# Patient Record
Sex: MALE | Race: BLACK OR AFRICAN AMERICAN | NOT HISPANIC OR LATINO | URBAN - METROPOLITAN AREA
[De-identification: names, ages, dates, MRNs, and addresses within clinical notes are randomized per-mention and may not be internally consistent; named-entity substitution may affect disease eponyms.]

---

## 2017-01-08 ENCOUNTER — EMERGENCY (EMERGENCY)
Facility: HOSPITAL | Age: 77
LOS: 0 days | Discharge: HOME | End: 2017-01-08
Attending: EMERGENCY MEDICINE
Payer: MEDICARE

## 2017-01-08 VITALS
RESPIRATION RATE: 18 BRPM | WEIGHT: 149.91 LBS | HEART RATE: 101 BPM | SYSTOLIC BLOOD PRESSURE: 138 MMHG | OXYGEN SATURATION: 98 % | TEMPERATURE: 98 F | DIASTOLIC BLOOD PRESSURE: 65 MMHG | HEIGHT: 73 IN

## 2017-01-08 VITALS
SYSTOLIC BLOOD PRESSURE: 133 MMHG | DIASTOLIC BLOOD PRESSURE: 61 MMHG | OXYGEN SATURATION: 98 % | RESPIRATION RATE: 18 BRPM | HEART RATE: 84 BPM

## 2017-01-08 DIAGNOSIS — Z21 ASYMPTOMATIC HUMAN IMMUNODEFICIENCY VIRUS [HIV] INFECTION STATUS: ICD-10-CM

## 2017-01-08 DIAGNOSIS — R41.82 ALTERED MENTAL STATUS, UNSPECIFIED: ICD-10-CM

## 2017-01-08 DIAGNOSIS — F03.90 UNSPECIFIED DEMENTIA, UNSPECIFIED SEVERITY, WITHOUT BEHAVIORAL DISTURBANCE, PSYCHOTIC DISTURBANCE, MOOD DISTURBANCE, AND ANXIETY: ICD-10-CM

## 2017-01-08 DIAGNOSIS — I10 ESSENTIAL (PRIMARY) HYPERTENSION: ICD-10-CM

## 2017-01-08 LAB
ALBUMIN SERPL ELPH-MCNC: 3.9 G/DL — SIGNIFICANT CHANGE UP (ref 3.3–5)
ALP SERPL-CCNC: 246 U/L — HIGH (ref 40–120)
ALT FLD-CCNC: 16 U/L — SIGNIFICANT CHANGE UP (ref 12–78)
ANION GAP SERPL CALC-SCNC: 7 MMOL/L — SIGNIFICANT CHANGE UP (ref 5–17)
APTT BLD: 33.4 SEC — SIGNIFICANT CHANGE UP (ref 27.5–37.4)
AST SERPL-CCNC: 24 U/L — SIGNIFICANT CHANGE UP (ref 15–37)
BILIRUB SERPL-MCNC: 0.5 MG/DL — SIGNIFICANT CHANGE UP (ref 0.2–1.2)
BUN SERPL-MCNC: 26 MG/DL — HIGH (ref 7–23)
CALCIUM SERPL-MCNC: 9.1 MG/DL — SIGNIFICANT CHANGE UP (ref 8.5–10.1)
CHLORIDE SERPL-SCNC: 105 MMOL/L — SIGNIFICANT CHANGE UP (ref 96–108)
CK SERPL-CCNC: 86 U/L — SIGNIFICANT CHANGE UP (ref 26–308)
CO2 SERPL-SCNC: 28 MMOL/L — SIGNIFICANT CHANGE UP (ref 22–31)
CREAT SERPL-MCNC: 1.38 MG/DL — HIGH (ref 0.5–1.3)
GLUCOSE SERPL-MCNC: 124 MG/DL — HIGH (ref 70–99)
HCT VFR BLD CALC: 36.6 % — LOW (ref 39–50)
HGB BLD-MCNC: 13.2 G/DL — SIGNIFICANT CHANGE UP (ref 13–17)
INR BLD: 1.04 RATIO — SIGNIFICANT CHANGE UP (ref 0.88–1.16)
MCHC RBC-ENTMCNC: 33.2 PG — SIGNIFICANT CHANGE UP (ref 27–34)
MCHC RBC-ENTMCNC: 36.2 GM/DL — HIGH (ref 32–36)
MCV RBC AUTO: 91.6 FL — SIGNIFICANT CHANGE UP (ref 80–100)
PLATELET # BLD AUTO: 192 K/UL — SIGNIFICANT CHANGE UP (ref 150–400)
POTASSIUM SERPL-MCNC: 4.6 MMOL/L — SIGNIFICANT CHANGE UP (ref 3.5–5.3)
POTASSIUM SERPL-SCNC: 4.6 MMOL/L — SIGNIFICANT CHANGE UP (ref 3.5–5.3)
PROT SERPL-MCNC: 9.3 GM/DL — HIGH (ref 6–8.3)
PROTHROM AB SERPL-ACNC: 11.7 SEC — SIGNIFICANT CHANGE UP (ref 10–13.1)
RBC # BLD: 3.99 M/UL — LOW (ref 4.2–5.8)
RBC # FLD: 12.1 % — SIGNIFICANT CHANGE UP (ref 11–15)
SODIUM SERPL-SCNC: 140 MMOL/L — SIGNIFICANT CHANGE UP (ref 135–145)
WBC # BLD: 9.7 K/UL — SIGNIFICANT CHANGE UP (ref 3.8–10.5)
WBC # FLD AUTO: 9.7 K/UL — SIGNIFICANT CHANGE UP (ref 3.8–10.5)

## 2017-01-08 PROCEDURE — 99284 EMERGENCY DEPT VISIT MOD MDM: CPT

## 2017-01-08 PROCEDURE — 71010: CPT | Mod: 26

## 2017-01-08 PROCEDURE — 70450 CT HEAD/BRAIN W/O DYE: CPT | Mod: 26

## 2017-01-08 NOTE — ED ADULT NURSE NOTE - PMH
Alzheimer's disease of other onset    HIV (human immunodeficiency virus infection)    HTN (hypertension)

## 2017-01-08 NOTE — ED PROVIDER NOTE - MEDICAL DECISION MAKING DETAILS
Patient with history of dementia presents after possible seizure; patient seen by neurology; stable; no seizure in ED, walked to bathroom with family; will follow up as outpatient

## 2017-01-08 NOTE — ED PROVIDER NOTE - OBJECTIVE STATEMENT
Patient with history of demetia, hiv presents with family presents after fall with possible seizure-like activity. Daughter denies history of seizure, fever, chills, neck pain.

## 2017-01-08 NOTE — ED ADULT NURSE NOTE - CHIEF COMPLAINT QUOTE
pt BIB paramedics for seizure like activity at home, was in kitchen stood up and fell back unknown if he struck head and started to shake once he hit the floor, denies seizure hx, HIV + compliant with meds, no neuro deficits noted and pt currently at baseline mental status, denies LOC,  in triage

## 2017-01-08 NOTE — ED ADULT TRIAGE NOTE - CHIEF COMPLAINT QUOTE
pt BIB paramedics for seizure like activity at home, was in kitchen stood up and fell back unknown if he struck head and started to shake once he hit the floor, denies seizure hx, HIV + compliant with meds, no neuro deficits noted and pt currently at baseline mental status, denies LOC pt BIB paramedics for seizure like activity at home, was in kitchen stood up and fell back unknown if he struck head and started to shake once he hit the floor, denies seizure hx, HIV + compliant with meds, no neuro deficits noted and pt currently at baseline mental status, denies LOC,  in triage

## 2017-01-08 NOTE — ED ADULT NURSE NOTE - OBJECTIVE STATEMENT
pt family at beside pt was at home washing dishes he vomited in the zinc, then fell back back and had seizure like activity, pt c/o chronic pain to the Rt arm, no visible bruise noted

## 2017-01-08 NOTE — ED ADULT NURSE REASSESSMENT NOTE - NS ED NURSE REASSESS COMMENT FT1
pt seen and re-evalautated  by Ed attending discharge ready in stable condition , left Ed ambulatory accompanied by wife and daughter in no acute distress

## 2017-01-09 DIAGNOSIS — R40.20 UNSPECIFIED COMA: ICD-10-CM

## 2017-01-11 ENCOUNTER — INPATIENT (INPATIENT)
Facility: HOSPITAL | Age: 77
LOS: 3 days | Discharge: HOME | End: 2017-01-15
Attending: INTERNAL MEDICINE | Admitting: INTERNAL MEDICINE
Payer: MEDICARE

## 2017-01-11 VITALS
DIASTOLIC BLOOD PRESSURE: 67 MMHG | OXYGEN SATURATION: 100 % | HEART RATE: 85 BPM | TEMPERATURE: 99 F | RESPIRATION RATE: 18 BRPM | SYSTOLIC BLOOD PRESSURE: 121 MMHG

## 2017-01-11 LAB
APTT BLD: 22.1 SEC — LOW (ref 27.5–37.4)
INR BLD: 1.01 RATIO — SIGNIFICANT CHANGE UP (ref 0.88–1.16)
LACTATE SERPL-SCNC: 1.1 MMOL/L — SIGNIFICANT CHANGE UP (ref 0.7–2)
PROTHROM AB SERPL-ACNC: 11.3 SEC — SIGNIFICANT CHANGE UP (ref 10–13.1)

## 2017-01-11 PROCEDURE — 99285 EMERGENCY DEPT VISIT HI MDM: CPT

## 2017-01-11 RX ORDER — SODIUM CHLORIDE 9 MG/ML
1000 INJECTION INTRAMUSCULAR; INTRAVENOUS; SUBCUTANEOUS ONCE
Qty: 0 | Refills: 0 | Status: COMPLETED | OUTPATIENT
Start: 2017-01-11 | End: 2017-01-11

## 2017-01-11 RX ORDER — IOHEXOL 300 MG/ML
30 INJECTION, SOLUTION INTRAVENOUS ONCE
Qty: 0 | Refills: 0 | Status: COMPLETED | OUTPATIENT
Start: 2017-01-11 | End: 2017-01-12

## 2017-01-11 RX ORDER — SODIUM CHLORIDE 9 MG/ML
3 INJECTION INTRAMUSCULAR; INTRAVENOUS; SUBCUTANEOUS ONCE
Qty: 0 | Refills: 0 | Status: COMPLETED | OUTPATIENT
Start: 2017-01-11 | End: 2017-01-11

## 2017-01-11 NOTE — ED ADULT TRIAGE NOTE - CHIEF COMPLAINT QUOTE
as per spouse pt appears to be off balance, diarhhea today, groin pain and lower back pain. Hx dementia

## 2017-01-11 NOTE — ED ADULT NURSE NOTE - OBJECTIVE STATEMENT
As per daughter, recently d/c do to syncopal episode on Sunday. Pt c/o of abdominal pain w/ Diarrhea x 6 episode As per family member, recently d/c do to syncopal episode on Sunday. Pt c/o of abdominal pain w/ Diarrhea x 6 episode

## 2017-01-12 DIAGNOSIS — M48.06 SPINAL STENOSIS, LUMBAR REGION: ICD-10-CM

## 2017-01-12 DIAGNOSIS — Z21 ASYMPTOMATIC HUMAN IMMUNODEFICIENCY VIRUS [HIV] INFECTION STATUS: ICD-10-CM

## 2017-01-12 DIAGNOSIS — K57.30 DIVERTICULOSIS OF LARGE INTESTINE WITHOUT PERFORATION OR ABSCESS WITHOUT BLEEDING: ICD-10-CM

## 2017-01-12 DIAGNOSIS — G20 PARKINSON'S DISEASE: ICD-10-CM

## 2017-01-12 DIAGNOSIS — B20 HUMAN IMMUNODEFICIENCY VIRUS [HIV] DISEASE: ICD-10-CM

## 2017-01-12 DIAGNOSIS — K52.9 NONINFECTIVE GASTROENTERITIS AND COLITIS, UNSPECIFIED: ICD-10-CM

## 2017-01-12 DIAGNOSIS — R19.7 DIARRHEA, UNSPECIFIED: ICD-10-CM

## 2017-01-12 DIAGNOSIS — G30.8 OTHER ALZHEIMER'S DISEASE: ICD-10-CM

## 2017-01-12 DIAGNOSIS — I10 ESSENTIAL (PRIMARY) HYPERTENSION: ICD-10-CM

## 2017-01-12 DIAGNOSIS — N18.9 CHRONIC KIDNEY DISEASE, UNSPECIFIED: ICD-10-CM

## 2017-01-12 LAB
ALBUMIN SERPL ELPH-MCNC: 3.3 G/DL — SIGNIFICANT CHANGE UP (ref 3.3–5)
ALP SERPL-CCNC: 167 U/L — HIGH (ref 40–120)
ALT FLD-CCNC: 39 U/L — SIGNIFICANT CHANGE UP (ref 12–78)
ANION GAP SERPL CALC-SCNC: 9 MMOL/L — SIGNIFICANT CHANGE UP (ref 5–17)
APPEARANCE UR: CLEAR — SIGNIFICANT CHANGE UP
AST SERPL-CCNC: 118 U/L — HIGH (ref 15–37)
BACTERIA # UR AUTO: ABNORMAL
BASOPHILS # BLD AUTO: 0.2 K/UL — SIGNIFICANT CHANGE UP (ref 0–0.2)
BASOPHILS NFR BLD AUTO: 1.6 % — SIGNIFICANT CHANGE UP (ref 0–2)
BILIRUB SERPL-MCNC: 0.3 MG/DL — SIGNIFICANT CHANGE UP (ref 0.2–1.2)
BILIRUB UR-MCNC: NEGATIVE — SIGNIFICANT CHANGE UP
BUN SERPL-MCNC: 26 MG/DL — HIGH (ref 7–23)
C DIFF BY PCR RESULT: SIGNIFICANT CHANGE UP
C DIFF TOX GENS STL QL NAA+PROBE: SIGNIFICANT CHANGE UP
CALCIUM SERPL-MCNC: 8.1 MG/DL — LOW (ref 8.5–10.1)
CHLORIDE SERPL-SCNC: 104 MMOL/L — SIGNIFICANT CHANGE UP (ref 96–108)
CO2 SERPL-SCNC: 24 MMOL/L — SIGNIFICANT CHANGE UP (ref 22–31)
COLOR SPEC: YELLOW — SIGNIFICANT CHANGE UP
COMMENT - URINE: SIGNIFICANT CHANGE UP
CREAT SERPL-MCNC: 1.31 MG/DL — HIGH (ref 0.5–1.3)
DIFF PNL FLD: ABNORMAL
EOSINOPHIL # BLD AUTO: 0 K/UL — SIGNIFICANT CHANGE UP (ref 0–0.5)
EOSINOPHIL NFR BLD AUTO: 0.1 % — SIGNIFICANT CHANGE UP (ref 0–6)
EPI CELLS # UR: SIGNIFICANT CHANGE UP
GLUCOSE SERPL-MCNC: 130 MG/DL — HIGH (ref 70–99)
GLUCOSE UR QL: NEGATIVE MG/DL — SIGNIFICANT CHANGE UP
GRAN CASTS # UR COMP ASSIST: ABNORMAL /LPF
HCT VFR BLD CALC: 32.6 % — LOW (ref 39–50)
HGB BLD-MCNC: 11.9 G/DL — LOW (ref 13–17)
HYALINE CASTS # UR AUTO: ABNORMAL /LPF
KETONES UR-MCNC: NEGATIVE — SIGNIFICANT CHANGE UP
LEUKOCYTE ESTERASE UR-ACNC: NEGATIVE — SIGNIFICANT CHANGE UP
LIDOCAIN IGE QN: 166 U/L — SIGNIFICANT CHANGE UP (ref 73–393)
LYMPHOCYTES # BLD AUTO: 26.9 % — SIGNIFICANT CHANGE UP (ref 13–44)
LYMPHOCYTES # BLD AUTO: 3.1 K/UL — SIGNIFICANT CHANGE UP (ref 1–3.3)
MCHC RBC-ENTMCNC: 33.2 PG — SIGNIFICANT CHANGE UP (ref 27–34)
MCHC RBC-ENTMCNC: 36.6 GM/DL — HIGH (ref 32–36)
MCV RBC AUTO: 90.8 FL — SIGNIFICANT CHANGE UP (ref 80–100)
MONOCYTES # BLD AUTO: 1 K/UL — HIGH (ref 0–0.9)
MONOCYTES NFR BLD AUTO: 8.5 % — SIGNIFICANT CHANGE UP (ref 2–14)
NEUTROPHILS # BLD AUTO: 7.2 K/UL — SIGNIFICANT CHANGE UP (ref 1.8–7.4)
NEUTROPHILS NFR BLD AUTO: 62.9 % — SIGNIFICANT CHANGE UP (ref 43–77)
NITRITE UR-MCNC: NEGATIVE — SIGNIFICANT CHANGE UP
PH UR: 5 — SIGNIFICANT CHANGE UP (ref 4.8–8)
PLATELET # BLD AUTO: 165 K/UL — SIGNIFICANT CHANGE UP (ref 150–400)
POTASSIUM SERPL-MCNC: 4.2 MMOL/L — SIGNIFICANT CHANGE UP (ref 3.5–5.3)
POTASSIUM SERPL-SCNC: 4.2 MMOL/L — SIGNIFICANT CHANGE UP (ref 3.5–5.3)
PROT SERPL-MCNC: 8.7 GM/DL — HIGH (ref 6–8.3)
PROT UR-MCNC: 30 MG/DL
RBC # BLD: 3.6 M/UL — LOW (ref 4.2–5.8)
RBC # FLD: 11.8 % — SIGNIFICANT CHANGE UP (ref 11–15)
RBC CASTS # UR COMP ASSIST: SIGNIFICANT CHANGE UP /HPF (ref 0–4)
SODIUM SERPL-SCNC: 137 MMOL/L — SIGNIFICANT CHANGE UP (ref 135–145)
SP GR SPEC: 1.01 — SIGNIFICANT CHANGE UP (ref 1.01–1.02)
UROBILINOGEN FLD QL: NEGATIVE MG/DL — SIGNIFICANT CHANGE UP
WBC # BLD: 11.4 K/UL — HIGH (ref 3.8–10.5)
WBC # FLD AUTO: 11.4 K/UL — HIGH (ref 3.8–10.5)

## 2017-01-12 PROCEDURE — 74177 CT ABD & PELVIS W/CONTRAST: CPT | Mod: 26

## 2017-01-12 PROCEDURE — 99223 1ST HOSP IP/OBS HIGH 75: CPT

## 2017-01-12 PROCEDURE — 99223 1ST HOSP IP/OBS HIGH 75: CPT | Mod: AI

## 2017-01-12 PROCEDURE — 99233 SBSQ HOSP IP/OBS HIGH 50: CPT | Mod: 25

## 2017-01-12 RX ORDER — AMLODIPINE BESYLATE 2.5 MG/1
5 TABLET ORAL DAILY
Qty: 0 | Refills: 0 | Status: DISCONTINUED | OUTPATIENT
Start: 2017-01-12 | End: 2017-01-15

## 2017-01-12 RX ORDER — DIPHENHYDRAMINE HCL 50 MG
50 CAPSULE ORAL ONCE
Qty: 0 | Refills: 0 | Status: DISCONTINUED | OUTPATIENT
Start: 2017-01-12 | End: 2017-01-12

## 2017-01-12 RX ORDER — HEPARIN SODIUM 5000 [USP'U]/ML
5000 INJECTION INTRAVENOUS; SUBCUTANEOUS EVERY 12 HOURS
Qty: 0 | Refills: 0 | Status: DISCONTINUED | OUTPATIENT
Start: 2017-01-12 | End: 2017-01-15

## 2017-01-12 RX ORDER — ACETAMINOPHEN 500 MG
650 TABLET ORAL EVERY 6 HOURS
Qty: 0 | Refills: 0 | Status: DISCONTINUED | OUTPATIENT
Start: 2017-01-12 | End: 2017-01-15

## 2017-01-12 RX ORDER — SODIUM CHLORIDE 9 MG/ML
1000 INJECTION INTRAMUSCULAR; INTRAVENOUS; SUBCUTANEOUS
Qty: 0 | Refills: 0 | Status: DISCONTINUED | OUTPATIENT
Start: 2017-01-12 | End: 2017-01-15

## 2017-01-12 RX ORDER — ACETAMINOPHEN 500 MG
650 TABLET ORAL EVERY 6 HOURS
Qty: 0 | Refills: 0 | Status: DISCONTINUED | OUTPATIENT
Start: 2017-01-12 | End: 2017-01-12

## 2017-01-12 RX ORDER — EMTRICITABINE AND TENOFOVIR DISOPROXIL FUMARATE 200; 300 MG/1; MG/1
1 TABLET, FILM COATED ORAL DAILY
Qty: 0 | Refills: 0 | Status: DISCONTINUED | OUTPATIENT
Start: 2017-01-12 | End: 2017-01-15

## 2017-01-12 RX ORDER — RITONAVIR 100 MG/1
100 TABLET, FILM COATED ORAL DAILY
Qty: 0 | Refills: 0 | Status: DISCONTINUED | OUTPATIENT
Start: 2017-01-12 | End: 2017-01-15

## 2017-01-12 RX ORDER — CIPROFLOXACIN LACTATE 400MG/40ML
200 VIAL (ML) INTRAVENOUS EVERY 12 HOURS
Qty: 0 | Refills: 0 | Status: DISCONTINUED | OUTPATIENT
Start: 2017-01-12 | End: 2017-01-13

## 2017-01-12 RX ORDER — METRONIDAZOLE 500 MG
TABLET ORAL
Qty: 0 | Refills: 0 | Status: DISCONTINUED | OUTPATIENT
Start: 2017-01-12 | End: 2017-01-13

## 2017-01-12 RX ORDER — SODIUM CHLORIDE 9 MG/ML
1000 INJECTION INTRAMUSCULAR; INTRAVENOUS; SUBCUTANEOUS ONCE
Qty: 0 | Refills: 0 | Status: COMPLETED | OUTPATIENT
Start: 2017-01-12 | End: 2017-01-12

## 2017-01-12 RX ORDER — DARUNAVIR 75 MG/1
800 TABLET, FILM COATED ORAL DAILY
Qty: 0 | Refills: 0 | Status: DISCONTINUED | OUTPATIENT
Start: 2017-01-12 | End: 2017-01-15

## 2017-01-12 RX ORDER — METRONIDAZOLE 500 MG
500 TABLET ORAL EVERY 8 HOURS
Qty: 0 | Refills: 0 | Status: DISCONTINUED | OUTPATIENT
Start: 2017-01-12 | End: 2017-01-13

## 2017-01-12 RX ORDER — METRONIDAZOLE 500 MG
500 TABLET ORAL ONCE
Qty: 0 | Refills: 0 | Status: COMPLETED | OUTPATIENT
Start: 2017-01-12 | End: 2017-01-12

## 2017-01-12 RX ORDER — DIPHENHYDRAMINE HCL 50 MG
25 CAPSULE ORAL ONCE
Qty: 0 | Refills: 0 | Status: COMPLETED | OUTPATIENT
Start: 2017-01-12 | End: 2017-01-12

## 2017-01-12 RX ORDER — DONEPEZIL HYDROCHLORIDE 10 MG/1
10 TABLET, FILM COATED ORAL AT BEDTIME
Qty: 0 | Refills: 0 | Status: DISCONTINUED | OUTPATIENT
Start: 2017-01-12 | End: 2017-01-15

## 2017-01-12 RX ORDER — OLANZAPINE 15 MG/1
2.5 TABLET, FILM COATED ORAL DAILY
Qty: 0 | Refills: 0 | Status: DISCONTINUED | OUTPATIENT
Start: 2017-01-12 | End: 2017-01-15

## 2017-01-12 RX ADMIN — DARUNAVIR 800 MILLIGRAM(S): 75 TABLET, FILM COATED ORAL at 14:05

## 2017-01-12 RX ADMIN — DONEPEZIL HYDROCHLORIDE 10 MILLIGRAM(S): 10 TABLET, FILM COATED ORAL at 22:39

## 2017-01-12 RX ADMIN — Medication 100 MILLIGRAM(S): at 18:05

## 2017-01-12 RX ADMIN — SODIUM CHLORIDE 75 MILLILITER(S): 9 INJECTION INTRAMUSCULAR; INTRAVENOUS; SUBCUTANEOUS at 18:06

## 2017-01-12 RX ADMIN — EMTRICITABINE AND TENOFOVIR DISOPROXIL FUMARATE 1 TABLET(S): 200; 300 TABLET, FILM COATED ORAL at 14:47

## 2017-01-12 RX ADMIN — SODIUM CHLORIDE 2000 MILLILITER(S): 9 INJECTION INTRAMUSCULAR; INTRAVENOUS; SUBCUTANEOUS at 05:37

## 2017-01-12 RX ADMIN — Medication 650 MILLIGRAM(S): at 14:42

## 2017-01-12 RX ADMIN — Medication 650 MILLIGRAM(S): at 14:12

## 2017-01-12 RX ADMIN — Medication 650 MILLIGRAM(S): at 08:37

## 2017-01-12 RX ADMIN — Medication 100 MILLIGRAM(S): at 07:24

## 2017-01-12 RX ADMIN — SODIUM CHLORIDE 3 MILLILITER(S): 9 INJECTION INTRAMUSCULAR; INTRAVENOUS; SUBCUTANEOUS at 00:26

## 2017-01-12 RX ADMIN — Medication 100 MILLIGRAM(S): at 14:07

## 2017-01-12 RX ADMIN — SODIUM CHLORIDE 75 MILLILITER(S): 9 INJECTION INTRAMUSCULAR; INTRAVENOUS; SUBCUTANEOUS at 07:24

## 2017-01-12 RX ADMIN — HEPARIN SODIUM 5000 UNIT(S): 5000 INJECTION INTRAVENOUS; SUBCUTANEOUS at 18:06

## 2017-01-12 RX ADMIN — SODIUM CHLORIDE 2000 MILLILITER(S): 9 INJECTION INTRAMUSCULAR; INTRAVENOUS; SUBCUTANEOUS at 00:26

## 2017-01-12 RX ADMIN — IOHEXOL 30 MILLILITER(S): 300 INJECTION, SOLUTION INTRAVENOUS at 00:26

## 2017-01-12 RX ADMIN — AMLODIPINE BESYLATE 5 MILLIGRAM(S): 2.5 TABLET ORAL at 14:06

## 2017-01-12 RX ADMIN — OLANZAPINE 2.5 MILLIGRAM(S): 15 TABLET, FILM COATED ORAL at 14:04

## 2017-01-12 RX ADMIN — Medication 100 MILLIGRAM(S): at 22:39

## 2017-01-12 RX ADMIN — RITONAVIR 100 MILLIGRAM(S): 100 TABLET, FILM COATED ORAL at 14:46

## 2017-01-12 RX ADMIN — Medication 650 MILLIGRAM(S): at 23:47

## 2017-01-12 RX ADMIN — Medication 25 MILLIGRAM(S): at 23:47

## 2017-01-12 RX ADMIN — Medication 1 TABLET(S): at 14:05

## 2017-01-12 NOTE — H&P ADULT. - ASSESSMENT
75 y/o M with HTN, HIV, dementia with behavior disturbance (agitation sometimes) p/w diarrhea for 2 days, CT abd consistent with possible sigmoid colitis, dehydration

## 2017-01-12 NOTE — H&P ADULT. - NEUROLOGICAL DETAILS
cranial nerves intact/AAOX1, confused- baseline mental status as per wife/responds to verbal commands

## 2017-01-12 NOTE — PROGRESS NOTE ADULT - SUBJECTIVE AND OBJECTIVE BOX
Patient is a 76y old  Male who presents with a chief complaint of Diarrhea (2017 06:00)    HPI:  75 y/o M with PMH of HIV, HTN, dementia brought in with c/o LBM for 1-2 days resulting in generalized weakness, and off balance while walking, patient denies nausea, vomiting, abdominal pain, fever, chills, As per wife his baseline mental status is confused, previously patient was non compliant to medications but for last few months he is compliant with HAART and wife makes sure he takes it regularly.  Patient denies cp, sob, dizziness at this time.  Pt. never had colonoscopy as per family.  Patient was in ED on 17 for an episode of vomiting followed by fall and possible seizure. (2017 06:00)       INTERVAL HPI/OVERNIGHT EVENTS:        Allergies    No Known Allergies    Intolerances        Vital Signs Last 24 Hrs  T(C): 36.7, Max: 38.1 ( @ 08:03)  T(F): 98.1, Max: 100.6 ( @ 08:03)  HR: 74 (74 - 94)  BP: 107/63 (107/63 - 130/75)  BP(mean): --  RR: 18 (18 - 18)  SpO2: 100% (96% - 100%)  CAPILLARY BLOOD GLUCOSE      I & Os for current day (as of  @ 17:33)  =============================================  IN: 1075 ml / OUT: 350 ml / NET: 725 ml          PHYSICAL EXAMINATION:  PHYSICAL EXAM:  GENERAL: NAD,   HEAD:  Atraumatic, Normocephalic  EYES: conjunctiva and sclera clear  ENMT: No tonsillar erythema, exudates, or enlargement; Moist mucous membranes, Good dentition, No lesions  NECK:  No JVD,no bruit  NERVOUS SYSTEM: cogwheeling rigidity both upper ext, confused  CHEST/LUNG: Clear anteriorly bilaterally; No rales, rhonchi, wheezing, or rubs  HEART: Regular rate and rhythm; No murmurs, rubs, or gallops  ABDOMEN: Soft, Nontender, Nondistended; Bowel sounds present  EXTREMITIES:  2 DP Pulses, No clubbing, cyanosis, or edema          LABS:                        11.9   11.4  )-----------( 165      ( 2017 23:32 )             32.6     2017 23:32    137    |  104    |  26     ----------------------------<  130    4.2     |  24     |  1.31     Ca    8.1        2017 23:32    TPro  8.7    /  Alb  3.3    /  TBili  0.3    /  DBili  x      /  AST  118    /  ALT  39     /  AlkPhos  167    2017 23:32    PT/INR - ( 2017 23:32 )   PT: 11.3 sec;   INR: 1.01 ratio         PTT - ( 2017 23:32 )  PTT:22.1 sec  Urinalysis Basic - ( 2017 00:57 )    Color: Yellow / Appearance: Clear / S.015 / pH: x  Gluc: x / Ketone: Negative  / Bili: Negative / Urobili: Negative mg/dL   Blood: x / Protein: 30 mg/dL / Nitrite: Negative   Leuk Esterase: Negative / RBC: 0-2 /HPF / WBC x   Sq Epi: x / Non Sq Epi: Occasional / Bacteria: Few                    RADIOLOGY & ADDITIONAL TESTS:        MEDICATIONS  (STANDING):  OLANZapine 2.5milliGRAM(s) Oral daily  amLODIPine   Tablet 5milliGRAM(s) Oral daily  donepezil 10milliGRAM(s) Oral at bedtime  emtricitabine 200 mG/tenofovir 300 mG 1Tablet(s) Oral daily  darunavir 800milliGRAM(s) Oral daily  ritonavir Tablet 100milliGRAM(s) Oral daily  trimethoprim  160 mG/sulfamethoxazole 800 mG 1Tablet(s) Oral every 48 hours  ciprofloxacin   IVPB 200milliGRAM(s) IV Intermittent every 12 hours  metroNIDAZOLE  IVPB  IV Intermittent   sodium chloride 0.9%. 1000milliLiter(s) IV Continuous <Continuous>  heparin  Injectable 5000Unit(s) SubCutaneous every 12 hours  metroNIDAZOLE  IVPB 500milliGRAM(s) IV Intermittent every 8 hours    MEDICATIONS  (PRN):  acetaminophen   Tablet 650milliGRAM(s) Oral every 6 hours PRN For Temp greater than 38 C (100.4 F)  acetaminophen   Tablet. 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  Chronic kidney disease, unspecified stage: Chronic kidney disease, unspecified stage  CKD (chronic kidney disease): CKD (chronic kidney disease)  Alzheimer&#x27;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 disturbance: Alzheimer&#x27;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 disturbance  Essential hypertension: Essential hypertension  HIV (human immunodeficiency virus infection): HIV (human immunodeficiency virus infection)  Diarrhea with dehydration: Diarrhea with dehydration  HIV (human immunodeficiency virus infection)        Comprehensive Metabolic Panel  Complete Blood Count + Automated Diff  Lactate, Blood  Lipase, Serum  Activated Partial Thromboplastin Time  Prothrombin Time and INR, Plasma  Urinalysis  sodium chloride 0.9% lock flush [completed]  sodium chloride 0.9% Bolus [completed]  iohexol 300 mG (iodine)/mL Oral Solution [completed]  Clostridium difficile Toxin by PCR  CT Abdomen and Pelvis w/ Oral Cont and w/ IV Cont  Urine Microscopic-Add On (NC)  sodium chloride 0.9% Bolus [completed]  HIV-1 RNA Quantitative, Viral Load  Ova and Parasites  Full T Cell Subset  12 Lead ECG  OLANZapine  amLODIPine   Tablet  donepezil  emtricitabine 200 mG/tenofovir 300 mG  darunavir  ritonavir Tablet  trimethoprim  160 mG/sulfamethoxazole 800 mG  ciprofloxacin   IVPB  metroNIDAZOLE  IVPB  sodium chloride 0.9%.  Diet, Full Liquid  metroNIDAZOLE  IVPB [completed]  metroNIDAZOLE  IVPB  heparin  Injectable  acetaminophen   Tablet  acetaminophen   Tablet.  acetaminophen   Tablet [discontinued]  Culture - Acid Fast - Stool w/Smear  Cryptosporidium Smear  Giardia lamblia Antigen, Stool

## 2017-01-12 NOTE — CONSULT NOTE ADULT - SUBJECTIVE AND OBJECTIVE BOX
Infectious Diseases - Attending at Dr. Thacker    HPI:  75 y/o M with PMH of HIV, HTN, dementia brought in with c/o LEAH eric 17.Pt is incontinenet right now. He comes in with  generalized weakness, and off balance while walking, patient denies nausea, vomiting, abdominal pain, fever, chills, As per wife his baseline mental status is confused, previously patient was non compliant to medications but for last few months he is compliant with HAART and wife makes sure he takes it regularly.  Patient denies cp, sob, dizziness at this time.  Pt. never had colonoscopy as per family.  Patient was in ED on 17 for an episode of vomiting followed by fall and possible seizure. (2017 06:00)      PAST MEDICAL & SURGICAL HISTORY:  Alzheimer&#x27;s disease of other onset  HTN (hypertension)  HIV (human immunodeficiency virus infection)  No significant past surgical history      Allergies    No Known Allergies    Intolerances        FAMILY HISTORY:  No pertinent family history in first degree relatives      SOCIAL HISTORY:No smoking    REVIEW OF SYSTEMS:    Constitutional: No fever,+ weight loss or fatigue  Eyes: No eye pain, visual disturbances, or discharge  ENT:  No difficulty hearing, tinnitus, vertigo; No sinus or throat pain  Neck: No pain or stiffness  Breasts: No pain, masses or nipple discharge  Respiratory: No cough, wheezing, chills or hemoptysis  Cardiovascular: No chest pain, palpitations, shortness of breath, dizziness or leg swelling  Gastrointestinal: + abdominal or epigastric pain. No nausea, vomiting or hematemesis, +diarrhea No melena or hematochezia.  Genitourinary: No dysuria, frequency, hematuria or incontinence  Rectal: No pain, hemorrhoids or incontinence  Neurological: Confused,likely memory loss  Skin: No itching, burning, rashes or lesions       MEDICATIONS  (STANDING):  OLANZapine 2.5milliGRAM(s) Oral daily  amLODIPine   Tablet 5milliGRAM(s) Oral daily  donepezil 10milliGRAM(s) Oral at bedtime  emtricitabine 200 mG/tenofovir 300 mG 1Tablet(s) Oral daily  darunavir 800milliGRAM(s) Oral daily  ritonavir Tablet 100milliGRAM(s) Oral daily  trimethoprim  160 mG/sulfamethoxazole 800 mG 1Tablet(s) Oral every 48 hours  ciprofloxacin   IVPB 200milliGRAM(s) IV Intermittent every 12 hours  metroNIDAZOLE  IVPB  IV Intermittent   sodium chloride 0.9%. 1000milliLiter(s) IV Continuous <Continuous>  heparin  Injectable 5000Unit(s) SubCutaneous every 12 hours  metroNIDAZOLE  IVPB 500milliGRAM(s) IV Intermittent every 8 hours    MEDICATIONS  (PRN):  acetaminophen   Tablet 650milliGRAM(s) Oral every 6 hours PRN For Temp greater than 38 C (100.4 F)  acetaminophen   Tablet. 650milliGRAM(s) Oral every 6 hours PRN Mild Pain (1 - 3)      Vital Signs Last 24 Hrs  T(C): 36.7, Max: 38.1 ( @ 08:03)  T(F): 98.1, Max: 100.6 ( @ 08:03)  HR: 74 (74 - 94)  BP: 107/63 (107/63 - 130/75)  BP(mean): --  RR: 18 (18 - 18)  SpO2: 100% (96% - 100%)    PHYSICAL EXAM:    Constitutional: NAD, well-groomed, well-developed  HEENT: PERRLA, EOMI, Normal Hearing, MMM  Neck: No LAD, No JVD  Back: Normal spine flexure, No CVA tenderness  Respiratory: CTAB/L   Cardiovascular: S1 and S2, RRR, no M/G/R  Gastrointestinal: BS+, mild distension ,tendereness +  Extremities: No peripheral edema  Vascular: 2+ peripheral pulses  Neurological: confused,no focal deficit      LABS:                        11.9   11.4  )-----------( 165      ( 2017 23:32 )             32.6     2017 23:32    137    |  104    |  26     ----------------------------<  130    4.2     |  24     |  1.31     Ca    8.1        2017 23:32    TPro  8.7    /  Alb  3.3    /  TBili  0.3    /  DBili  x      /  AST  118    /  ALT  39     /  AlkPhos  167    2017 23:32    PT/INR - ( 2017 23:32 )   PT: 11.3 sec;   INR: 1.01 ratio         PTT - ( 2017 23:32 )  PTT:22.1 sec  Urinalysis Basic - ( 2017 00:57 )    Color: Yellow / Appearance: Clear / S.015 / pH: x  Gluc: x / Ketone: Negative  / Bili: Negative / Urobili: Negative mg/dL   Blood: x / Protein: 30 mg/dL / Nitrite: Negative   Leuk Esterase: Negative / RBC: 0-2 /HPF / WBC x   Sq Epi: x / Non Sq Epi: Occasional / Bacteria: Few        MICROBIOLOGY:      RADIOLOGY & ADDITIONAL STUDIES:

## 2017-01-12 NOTE — ED PROVIDER NOTE - PHYSICAL EXAMINATION
General:     NAD, well-nourished, well-appearing  Head:     NC/AT, EOMI, oral mucosa moist  Neck:     trachea midline  Lungs:     CTA b/l, no w/r/r  CVS:     S1S2, RRR, no m/g/r  Abd:     +BS, ttp @ bilateral lower abd, s/nd, no organomegaly  Ext:    2+ radial and pedal pulses, no c/c/e  Neuro: AAOx3, no sensory/motor deficits

## 2017-01-12 NOTE — PROGRESS NOTE ADULT - ASSESSMENT
77 y/o M with HTN, HIV, dementia with behavior disturbance (agitation sometimes) p/w diarrhea for 2 days, CT abd consistent with possible sigmoid colitis, dehydration

## 2017-01-12 NOTE — H&P ADULT. - HISTORY OF PRESENT ILLNESS
77 y/o M with PMH of HIV, HTN, dementia brought in with c/o LBM for 1-2 days resulting in generalized weakness, and off balance while walking, patient denies nausea, vomiting, abdominal pain, fever, chills, As per wife his baseline mental status is confused, previously patient was non compliant to medications but for last few months he is compliant with HAART and wife makes sure he takes it regularly.  Patient denies cp, sob, dizziness at this time.  Pt. never had colonoscopy as per family.  Patient was in ED on 1/8/17 for an episode of vomiting followed by fall and possible seizure.

## 2017-01-12 NOTE — CONSULT NOTE ADULT - PROBLEM SELECTOR RECOMMENDATION 9
f/u on stool c/s,ova and parasite  Sp [employer] stating for oppurtunisitic organsim likely cryptosporidium ,isospora and cyclospora etc.  F/U c diff though less likely as not on antibiotics recently. On cipro /flagyl? Colitis on CT

## 2017-01-12 NOTE — ED ADULT NURSE REASSESSMENT NOTE - NS ED NURSE REASSESS COMMENT FT1
pt awake, alert, pt being cleaned at this time. family at bedside. no acute distress noted. awaiting admit

## 2017-01-12 NOTE — CONSULT NOTE ADULT - SUBJECTIVE AND OBJECTIVE BOX
Chief Complaint:  Patient is a 76y old  Male who presents with a chief complaint of Diarrhea (2017 06:00)      HPI: Abdominal pain and diarrhea for 1 week. No vomiting, fever ,blood in stool. No recent use of antibiotics.    No Known Allergies      OLANZapine 2.5milliGRAM(s) Oral daily  amLODIPine   Tablet 5milliGRAM(s) Oral daily  donepezil 10milliGRAM(s) Oral at bedtime  emtricitabine 200 mG/tenofovir 300 mG 1Tablet(s) Oral daily  darunavir 800milliGRAM(s) Oral daily  ritonavir Tablet 100milliGRAM(s) Oral daily  trimethoprim  160 mG/sulfamethoxazole 800 mG 1Tablet(s) Oral every 48 hours  ciprofloxacin   IVPB 200milliGRAM(s) IV Intermittent every 12 hours  metroNIDAZOLE  IVPB  IV Intermittent   sodium chloride 0.9%. 1000milliLiter(s) IV Continuous <Continuous>  heparin  Injectable 5000Unit(s) SubCutaneous every 12 hours  metroNIDAZOLE  IVPB 500milliGRAM(s) IV Intermittent every 8 hours  acetaminophen   Tablet 650milliGRAM(s) Oral every 6 hours PRN  acetaminophen   Tablet. 650milliGRAM(s) Oral every 6 hours PRN      PMH/PSH: No pertinent family history in first degree relatives  Alzheimer;s disease of other onset  HTN (hypertension)  HIV (human immunodeficiency virus infection)  No significant past surgical history        Review of Systems:    General:  No wt loss, fevers, chills, night sweats,fatigue,   Eyes:  Good vision,   ENT:  No sore throat,    CV:  No pain, palpitatioins, hypo/hypertension  Resp:  No dyspnea, cough, tachypnea, wheezing  GI:  No pain, No nausea, No vomiting, + diarrhea, No constipatiion, No weight loss, No fever, No pruritis, No rectal bleeding, No tarry stools, No dysphagia,  :  No pain, bleeding, incontinence, nocturia  Muscle:  No pain, weakness  Breast:  No pain, abscess, mass, discharge  Neuro:  No weakness, tingling, memory problems  Psych:  No fatigue, insomnia, mood problems, depression  Endocrine:  No polyuria, polydypsia, cold/heat intolerance  Heme:  No petechiae, ecchymosis, easy bruisability  Skin:  No rash, tattoos, scars, edema      Physical Exam:    Vital Signs:  Vital Signs Last 24 Hrs  T(C): 36.7, Max: 38.1 ( @ 08:03)  T(F): 98.1, Max: 100.6 ( @ 08:03)  HR: 74 (74 - 94)  BP: 107/63 (107/63 - 130/75)  BP(mean): --  RR: 18 (18 - 18)  SpO2: 100% (96% - 100%)  Daily Height in cm: 182.88 (2017 11:15)    Daily Weight in k (2017 11:15)    General:  Appears stated age, well-groomed, well-nourished, no distress  HEENT:  NC/AT,  conjunctivae clear and pink, no thyromegaly, nodules, adenopathy, no JVD  Chest:  Full & symmetric excursion, no increased effort, breath sounds clear  Cardiovascular:  Regular rhythm, S1, S2, no murmur/rub/S3/S4, no abdominal bruit, no edema  Abdomen:  Soft, non-tender, +distended, normoactive bowel sounds,  no masses ,no hepatosplenomegaly, no signs of chronic liver disease  Extremities:  no cyanosis, clubbing or edema  Skin:  No rash/erythema/ecchymoses/petechiae/wounds/abscess/warm/dry  Neuro/Psych:  Alert, oriented, no asterixis, no tremor, no encephalopathy    Laboratory:                            11.9   11.4  )-----------( 165      ( 2017 23:32 )             32.6     2017 23:32    137    |  104    |  26     ----------------------------<  130    4.2     |  24     |  1.31     Ca    8.1        2017 23:32    TPro  8.7    /  Alb  3.3    /  TBili  0.3    /  DBili  x      /  AST  118    /  ALT  39     /  AlkPhos  167    2017 23:32    LIVER FUNCTIONS - ( 2017 23:32 )  Alb: 3.3 g/dL / Pro: 8.7 gm/dL / ALK PHOS: 167 U/L / ALT: 39 U/L / AST: 118 U/L / GGT: x           PT/INR - ( 2017 23:32 )   PT: 11.3 sec;   INR: 1.01 ratio         PTT - ( 2017 23:32 )  PTT:22.1 sec  Urinalysis Basic - ( 2017 00:57 )    Color: Yellow / Appearance: Clear / S.015 / pH: x  Gluc: x / Ketone: Negative  / Bili: Negative / Urobili: Negative mg/dL   Blood: x / Protein: 30 mg/dL / Nitrite: Negative   Leuk Esterase: Negative / RBC: 0-2 /HPF / WBC x   Sq Epi: x / Non Sq Epi: Occasional / Bacteria: Few      Lipase, Serum: 166 U/L ( @ 23:32)  amyl    Imaging:  EXAM:  CT ABDOMEN AND PELVIS OC IC                            PROCEDURE DATE:  2017        INTERPRETATION:  CLINICAL INFORMATION: Abdominal pain. Diarrhea.    TECHNIQUE: CT imaging of the abdomen and pelvis was performed with IV and   oral contrast. 90 mL of Omnipaque 350 was injected intravenously. 10 mL   was discarded.    COMPARISON: There are no prior studies available for comparison.      FINDINGS:    The heart and lung bases are unremarkable.    The liver, spleen, pancreas, gallbladder and biliary tree are   unremarkable.    The kidneys enhance symmetrically without hydronephrosis. Subcentimeter   hypodense lesions in the right kidney that are too small to characterize.   The adrenal glands are unremarkable.    Question wall thickening versus under distention of the mid sigmoid colon   (sequence 2, image 74). There is no bowel obstruction or ascites. There   are scattered colonic diverticula. The appendix is normal.    The bladder, prostate and seminal vesicles are unremarkable.    There is no abdominal or pelvic lymphadenopathy.    The aorta and IVC are unremarkable.    Moderate to severe degenerative changes of the lumbar spine, worst at   L4-5 where there is moderate to severe central canal stenosis. Cortical   thickening and lucency involving the L2 and L3 vertebral bodies along   with their posterior elements suggestive of Pagets disease.      IMPRESSION:    No evidence of small bowel obstruction or active bowel inflammation.    Question wall thickening versus under distention of the mid sigmoid   colon. Correlation with colonoscopy suggested to exclude underlying   lesion.    Moderate to severe degenerative changes of the lumbar spine, worst at   L4-5 where there is moderate to severe central canal stenosis. MRI   suggested for further characterization.    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              OSIRIS MUSA M.D., ATTENDING RADIOLOGIST  This document has been electronically signed. 2017  3:30AM

## 2017-01-12 NOTE — ED PROVIDER NOTE - OBJECTIVE STATEMENT
76yoM; with pmh signif for HIV (non-compliant with medications), Dementia, HTN, HLD; now p/w diarrhea (x3 days)--watery diarrhea, x10 episodes daily, non-bloody. c/o abd pain--bilateral lower abd pain, cramping, intermittent. denies n/v. denies f/c/s. denies sick contacts. denies travel hx. denies unusual PO intake.

## 2017-01-12 NOTE — H&P ADULT. - PROBLEM SELECTOR PLAN 1
admit to med/surg floor  IV hydration  IV cipro/flagyl  full liquid diet  stool for ova/parasite and C- diff toxin  GI eval

## 2017-01-12 NOTE — ED PROVIDER NOTE - CARE PLAN
Principal Discharge DX:	Diarrhea with dehydration  Secondary Diagnosis:	HIV (human immunodeficiency virus infection)

## 2017-01-12 NOTE — H&P ADULT. - GASTROINTESTINAL DETAILS
no rebound tenderness/no masses palpable/no distention/soft/no guarding/nontender/no rigidity/bowel sounds normal

## 2017-01-13 DIAGNOSIS — A09 INFECTIOUS GASTROENTERITIS AND COLITIS, UNSPECIFIED: ICD-10-CM

## 2017-01-13 DIAGNOSIS — N17.9 ACUTE KIDNEY FAILURE, UNSPECIFIED: ICD-10-CM

## 2017-01-13 LAB
4/8 RATIO: 0.15 RATIO — LOW (ref 0.9–3.6)
ABS CD8: 1463 /UL — HIGH (ref 136–757)
ANION GAP SERPL CALC-SCNC: 8 MMOL/L — SIGNIFICANT CHANGE UP (ref 5–17)
BUN SERPL-MCNC: 10 MG/DL — SIGNIFICANT CHANGE UP (ref 7–23)
CALCIUM SERPL-MCNC: 8.4 MG/DL — LOW (ref 8.5–10.1)
CD16+CD56+ CELLS NFR BLD: 10 % — SIGNIFICANT CHANGE UP (ref 4–25)
CD16+CD56+ CELLS NFR SPEC: 219 /UL — SIGNIFICANT CHANGE UP (ref 64–494)
CD19 BLASTS SPEC-ACNC: 10 % — SIGNIFICANT CHANGE UP (ref 5–22)
CD19 BLASTS SPEC-ACNC: 212 /UL — SIGNIFICANT CHANGE UP (ref 68–528)
CD3 BLASTS SPEC-ACNC: 1673 /UL — SIGNIFICANT CHANGE UP (ref 799–2171)
CD3 BLASTS SPEC-ACNC: 78 % — SIGNIFICANT CHANGE UP (ref 59–85)
CD4 %: 10 % — LOW (ref 36–65)
CD8 %: 67 % — HIGH (ref 11–36)
CHLORIDE SERPL-SCNC: 107 MMOL/L — SIGNIFICANT CHANGE UP (ref 96–108)
CO2 SERPL-SCNC: 25 MMOL/L — SIGNIFICANT CHANGE UP (ref 22–31)
CREAT SERPL-MCNC: 0.97 MG/DL — SIGNIFICANT CHANGE UP (ref 0.5–1.3)
CULTURE RESULTS: SIGNIFICANT CHANGE UP
GLUCOSE SERPL-MCNC: 109 MG/DL — HIGH (ref 70–99)
HCT VFR BLD CALC: 35.3 % — LOW (ref 39–50)
HGB BLD-MCNC: 12.8 G/DL — LOW (ref 13–17)
HIV1 RNA # SERPL NAA+PROBE: SIGNIFICANT CHANGE UP
HIV1 RNA SERPL NAA+PROBE-LOG#: <1.6 LG10COP/ML — SIGNIFICANT CHANGE UP
MAGNESIUM SERPL-MCNC: 2.3 MG/DL — SIGNIFICANT CHANGE UP (ref 1.8–2.4)
MCHC RBC-ENTMCNC: 32.6 PG — SIGNIFICANT CHANGE UP (ref 27–34)
MCHC RBC-ENTMCNC: 36.2 GM/DL — HIGH (ref 32–36)
MCV RBC AUTO: 90.1 FL — SIGNIFICANT CHANGE UP (ref 80–100)
PHOSPHATE SERPL-MCNC: 2.5 MG/DL — SIGNIFICANT CHANGE UP (ref 2.5–4.5)
PLATELET # BLD AUTO: 160 K/UL — SIGNIFICANT CHANGE UP (ref 150–400)
POTASSIUM SERPL-MCNC: 3.7 MMOL/L — SIGNIFICANT CHANGE UP (ref 3.5–5.3)
POTASSIUM SERPL-SCNC: 3.7 MMOL/L — SIGNIFICANT CHANGE UP (ref 3.5–5.3)
RBC # BLD: 3.92 M/UL — LOW (ref 4.2–5.8)
RBC # FLD: 11.6 % — SIGNIFICANT CHANGE UP (ref 11–15)
SODIUM SERPL-SCNC: 140 MMOL/L — SIGNIFICANT CHANGE UP (ref 135–145)
SPECIMEN SOURCE: SIGNIFICANT CHANGE UP
T-CELL CD4 SUBSET PNL BLD: 219 /UL — LOW (ref 489–1457)
WBC # BLD: 8.2 K/UL — SIGNIFICANT CHANGE UP (ref 3.8–10.5)
WBC # FLD AUTO: 8.2 K/UL — SIGNIFICANT CHANGE UP (ref 3.8–10.5)

## 2017-01-13 PROCEDURE — 99232 SBSQ HOSP IP/OBS MODERATE 35: CPT

## 2017-01-13 PROCEDURE — 99233 SBSQ HOSP IP/OBS HIGH 50: CPT

## 2017-01-13 RX ORDER — CIPROFLOXACIN LACTATE 400MG/40ML
500 VIAL (ML) INTRAVENOUS EVERY 12 HOURS
Qty: 0 | Refills: 0 | Status: DISCONTINUED | OUTPATIENT
Start: 2017-01-13 | End: 2017-01-15

## 2017-01-13 RX ORDER — METRONIDAZOLE 500 MG
500 TABLET ORAL EVERY 8 HOURS
Qty: 0 | Refills: 0 | Status: DISCONTINUED | OUTPATIENT
Start: 2017-01-13 | End: 2017-01-15

## 2017-01-13 RX ADMIN — HEPARIN SODIUM 5000 UNIT(S): 5000 INJECTION INTRAVENOUS; SUBCUTANEOUS at 06:19

## 2017-01-13 RX ADMIN — HEPARIN SODIUM 5000 UNIT(S): 5000 INJECTION INTRAVENOUS; SUBCUTANEOUS at 18:37

## 2017-01-13 RX ADMIN — OLANZAPINE 2.5 MILLIGRAM(S): 15 TABLET, FILM COATED ORAL at 12:14

## 2017-01-13 RX ADMIN — Medication 100 MILLIGRAM(S): at 06:19

## 2017-01-13 RX ADMIN — Medication 100 MILLIGRAM(S): at 22:04

## 2017-01-13 RX ADMIN — DONEPEZIL HYDROCHLORIDE 10 MILLIGRAM(S): 10 TABLET, FILM COATED ORAL at 22:04

## 2017-01-13 RX ADMIN — Medication 500 MILLIGRAM(S): at 22:04

## 2017-01-13 RX ADMIN — EMTRICITABINE AND TENOFOVIR DISOPROXIL FUMARATE 1 TABLET(S): 200; 300 TABLET, FILM COATED ORAL at 12:13

## 2017-01-13 RX ADMIN — DARUNAVIR 800 MILLIGRAM(S): 75 TABLET, FILM COATED ORAL at 12:13

## 2017-01-13 RX ADMIN — Medication 500 MILLIGRAM(S): at 18:37

## 2017-01-13 RX ADMIN — RITONAVIR 100 MILLIGRAM(S): 100 TABLET, FILM COATED ORAL at 12:12

## 2017-01-13 NOTE — PROGRESS NOTE ADULT - SUBJECTIVE AND OBJECTIVE BOX
Patient is a 76y old  Male who presents with a chief complaint of Diarrhea (2017 06:00)      INTERVAL HPI / OVERNIGHT EVENTS: diarrhea improving.    MEDICATIONS  (STANDING):  OLANZapine 2.5milliGRAM(s) Oral daily  amLODIPine   Tablet 5milliGRAM(s) Oral daily  donepezil 10milliGRAM(s) Oral at bedtime  emtricitabine 200 mG/tenofovir 300 mG 1Tablet(s) Oral daily  darunavir 800milliGRAM(s) Oral daily  ritonavir Tablet 100milliGRAM(s) Oral daily  trimethoprim  160 mG/sulfamethoxazole 800 mG 1Tablet(s) Oral every 48 hours  sodium chloride 0.9%. 1000milliLiter(s) IV Continuous <Continuous>  heparin  Injectable 5000Unit(s) SubCutaneous every 12 hours  ciprofloxacin     Tablet 500milliGRAM(s) Oral every 12 hours  metroNIDAZOLE    Tablet 500milliGRAM(s) Oral every 8 hours    MEDICATIONS  (PRN):  acetaminophen   Tablet 650milliGRAM(s) Oral every 6 hours PRN For Temp greater than 38 C (100.4 F)  acetaminophen   Tablet. 650milliGRAM(s) Oral every 6 hours PRN Mild Pain (1 - 3)      Vital Signs Last 24 Hrs  T(C): 37.1, Max: 38.1 ( @ 23:38)  T(F): 98.8, Max: 100.6 ( @ 23:38)  HR: 82 (77 - 104)  BP: 107/63 (107/63 - 148/82)  BP(mean): --  RR: 16 (16 - 18)  SpO2: 96% (96% - 99%)    PHYSICAL EXAM:    Constitutional: Confused (baseline)  Respiratory: CTABCardiovascular: S1 and S2, RRR, no M/G/R  Gastrointestinal: BS+, soft, NT/ND  Extremities: No peripheral edema  Skin: No rashes      LABS:                        12.8   8.2   )-----------( 160      ( 2017 11:34 )             35.3     2017 11:34    140    |  107    |  10     ----------------------------<  109    3.7     |  25     |  0.97     Ca    8.4        2017 11:34  Phos  2.5       2017 11:34  Mg     2.3       2017 11:34    TPro  8.7    /  Alb  3.3    /  TBili  0.3    /  DBili  x      /  AST  118    /  ALT  39     /  AlkPhos  167    2017 23:32    PT/INR - ( 2017 23:32 )   PT: 11.3 sec;   INR: 1.01 ratio         PTT - ( 2017 23:32 )  PTT:22.1 sec  Urinalysis Basic - ( 2017 00:57 )    Color: Yellow / Appearance: Clear / S.015 / pH: x  Gluc: x / Ketone: Negative  / Bili: Negative / Urobili: Negative mg/dL   Blood: x / Protein: 30 mg/dL / Nitrite: Negative   Leuk Esterase: Negative / RBC: 0-2 /HPF / WBC x   Sq Epi: x / Non Sq Epi: Occasional / Bacteria: Few          MICROBIOLOGY:    Testing in progress  Gram Stain - --  Gram Stain Blood - --  Organism - --  Organism Identification - --  Specimen Source - .Stool Feces                    AFB:        RADIOLOGY & ADDITIONAL STUDIES:

## 2017-01-13 NOTE — PROGRESS NOTE ADULT - SUBJECTIVE AND OBJECTIVE BOX
Gastroenterolgy  Patient seen and examined bedside resting comfortably.  No complaints offered.   No abdominal pain  Denies nausea and vomiting. Tolerating diet.    T(F): 98.8, Max: 100.6 (01-12 @ 23:38)  HR: 75 (75 - 104)  BP: 120/70 (107/63 - 148/82)  RR: 16 (16 - 18)  SpO2: 99% (96% - 99%)  Wt(kg): --  CAPILLARY BLOOD GLUCOSE      PHYSICAL EXAM:  General: NAD, WDWN.   Neuro:  Awake confused  HEENT: NCAT, EOMI, conjunctiva clear  CV: +S1+S2 regular rate and rhythm  Lung: clear to ausculation bilaterally, respirations nonlabored, good inspiratory effort  Abdomen: soft, NTND. Normal active BS  Extremities: no cyanosis, clubbing or edema    LABS:                        12.8   8.2   )-----------( 160      ( 13 Jan 2017 11:34 )             35.3     13 Jan 2017 11:34    140    |  107    |  10     ----------------------------<  109    3.7     |  25     |  0.97     Ca    8.4        13 Jan 2017 11:34  Phos  2.5       13 Jan 2017 11:34  Mg     2.3       13 Jan 2017 11:34    TPro  8.7    /  Alb  3.3    /  TBili  0.3    /  DBili  x      /  AST  118    /  ALT  39     /  AlkPhos  167    11 Jan 2017 23:32    LIVER FUNCTIONS - ( 11 Jan 2017 23:32 )  Alb: 3.3 g/dL / Pro: 8.7 gm/dL / ALK PHOS: 167 U/L / ALT: 39 U/L / AST: 118 U/L / GGT: x           PT/INR - ( 11 Jan 2017 23:32 )   PT: 11.3 sec;   INR: 1.01 ratio         PTT - ( 11 Jan 2017 23:32 )  PTT:22.1 sec  I&O's Detail    01-13 @ 11:34    140 | 107 | 10  /8.4 | 2.3 | 2.5  _______________________/  3.7 | 25 | 0.97                           \par   Lipase, Serum: 166 U/L (01-11 @ 23:32)      Clostridium difficile Toxin by PCR (01.12.17 @ 11:13)    Clostridium difficile Toxin by PCR: The results of this test should be interpreted with consideration of all  clinical and laboratory findings. This test determines the presence of  the C. difficile tcdB gene at a given time and is not intended to  identify antibiotic associated disease or C. difficile infection without  clinical context. Successful treatment is based on the resolution of  clinical symptoms. This test should not be used as a "test of cure"  because C. difficile DNA will persist after successful treatment. Repeat  testing will not be permitted.    This test is performed on the BD MAX system using Real-Time PCR and  fluorogenic target-specific hybridization.    C Diff by PCR Result: NotDete    Culture - Stool (05.07.16 @ 14:03)    Culture - Stool:   ****************** PRELIMINARY **************************  NEGATIVE FOR SALMONELLA, SHIGELLA, YERSINIA,  E. COLI O157, AEROMONAS, PLESIOMONAS, AND VIBRIO SP.                      AFTER 24 HOURS  *********************************************************  **********************FINAL REPORT************************  CULTURE NEGATIVE FOR SALMONELLA, SHIGELLA, YERSINIA, E COLI  O157, AEROMONAS, PLESIOMONAS, CAMPYLOBACTER AND VIBRIO SP.  **********************************************************    Specimen Source: FECES

## 2017-01-13 NOTE — PROGRESS NOTE ADULT - SUBJECTIVE AND OBJECTIVE BOX
CHIEF COMPLAINT/INTERVAL HISTORY:    Patient is a 76y old  Male who presents with a chief complaint of Diarrhea (2017 06:00)      HPI:  75 y/o M with PMH of HIV, HTN, dementia brought in with c/o LBM for 1-2 days resulting in generalized weakness, and off balance while walking, patient denies nausea, vomiting, abdominal pain, fever, chills, As per wife his baseline mental status is confused, previously patient was non compliant to medications but for last few months he is compliant with HAART and wife makes sure he takes it regularly.  Patient denies cp, sob, dizziness at this time.  Pt. never had colonoscopy as per family.  Patient was in ED on 17 for an episode of vomiting followed by fall and possible seizure. (2017 06:00)      SUBJECTIVE & OBJECTIVE: Pt seen and examined at bedside. Aide by the bed side, as per Aide he does not like liquid diet and wants to eat solid food, As per RN patient still with frequent LBM all night.  No nausea/vomiting/abd pain  Denies chest pain/SOB, nausea/vomiting, No cough, dizziness, HA or blurry vision, Rest unremarkable.         Vital Signs Last 24 Hrs  T(C): 37.1, Max: 38.1 ( @ 23:38)  T(F): 98.8, Max: 100.6 ( @ 23:38)  HR: 82 (77 - 104)  BP: 107/63 (107/63 - 148/82)  BP(mean): --  ABP: --  ABP(mean): --  RR: 16 (16 - 18)  SpO2: 96% (96% - 99%)        MEDICATIONS  (STANDING):  OLANZapine 2.5milliGRAM(s) Oral daily  amLODIPine   Tablet 5milliGRAM(s) Oral daily  donepezil 10milliGRAM(s) Oral at bedtime  emtricitabine 200 mG/tenofovir 300 mG 1Tablet(s) Oral daily  darunavir 800milliGRAM(s) Oral daily  ritonavir Tablet 100milliGRAM(s) Oral daily  trimethoprim  160 mG/sulfamethoxazole 800 mG 1Tablet(s) Oral every 48 hours  ciprofloxacin   IVPB 200milliGRAM(s) IV Intermittent every 12 hours  metroNIDAZOLE  IVPB  IV Intermittent   sodium chloride 0.9%. 1000milliLiter(s) IV Continuous <Continuous>  heparin  Injectable 5000Unit(s) SubCutaneous every 12 hours  metroNIDAZOLE  IVPB 500milliGRAM(s) IV Intermittent every 8 hours    MEDICATIONS  (PRN):  acetaminophen   Tablet 650milliGRAM(s) Oral every 6 hours PRN For Temp greater than 38 C (100.4 F)  acetaminophen   Tablet. 650milliGRAM(s) Oral every 6 hours PRN Mild Pain (1 - 3)        PHYSICAL EXAM:    GENERAL: NAD, AAOx1, with confusion  HEAD:  Atraumatic, Normocephalic  EYES: EOMI, PERRLA, Mild pallor  ENMT: Moist mucous membranes  NECK: Supple, No JVD  NERVOUS SYSTEM: Non focal, confused, slow speech  CHEST/LUNG: Clear to auscultation bilaterally; No rales, rhonchi, wheezing, or rubs  HEART: Regular rate and rhythm;   ABDOMEN: Soft, Nontender, Nondistended; Bowel sounds present  EXTREMITIES: No cyanosis, or edema    LABS:                        12.8   8.2   )-----------( 160      ( 2017 11:34 )             35.3     2017 11:34    140    |  107    |  10     ----------------------------<  109    3.7     |  25     |  0.97     Ca    8.4        2017 11:34  Phos  2.5       2017 11:34  Mg     2.3       2017 11:34    TPro  8.7    /  Alb  3.3    /  TBili  0.3    /  DBili  x      /  AST  118    /  ALT  39     /  AlkPhos  167    2017 23:32    PT/INR - ( 2017 23:32 )   PT: 11.3 sec;   INR: 1.01 ratio         PTT - ( 2017 23:32 )  PTT:22.1 sec  Urinalysis Basic - ( 2017 00:57 )    Color: Yellow / Appearance: Clear / S.015 / pH: x  Gluc: x / Ketone: Negative  / Bili: Negative / Urobili: Negative mg/dL   Blood: x / Protein: 30 mg/dL / Nitrite: Negative   Leuk Esterase: Negative / RBC: 0-2 /HPF / WBC x   Sq Epi: x / Non Sq Epi: Occasional / Bacteria: Few

## 2017-01-14 LAB
ANION GAP SERPL CALC-SCNC: 9 MMOL/L — SIGNIFICANT CHANGE UP (ref 5–17)
BUN SERPL-MCNC: 12 MG/DL — SIGNIFICANT CHANGE UP (ref 7–23)
CALCIUM SERPL-MCNC: 8.2 MG/DL — LOW (ref 8.5–10.1)
CHLORIDE SERPL-SCNC: 108 MMOL/L — SIGNIFICANT CHANGE UP (ref 96–108)
CO2 SERPL-SCNC: 24 MMOL/L — SIGNIFICANT CHANGE UP (ref 22–31)
CREAT SERPL-MCNC: 0.94 MG/DL — SIGNIFICANT CHANGE UP (ref 0.5–1.3)
GLUCOSE SERPL-MCNC: 99 MG/DL — SIGNIFICANT CHANGE UP (ref 70–99)
MAGNESIUM SERPL-MCNC: 2.1 MG/DL — SIGNIFICANT CHANGE UP (ref 1.8–2.4)
PHOSPHATE SERPL-MCNC: 3 MG/DL — SIGNIFICANT CHANGE UP (ref 2.5–4.5)
POTASSIUM SERPL-MCNC: 3.5 MMOL/L — SIGNIFICANT CHANGE UP (ref 3.5–5.3)
POTASSIUM SERPL-SCNC: 3.5 MMOL/L — SIGNIFICANT CHANGE UP (ref 3.5–5.3)
SODIUM SERPL-SCNC: 141 MMOL/L — SIGNIFICANT CHANGE UP (ref 135–145)

## 2017-01-14 PROCEDURE — 99233 SBSQ HOSP IP/OBS HIGH 50: CPT

## 2017-01-14 RX ORDER — GLYCERIN ADULT
1 SUPPOSITORY, RECTAL RECTAL ONCE
Qty: 0 | Refills: 0 | Status: COMPLETED | OUTPATIENT
Start: 2017-01-14 | End: 2017-01-14

## 2017-01-14 RX ADMIN — RITONAVIR 100 MILLIGRAM(S): 100 TABLET, FILM COATED ORAL at 11:24

## 2017-01-14 RX ADMIN — DARUNAVIR 800 MILLIGRAM(S): 75 TABLET, FILM COATED ORAL at 11:24

## 2017-01-14 RX ADMIN — Medication 500 MILLIGRAM(S): at 17:07

## 2017-01-14 RX ADMIN — Medication 500 MILLIGRAM(S): at 06:28

## 2017-01-14 RX ADMIN — HEPARIN SODIUM 5000 UNIT(S): 5000 INJECTION INTRAVENOUS; SUBCUTANEOUS at 17:07

## 2017-01-14 RX ADMIN — Medication 1 SUPPOSITORY(S): at 17:48

## 2017-01-14 RX ADMIN — DONEPEZIL HYDROCHLORIDE 10 MILLIGRAM(S): 10 TABLET, FILM COATED ORAL at 22:38

## 2017-01-14 RX ADMIN — Medication 500 MILLIGRAM(S): at 22:38

## 2017-01-14 RX ADMIN — Medication 100 MILLIGRAM(S): at 06:28

## 2017-01-14 RX ADMIN — HEPARIN SODIUM 5000 UNIT(S): 5000 INJECTION INTRAVENOUS; SUBCUTANEOUS at 06:28

## 2017-01-14 RX ADMIN — Medication 500 MILLIGRAM(S): at 13:20

## 2017-01-14 RX ADMIN — Medication 1 TABLET(S): at 13:20

## 2017-01-14 RX ADMIN — EMTRICITABINE AND TENOFOVIR DISOPROXIL FUMARATE 1 TABLET(S): 200; 300 TABLET, FILM COATED ORAL at 11:25

## 2017-01-14 RX ADMIN — OLANZAPINE 2.5 MILLIGRAM(S): 15 TABLET, FILM COATED ORAL at 11:24

## 2017-01-14 NOTE — PROGRESS NOTE ADULT - PROBLEM SELECTOR PLAN 3
c/w home meds
c/w home meds
kidney disease is stable
with aids dememtia.  cont HAART cd4 219 -compliant for one year now  F/U with Dr plaza in [facility]

## 2017-01-14 NOTE — PROGRESS NOTE ADULT - SUBJECTIVE AND OBJECTIVE BOX
Gastroenterolgy  Patient seen and examined bedside resting comfortably.  No complaints offered.   No abdominal pain  Denies nausea and vomiting. Tolerating diet.  Normal flatus/BM.     T(F): 98.6, Max: 99.9 (01-13 @ 23:45)  HR: 80 (75 - 84)  BP: 102/67 (102/67 - 120/70)  RR: 16 (16 - 17)  SpO2: 100% (97% - 100%)  Wt(kg): --  CAPILLARY BLOOD GLUCOSE      PHYSICAL EXAM:  General: NAD, WDWN.   Neuro:  Alert & oriented x 3  HEENT: NCAT, EOMI, conjunctiva clear  CV: +S1+S2 regular rate and rhythm  Lung: clear to ausculation bilaterally, respirations nonlabored, good inspiratory effort  Abdomen: soft, NonTender, No distention Normal active BS  Extremities: no cyanosis, clubbing or edema    LABS:                        12.8   8.2   )-----------( 160      ( 13 Jan 2017 11:34 )             35.3     14 Jan 2017 07:21    141    |  108    |  12     ----------------------------<  99     3.5     |  24     |  0.94     Ca    8.2        14 Jan 2017 07:21  Phos  3.0       14 Jan 2017 07:21  Mg     2.1       14 Jan 2017 07:21

## 2017-01-14 NOTE — PHYSICAL THERAPY INITIAL EVALUATION ADULT - FOLLOWS COMMANDS/ANSWERS QUESTIONS, REHAB EVAL
unable to answer questions/unable to follow commands/unable to follow multi-step instructions/50% of the time

## 2017-01-14 NOTE — PHYSICAL THERAPY INITIAL EVALUATION ADULT - RANGE OF MOTION EXAMINATION, REHAB EVAL
bilateral upper extremity ROM was WNL (within normal limits)/bilateral lower extremity ROM was WFL (within functional limits)

## 2017-01-14 NOTE — PROGRESS NOTE ADULT - SUBJECTIVE AND OBJECTIVE BOX
Patient is a 76y old  Male who presents with a chief complaint of Diarrhea (12 Jan 2017 06:00)      INTERVAL HPI/OVERNIGHT EVENTS:  pt feels better pt has history of hiv and dementia , as per request of id  needs stool examination. pending stool  collection    MEDICATIONS  (STANDING):  OLANZapine 2.5milliGRAM(s) Oral daily  amLODIPine   Tablet 5milliGRAM(s) Oral daily  donepezil 10milliGRAM(s) Oral at bedtime  emtricitabine 200 mG/tenofovir 300 mG 1Tablet(s) Oral daily  darunavir 800milliGRAM(s) Oral daily  ritonavir Tablet 100milliGRAM(s) Oral daily  trimethoprim  160 mG/sulfamethoxazole 800 mG 1Tablet(s) Oral every 48 hours  sodium chloride 0.9%. 1000milliLiter(s) IV Continuous <Continuous>  heparin  Injectable 5000Unit(s) SubCutaneous every 12 hours  ciprofloxacin     Tablet 500milliGRAM(s) Oral every 12 hours  metroNIDAZOLE    Tablet 500milliGRAM(s) Oral every 8 hours    MEDICATIONS  (PRN):  acetaminophen   Tablet 650milliGRAM(s) Oral every 6 hours PRN For Temp greater than 38 C (100.4 F)  acetaminophen   Tablet. 650milliGRAM(s) Oral every 6 hours PRN Mild Pain (1 - 3)  guaiFENesin    Syrup 100milliGRAM(s) Oral every 6 hours PRN Cough      Allergies    No Known Allergies    Intolerances        REVIEW OF SYSTEMS:  CONSTITUTIONAL: No fever, weight loss, or fatigue  EYES: No eye pain, visual disturbances, or discharge  ENMT:  No difficulty hearing, tinnitus, vertigo; No sinus or throat pain  NECK: No pain or stiffness  BREASTS: No pain, masses, or nipple discharge  RESPIRATORY: No cough, wheezing, chills or hemoptysis; No shortness of breath  CARDIOVASCULAR: No chest pain, palpitations, dizziness, or leg swelling  GASTROINTESTINAL: No abdominal or epigastric pain. No nausea, vomiting, or hematemesis; No diarrhea or constipation. No melena or hematochezia.  GENITOURINARY: No dysuria, frequency, hematuria, or incontinence  NEUROLOGICAL: No headaches, memory loss, loss of strength, numbness, or tremors  SKIN: No itching, burning, rashes, or lesions   LYMPH NODES: No enlarged glands  ENDOCRINE: No heat or cold intolerance; No hair loss  MUSCULOSKELETAL: No joint pain or swelling; No muscle, back, or extremity pain  PSYCHIATRIC: No depression, anxiety, mood swings, or difficulty sleeping  HEME/LYMPH: No easy bruising, or bleeding gums  ALLERGY AND IMMUNOLOGIC: No hives or eczema    Vital Signs Last 24 Hrs  T(C): 37, Max: 37.7 (01-13 @ 23:45)  T(F): 98.6, Max: 99.9 (01-13 @ 23:45)  HR: 80 (75 - 84)  BP: 102/67 (102/67 - 120/70)  BP(mean): --  RR: 16 (16 - 17)  SpO2: 100% (97% - 100%)    PHYSICAL EXAM:  GENERAL: NAD, well-groomed, well-developed  HEAD:  Atraumatic, Normocephalic  EYES: EOMI, PERRLA, conjunctiva and sclera clear  ENMT: No tonsillar erythema, exudates, or enlargement; Moist mucous membranes, Good dentition, No lesions  NECK: Supple, No JVD, Normal thyroid  NERVOUS SYSTEM:  Alert & Oriented X3, Good concentration; Motor Strength 5/5 B/L upper and lower extremities; DTRs 2+ intact and symmetric  CHEST/LUNG: Clear to percussion bilaterally; No rales, rhonchi, wheezing, or rubs  HEART: Regular rate and rhythm; No murmurs, rubs, or gallops  ABDOMEN: Soft, Nontender, Nondistended; Bowel sounds present  EXTREMITIES:  2+ Peripheral Pulses, No clubbing, cyanosis, or edema  LYMPH: No lymphadenopathy noted  SKIN: No rashes or lesions    LABS:                        12.8   8.2   )-----------( 160      ( 13 Jan 2017 11:34 )             35.3     14 Jan 2017 07:21    141    |  108    |  12     ----------------------------<  99     3.5     |  24     |  0.94     Ca    8.2        14 Jan 2017 07:21  Phos  3.0       14 Jan 2017 07:21  Mg     2.1       14 Jan 2017 07:21          CAPILLARY BLOOD GLUCOSE      RADIOLOGY & ADDITIONAL TESTS:    Imaging Personally Reviewed:  [ ] YES  [ ] NO    Consultant(s) Notes Reviewed:  [ ] YES  [ ] NO    Care Discussed with Consultants/Other Providers [ ] YES  [ ] NO

## 2017-01-14 NOTE — PHYSICAL THERAPY INITIAL EVALUATION ADULT - PASSIVE RANGE OF MOTION EXAMINATION, REHAB EVAL
bilateral lower extremity Passive ROM was WFL (within functional limits)/bilateral upper extremity Passive ROM was WNL (within normal limits)

## 2017-01-14 NOTE — PROGRESS NOTE ADULT - PROBLEM SELECTOR PROBLEM 4
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
Colitis, acute
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

## 2017-01-14 NOTE — PROGRESS NOTE ADULT - ASSESSMENT
Impression: 76y Male admitted with DIARRHEA WITH DEHYDRATION, HIV+, AIDS dementia,   stool cultures neg , C-diff neg

## 2017-01-14 NOTE — PHYSICAL THERAPY INITIAL EVALUATION ADULT - TRANSFER SAFETY CONCERNS NOTED: BED/CHAIR, REHAB EVAL
decreased safety awareness/decreased weight-shifting ability/decreased sequencing ability/decreased balance during turns

## 2017-01-14 NOTE — PROGRESS NOTE ADULT - PROBLEM SELECTOR PROBLEM 5
CKD (chronic kidney disease)
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
Diarrhea with dehydration

## 2017-01-14 NOTE — PROGRESS NOTE ADULT - PROBLEM SELECTOR PROBLEM 3
Essential hypertension
Essential hypertension
Chronic kidney disease, unspecified stage
HIV (human immunodeficiency virus infection)

## 2017-01-14 NOTE — PHYSICAL THERAPY INITIAL EVALUATION ADULT - IMPAIRMENTS FOUND, PT EVAL
muscle strength/arousal, attention, and cognition/ergonomics and body mechanics/gait, locomotion, and balance

## 2017-01-14 NOTE — PHYSICAL THERAPY INITIAL EVALUATION ADULT - ACTIVE RANGE OF MOTION EXAMINATION, REHAB EVAL
bilateral  lower extremity Active ROM was WFL (within functional limits)/andrez. upper extremity Active ROM was WNL (within normal limits)

## 2017-01-15 VITALS
DIASTOLIC BLOOD PRESSURE: 68 MMHG | TEMPERATURE: 99 F | OXYGEN SATURATION: 97 % | SYSTOLIC BLOOD PRESSURE: 102 MMHG | RESPIRATION RATE: 18 BRPM | HEART RATE: 83 BPM

## 2017-01-15 LAB
NIGHT BLUE STAIN TISS: SIGNIFICANT CHANGE UP
SPECIMEN SOURCE: SIGNIFICANT CHANGE UP

## 2017-01-15 RX ORDER — DARUNAVIR 75 MG/1
1 TABLET, FILM COATED ORAL
Qty: 0 | Refills: 0 | COMMUNITY

## 2017-01-15 RX ORDER — METRONIDAZOLE 500 MG
1 TABLET ORAL
Qty: 9 | Refills: 0 | OUTPATIENT
Start: 2017-01-15 | End: 2017-01-18

## 2017-01-15 RX ORDER — CIPROFLOXACIN LACTATE 400MG/40ML
1 VIAL (ML) INTRAVENOUS
Qty: 6 | Refills: 0 | OUTPATIENT
Start: 2017-01-15 | End: 2017-01-18

## 2017-01-15 RX ADMIN — AMLODIPINE BESYLATE 5 MILLIGRAM(S): 2.5 TABLET ORAL at 05:57

## 2017-01-15 RX ADMIN — HEPARIN SODIUM 5000 UNIT(S): 5000 INJECTION INTRAVENOUS; SUBCUTANEOUS at 05:58

## 2017-01-15 RX ADMIN — Medication 500 MILLIGRAM(S): at 05:57

## 2017-01-15 NOTE — DISCHARGE NOTE ADULT - HOSPITAL COURSE
77 y/o M with PMH of HIV, HTN, dementia brought in with c/o diarrhea for 1-2 days resulting in generalized weakness, and off balance while walking, patient denies nausea, vomiting, abdominal pain, fever, chills, As per wife his baseline mental status is confused, previously patient was non compliant with medications but for last few months he is compliant with HAART and wife makes sure he takes it regularly. Patient denies cp, sob, dizziness at this time.  Pt. never had colonoscopy as per family.      CT A/P showed no evidence of small bowel obstruction or active bowel inflammation, question wall thickening versus under distention of the mid sigmoid   colon; correlation with colonoscopy suggested to exclude underlying lesion.  Patient given IV fluids.  Stool studies sent, c diff negative, family to call for rest of stool studies results.  Outpatient colonoscopy should be done.  Patient to complete 5 day course of cipro/flagyl for questionable colitis on CT.

## 2017-01-15 NOTE — PROGRESS NOTE ADULT - PROBLEM SELECTOR PROBLEM 2
HIV (human immunodeficiency virus infection)
HIV (human immunodeficiency virus infection)
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
HIV (human immunodeficiency virus infection)

## 2017-01-15 NOTE — DISCHARGE NOTE ADULT - PLAN OF CARE
Resolution of diarrhea Call [phone] in 1-2 days for stool studies results (ask for Dr Lema)  Follow up with Dr Lange at [facility]

## 2017-01-15 NOTE — PROGRESS NOTE ADULT - SUBJECTIVE AND OBJECTIVE BOX
Gastroenterolgy  Patient seen and examined bedside resting comfortably.  No complaints offered.   No abdominal pain    T(F): 98.8, Max: 98.8 (01-15 @ 05:38)  HR: 83 (66 - 83)  BP: 102/68 (102/62 - 108/63)  RR: 18 (16 - 18)Patient seen and examined bedside resting comfortably.  No complaints offered.   No abdominal pain  SpO2: 97% (97% - 100%)  Wt(kg): --  CAPILLARY BLOOD GLUCOSE      PHYSICAL EXAM:  General: NAD, WDWN.   Neuro:  Alert & oriented x 3  HEENT: NCAT, EOMI, conjunctiva clear  CV: +S1+S2 regular rate and rhythm  Lung: clear to ausculation bilaterally, respirations nonlabored, good inspiratory effort  Abdomen: soft, NonTender, No distention Normal active BS  Extremities: no cyanosis, clubbing or edema    LABS:    14 Jan 2017 07:21    141    |  108    |  12     ----------------------------<  99     3.5     |  24     |  0.94     Ca    8.2        14 Jan 2017 07:21  Phos  3.0       14 Jan 2017 07:21  Mg     2.1       14 Jan 2017 07:21

## 2017-01-15 NOTE — PROGRESS NOTE ADULT - PROBLEM SELECTOR PLAN 1
still with persistent LBM  c/w IV hydration  IV cipro/flagyl  GI and ID following  follow stool studies- c-diff negative  DASH diet as tolerated  monitor alexites
IV hydration  IV cipro/flagyl  full liquid diet  stool for ova/parasite and C- diff toxin  GI eval
advance diet, IV fluids
continue current management, Antibiotics per ID
continue current management, Antibiotics per ID.
long standing hiv infection  and he is taking med . he. he feels better
viral vs acute infectious bacterial diarrhea or diarrhea due to oppurtunistic organisms(like isospora/cyclospora or cryptosporidium)  F/u on acid fast staining of stool and other stool studies ordered  Cont Cipro/flagyl-change to oral (day 2/5)  Possible colitis vs underdistension of colon CT, though not convincing   Out pt colonoscopy sh be performed

## 2017-01-15 NOTE — PROGRESS NOTE ADULT - PROBLEM SELECTOR PROBLEM 1
Diarrhea with dehydration
Diarrhea with dehydration
Diarrhea of infectious origin
Diarrhea with dehydration
HIV (human immunodeficiency virus infection)

## 2017-01-15 NOTE — DISCHARGE NOTE ADULT - CARE PROVIDER_API CALL
Shruti Lema (SANDRA), Internal Medicine  [address] [place] [address]  Phone: [phone]  Fax: [phone]

## 2017-01-15 NOTE — DISCHARGE NOTE ADULT - PATIENT PORTAL LINK FT
“You can access the Follow[facility] Patient Portal, offered by [facility], by registering with the following website: [email or web address]”

## 2017-01-15 NOTE — DISCHARGE NOTE ADULT - CARE PLAN
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 Principal Discharge DX:	Diarrhea with dehydration  Goal:	Resolution of diarrhea  Instructions for follow-up, activity and diet:	Call [phone] in 1-2 days for stool studies results (ask for Dr Lema)  Follow up with Dr Lange at [facility]  Secondary Diagnosis:	Colitis, acute  Secondary Diagnosis:	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

## 2017-01-15 NOTE — DISCHARGE NOTE ADULT - MEDICATION SUMMARY - MEDICATIONS TO TAKE
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    Truvada  --  by mouth   -- unknown dosage  -- Indication: For HIV (human immunodeficiency virus infection)    Norvir 100 mg oral tablet  --  by mouth once a day  -- Indication: For HIV (human immunodeficiency virus infection)    Prezista 800 mg oral tablet  -- 1 tab(s) by mouth once a day  -- Indication: For HIV (human immunodeficiency virus infection)    Norvasc 5 mg oral tablet  -- 1 tab(s) by mouth once a day  -- Indication: For Essential hypertension    donepezil 10 mg oral tablet  -- 1 tab(s) by mouth once a day (at bedtime)  -- Indication: For 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

## 2017-01-15 NOTE — PROGRESS NOTE ADULT - PROBLEM SELECTOR PLAN 2
c/w HAART   ID evaluation appreciated  CD4% 10, follow VL
c/w home meds  ID evaluation  check viral load, T4/T8 cell count
cont antivirals  cont antibiotics
continue antivirals   supportive care
dementia and stable
on aricept

## 2017-01-15 NOTE — DISCHARGE NOTE ADULT - SECONDARY DIAGNOSIS.
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

## 2017-01-17 LAB
CULTURE RESULTS: SIGNIFICANT CHANGE UP
CULTURE RESULTS: SIGNIFICANT CHANGE UP
SPECIMEN SOURCE: SIGNIFICANT CHANGE UP
SPECIMEN SOURCE: SIGNIFICANT CHANGE UP

## 2017-01-18 DIAGNOSIS — R19.7 DIARRHEA, UNSPECIFIED: ICD-10-CM

## 2017-01-18 DIAGNOSIS — E86.0 DEHYDRATION: ICD-10-CM

## 2017-01-18 DIAGNOSIS — K57.30 DIVERTICULOSIS OF LARGE INTESTINE WITHOUT PERFORATION OR ABSCESS WITHOUT BLEEDING: ICD-10-CM

## 2017-01-18 DIAGNOSIS — Z91.14 PATIENT'S OTHER NONCOMPLIANCE WITH MEDICATION REGIMEN: ICD-10-CM

## 2017-01-18 DIAGNOSIS — M48.06 SPINAL STENOSIS, LUMBAR REGION: ICD-10-CM

## 2017-01-18 DIAGNOSIS — N17.9 ACUTE KIDNEY FAILURE, UNSPECIFIED: ICD-10-CM

## 2017-01-18 DIAGNOSIS — A09 INFECTIOUS GASTROENTERITIS AND COLITIS, UNSPECIFIED: ICD-10-CM

## 2017-01-18 DIAGNOSIS — F02.81 DEMENTIA IN OTHER DISEASES CLASSIFIED ELSEWHERE, UNSPECIFIED SEVERITY, WITH BEHAVIORAL DISTURBANCE: ICD-10-CM

## 2017-01-18 DIAGNOSIS — B20 HUMAN IMMUNODEFICIENCY VIRUS [HIV] DISEASE: ICD-10-CM

## 2017-01-18 DIAGNOSIS — I12.9 HYPERTENSIVE CHRONIC KIDNEY DISEASE WITH STAGE 1 THROUGH STAGE 4 CHRONIC KIDNEY DISEASE, OR UNSPECIFIED CHRONIC KIDNEY DISEASE: ICD-10-CM

## 2017-01-18 DIAGNOSIS — G31.83 NEUROCOGNITIVE DISORDER WITH LEWY BODIES: ICD-10-CM

## 2017-01-18 DIAGNOSIS — N18.9 CHRONIC KIDNEY DISEASE, UNSPECIFIED: ICD-10-CM

## 2017-01-18 DIAGNOSIS — R32 UNSPECIFIED URINARY INCONTINENCE: ICD-10-CM

## 2017-01-18 DIAGNOSIS — G30.8 OTHER ALZHEIMER'S DISEASE: ICD-10-CM

## 2017-01-25 ENCOUNTER — RX RENEWAL (OUTPATIENT)
Age: 77
End: 2017-01-25

## 2017-01-28 ENCOUNTER — OUTPATIENT (OUTPATIENT)
Dept: OUTPATIENT SERVICES | Facility: HOSPITAL | Age: 77
LOS: 1 days | End: 2017-01-28
Payer: MEDICARE

## 2017-01-28 ENCOUNTER — APPOINTMENT (OUTPATIENT)
Dept: RADIOLOGY | Facility: IMAGING CENTER | Age: 77
End: 2017-01-28

## 2017-01-28 DIAGNOSIS — Z00.8 ENCOUNTER FOR OTHER GENERAL EXAMINATION: ICD-10-CM

## 2017-01-28 PROBLEM — G30.8 OTHER ALZHEIMER'S DISEASE: Chronic | Status: ACTIVE | Noted: 2017-01-08

## 2017-01-28 PROCEDURE — 73130 X-RAY EXAM OF HAND: CPT

## 2017-01-30 PROBLEM — R40.20 LOC (LOSS OF CONSCIOUSNESS): Status: ACTIVE | Noted: 2017-01-30

## 2017-02-02 ENCOUNTER — APPOINTMENT (OUTPATIENT)
Dept: INFECTIOUS DISEASE | Facility: CLINIC | Age: 77
End: 2017-02-02

## 2017-02-02 VITALS
SYSTOLIC BLOOD PRESSURE: 129 MMHG | HEART RATE: 91 BPM | BODY MASS INDEX: 19.91 KG/M2 | DIASTOLIC BLOOD PRESSURE: 75 MMHG | OXYGEN SATURATION: 99 % | HEIGHT: 72 IN | TEMPERATURE: 98 F | WEIGHT: 147 LBS

## 2017-02-03 LAB
25(OH)D3 SERPL-MCNC: 36.3 NG/ML
ALBUMIN SERPL ELPH-MCNC: 4.2 G/DL
ALP BLD-CCNC: 144 U/L
ALT SERPL-CCNC: 7 U/L
ANION GAP SERPL CALC-SCNC: 14 MMOL/L
APPEARANCE: CLEAR
AST SERPL-CCNC: 26 U/L
BACTERIA: NEGATIVE
BASOPHILS # BLD AUTO: 0.01 K/UL
BASOPHILS NFR BLD AUTO: 0.2 %
BILIRUB SERPL-MCNC: 0.3 MG/DL
BILIRUBIN URINE: NEGATIVE
BLOOD URINE: NEGATIVE
BUN SERPL-MCNC: 19 MG/DL
C TRACH DNA SPEC QL NAA+PROBE: NORMAL
C TRACH RRNA SPEC QL NAA+PROBE: NORMAL
CALCIUM SERPL-MCNC: 9.9 MG/DL
CHLORIDE SERPL-SCNC: 99 MMOL/L
CHOLEST SERPL-MCNC: 247 MG/DL
CHOLEST/HDLC SERPL: 5.3 RATIO
CO2 SERPL-SCNC: 26 MMOL/L
COLOR: YELLOW
CREAT SERPL-MCNC: 0.98 MG/DL
EOSINOPHIL # BLD AUTO: 0.05 K/UL
EOSINOPHIL NFR BLD AUTO: 1.1 %
GLUCOSE QUALITATIVE U: NORMAL MG/DL
GLUCOSE SERPL-MCNC: 90 MG/DL
HAV IGG+IGM SER QL: NONREACTIVE
HBV CORE IGG+IGM SER QL: NONREACTIVE
HBV SURFACE AB SER QL: NONREACTIVE
HBV SURFACE AG SER QL: NONREACTIVE
HCT VFR BLD CALC: 40.6 %
HCV AB SER QL: NONREACTIVE
HCV S/CO RATIO: 0.15 S/CO
HDLC SERPL-MCNC: 47 MG/DL
HGB BLD-MCNC: 13.3 G/DL
IMM GRANULOCYTES NFR BLD AUTO: 0 %
KETONES URINE: NEGATIVE
LDLC SERPL CALC-MCNC: 180 MG/DL
LEUKOCYTE ESTERASE URINE: NEGATIVE
LYMPHOCYTES # BLD AUTO: 2.81 K/UL
LYMPHOCYTES NFR BLD AUTO: 62.6 %
MAN DIFF?: NORMAL
MCHC RBC-ENTMCNC: 31.7 PG
MCHC RBC-ENTMCNC: 32.8 GM/DL
MCV RBC AUTO: 96.7 FL
MICROSCOPIC-UA: NORMAL
MONOCYTES # BLD AUTO: 0.61 K/UL
MONOCYTES NFR BLD AUTO: 13.6 %
N GONORRHOEA DNA SPEC QL NAA+PROBE: NORMAL
N GONORRHOEA RRNA SPEC QL NAA+PROBE: NORMAL
NEUTROPHILS # BLD AUTO: 1.01 K/UL
NEUTROPHILS NFR BLD AUTO: 22.5 %
NITRITE URINE: NEGATIVE
PH URINE: 5.5
PLATELET # BLD AUTO: 264 K/UL
POTASSIUM SERPL-SCNC: 4.1 MMOL/L
PROT SERPL-MCNC: 9.2 G/DL
PROTEIN URINE: NEGATIVE MG/DL
PSA SERPL-MCNC: 1.65 NG/ML
RBC # BLD: 4.2 M/UL
RBC # FLD: 14.4 %
RED BLOOD CELLS URINE: 0 /HPF
SODIUM SERPL-SCNC: 139 MMOL/L
SOURCE AMPLIFICATION: NORMAL
SPECIFIC GRAVITY URINE: 1.02
SQUAMOUS EPITHELIAL CELLS: 0 /HPF
T PALLIDUM AB SER QL IA: NEGATIVE
TRIGL SERPL-MCNC: 102 MG/DL
UROBILINOGEN URINE: NORMAL MG/DL
WBC # FLD AUTO: 4.49 K/UL
WHITE BLOOD CELLS URINE: 2 /HPF

## 2017-02-07 LAB
ADJUSTED MITOGEN: >10 IU/ML
ADJUSTED TB AG: 0.02 IU/ML
CD3 CELLS # BLD: 2191 /UL
CD3 CELLS NFR BLD: 79 %
CD3+CD4+ CELLS # BLD: 317 /UL
CD3+CD4+ CELLS NFR BLD: 11 %
CD3+CD4+ CELLS/CD3+CD8+ CLL SPEC: 0.17 RATIO
CD3+CD8+ CELLS # SPEC: 1895 /UL
CD3+CD8+ CELLS NFR BLD: 68 %
HIV RT GENE MUT DET ISLT: NORMAL
HIV1 RNA # SERPL NAA+PROBE: NOT DETECTED COPIES/ML
M TB IFN-G BLD-IMP: NEGATIVE
QUANTIFERON GOLD NIL: 0.12 IU/ML
VIRAL LOAD LOG: NOT DETECTED LG10COP/ML

## 2017-02-16 ENCOUNTER — APPOINTMENT (OUTPATIENT)
Dept: NEUROLOGY | Facility: CLINIC | Age: 77
End: 2017-02-16

## 2017-02-21 ENCOUNTER — APPOINTMENT (OUTPATIENT)
Dept: NEUROLOGY | Facility: CLINIC | Age: 77
End: 2017-02-21

## 2017-02-21 VITALS
HEIGHT: 72 IN | HEART RATE: 93 BPM | WEIGHT: 147 LBS | DIASTOLIC BLOOD PRESSURE: 74 MMHG | SYSTOLIC BLOOD PRESSURE: 131 MMHG | BODY MASS INDEX: 19.91 KG/M2

## 2017-02-26 LAB
DOLUTEGRAVIR RESISTANCE: NORMAL
ELVITEGRAVIR RESISTANCE: NORMAL
HIV1 RNA # SERPL NAA+PROBE: NORMAL COPIES/ML
RALTEGRAVIR RESISTANCE: NORMAL
VL DATE: NORMAL

## 2017-03-01 LAB
CULTURE RESULTS: SIGNIFICANT CHANGE UP
NIGHT BLUE STAIN TISS: SIGNIFICANT CHANGE UP
SPECIMEN SOURCE: SIGNIFICANT CHANGE UP

## 2017-03-17 ENCOUNTER — OTHER (OUTPATIENT)
Age: 77
End: 2017-03-17

## 2017-05-11 ENCOUNTER — RX RENEWAL (OUTPATIENT)
Age: 77
End: 2017-05-11

## 2017-06-08 ENCOUNTER — APPOINTMENT (OUTPATIENT)
Dept: INFECTIOUS DISEASE | Facility: CLINIC | Age: 77
End: 2017-06-08

## 2017-06-08 VITALS
HEIGHT: 72 IN | WEIGHT: 164 LBS | HEART RATE: 86 BPM | DIASTOLIC BLOOD PRESSURE: 78 MMHG | OXYGEN SATURATION: 98 % | SYSTOLIC BLOOD PRESSURE: 140 MMHG | TEMPERATURE: 97 F | BODY MASS INDEX: 22.21 KG/M2

## 2017-06-08 LAB
ALBUMIN SERPL ELPH-MCNC: 4.3 G/DL
ALP BLD-CCNC: 256 U/L
ALT SERPL-CCNC: 10 U/L
ANION GAP SERPL CALC-SCNC: 14 MMOL/L
AST SERPL-CCNC: 29 U/L
BASOPHILS # BLD AUTO: 0.01 K/UL
BASOPHILS NFR BLD AUTO: 0.1 %
BILIRUB SERPL-MCNC: 0.3 MG/DL
BUN SERPL-MCNC: 12 MG/DL
CALCIUM SERPL-MCNC: 9.3 MG/DL
CHLORIDE SERPL-SCNC: 102 MMOL/L
CO2 SERPL-SCNC: 26 MMOL/L
CREAT SERPL-MCNC: 0.95 MG/DL
EOSINOPHIL # BLD AUTO: 0.1 K/UL
EOSINOPHIL NFR BLD AUTO: 1.4 %
GLUCOSE SERPL-MCNC: 111 MG/DL
HCT VFR BLD CALC: 40.3 %
HGB BLD-MCNC: 13.7 G/DL
IMM GRANULOCYTES NFR BLD AUTO: 0.1 %
LYMPHOCYTES # BLD AUTO: 4.57 K/UL
LYMPHOCYTES NFR BLD AUTO: 65.8 %
MAN DIFF?: NORMAL
MCHC RBC-ENTMCNC: 31.3 PG
MCHC RBC-ENTMCNC: 34 GM/DL
MCV RBC AUTO: 92 FL
MONOCYTES # BLD AUTO: 0.54 K/UL
MONOCYTES NFR BLD AUTO: 7.8 %
NEUTROPHILS # BLD AUTO: 1.72 K/UL
NEUTROPHILS NFR BLD AUTO: 24.8 %
PLATELET # BLD AUTO: 254 K/UL
POTASSIUM SERPL-SCNC: 3.9 MMOL/L
PROT SERPL-MCNC: 9 G/DL
RBC # BLD: 4.38 M/UL
RBC # FLD: 13.8 %
SODIUM SERPL-SCNC: 142 MMOL/L
WBC # FLD AUTO: 6.95 K/UL

## 2017-06-12 LAB
CD3 CELLS # BLD: 3342 /UL
CD3 CELLS NFR BLD: 78 %
CD3+CD4+ CELLS # BLD: 451 /UL
CD3+CD4+ CELLS NFR BLD: 11 %
CD3+CD4+ CELLS/CD3+CD8+ CLL SPEC: 0.15 RATIO
CD3+CD8+ CELLS # SPEC: 2929 /UL
CD3+CD8+ CELLS NFR BLD: 68 %
HIV1 RNA # SERPL NAA+PROBE: <40 COPIES/ML
VIRAL LOAD LOG: ABNORMAL LG10COP/ML

## 2017-07-17 ENCOUNTER — RX RENEWAL (OUTPATIENT)
Age: 77
End: 2017-07-17

## 2017-07-18 ENCOUNTER — RX RENEWAL (OUTPATIENT)
Age: 77
End: 2017-07-18

## 2017-08-01 ENCOUNTER — APPOINTMENT (OUTPATIENT)
Dept: NEUROLOGY | Facility: CLINIC | Age: 77
End: 2017-08-01
Payer: MEDICARE

## 2017-08-01 VITALS
SYSTOLIC BLOOD PRESSURE: 126 MMHG | BODY MASS INDEX: 24.11 KG/M2 | HEIGHT: 72 IN | DIASTOLIC BLOOD PRESSURE: 73 MMHG | WEIGHT: 178 LBS | HEART RATE: 83 BPM

## 2017-08-01 DIAGNOSIS — E78.5 HYPERLIPIDEMIA, UNSPECIFIED: ICD-10-CM

## 2017-08-01 PROCEDURE — 99214 OFFICE O/P EST MOD 30 MIN: CPT

## 2017-08-21 ENCOUNTER — RX RENEWAL (OUTPATIENT)
Age: 77
End: 2017-08-21

## 2017-09-14 ENCOUNTER — RX RENEWAL (OUTPATIENT)
Age: 77
End: 2017-09-14

## 2017-09-18 ENCOUNTER — RX RENEWAL (OUTPATIENT)
Age: 77
End: 2017-09-18

## 2017-09-18 ENCOUNTER — MEDICATION RENEWAL (OUTPATIENT)
Age: 77
End: 2017-09-18

## 2017-09-20 ENCOUNTER — RX RENEWAL (OUTPATIENT)
Age: 77
End: 2017-09-20

## 2017-09-27 ENCOUNTER — RX RENEWAL (OUTPATIENT)
Age: 77
End: 2017-09-27

## 2017-10-02 ENCOUNTER — INPATIENT (INPATIENT)
Facility: HOSPITAL | Age: 77
LOS: 9 days | Discharge: HOME HEALTH | End: 2017-10-12
Attending: HOSPITALIST | Admitting: HOSPITALIST
Payer: MEDICARE

## 2017-10-02 ENCOUNTER — APPOINTMENT (OUTPATIENT)
Dept: INFECTIOUS DISEASE | Facility: CLINIC | Age: 77
End: 2017-10-02

## 2017-10-02 VITALS
SYSTOLIC BLOOD PRESSURE: 145 MMHG | TEMPERATURE: 100 F | WEIGHT: 169.98 LBS | DIASTOLIC BLOOD PRESSURE: 77 MMHG | HEIGHT: 72 IN | HEART RATE: 98 BPM | OXYGEN SATURATION: 96 % | RESPIRATION RATE: 17 BRPM

## 2017-10-02 DIAGNOSIS — N40.0 BENIGN PROSTATIC HYPERPLASIA WITHOUT LOWER URINARY TRACT SYMPTOMS: ICD-10-CM

## 2017-10-02 DIAGNOSIS — N13.30 UNSPECIFIED HYDRONEPHROSIS: ICD-10-CM

## 2017-10-02 DIAGNOSIS — B20 HUMAN IMMUNODEFICIENCY VIRUS [HIV] DISEASE: ICD-10-CM

## 2017-10-02 DIAGNOSIS — J18.9 PNEUMONIA, UNSPECIFIED ORGANISM: ICD-10-CM

## 2017-10-02 DIAGNOSIS — I10 ESSENTIAL (PRIMARY) HYPERTENSION: ICD-10-CM

## 2017-10-02 DIAGNOSIS — R33.8 OTHER RETENTION OF URINE: ICD-10-CM

## 2017-10-02 DIAGNOSIS — G30.8 OTHER ALZHEIMER'S DISEASE: ICD-10-CM

## 2017-10-02 LAB
ALBUMIN SERPL ELPH-MCNC: 2.8 G/DL — LOW (ref 3.3–5)
ALP SERPL-CCNC: 161 U/L — HIGH (ref 40–120)
ALT FLD-CCNC: 18 U/L — SIGNIFICANT CHANGE UP (ref 12–78)
ANION GAP SERPL CALC-SCNC: 10 MMOL/L — SIGNIFICANT CHANGE UP (ref 5–17)
APPEARANCE UR: CLEAR — SIGNIFICANT CHANGE UP
AST SERPL-CCNC: 24 U/L — SIGNIFICANT CHANGE UP (ref 15–37)
BACTERIA # UR AUTO: ABNORMAL
BASOPHILS # BLD AUTO: 0.1 K/UL — SIGNIFICANT CHANGE UP (ref 0–0.2)
BASOPHILS NFR BLD AUTO: 0.9 % — SIGNIFICANT CHANGE UP (ref 0–2)
BILIRUB SERPL-MCNC: 0.4 MG/DL — SIGNIFICANT CHANGE UP (ref 0.2–1.2)
BILIRUB UR-MCNC: NEGATIVE — SIGNIFICANT CHANGE UP
BUN SERPL-MCNC: 8 MG/DL — SIGNIFICANT CHANGE UP (ref 7–23)
CALCIUM SERPL-MCNC: 8.7 MG/DL — SIGNIFICANT CHANGE UP (ref 8.5–10.1)
CHLORIDE SERPL-SCNC: 103 MMOL/L — SIGNIFICANT CHANGE UP (ref 96–108)
CO2 SERPL-SCNC: 25 MMOL/L — SIGNIFICANT CHANGE UP (ref 22–31)
COLOR SPEC: YELLOW — SIGNIFICANT CHANGE UP
CREAT SERPL-MCNC: 0.93 MG/DL — SIGNIFICANT CHANGE UP (ref 0.5–1.3)
DIFF PNL FLD: ABNORMAL
EOSINOPHIL # BLD AUTO: 0.1 K/UL — SIGNIFICANT CHANGE UP (ref 0–0.5)
EOSINOPHIL NFR BLD AUTO: 0.8 % — SIGNIFICANT CHANGE UP (ref 0–6)
GLUCOSE SERPL-MCNC: 118 MG/DL — HIGH (ref 70–99)
GLUCOSE UR QL: NEGATIVE MG/DL — SIGNIFICANT CHANGE UP
HCT VFR BLD CALC: 37 % — LOW (ref 39–50)
HGB BLD-MCNC: 12.3 G/DL — LOW (ref 13–17)
KETONES UR-MCNC: NEGATIVE — SIGNIFICANT CHANGE UP
LACTATE SERPL-SCNC: 1 MMOL/L — SIGNIFICANT CHANGE UP (ref 0.7–2)
LEUKOCYTE ESTERASE UR-ACNC: NEGATIVE — SIGNIFICANT CHANGE UP
LYMPHOCYTES # BLD AUTO: 19 % — SIGNIFICANT CHANGE UP (ref 13–44)
LYMPHOCYTES # BLD AUTO: 2.2 K/UL — SIGNIFICANT CHANGE UP (ref 1–3.3)
MCHC RBC-ENTMCNC: 31.1 PG — SIGNIFICANT CHANGE UP (ref 27–34)
MCHC RBC-ENTMCNC: 33.3 GM/DL — SIGNIFICANT CHANGE UP (ref 32–36)
MCV RBC AUTO: 93.5 FL — SIGNIFICANT CHANGE UP (ref 80–100)
MONOCYTES # BLD AUTO: 0.9 K/UL — SIGNIFICANT CHANGE UP (ref 0–0.9)
MONOCYTES NFR BLD AUTO: 7.6 % — SIGNIFICANT CHANGE UP (ref 2–14)
NEUTROPHILS # BLD AUTO: 8.4 K/UL — HIGH (ref 1.8–7.4)
NEUTROPHILS NFR BLD AUTO: 71.8 % — SIGNIFICANT CHANGE UP (ref 43–77)
NITRITE UR-MCNC: NEGATIVE — SIGNIFICANT CHANGE UP
PH UR: 6.5 — SIGNIFICANT CHANGE UP (ref 5–8)
PLATELET # BLD AUTO: 303 K/UL — SIGNIFICANT CHANGE UP (ref 150–400)
POTASSIUM SERPL-MCNC: 3.6 MMOL/L — SIGNIFICANT CHANGE UP (ref 3.5–5.3)
POTASSIUM SERPL-SCNC: 3.6 MMOL/L — SIGNIFICANT CHANGE UP (ref 3.5–5.3)
PROT SERPL-MCNC: 8.8 GM/DL — HIGH (ref 6–8.3)
PROT UR-MCNC: NEGATIVE MG/DL — SIGNIFICANT CHANGE UP
RBC # BLD: 3.95 M/UL — LOW (ref 4.2–5.8)
RBC # FLD: 12.1 % — SIGNIFICANT CHANGE UP (ref 11–15)
RBC CASTS # UR COMP ASSIST: ABNORMAL /HPF (ref 0–4)
SODIUM SERPL-SCNC: 138 MMOL/L — SIGNIFICANT CHANGE UP (ref 135–145)
SP GR SPEC: 1 — LOW (ref 1.01–1.02)
TROPONIN I SERPL-MCNC: <.015 NG/ML — SIGNIFICANT CHANGE UP (ref 0.01–0.04)
UROBILINOGEN FLD QL: NEGATIVE MG/DL — SIGNIFICANT CHANGE UP
WBC # BLD: 11.7 K/UL — HIGH (ref 3.8–10.5)
WBC # FLD AUTO: 11.7 K/UL — HIGH (ref 3.8–10.5)
WBC UR QL: SIGNIFICANT CHANGE UP

## 2017-10-02 PROCEDURE — 99223 1ST HOSP IP/OBS HIGH 75: CPT

## 2017-10-02 PROCEDURE — 74177 CT ABD & PELVIS W/CONTRAST: CPT | Mod: 26

## 2017-10-02 PROCEDURE — 71010: CPT | Mod: 26

## 2017-10-02 PROCEDURE — 93010 ELECTROCARDIOGRAM REPORT: CPT

## 2017-10-02 PROCEDURE — 99285 EMERGENCY DEPT VISIT HI MDM: CPT

## 2017-10-02 RX ORDER — AZITHROMYCIN 500 MG/1
500 TABLET, FILM COATED ORAL DAILY
Qty: 0 | Refills: 0 | Status: DISCONTINUED | OUTPATIENT
Start: 2017-10-02 | End: 2017-10-11

## 2017-10-02 RX ORDER — DONEPEZIL HYDROCHLORIDE 10 MG/1
10 TABLET, FILM COATED ORAL AT BEDTIME
Qty: 0 | Refills: 0 | Status: DISCONTINUED | OUTPATIENT
Start: 2017-10-02 | End: 2017-10-12

## 2017-10-02 RX ORDER — PIPERACILLIN AND TAZOBACTAM 4; .5 G/20ML; G/20ML
3.38 INJECTION, POWDER, LYOPHILIZED, FOR SOLUTION INTRAVENOUS ONCE
Qty: 0 | Refills: 0 | Status: COMPLETED | OUTPATIENT
Start: 2017-10-02 | End: 2017-10-02

## 2017-10-02 RX ORDER — ATORVASTATIN CALCIUM 80 MG/1
10 TABLET, FILM COATED ORAL AT BEDTIME
Qty: 0 | Refills: 0 | Status: DISCONTINUED | OUTPATIENT
Start: 2017-10-02 | End: 2017-10-12

## 2017-10-02 RX ORDER — VANCOMYCIN HCL 1 G
1000 VIAL (EA) INTRAVENOUS EVERY 12 HOURS
Qty: 0 | Refills: 0 | Status: DISCONTINUED | OUTPATIENT
Start: 2017-10-02 | End: 2017-10-12

## 2017-10-02 RX ORDER — FINASTERIDE 5 MG/1
5 TABLET, FILM COATED ORAL DAILY
Qty: 0 | Refills: 0 | Status: DISCONTINUED | OUTPATIENT
Start: 2017-10-02 | End: 2017-10-12

## 2017-10-02 RX ORDER — SODIUM CHLORIDE 9 MG/ML
1000 INJECTION INTRAMUSCULAR; INTRAVENOUS; SUBCUTANEOUS ONCE
Qty: 0 | Refills: 0 | Status: COMPLETED | OUTPATIENT
Start: 2017-10-02 | End: 2017-10-02

## 2017-10-02 RX ORDER — VANCOMYCIN HCL 1 G
1000 VIAL (EA) INTRAVENOUS ONCE
Qty: 0 | Refills: 0 | Status: COMPLETED | OUTPATIENT
Start: 2017-10-02 | End: 2017-10-02

## 2017-10-02 RX ORDER — PIPERACILLIN AND TAZOBACTAM 4; .5 G/20ML; G/20ML
3.38 INJECTION, POWDER, LYOPHILIZED, FOR SOLUTION INTRAVENOUS EVERY 8 HOURS
Qty: 0 | Refills: 0 | Status: DISCONTINUED | OUTPATIENT
Start: 2017-10-02 | End: 2017-10-04

## 2017-10-02 RX ORDER — DARUNAVIR 75 MG/1
800 TABLET, FILM COATED ORAL DAILY
Qty: 0 | Refills: 0 | Status: DISCONTINUED | OUTPATIENT
Start: 2017-10-02 | End: 2017-10-12

## 2017-10-02 RX ORDER — VANCOMYCIN HCL 1 G
1000 VIAL (EA) INTRAVENOUS EVERY 12 HOURS
Qty: 0 | Refills: 0 | Status: DISCONTINUED | OUTPATIENT
Start: 2017-10-02 | End: 2017-10-02

## 2017-10-02 RX ORDER — AMLODIPINE BESYLATE 2.5 MG/1
5 TABLET ORAL DAILY
Qty: 0 | Refills: 0 | Status: DISCONTINUED | OUTPATIENT
Start: 2017-10-02 | End: 2017-10-12

## 2017-10-02 RX ORDER — EMTRICITABINE AND TENOFOVIR DISOPROXIL FUMARATE 200; 300 MG/1; MG/1
1 TABLET, FILM COATED ORAL DAILY
Qty: 0 | Refills: 0 | Status: DISCONTINUED | OUTPATIENT
Start: 2017-10-02 | End: 2017-10-12

## 2017-10-02 RX ORDER — OLANZAPINE 15 MG/1
2.5 TABLET, FILM COATED ORAL DAILY
Qty: 0 | Refills: 0 | Status: DISCONTINUED | OUTPATIENT
Start: 2017-10-02 | End: 2017-10-12

## 2017-10-02 RX ORDER — TAMSULOSIN HYDROCHLORIDE 0.4 MG/1
0.4 CAPSULE ORAL AT BEDTIME
Qty: 0 | Refills: 0 | Status: DISCONTINUED | OUTPATIENT
Start: 2017-10-02 | End: 2017-10-12

## 2017-10-02 RX ORDER — IPRATROPIUM/ALBUTEROL SULFATE 18-103MCG
3 AEROSOL WITH ADAPTER (GRAM) INHALATION ONCE
Qty: 0 | Refills: 0 | Status: COMPLETED | OUTPATIENT
Start: 2017-10-02 | End: 2017-10-02

## 2017-10-02 RX ORDER — INFLUENZA VIRUS VACCINE 15; 15; 15; 15 UG/.5ML; UG/.5ML; UG/.5ML; UG/.5ML
0.5 SUSPENSION INTRAMUSCULAR ONCE
Qty: 0 | Refills: 0 | Status: COMPLETED | OUTPATIENT
Start: 2017-10-02 | End: 2017-10-02

## 2017-10-02 RX ORDER — RITONAVIR 100 MG/1
100 TABLET, FILM COATED ORAL DAILY
Qty: 0 | Refills: 0 | Status: DISCONTINUED | OUTPATIENT
Start: 2017-10-02 | End: 2017-10-12

## 2017-10-02 RX ORDER — SODIUM CHLORIDE 9 MG/ML
1000 INJECTION, SOLUTION INTRAVENOUS
Qty: 0 | Refills: 0 | Status: DISCONTINUED | OUTPATIENT
Start: 2017-10-02 | End: 2017-10-12

## 2017-10-02 RX ORDER — HEPARIN SODIUM 5000 [USP'U]/ML
5000 INJECTION INTRAVENOUS; SUBCUTANEOUS EVERY 8 HOURS
Qty: 0 | Refills: 0 | Status: DISCONTINUED | OUTPATIENT
Start: 2017-10-02 | End: 2017-10-12

## 2017-10-02 RX ADMIN — PIPERACILLIN AND TAZOBACTAM 200 GRAM(S): 4; .5 INJECTION, POWDER, LYOPHILIZED, FOR SOLUTION INTRAVENOUS at 14:53

## 2017-10-02 RX ADMIN — Medication 250 MILLIGRAM(S): at 17:58

## 2017-10-02 RX ADMIN — ATORVASTATIN CALCIUM 10 MILLIGRAM(S): 80 TABLET, FILM COATED ORAL at 21:49

## 2017-10-02 RX ADMIN — FINASTERIDE 5 MILLIGRAM(S): 5 TABLET, FILM COATED ORAL at 21:50

## 2017-10-02 RX ADMIN — Medication 3 MILLILITER(S): at 14:54

## 2017-10-02 RX ADMIN — PIPERACILLIN AND TAZOBACTAM 25 GRAM(S): 4; .5 INJECTION, POWDER, LYOPHILIZED, FOR SOLUTION INTRAVENOUS at 21:58

## 2017-10-02 RX ADMIN — OLANZAPINE 2.5 MILLIGRAM(S): 15 TABLET, FILM COATED ORAL at 21:50

## 2017-10-02 RX ADMIN — TAMSULOSIN HYDROCHLORIDE 0.4 MILLIGRAM(S): 0.4 CAPSULE ORAL at 21:50

## 2017-10-02 RX ADMIN — SODIUM CHLORIDE 500 MILLILITER(S): 9 INJECTION INTRAMUSCULAR; INTRAVENOUS; SUBCUTANEOUS at 10:00

## 2017-10-02 RX ADMIN — SODIUM CHLORIDE 80 MILLILITER(S): 9 INJECTION, SOLUTION INTRAVENOUS at 17:58

## 2017-10-02 RX ADMIN — DONEPEZIL HYDROCHLORIDE 10 MILLIGRAM(S): 10 TABLET, FILM COATED ORAL at 21:50

## 2017-10-02 RX ADMIN — HEPARIN SODIUM 5000 UNIT(S): 5000 INJECTION INTRAVENOUS; SUBCUTANEOUS at 21:50

## 2017-10-02 RX ADMIN — Medication 250 MILLIGRAM(S): at 14:53

## 2017-10-02 NOTE — CONSULT NOTE ADULT - PROBLEM SELECTOR RECOMMENDATION 9
Start tamsulosin/finasteride. Proceed with voiding trial
follow on c/s  follow on wbc and temp curve  cont zosyn   add vanco and legionella  send legionella antigen

## 2017-10-02 NOTE — H&P ADULT - NSHPPHYSICALEXAM_GEN_ALL_CORE
GENERAL: NAD   HEAD:  Atraumatic, Normocephalic  EYES: EOMI, PERRLA, conjunctiva and sclera clear  ENMT: No tonsillar erythema, exudates, or enlargement; Moist mucous membranes, Good dentition, No lesions  NECK: Supple, No JVD, Normal thyroid  NERVOUS SYSTEM:  Alert Motor Strength 5/5 B/L upper and lower extremities; DTRs 2+ intact and symmetric  CHEST/LUNG: Clear to percussion bilaterally; No rales, rhonchi, wheezing, or rubs  HEART: Regular rate and rhythm; No murmurs, rubs, or gallops  ABDOMEN: Soft, Nontender, Nondistended; Bowel sounds present  EXTREMITIES:  2+ Peripheral Pulses, No clubbing, cyanosis, or edema  LYMPH: No lymphadenopathy   SKIN: No rashes or lesions

## 2017-10-02 NOTE — ED ADULT TRIAGE NOTE - CHIEF COMPLAINT QUOTE
diarrhea, cough, runny nose, fever symptoms started Wednesday  diarrhea now subsiding   patient with history of Alzheimer's

## 2017-10-02 NOTE — CONSULT NOTE ADULT - SUBJECTIVE AND OBJECTIVE BOX
HPI:  : 76M  HIV, HTN, dementia o HAART therapy, compliant per family comes in w loose stools for a wk, rhinorrhea and dry cough, w fevers at home 100.6 yesterday. no n/v,but does have diarrhea  no sick contacts. states had this b/f and was admitted for dehydration. His wife/daughter was unable to assess any change in voiding symptoms prior to admission.   ROS - pt is poor historian (02 Oct 2017 14:45)      PAST MEDICAL & SURGICAL HISTORY:  Alzheimer's disease of other onset  HTN (hypertension)  HIV (human immunodeficiency virus infection)  No significant past surgical history      REVIEW OF SYSTEMS:    General: no fever	    Respiratory and Thorax: no sob  	  Cardiovascular:	no cp    Gastrointestinal:	diarrhea    Genitourinary:	incontinence           MEDICATIONS  (STANDING):  atorvastatin 10 milliGRAM(s) Oral at bedtime  OLANZapine 2.5 milliGRAM(s) Oral daily  ritonavir Tablet 100 milliGRAM(s) Oral daily  darunavir 800 milliGRAM(s) Oral daily  emtricitabine 200 mG/tenofovir 300 mG (TRUVADA) 1 Tablet(s) Oral daily  amLODIPine   Tablet 5 milliGRAM(s) Oral daily  donepezil 10 milliGRAM(s) Oral at bedtime  heparin  Injectable 5000 Unit(s) SubCutaneous every 8 hours  piperacillin/tazobactam IVPB. 3.375 Gram(s) IV Intermittent every 8 hours  dextrose 5% + sodium chloride 0.45%. 1000 milliLiter(s) (80 mL/Hr) IV Continuous <Continuous>  azithromycin   Tablet 500 milliGRAM(s) Oral daily  vancomycin  IVPB 1000 milliGRAM(s) IV Intermittent every 12 hours  influenza   Vaccine 0.5 milliLiter(s) IntraMuscular once    MEDICATIONS  (PRN):      Allergies    No Known Allergies    Intolerances        SOCIAL HISTORY:    FAMILY HISTORY:  No pertinent family history in first degree relatives      Vital Signs Last 24 Hrs  T(C): 37.6 (02 Oct 2017 17:55), Max: 38.3 (02 Oct 2017 15:27)  T(F): 99.7 (02 Oct 2017 17:55), Max: 100.9 (02 Oct 2017 15:27)  HR: 105 (02 Oct 2017 15:30) (94 - 105)  BP: 134/71 (02 Oct 2017 15:30) (126/72 - 145/77)  BP(mean): --  RR: 18 (02 Oct 2017 15:30) (16 - 18)  SpO2: 95% (02 Oct 2017 15:30) (95% - 96%)      PHYSICAL EXAM:     Gastrointestinal: nondistended; NT    Genitourinary: penis: no lesions; nelson in place: urine clear    Extremities: no edema      LABS:                        12.3   11.7  )-----------( 303      ( 02 Oct 2017 10:00 )             37.0     10-    138  |  103  |  8   ----------------------------<  118<H>  3.6   |  25  |  0.93    Ca    8.7      02 Oct 2017 10:00    TPro  8.8<H>  /  Alb  2.8<L>  /  TBili  0.4  /  DBili  x   /  AST  24  /  ALT  18  /  AlkPhos  161<H>  10-02      Urinalysis Basic - ( 02 Oct 2017 17:32 )    Color: Yellow / Appearance: Clear / S.005 / pH: x  Gluc: x / Ketone: Negative  / Bili: Negative / Urobili: Negative mg/dL   Blood: x / Protein: Negative mg/dL / Nitrite: Negative   Leuk Esterase: Negative / RBC: 3-5 /HPF / WBC 3-5   Sq Epi: x / Non Sq Epi: x / Bacteria: x        RADIOLOGY & ADDITIONAL STUDIES:

## 2017-10-02 NOTE — H&P ADULT - HISTORY OF PRESENT ILLNESS
: 76M  HIV, HTN, dementia o HAART therapy, compliant per family comes in w loose stools for a wk, rhinorrhea and dry cough, w fevers at home 100.6 yesterday. no n/v,but does have diarrhea  no sick contacts. states had this b/f and was admitted for dehydration  ROS - pt is poor historian

## 2017-10-02 NOTE — CONSULT NOTE ADULT - PROBLEM SELECTOR RECOMMENDATION 2
Discussed treatment options with patient's wife and daughter
as ICP covering both gram neg and MRSA empirically

## 2017-10-02 NOTE — ED PROVIDER NOTE - PRESENTING SYMPTOMS DETAILS
76M  HIV, HTN, dementia o HAART therapy, compliant per family comes in w loose stools for a wk, rhinorrhea and dry cough, w fevers at home 100.6 yesterday. no n/v, no sick contacts. states had this b/f and was admitted for dehydration  ROS - pt is poor historian

## 2017-10-02 NOTE — H&P ADULT - NSHPLABSRESULTS_GEN_ALL_CORE
12.3   11.7  )-----------( 303      ( 02 Oct 2017 10:00 )             37.0   10-02    138  |  103  |  8   ----------------------------<  118<H>  3.6   |  25  |  0.93    Ca    8.7      02 Oct 2017 10:00    TPro  8.8<H>  /  Alb  2.8<L>  /  TBili  0.4  /  DBili  x   /  AST  24  /  ALT  18  /  AlkPhos  161<H>  10-02    < from: Xray Chest 1 View AP/PA. (10.02.17 @ 09:43) >    IMPRESSION: Bibasilar patchy opacities compatible with atelectasis and/or   pneumonia    < end of copied text >

## 2017-10-02 NOTE — CONSULT NOTE ADULT - ASSESSMENT
pt is septic with pna  add vanco and azithro   cont zoysn   follow up on cs  legionella antigen  check RVP  2.HIV   cont meds  HIV dementia 76 yr old male seen with

## 2017-10-02 NOTE — CONSULT NOTE ADULT - SUBJECTIVE AND OBJECTIVE BOX
Infectious Diseases - Attending at Dr. Thacker    HPI:  : 76M  HIV, HTN, dementia o HAART therapy, compliant per family comes in w loose stools for a wk, rhinorrhea and dry cough, w fevers at home 100.6 yesterday. no n/v,but does have diarrhea  no sick contacts. states had this b/f and was admitted for dehydration  ROS - pt is poor historian (02 Oct 2017 14:45)      PAST MEDICAL & SURGICAL HISTORY:  Alzheimer's disease of other onset  HTN (hypertension)  HIV (human immunodeficiency virus infection)  No significant past surgical history      Allergies    No Known Allergies    Intolerances        FAMILY HISTORY:  No pertinent family history in first degree relatives      SOCIAL HISTORY:    REVIEW OF SYSTEMS:    Constitutional: No fever, weight loss or fatigue  Eyes: No eye pain, visual disturbances, or discharge  ENT:  No difficulty hearing, tinnitus, vertigo; No sinus or throat pain  Neck: No pain or stiffness  Respiratory: No cough, wheezing, chills or hemoptysis  Cardiovascular: No chest pain, palpitations, shortness of breath, dizziness or leg swelling  Gastrointestinal: No abdominal or epigastric pain. No nausea, vomiting or hematemesis; No diarrhea or constipation. No melena or hematochezia.  Genitourinary: No dysuria, frequency, hematuria or incontinence  Neurological: No headaches, memory loss, loss of strength, numbness or tremors  Skin: No itching, burning, rashes or lesions       MEDICATIONS  (STANDING):  atorvastatin 10 milliGRAM(s) Oral at bedtime  OLANZapine 2.5 milliGRAM(s) Oral daily  ritonavir Tablet 100 milliGRAM(s) Oral daily  darunavir 800 milliGRAM(s) Oral daily  emtricitabine 200 mG/tenofovir 300 mG (TRUVADA) 1 Tablet(s) Oral daily  amLODIPine   Tablet 5 milliGRAM(s) Oral daily  donepezil 10 milliGRAM(s) Oral at bedtime  heparin  Injectable 5000 Unit(s) SubCutaneous every 8 hours  piperacillin/tazobactam IVPB. 3.375 Gram(s) IV Intermittent every 8 hours  dextrose 5% + sodium chloride 0.45%. 1000 milliLiter(s) (80 mL/Hr) IV Continuous <Continuous>  azithromycin   Tablet 500 milliGRAM(s) Oral daily  vancomycin  IVPB 1000 milliGRAM(s) IV Intermittent every 12 hours  influenza   Vaccine 0.5 milliLiter(s) IntraMuscular once  tamsulosin 0.4 milliGRAM(s) Oral at bedtime  finasteride 5 milliGRAM(s) Oral daily    MEDICATIONS  (PRN):      Vital Signs Last 24 Hrs  T(C): 37.6 (02 Oct 2017 17:55), Max: 38.3 (02 Oct 2017 15:27)  T(F): 99.7 (02 Oct 2017 17:55), Max: 100.9 (02 Oct 2017 15:27)  HR: 105 (02 Oct 2017 15:30) (94 - 105)  BP: 134/71 (02 Oct 2017 15:30) (126/72 - 145/77)  BP(mean): --  RR: 18 (02 Oct 2017 15:30) (16 - 18)  SpO2: 95% (02 Oct 2017 15:30) (95% - 96%)    PHYSICAL EXAM:    Constitutional: NAD, well-groomed, well-developed  HEENT: PERRLA, EOMI, Normal Hearing, MMM  Neck: No LAD, No JVD  Back: Normal spine flexure, No CVA tenderness  Respiratory: CTAB/L  Cardiovascular: S1 and S2, RRR, no M/G/R  Gastrointestinal: BS+, soft, NT/ND  Extremities: No peripheral edema  Vascular: 2+ peripheral pulses  Neurological: A/O x 3, no focal deficits  Skin: No rashes      LABS:                        12.3   11.7  )-----------( 303      ( 02 Oct 2017 10:00 )             37.0     10-02    138  |  103  |  8   ----------------------------<  118<H>  3.6   |  25  |  0.93    Ca    8.7      02 Oct 2017 10:00    TPro  8.8<H>  /  Alb  2.8<L>  /  TBili  0.4  /  DBili  x   /  AST  24  /  ALT  18  /  AlkPhos  161<H>  10-02      Urinalysis Basic - ( 02 Oct 2017 17:32 )    Color: Yellow / Appearance: Clear / S.005 / pH: x  Gluc: x / Ketone: Negative  / Bili: Negative / Urobili: Negative mg/dL   Blood: x / Protein: Negative mg/dL / Nitrite: Negative   Leuk Esterase: Negative / RBC: 3-5 /HPF / WBC 3-5   Sq Epi: x / Non Sq Epi: x / Bacteria: x        MICROBIOLOGY:  RECENT CULTURES:        RADIOLOGY & ADDITIONAL STUDIES: Infectious Diseases - Attending at Dr. Thacker    HPI:  : 76M  HIV, HTN, dementia on HAART therapy, compliant per family for last 18 months comes in w loose stools for a wk, rhinorrhea and dry cough, w fevers at home 100.6 yesterday. no n/v,but does have diarrhea  no sick contacts. states had this b/f and was admitted for dehydration .  Wife at bedside. Pt doesn't talk says yes for every question asked .Doesn't appear to be uncomfortable .Had fever in ER.  ROS - pt is poor historian (02 Oct 2017 14:45)      PAST MEDICAL & SURGICAL HISTORY:  Alzheimer's disease of other onset  HTN (hypertension)  HIV (human immunodeficiency virus infection)  No significant past surgical history      Allergies    No Known Allergies    Intolerances        FAMILY HISTORY:  No pertinent family history in first degree relatives      SOCIAL HISTORY:non smoker    REVIEW OF SYSTEMS:    Constitutional: +fever, No weight loss or fatigue  Eyes: No eye pain, visual disturbances, or discharge  ENT:  No difficulty hearing, tinnitus, vertigo;rhinorrhea+  Neck: No pain or stiffness  Respiratory: + cough, wheezing, chills or hemoptysis  Cardiovascular: No chest pain, palpitations, shortness of breath, dizziness or leg swelling  Gastrointestinal: No abdominal or epigastric pain. No nausea, vomiting or hematemesis; + diarrhea Genitourinary: No dysuria, frequency, hematuria or incontinence  Neurological: +amari loss, No  loss of strength, numbness or tremors  Skin: No itching, burning, rashes or lesions       MEDICATIONS  (STANDING):  atorvastatin 10 milliGRAM(s) Oral at bedtime  OLANZapine 2.5 milliGRAM(s) Oral daily  ritonavir Tablet 100 milliGRAM(s) Oral daily  darunavir 800 milliGRAM(s) Oral daily  emtricitabine 200 mG/tenofovir 300 mG (TRUVADA) 1 Tablet(s) Oral daily  amLODIPine   Tablet 5 milliGRAM(s) Oral daily  donepezil 10 milliGRAM(s) Oral at bedtime  heparin  Injectable 5000 Unit(s) SubCutaneous every 8 hours  piperacillin/tazobactam IVPB. 3.375 Gram(s) IV Intermittent every 8 hours  dextrose 5% + sodium chloride 0.45%. 1000 milliLiter(s) (80 mL/Hr) IV Continuous <Continuous>  azithromycin   Tablet 500 milliGRAM(s) Oral daily  vancomycin  IVPB 1000 milliGRAM(s) IV Intermittent every 12 hours  influenza   Vaccine 0.5 milliLiter(s) IntraMuscular once  tamsulosin 0.4 milliGRAM(s) Oral at bedtime  finasteride 5 milliGRAM(s) Oral daily    MEDICATIONS  (PRN):      Vital Signs Last 24 Hrs  T(C): 37.6 (02 Oct 2017 17:55), Max: 38.3 (02 Oct 2017 15:27)  T(F): 99.7 (02 Oct 2017 17:55), Max: 100.9 (02 Oct 2017 15:27)  HR: 105 (02 Oct 2017 15:30) (94 - 105)  BP: 134/71 (02 Oct 2017 15:30) (126/72 - 145/77)  BP(mean): --  RR: 18 (02 Oct 2017 15:30) (16 - 18)  SpO2: 95% (02 Oct 2017 15:30) (95% - 96%)    PHYSICAL EXAM:    Constitutional: NAD, well-groomed, well-developed  HEENT: PERRLA, EOMI, Normal Hearing, MMM  Neck: No LAD, No JVD  Back: Normal spine flexure, No CVA tenderness  Respiratory: CTAB/L  Cardiovascular: S1 and S2, RRR, no M/G/R  Gastrointestinal: BS+, soft, NT/ND  Extremities: No peripheral edema  Vascular: 2+ peripheral pulses  Neurological: A/O x 3, no focal deficits  Skin: No rashes      LABS:                        12.3   11.7  )-----------( 303      ( 02 Oct 2017 10:00 )             37.0     10-02    138  |  103  |  8   ----------------------------<  118<H>  3.6   |  25  |  0.93    Ca    8.7      02 Oct 2017 10:00    TPro  8.8<H>  /  Alb  2.8<L>  /  TBili  0.4  /  DBili  x   /  AST  24  /  ALT  18  /  AlkPhos  161<H>  10-02      Urinalysis Basic - ( 02 Oct 2017 17:32 )    Color: Yellow / Appearance: Clear / S.005 / pH: x  Gluc: x / Ketone: Negative  / Bili: Negative / Urobili: Negative mg/dL   Blood: x / Protein: Negative mg/dL / Nitrite: Negative   Leuk Esterase: Negative / RBC: 3-5 /HPF / WBC 3-5   Sq Epi: x / Non Sq Epi: x / Bacteria: x        MICROBIOLOGY:  RECENT CULTURES:        RADIOLOGY & ADDITIONAL STUDIES:  < from: CT Abdomen and Pelvis w/ IV Cont (10.02.17 @ 12:29) >    LOWER CHEST: Small right pleural effusion. Bibasilar atelectasis.    LIVER: Within normal limits.  BILE DUCTS: Normal caliber.  GALLBLADDER: Within normal limits.  SPLEEN: Within normal limits.  PANCREAS: Within normal limits.  ADRENALS: Within normal limits.  KIDNEYS/URETERS: Mild bilateral hydroureteronephrosis. Subcentimeter   hypodense renal lesions, too small to further characterize.    BLADDER: Distended.  REPRODUCTIVE ORGANS: The prostate is within normal limits.    BOWEL: Prominent gastroesophageal lymph nodes measuring up to 1.2 cm. No   bowel obstruction. Appendix within normal limits. Colonic diverticulosis.  PERITONEUM: No ascites.  VESSELS:  Atherosclerotic changes.  RETROPERITONEUM: Enlarged pelvic wall lymph nodes measuring up to 3.1 cm,   similar to previous study.  ABDOMINAL WALL: Small fat-containing umbilical hernia.  BONES: Spinal degenerative changes. Pagetoid changes of L2, L3, and left   iliac bone.    IMPRESSION:     Small right pleural effusion.  Mild bilateral hydroureteronephrosis, which may be secondary to distended   urinary bladder.  No CT evidence of colitis.    < end of copied text >

## 2017-10-02 NOTE — ED PROVIDER NOTE - MEDICAL DECISION MAKING DETAILS
xr shows possible pna. given pt feeling weak w pna, hx hiv will admit xr shows possible pna. given pt feeling weak w pna, fevers at home, hx hiv will admit. covered for [employer]. decision for possible pcp coverage deferred to inpt team

## 2017-10-03 DIAGNOSIS — N40.1 BENIGN PROSTATIC HYPERPLASIA WITH LOWER URINARY TRACT SYMPTOMS: ICD-10-CM

## 2017-10-03 LAB
ANION GAP SERPL CALC-SCNC: 11 MMOL/L — SIGNIFICANT CHANGE UP (ref 5–17)
BUN SERPL-MCNC: 6 MG/DL — LOW (ref 7–23)
CALCIUM SERPL-MCNC: 8.3 MG/DL — LOW (ref 8.5–10.1)
CHLORIDE SERPL-SCNC: 104 MMOL/L — SIGNIFICANT CHANGE UP (ref 96–108)
CO2 SERPL-SCNC: 26 MMOL/L — SIGNIFICANT CHANGE UP (ref 22–31)
CREAT SERPL-MCNC: 1 MG/DL — SIGNIFICANT CHANGE UP (ref 0.5–1.3)
GLUCOSE SERPL-MCNC: 165 MG/DL — HIGH (ref 70–99)
HCT VFR BLD CALC: 33.9 % — LOW (ref 39–50)
HGB BLD-MCNC: 11.5 G/DL — LOW (ref 13–17)
LDH SERPL L TO P-CCNC: 313 U/L — HIGH (ref 50–242)
MCHC RBC-ENTMCNC: 31.2 PG — SIGNIFICANT CHANGE UP (ref 27–34)
MCHC RBC-ENTMCNC: 34 GM/DL — SIGNIFICANT CHANGE UP (ref 32–36)
MCV RBC AUTO: 91.6 FL — SIGNIFICANT CHANGE UP (ref 80–100)
PLATELET # BLD AUTO: 295 K/UL — SIGNIFICANT CHANGE UP (ref 150–400)
POTASSIUM SERPL-MCNC: 3.3 MMOL/L — LOW (ref 3.5–5.3)
POTASSIUM SERPL-SCNC: 3.3 MMOL/L — LOW (ref 3.5–5.3)
PROCALCITONIN SERPL-MCNC: 0.12 NG/ML — HIGH (ref 0–0.04)
RBC # BLD: 3.7 M/UL — LOW (ref 4.2–5.8)
RBC # FLD: 12 % — SIGNIFICANT CHANGE UP (ref 11–15)
SODIUM SERPL-SCNC: 141 MMOL/L — SIGNIFICANT CHANGE UP (ref 135–145)
VANCOMYCIN TROUGH SERPL-MCNC: 12.2 UG/ML — SIGNIFICANT CHANGE UP (ref 10–20)
WBC # BLD: 10.5 K/UL — SIGNIFICANT CHANGE UP (ref 3.8–10.5)
WBC # FLD AUTO: 10.5 K/UL — SIGNIFICANT CHANGE UP (ref 3.8–10.5)

## 2017-10-03 PROCEDURE — 99233 SBSQ HOSP IP/OBS HIGH 50: CPT

## 2017-10-03 RX ORDER — ACETAMINOPHEN 500 MG
650 TABLET ORAL EVERY 6 HOURS
Qty: 0 | Refills: 0 | Status: DISCONTINUED | OUTPATIENT
Start: 2017-10-03 | End: 2017-10-12

## 2017-10-03 RX ORDER — IPRATROPIUM/ALBUTEROL SULFATE 18-103MCG
3 AEROSOL WITH ADAPTER (GRAM) INHALATION EVERY 6 HOURS
Qty: 0 | Refills: 0 | Status: DISCONTINUED | OUTPATIENT
Start: 2017-10-03 | End: 2017-10-12

## 2017-10-03 RX ORDER — POTASSIUM CHLORIDE 20 MEQ
20 PACKET (EA) ORAL
Qty: 0 | Refills: 0 | Status: COMPLETED | OUTPATIENT
Start: 2017-10-03 | End: 2017-10-04

## 2017-10-03 RX ADMIN — HEPARIN SODIUM 5000 UNIT(S): 5000 INJECTION INTRAVENOUS; SUBCUTANEOUS at 05:44

## 2017-10-03 RX ADMIN — Medication 650 MILLIGRAM(S): at 15:15

## 2017-10-03 RX ADMIN — Medication 3 MILLILITER(S): at 18:46

## 2017-10-03 RX ADMIN — FINASTERIDE 5 MILLIGRAM(S): 5 TABLET, FILM COATED ORAL at 11:30

## 2017-10-03 RX ADMIN — Medication 20 MILLIEQUIVALENT(S): at 21:56

## 2017-10-03 RX ADMIN — Medication 650 MILLIGRAM(S): at 00:54

## 2017-10-03 RX ADMIN — HEPARIN SODIUM 5000 UNIT(S): 5000 INJECTION INTRAVENOUS; SUBCUTANEOUS at 15:12

## 2017-10-03 RX ADMIN — AMLODIPINE BESYLATE 5 MILLIGRAM(S): 2.5 TABLET ORAL at 05:44

## 2017-10-03 RX ADMIN — TAMSULOSIN HYDROCHLORIDE 0.4 MILLIGRAM(S): 0.4 CAPSULE ORAL at 21:29

## 2017-10-03 RX ADMIN — OLANZAPINE 2.5 MILLIGRAM(S): 15 TABLET, FILM COATED ORAL at 11:30

## 2017-10-03 RX ADMIN — EMTRICITABINE AND TENOFOVIR DISOPROXIL FUMARATE 1 TABLET(S): 200; 300 TABLET, FILM COATED ORAL at 11:31

## 2017-10-03 RX ADMIN — Medication 250 MILLIGRAM(S): at 18:43

## 2017-10-03 RX ADMIN — PIPERACILLIN AND TAZOBACTAM 25 GRAM(S): 4; .5 INJECTION, POWDER, LYOPHILIZED, FOR SOLUTION INTRAVENOUS at 15:14

## 2017-10-03 RX ADMIN — RITONAVIR 100 MILLIGRAM(S): 100 TABLET, FILM COATED ORAL at 11:30

## 2017-10-03 RX ADMIN — ATORVASTATIN CALCIUM 10 MILLIGRAM(S): 80 TABLET, FILM COATED ORAL at 21:29

## 2017-10-03 RX ADMIN — DARUNAVIR 800 MILLIGRAM(S): 75 TABLET, FILM COATED ORAL at 11:31

## 2017-10-03 RX ADMIN — Medication 20 MILLIEQUIVALENT(S): at 23:45

## 2017-10-03 RX ADMIN — HEPARIN SODIUM 5000 UNIT(S): 5000 INJECTION INTRAVENOUS; SUBCUTANEOUS at 21:29

## 2017-10-03 RX ADMIN — PIPERACILLIN AND TAZOBACTAM 25 GRAM(S): 4; .5 INJECTION, POWDER, LYOPHILIZED, FOR SOLUTION INTRAVENOUS at 06:50

## 2017-10-03 RX ADMIN — SODIUM CHLORIDE 80 MILLILITER(S): 9 INJECTION, SOLUTION INTRAVENOUS at 15:13

## 2017-10-03 RX ADMIN — AZITHROMYCIN 500 MILLIGRAM(S): 500 TABLET, FILM COATED ORAL at 11:30

## 2017-10-03 RX ADMIN — Medication 250 MILLIGRAM(S): at 05:24

## 2017-10-03 RX ADMIN — PIPERACILLIN AND TAZOBACTAM 25 GRAM(S): 4; .5 INJECTION, POWDER, LYOPHILIZED, FOR SOLUTION INTRAVENOUS at 21:29

## 2017-10-03 RX ADMIN — Medication 650 MILLIGRAM(S): at 23:42

## 2017-10-03 RX ADMIN — DONEPEZIL HYDROCHLORIDE 10 MILLIGRAM(S): 10 TABLET, FILM COATED ORAL at 21:29

## 2017-10-03 NOTE — PROGRESS NOTE ADULT - ASSESSMENT
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 Low grade fever overnight will  increase antibiotic spectrum with vancomycin

## 2017-10-03 NOTE — PROGRESS NOTE ADULT - ASSESSMENT
d/c vanco in am ,cont zosyn and azithro for now  low grade fever +   breathing comfortably   T cell subset ordered &6 yr old male seen with pt is septic with pna  add vanco and ederro   cont zoysn   follow up on cs  legionella antigen  check RVP  2.HIV   cont meds  HIV dementia

## 2017-10-03 NOTE — PROGRESS NOTE ADULT - SUBJECTIVE AND OBJECTIVE BOX
Patient seen and examined bedside resting comfortably.  No complaints offered.   Denies nausea and vomiting. Tolerating diet.  Flatus/BM. +  Denies chest pain, dyspnea, cough.  T(F): 98.2 (10-03-17 @ 04:44), Max: 100.9 (10-02-17 @ 15:27)  HR: 80 (10-03-17 @ 04:44) (80 - 105)  BP: 120/66 (10-03-17 @ 04:44) (120/66 - 134/71)  RR: 18 (10-03-17 @ 04:44) (16 - 18)  SpO2: 96% (10-03-17 @ 04:44) (95% - 97%)        PHYSICAL EXAM:  General: NAD, WDWN  Neuro:  Alert  Abdomen: soft, NTND. Normactive BS  Wong cath in place Draining clear urine    LABS:                        11.5   10.5  )-----------( 295      ( 03 Oct 2017 07:42 )             33.9     10-03    141  |  104  |  6<L>  ----------------------------<  165<H>  3.3<L>   |  26  |  1.00    Ca    8.3<L>      03 Oct 2017 07:42    TPro  8.8<H>  /  Alb  2.8<L>  /  TBili  0.4  /  DBili  x   /  AST  24  /  ALT  18  /  AlkPhos  161<H>  10-02      I&O's Detail    02 Oct 2017 07:01  -  03 Oct 2017 07:00  --------------------------------------------------------  IN:    dextrose 5% + sodium chloride 0.45%.: 160 mL    Oral Fluid: 120 mL    Solution: 100 mL    Solution: 250 mL  Total IN: 630 mL    OUT:    Indwelling Catheter - Urethral: 2350 mL  Total OUT: 2350 mL    Total NET: -1720 mL        Impression: 76y Male admitted with PNEUMONIA DUE TO INFECTION UNSPECIFIED PART OF LUNG MEDICAL EVAL, FEVER, DIARREHIA  bilateral hydronephrosis    Plan:  - renal sonogram pending  -maintain Wong cath  continue flomax & finasteride  -will discuss with surgical attending

## 2017-10-03 NOTE — PROGRESS NOTE ADULT - SUBJECTIVE AND OBJECTIVE BOX
Patient is a 76y old  Male who presents with a chief complaint of fever (02 Oct 2017 14:45)      INTERVAL HPI/OVERNIGHT EVENTS: new admission from yesterday spoke at length with wife     MEDICATIONS  (STANDING):  amLODIPine   Tablet 5 milliGRAM(s) Oral daily  atorvastatin 10 milliGRAM(s) Oral at bedtime  azithromycin   Tablet 500 milliGRAM(s) Oral daily  darunavir 800 milliGRAM(s) Oral daily  dextrose 5% + sodium chloride 0.45%. 1000 milliLiter(s) (80 mL/Hr) IV Continuous <Continuous>  donepezil 10 milliGRAM(s) Oral at bedtime  emtricitabine 200 mG/tenofovir 300 mG (TRUVADA) 1 Tablet(s) Oral daily  finasteride 5 milliGRAM(s) Oral daily  heparin  Injectable 5000 Unit(s) SubCutaneous every 8 hours  influenza   Vaccine 0.5 milliLiter(s) IntraMuscular once  OLANZapine 2.5 milliGRAM(s) Oral daily  piperacillin/tazobactam IVPB. 3.375 Gram(s) IV Intermittent every 8 hours  ritonavir Tablet 100 milliGRAM(s) Oral daily  tamsulosin 0.4 milliGRAM(s) Oral at bedtime  vancomycin  IVPB 1000 milliGRAM(s) IV Intermittent every 12 hours    MEDICATIONS  (PRN):  acetaminophen   Tablet 650 milliGRAM(s) Oral every 6 hours PRN For Temp greater than 38 C (100.4 F)  ALBUTerol/ipratropium for Nebulization 3 milliLiter(s) Nebulizer every 6 hours PRN Bronchospasm      Allergies    No Known Allergies    Intolerances        REVIEW OF SYSTEMS:  patient did not participate in review of systems secondary to condition but does not appear to be in distress    Vital Signs Last 24 Hrs  T(C): 37.7 (03 Oct 2017 17:24), Max: 38.3 (03 Oct 2017 15:05)  T(F): 99.8 (03 Oct 2017 17:24), Max: 100.9 (03 Oct 2017 15:05)  HR: 89 (03 Oct 2017 17:24) (80 - 122)  BP: 105/60 (03 Oct 2017 17:24) (105/60 - 133/76)  BP(mean): --  RR: 17 (03 Oct 2017 17:24) (17 - 18)  SpO2: 94% (03 Oct 2017 17:24) (94% - 97%)    PHYSICAL EXAM:  GENERAL: NAD  HEAD:  Atraumatic, Normocephalic  EYES: EOMI, PERRLA, conjunctiva and sclera clear  ENMT: No tonsillar erythema, exudates, or enlargement; Moist mucous membranes, Good dentition, No lesions  NECK: Supple, No JVD, Normal thyroid  NERVOUS SYSTEM:  Alert awake   CHEST/LUNG: cracdkles and wheeze b/l   HEART: Regular rate and rhythm; No murmurs, rubs, or gallops  ABDOMEN: Soft, Nontender, Nondistended; Bowel sounds present  EXTREMITIES:  2+ Peripheral Pulses, No clubbing, cyanosis, or edema  LYMPH: No lymphadenopathy noted  SKIN: No rashes or lesions    LABS:                        11.5   10.5  )-----------( 295      ( 03 Oct 2017 07:42 )             33.9     10-03    141  |  104  |  6<L>  ----------------------------<  165<H>  3.3<L>   |  26  |  1.00    Ca    8.3<L>      03 Oct 2017 07:42    TPro  8.8<H>  /  Alb  2.8<L>  /  TBili  0.4  /  DBili  x   /  AST  24  /  ALT  18  /  AlkPhos  161<H>  10-02      Urinalysis Basic - ( 02 Oct 2017 17:32 )    Color: Yellow / Appearance: Clear / S.005 / pH: x  Gluc: x / Ketone: Negative  / Bili: Negative / Urobili: Negative mg/dL   Blood: x / Protein: Negative mg/dL / Nitrite: Negative   Leuk Esterase: Negative / RBC: 3-5 /HPF / WBC 3-5   Sq Epi: x / Non Sq Epi: x / Bacteria: Occasional      CAPILLARY BLOOD GLUCOSE          RADIOLOGY & ADDITIONAL TESTS:    Imaging Personally Reviewed:  [X ] YES  [ ] NO    Consultant(s) Notes Reviewed:  [X ] YES  [ ] NO    Care Discussed with Consultants/Other Providers [ X] YES  [ ] NO

## 2017-10-03 NOTE — PROGRESS NOTE ADULT - SUBJECTIVE AND OBJECTIVE BOX
Patient is a 76y old  Male who presents with a chief complaint of fever (02 Oct 2017 14:45)      INTERVAL HPI / OVERNIGHT EVENTS:    MEDICATIONS  (STANDING):  amLODIPine   Tablet 5 milliGRAM(s) Oral daily  atorvastatin 10 milliGRAM(s) Oral at bedtime  azithromycin   Tablet 500 milliGRAM(s) Oral daily  darunavir 800 milliGRAM(s) Oral daily  dextrose 5% + sodium chloride 0.45%. 1000 milliLiter(s) (80 mL/Hr) IV Continuous <Continuous>  donepezil 10 milliGRAM(s) Oral at bedtime  emtricitabine 200 mG/tenofovir 300 mG (TRUVADA) 1 Tablet(s) Oral daily  finasteride 5 milliGRAM(s) Oral daily  heparin  Injectable 5000 Unit(s) SubCutaneous every 8 hours  influenza   Vaccine 0.5 milliLiter(s) IntraMuscular once  OLANZapine 2.5 milliGRAM(s) Oral daily  piperacillin/tazobactam IVPB. 3.375 Gram(s) IV Intermittent every 8 hours  potassium chloride   Powder 20 milliEquivalent(s) Oral every 2 hours  ritonavir Tablet 100 milliGRAM(s) Oral daily  tamsulosin 0.4 milliGRAM(s) Oral at bedtime  vancomycin  IVPB 1000 milliGRAM(s) IV Intermittent every 12 hours    MEDICATIONS  (PRN):  acetaminophen   Tablet 650 milliGRAM(s) Oral every 6 hours PRN For Temp greater than 38 C (100.4 F)  ALBUTerol/ipratropium for Nebulization 3 milliLiter(s) Nebulizer every 6 hours PRN Bronchospasm      Vital Signs Last 24 Hrs  T(C): 37.7 (03 Oct 2017 17:24), Max: 38.3 (03 Oct 2017 15:05)  T(F): 99.8 (03 Oct 2017 17:24), Max: 100.9 (03 Oct 2017 15:05)  HR: 91 (03 Oct 2017 18:52) (80 - 122)  BP: 105/60 (03 Oct 2017 17:24) (105/60 - 133/76)  BP(mean): --  RR: 17 (03 Oct 2017 17:24) (17 - 18)  SpO2: 97% (03 Oct 2017 18:52) (94% - 97%)    PHYSICAL EXAM:  General :NAD  Constitutional:  well-groomed, well-developed  Respiratory: CTAB/L  Cardiovascular: S1 and S2, RRR, no M/G/R  Gastrointestinal: BS+, soft, NT/ND  Extremities: No peripheral edema  Vascular: 2+ peripheral pulses  Skin: No rashes      LABS:                        11.5   10.5  )-----------( 295      ( 03 Oct 2017 07:42 )             33.9     10    141  |  104  |  6<L>  ----------------------------<  165<H>  3.3<L>   |  26  |  1.00    Ca    8.3<L>      03 Oct 2017 07:42    TPro  8.8<H>  /  Alb  2.8<L>  /  TBili  0.4  /  DBili  x   /  AST  24  /  ALT  18  /  AlkPhos  161<H>  10-02    Urinalysis Basic - ( 02 Oct 2017 17:32 )    Color: Yellow / Appearance: Clear / S.005 / pH: x  Gluc: x / Ketone: Negative  / Bili: Negative / Urobili: Negative mg/dL   Blood: x / Protein: Negative mg/dL / Nitrite: Negative   Leuk Esterase: Negative / RBC: 3-5 /HPF / WBC 3-5   Sq Epi: x / Non Sq Epi: x / Bacteria: Occasional          MICROBIOLOGY:  RECENT CULTURES:  10-02 .Blood Blood-Venous XXXX XXXX   No growth to date.          RADIOLOGY & ADDITIONAL STUDIES: Patient is a 76y old  Male who presents with a chief complaint of fever (02 Oct 2017 14:45)      INTERVAL HPI / OVERNIGHT EVENTS: lying comfortably in bed. no acute compliant ,low grade fever X 1    MEDICATIONS  (STANDING):  amLODIPine   Tablet 5 milliGRAM(s) Oral daily  atorvastatin 10 milliGRAM(s) Oral at bedtime  azithromycin   Tablet 500 milliGRAM(s) Oral daily  darunavir 800 milliGRAM(s) Oral daily  dextrose 5% + sodium chloride 0.45%. 1000 milliLiter(s) (80 mL/Hr) IV Continuous <Continuous>  donepezil 10 milliGRAM(s) Oral at bedtime  emtricitabine 200 mG/tenofovir 300 mG (TRUVADA) 1 Tablet(s) Oral daily  finasteride 5 milliGRAM(s) Oral daily  heparin  Injectable 5000 Unit(s) SubCutaneous every 8 hours  influenza   Vaccine 0.5 milliLiter(s) IntraMuscular once  OLANZapine 2.5 milliGRAM(s) Oral daily  piperacillin/tazobactam IVPB. 3.375 Gram(s) IV Intermittent every 8 hours  potassium chloride   Powder 20 milliEquivalent(s) Oral every 2 hours  ritonavir Tablet 100 milliGRAM(s) Oral daily  tamsulosin 0.4 milliGRAM(s) Oral at bedtime  vancomycin  IVPB 1000 milliGRAM(s) IV Intermittent every 12 hours    MEDICATIONS  (PRN):  acetaminophen   Tablet 650 milliGRAM(s) Oral every 6 hours PRN For Temp greater than 38 C (100.4 F)  ALBUTerol/ipratropium for Nebulization 3 milliLiter(s) Nebulizer every 6 hours PRN Bronchospasm      Vital Signs Last 24 Hrs  T(C): 37.7 (03 Oct 2017 17:24), Max: 38.3 (03 Oct 2017 15:05)  T(F): 99.8 (03 Oct 2017 17:24), Max: 100.9 (03 Oct 2017 15:05)  HR: 91 (03 Oct 2017 18:52) (80 - 122)  BP: 105/60 (03 Oct 2017 17:24) (105/60 - 133/76)  BP(mean): --  RR: 17 (03 Oct 2017 17:24) (17 - 18)  SpO2: 97% (03 Oct 2017 18:52) (94% - 97%)    PHYSICAL EXAM:  General :NAD  Constitutional:  well-groomed, well-developed  Respiratory: CTAB/L  Cardiovascular: S1 and S2, RRR, no M/G/R  Gastrointestinal: BS+, soft, NT/ND  Extremities: No peripheral edema  Vascular: 2+ peripheral pulses  Skin: No rashes      LABS:                        11.5   10.5  )-----------( 295      ( 03 Oct 2017 07:42 )             33.9     10    141  |  104  |  6<L>  ----------------------------<  165<H>  3.3<L>   |  26  |  1.00    Ca    8.3<L>      03 Oct 2017 07:42    TPro  8.8<H>  /  Alb  2.8<L>  /  TBili  0.4  /  DBili  x   /  AST  24  /  ALT  18  /  AlkPhos  161<H>  10-02    Urinalysis Basic - ( 02 Oct 2017 17:32 )    Color: Yellow / Appearance: Clear / S.005 / pH: x  Gluc: x / Ketone: Negative  / Bili: Negative / Urobili: Negative mg/dL   Blood: x / Protein: Negative mg/dL / Nitrite: Negative   Leuk Esterase: Negative / RBC: 3-5 /HPF / WBC 3-5   Sq Epi: x / Non Sq Epi: x / Bacteria: Occasional    PCT 0.12      MICROBIOLOGY:  RECENT CULTURES:  10-02 .Blood Blood-Venous XXXX XXXX   No growth to date.          RADIOLOGY & ADDITIONAL STUDIES:

## 2017-10-03 NOTE — PROGRESS NOTE ADULT - SUBJECTIVE AND OBJECTIVE BOX
INTERVAL HPI/OVERNIGHT EVENTS: asleep; easily aroused    MEDICATIONS  (STANDING):  amLODIPine   Tablet 5 milliGRAM(s) Oral daily  atorvastatin 10 milliGRAM(s) Oral at bedtime  azithromycin   Tablet 500 milliGRAM(s) Oral daily  darunavir 800 milliGRAM(s) Oral daily  dextrose 5% + sodium chloride 0.45%. 1000 milliLiter(s) (80 mL/Hr) IV Continuous <Continuous>  donepezil 10 milliGRAM(s) Oral at bedtime  emtricitabine 200 mG/tenofovir 300 mG (TRUVADA) 1 Tablet(s) Oral daily  finasteride 5 milliGRAM(s) Oral daily  heparin  Injectable 5000 Unit(s) SubCutaneous every 8 hours  influenza   Vaccine 0.5 milliLiter(s) IntraMuscular once  OLANZapine 2.5 milliGRAM(s) Oral daily  piperacillin/tazobactam IVPB. 3.375 Gram(s) IV Intermittent every 8 hours  ritonavir Tablet 100 milliGRAM(s) Oral daily  tamsulosin 0.4 milliGRAM(s) Oral at bedtime  vancomycin  IVPB 1000 milliGRAM(s) IV Intermittent every 12 hours    MEDICATIONS  (PRN):  acetaminophen   Tablet 650 milliGRAM(s) Oral every 6 hours PRN For Temp greater than 38 C (100.4 F)  ALBUTerol/ipratropium for Nebulization 3 milliLiter(s) Nebulizer every 6 hours PRN Bronchospasm      Allergies    No Known Allergies    Intolerances        REVIEW OF SYSTEMS:    General: no fever	    Respiratory and Thorax: no sob  	  Cardiovascular:	no cp    Gastrointestinal:	 no change in BM    Genitourinary:	no hematuria           Vital Signs Last 24 Hrs  T(C): 37.7 (03 Oct 2017 17:24), Max: 38.3 (03 Oct 2017 15:05)  T(F): 99.8 (03 Oct 2017 17:24), Max: 100.9 (03 Oct 2017 15:05)  HR: 91 (03 Oct 2017 18:52) (80 - 122)  BP: 105/60 (03 Oct 2017 17:24) (105/60 - 133/76)  BP(mean): --  RR: 17 (03 Oct 2017 17:24) (17 - 18)  SpO2: 97% (03 Oct 2017 18:52) (94% - 97%)    PHYSICAL EXAM:    Gastrointestinal: soft    Genitourinary: nelson clear           LABS:                        11.5   10.5  )-----------( 295      ( 03 Oct 2017 07:42 )             33.9     10-    141  |  104  |  6<L>  ----------------------------<  165<H>  3.3<L>   |  26  |  1.00    Ca    8.3<L>      03 Oct 2017 07:42    TPro  8.8<H>  /  Alb  2.8<L>  /  TBili  0.4  /  DBili  x   /  AST  24  /  ALT  18  /  AlkPhos  161<H>  10-      Urinalysis Basic - ( 02 Oct 2017 17:32 )    Color: Yellow / Appearance: Clear / S.005 / pH: x  Gluc: x / Ketone: Negative  / Bili: Negative / Urobili: Negative mg/dL   Blood: x / Protein: Negative mg/dL / Nitrite: Negative   Leuk Esterase: Negative / RBC: 3-5 /HPF / WBC 3-5   Sq Epi: x / Non Sq Epi: x / Bacteria: Occasional        RADIOLOGY & ADDITIONAL TESTS:

## 2017-10-04 DIAGNOSIS — F03.90 UNSPECIFIED DEMENTIA, UNSPECIFIED SEVERITY, WITHOUT BEHAVIORAL DISTURBANCE, PSYCHOTIC DISTURBANCE, MOOD DISTURBANCE, AND ANXIETY: ICD-10-CM

## 2017-10-04 DIAGNOSIS — J15.6 PNEUMONIA DUE TO OTHER GRAM-NEGATIVE BACTERIA: ICD-10-CM

## 2017-10-04 DIAGNOSIS — A41.9 SEPSIS, UNSPECIFIED ORGANISM: ICD-10-CM

## 2017-10-04 LAB
4/8 RATIO: 0.15 RATIO — LOW (ref 0.9–3.6)
ABS CD8: 2157 /UL — HIGH (ref 136–757)
ALBUMIN SERPL ELPH-MCNC: 2.2 G/DL — LOW (ref 3.3–5)
ALP SERPL-CCNC: 138 U/L — HIGH (ref 40–120)
ALT FLD-CCNC: 20 U/L — SIGNIFICANT CHANGE UP (ref 12–78)
ANION GAP SERPL CALC-SCNC: 10 MMOL/L — SIGNIFICANT CHANGE UP (ref 5–17)
AST SERPL-CCNC: 22 U/L — SIGNIFICANT CHANGE UP (ref 15–37)
BILIRUB SERPL-MCNC: 0.4 MG/DL — SIGNIFICANT CHANGE UP (ref 0.2–1.2)
BUN SERPL-MCNC: 13 MG/DL — SIGNIFICANT CHANGE UP (ref 7–23)
CALCIUM SERPL-MCNC: 8 MG/DL — LOW (ref 8.5–10.1)
CD16+CD56+ CELLS NFR BLD: 17 % — SIGNIFICANT CHANGE UP (ref 4–25)
CD16+CD56+ CELLS NFR SPEC: 599 /UL — HIGH (ref 64–494)
CD19 BLASTS SPEC-ACNC: 285 /UL — SIGNIFICANT CHANGE UP (ref 68–528)
CD19 BLASTS SPEC-ACNC: 8 % — SIGNIFICANT CHANGE UP (ref 5–22)
CD3 BLASTS SPEC-ACNC: 2505 /UL — HIGH (ref 799–2171)
CD3 BLASTS SPEC-ACNC: 74 % — SIGNIFICANT CHANGE UP (ref 59–85)
CD4 %: 10 % — LOW (ref 36–65)
CD8 %: 66 % — HIGH (ref 11–36)
CHLORIDE SERPL-SCNC: 105 MMOL/L — SIGNIFICANT CHANGE UP (ref 96–108)
CO2 SERPL-SCNC: 24 MMOL/L — SIGNIFICANT CHANGE UP (ref 22–31)
CREAT SERPL-MCNC: 1.09 MG/DL — SIGNIFICANT CHANGE UP (ref 0.5–1.3)
FLUAV SPEC QL CULT: NEGATIVE — SIGNIFICANT CHANGE UP
FLUBV AG SPEC QL IA: NEGATIVE — SIGNIFICANT CHANGE UP
GLUCOSE SERPL-MCNC: 147 MG/DL — HIGH (ref 70–99)
HCT VFR BLD CALC: 32.5 % — LOW (ref 39–50)
HGB BLD-MCNC: 11.1 G/DL — LOW (ref 13–17)
LEGIONELLA AG UR QL: NEGATIVE — SIGNIFICANT CHANGE UP
MAGNESIUM SERPL-MCNC: 2.5 MG/DL — SIGNIFICANT CHANGE UP (ref 1.6–2.6)
MCHC RBC-ENTMCNC: 31.9 PG — SIGNIFICANT CHANGE UP (ref 27–34)
MCHC RBC-ENTMCNC: 34.2 GM/DL — SIGNIFICANT CHANGE UP (ref 32–36)
MCV RBC AUTO: 93.2 FL — SIGNIFICANT CHANGE UP (ref 80–100)
PHOSPHATE SERPL-MCNC: 2.2 MG/DL — LOW (ref 2.5–4.5)
PLATELET # BLD AUTO: 296 K/UL — SIGNIFICANT CHANGE UP (ref 150–400)
POTASSIUM SERPL-MCNC: 3.8 MMOL/L — SIGNIFICANT CHANGE UP (ref 3.5–5.3)
POTASSIUM SERPL-SCNC: 3.8 MMOL/L — SIGNIFICANT CHANGE UP (ref 3.5–5.3)
PROT SERPL-MCNC: 7.7 GM/DL — SIGNIFICANT CHANGE UP (ref 6–8.3)
RBC # BLD: 3.48 M/UL — LOW (ref 4.2–5.8)
RBC # FLD: 11.9 % — SIGNIFICANT CHANGE UP (ref 11–15)
SODIUM SERPL-SCNC: 139 MMOL/L — SIGNIFICANT CHANGE UP (ref 135–145)
T-CELL CD4 SUBSET PNL BLD: 333 /UL — LOW (ref 489–1457)
WBC # BLD: 13 K/UL — HIGH (ref 3.8–10.5)
WBC # FLD AUTO: 13 K/UL — HIGH (ref 3.8–10.5)

## 2017-10-04 PROCEDURE — 99233 SBSQ HOSP IP/OBS HIGH 50: CPT

## 2017-10-04 PROCEDURE — 76770 US EXAM ABDO BACK WALL COMP: CPT | Mod: 26

## 2017-10-04 PROCEDURE — 76775 US EXAM ABDO BACK WALL LIM: CPT | Mod: 26

## 2017-10-04 RX ORDER — MEROPENEM 1 G/30ML
1000 INJECTION INTRAVENOUS ONCE
Qty: 0 | Refills: 0 | Status: COMPLETED | OUTPATIENT
Start: 2017-10-04 | End: 2017-10-04

## 2017-10-04 RX ORDER — MEROPENEM 1 G/30ML
1000 INJECTION INTRAVENOUS EVERY 8 HOURS
Qty: 0 | Refills: 0 | Status: DISCONTINUED | OUTPATIENT
Start: 2017-10-04 | End: 2017-10-12

## 2017-10-04 RX ORDER — MEROPENEM 1 G/30ML
INJECTION INTRAVENOUS
Qty: 0 | Refills: 0 | Status: DISCONTINUED | OUTPATIENT
Start: 2017-10-04 | End: 2017-10-12

## 2017-10-04 RX ADMIN — FINASTERIDE 5 MILLIGRAM(S): 5 TABLET, FILM COATED ORAL at 12:30

## 2017-10-04 RX ADMIN — Medication 100 MILLIGRAM(S): at 04:22

## 2017-10-04 RX ADMIN — SODIUM CHLORIDE 80 MILLILITER(S): 9 INJECTION, SOLUTION INTRAVENOUS at 17:27

## 2017-10-04 RX ADMIN — HEPARIN SODIUM 5000 UNIT(S): 5000 INJECTION INTRAVENOUS; SUBCUTANEOUS at 13:47

## 2017-10-04 RX ADMIN — HEPARIN SODIUM 5000 UNIT(S): 5000 INJECTION INTRAVENOUS; SUBCUTANEOUS at 21:31

## 2017-10-04 RX ADMIN — EMTRICITABINE AND TENOFOVIR DISOPROXIL FUMARATE 1 TABLET(S): 200; 300 TABLET, FILM COATED ORAL at 12:30

## 2017-10-04 RX ADMIN — TAMSULOSIN HYDROCHLORIDE 0.4 MILLIGRAM(S): 0.4 CAPSULE ORAL at 21:31

## 2017-10-04 RX ADMIN — MEROPENEM 200 MILLIGRAM(S): 1 INJECTION INTRAVENOUS at 13:47

## 2017-10-04 RX ADMIN — Medication 650 MILLIGRAM(S): at 14:07

## 2017-10-04 RX ADMIN — DONEPEZIL HYDROCHLORIDE 10 MILLIGRAM(S): 10 TABLET, FILM COATED ORAL at 21:31

## 2017-10-04 RX ADMIN — AMLODIPINE BESYLATE 5 MILLIGRAM(S): 2.5 TABLET ORAL at 05:43

## 2017-10-04 RX ADMIN — ATORVASTATIN CALCIUM 10 MILLIGRAM(S): 80 TABLET, FILM COATED ORAL at 21:31

## 2017-10-04 RX ADMIN — HEPARIN SODIUM 5000 UNIT(S): 5000 INJECTION INTRAVENOUS; SUBCUTANEOUS at 05:43

## 2017-10-04 RX ADMIN — RITONAVIR 100 MILLIGRAM(S): 100 TABLET, FILM COATED ORAL at 12:30

## 2017-10-04 RX ADMIN — Medication 20 MILLIEQUIVALENT(S): at 02:43

## 2017-10-04 RX ADMIN — Medication 250 MILLIGRAM(S): at 06:20

## 2017-10-04 RX ADMIN — AZITHROMYCIN 500 MILLIGRAM(S): 500 TABLET, FILM COATED ORAL at 12:30

## 2017-10-04 RX ADMIN — MEROPENEM 200 MILLIGRAM(S): 1 INJECTION INTRAVENOUS at 21:31

## 2017-10-04 RX ADMIN — Medication 650 MILLIGRAM(S): at 05:42

## 2017-10-04 RX ADMIN — Medication 250 MILLIGRAM(S): at 17:27

## 2017-10-04 RX ADMIN — OLANZAPINE 2.5 MILLIGRAM(S): 15 TABLET, FILM COATED ORAL at 12:30

## 2017-10-04 RX ADMIN — DARUNAVIR 800 MILLIGRAM(S): 75 TABLET, FILM COATED ORAL at 12:30

## 2017-10-04 RX ADMIN — PIPERACILLIN AND TAZOBACTAM 25 GRAM(S): 4; .5 INJECTION, POWDER, LYOPHILIZED, FOR SOLUTION INTRAVENOUS at 05:43

## 2017-10-04 NOTE — PROGRESS NOTE ADULT - SUBJECTIVE AND OBJECTIVE BOX
Patient seen and examined bedside resting comfortably, easily arousable.     T(F): 101.4 (10-04-17 @ 11:08), Max: 102.2 (10-03-17 @ 23:33)  HR: 91 (10-04-17 @ 11:08) (89 - 106)  BP: 126/68 (10-04-17 @ 11:08) (105/60 - 138/76)  RR: 18 (10-04-17 @ 11:08) (17 - 18)  SpO2: 98% (10-04-17 @ 11:08) (94% - 98%)    PHYSICAL EXAM:  General: NAD  CV: +S1S2 regular rate and rhythm  Lung: +cough, coarse breath sounds  Abdomen: + distention, nontender, soft  : Nelson in place draining clear yellow urine: 2000ml/24hours.     LABS:                        11.1   13.0  )-----------( 296      ( 04 Oct 2017 07:59 )             32.5     10-04    139  |  105  |  13  ----------------------------<  147<H>  3.8   |  24  |  1.09    Ca    8.0<L>      04 Oct 2017 07:59  Phos  2.2     10-04  Mg     2.5     10-04    TPro  7.7  /  Alb  2.2<L>  /  TBili  0.4  /  DBili  x   /  AST  22  /  ALT  20  /  AlkPhos  138<H>  10-04    I&O's Detail    03 Oct 2017 07:01  -  04 Oct 2017 07:00  --------------------------------------------------------  IN:    dextrose 5% + sodium chloride 0.45%.: 960 mL    Oral Fluid: 680 mL    Solution: 250 mL    Solution: 200 mL  Total IN: 2090 mL    OUT:    Indwelling Catheter - Urethral: 2000 mL  Total OUT: 2000 mL    Total NET: 90 mL    from: US Renal (10.04.17 @ 08:38):  1. No bilateral hydronephrosis noted. 2. Two  small right renal cysts.    Impression: 76y Male with PMH Alzheimer's disease, HTN, HIV. Admitted with fever secondary to pneumonia. Consulted secondary urinary retention and bilateral hydronephrosis, now resolved.     Plan:  - remove nelson catheter now for TOV  - PVR bladder scan  - continue to trend BUN/Cr  - continue Flomax/ Proscar  - continue Antibiotics per ID  - Discussed with Dr. Johnson.

## 2017-10-04 NOTE — PROGRESS NOTE ADULT - SUBJECTIVE AND OBJECTIVE BOX
Patient is a 76y old  Male who presents with a chief complaint of fever (02 Oct 2017 14:45)      INTERVAL HPI/OVERNIGHT EVENTS: continued fever   MEDICATIONS  (STANDING):  amLODIPine   Tablet 5 milliGRAM(s) Oral daily  atorvastatin 10 milliGRAM(s) Oral at bedtime  azithromycin   Tablet 500 milliGRAM(s) Oral daily  darunavir 800 milliGRAM(s) Oral daily  dextrose 5% + sodium chloride 0.45%. 1000 milliLiter(s) (80 mL/Hr) IV Continuous <Continuous>  donepezil 10 milliGRAM(s) Oral at bedtime  emtricitabine 200 mG/tenofovir 300 mG (TRUVADA) 1 Tablet(s) Oral daily  finasteride 5 milliGRAM(s) Oral daily  heparin  Injectable 5000 Unit(s) SubCutaneous every 8 hours  influenza   Vaccine 0.5 milliLiter(s) IntraMuscular once  OLANZapine 2.5 milliGRAM(s) Oral daily  piperacillin/tazobactam IVPB. 3.375 Gram(s) IV Intermittent every 8 hours  ritonavir Tablet 100 milliGRAM(s) Oral daily  tamsulosin 0.4 milliGRAM(s) Oral at bedtime  vancomycin  IVPB 1000 milliGRAM(s) IV Intermittent every 12 hours    MEDICATIONS  (PRN):  acetaminophen   Tablet 650 milliGRAM(s) Oral every 6 hours PRN For Temp greater than 38 C (100.4 F)  ALBUTerol/ipratropium for Nebulization 3 milliLiter(s) Nebulizer every 6 hours PRN Bronchospasm      Allergies    No Known Allergies    Intolerances        REVIEW OF SYSTEMS:  patient did not participate in review of systems secondary to condition but does not appear to be in distress    MEDICATIONS  (STANDING):  amLODIPine   Tablet 5 milliGRAM(s) Oral daily  atorvastatin 10 milliGRAM(s) Oral at bedtime  azithromycin   Tablet 500 milliGRAM(s) Oral daily  darunavir 800 milliGRAM(s) Oral daily  dextrose 5% + sodium chloride 0.45%. 1000 milliLiter(s) (80 mL/Hr) IV Continuous <Continuous>  donepezil 10 milliGRAM(s) Oral at bedtime  emtricitabine 200 mG/tenofovir 300 mG (TRUVADA) 1 Tablet(s) Oral daily  finasteride 5 milliGRAM(s) Oral daily  heparin  Injectable 5000 Unit(s) SubCutaneous every 8 hours  influenza   Vaccine 0.5 milliLiter(s) IntraMuscular once  meropenem IVPB      meropenem IVPB 1000 milliGRAM(s) IV Intermittent every 8 hours  OLANZapine 2.5 milliGRAM(s) Oral daily  ritonavir Tablet 100 milliGRAM(s) Oral daily  tamsulosin 0.4 milliGRAM(s) Oral at bedtime  vancomycin  IVPB 1000 milliGRAM(s) IV Intermittent every 12 hours    MEDICATIONS  (PRN):  acetaminophen   Tablet 650 milliGRAM(s) Oral every 6 hours PRN For Temp greater than 38 C (100.4 F)  ALBUTerol/ipratropium for Nebulization 3 milliLiter(s) Nebulizer every 6 hours PRN Bronchospasm      PHYSICAL EXAM:  GENERAL: NAD  HEAD:  Atraumatic, Normocephalic  EYES: EOMI, PERRLA, conjunctiva and sclera clear  ENMT: No tonsillar erythema, exudates, or enlargement; Moist mucous membranes, Good dentition, No lesions  NECK: Supple, No JVD, Normal thyroid  NERVOUS SYSTEM:  Alert awake   CHEST/LUNG: cracdkles and wheeze b/l   HEART: Regular rate and rhythm; No murmurs, rubs, or gallops  ABDOMEN: Soft, Nontender, Nondistended; Bowel sounds present  EXTREMITIES:  2+ Peripheral Pulses, No clubbing, cyanosis, or edema  LYMPH: No lymphadenopathy noted  SKIN: No rashes or lesions                          11.1   13.0  )-----------( 296      ( 04 Oct 2017 07:59 )             32.5     10-04    139  |  105  |  13  ----------------------------<  147<H>  3.8   |  24  |  1.09    Ca    8.0<L>      04 Oct 2017 07:59  Phos  2.2     10-04  Mg     2.5     10-04    TPro  7.7  /  Alb  2.2<L>  /  TBili  0.4  /  DBili  x   /  AST  22  /  ALT  20  /  AlkPhos  138<H>  10-04                RADIOLOGY & ADDITIONAL TESTS:  < from: US Renal (10.04.17 @ 08:38) >  IMPRESSION:   1. No bilateral hydronephrosis noted.  2. Two  small right renal cysts.    < end of copied text >    Imaging Personally Reviewed:  [X ] YES  [ ] NO    Consultant(s) Notes Reviewed:  [X ] YES  [ ] NO    Care Discussed with Consultants/Other Providers [ X] YES  [ ] NO

## 2017-10-04 NOTE — PROGRESS NOTE ADULT - ASSESSMENT
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 Low grade fever overnight will  US now without hydro paul have trial of void     Dc planning physical therapy

## 2017-10-05 LAB
ANION GAP SERPL CALC-SCNC: 12 MMOL/L — SIGNIFICANT CHANGE UP (ref 5–17)
BUN SERPL-MCNC: 11 MG/DL — SIGNIFICANT CHANGE UP (ref 7–23)
CALCIUM SERPL-MCNC: 8.4 MG/DL — LOW (ref 8.5–10.1)
CHLORIDE SERPL-SCNC: 105 MMOL/L — SIGNIFICANT CHANGE UP (ref 96–108)
CO2 SERPL-SCNC: 23 MMOL/L — SIGNIFICANT CHANGE UP (ref 22–31)
CREAT SERPL-MCNC: 0.89 MG/DL — SIGNIFICANT CHANGE UP (ref 0.5–1.3)
CULTURE RESULTS: NO GROWTH — SIGNIFICANT CHANGE UP
FUNGITELL: 261 PG/ML — HIGH (ref 0–59)
GLUCOSE SERPL-MCNC: 140 MG/DL — HIGH (ref 70–99)
HCT VFR BLD CALC: 33.5 % — LOW (ref 39–50)
HGB BLD-MCNC: 11.6 G/DL — LOW (ref 13–17)
MAGNESIUM SERPL-MCNC: 2.7 MG/DL — HIGH (ref 1.6–2.6)
MCHC RBC-ENTMCNC: 31.7 PG — SIGNIFICANT CHANGE UP (ref 27–34)
MCHC RBC-ENTMCNC: 34.7 GM/DL — SIGNIFICANT CHANGE UP (ref 32–36)
MCV RBC AUTO: 91.4 FL — SIGNIFICANT CHANGE UP (ref 80–100)
PHOSPHATE SERPL-MCNC: 2.3 MG/DL — LOW (ref 2.5–4.5)
PLATELET # BLD AUTO: 337 K/UL — SIGNIFICANT CHANGE UP (ref 150–400)
POTASSIUM SERPL-MCNC: 3.6 MMOL/L — SIGNIFICANT CHANGE UP (ref 3.5–5.3)
POTASSIUM SERPL-SCNC: 3.6 MMOL/L — SIGNIFICANT CHANGE UP (ref 3.5–5.3)
PROCALCITONIN SERPL-MCNC: 0.27 NG/ML — HIGH (ref 0–0.04)
RBC # BLD: 3.67 M/UL — LOW (ref 4.2–5.8)
RBC # FLD: 11.9 % — SIGNIFICANT CHANGE UP (ref 11–15)
SODIUM SERPL-SCNC: 140 MMOL/L — SIGNIFICANT CHANGE UP (ref 135–145)
SPECIMEN SOURCE: SIGNIFICANT CHANGE UP
WBC # BLD: 13 K/UL — HIGH (ref 3.8–10.5)
WBC # FLD AUTO: 13 K/UL — HIGH (ref 3.8–10.5)

## 2017-10-05 PROCEDURE — 99233 SBSQ HOSP IP/OBS HIGH 50: CPT

## 2017-10-05 RX ADMIN — Medication 200 MILLIGRAM(S): at 12:44

## 2017-10-05 RX ADMIN — DARUNAVIR 800 MILLIGRAM(S): 75 TABLET, FILM COATED ORAL at 12:42

## 2017-10-05 RX ADMIN — AMLODIPINE BESYLATE 5 MILLIGRAM(S): 2.5 TABLET ORAL at 05:14

## 2017-10-05 RX ADMIN — HEPARIN SODIUM 5000 UNIT(S): 5000 INJECTION INTRAVENOUS; SUBCUTANEOUS at 21:16

## 2017-10-05 RX ADMIN — MEROPENEM 200 MILLIGRAM(S): 1 INJECTION INTRAVENOUS at 21:16

## 2017-10-05 RX ADMIN — HEPARIN SODIUM 5000 UNIT(S): 5000 INJECTION INTRAVENOUS; SUBCUTANEOUS at 14:48

## 2017-10-05 RX ADMIN — HEPARIN SODIUM 5000 UNIT(S): 5000 INJECTION INTRAVENOUS; SUBCUTANEOUS at 05:15

## 2017-10-05 RX ADMIN — Medication 250 MILLIGRAM(S): at 05:14

## 2017-10-05 RX ADMIN — EMTRICITABINE AND TENOFOVIR DISOPROXIL FUMARATE 1 TABLET(S): 200; 300 TABLET, FILM COATED ORAL at 12:42

## 2017-10-05 RX ADMIN — AZITHROMYCIN 500 MILLIGRAM(S): 500 TABLET, FILM COATED ORAL at 12:44

## 2017-10-05 RX ADMIN — Medication 650 MILLIGRAM(S): at 00:11

## 2017-10-05 RX ADMIN — OLANZAPINE 2.5 MILLIGRAM(S): 15 TABLET, FILM COATED ORAL at 12:43

## 2017-10-05 RX ADMIN — MEROPENEM 200 MILLIGRAM(S): 1 INJECTION INTRAVENOUS at 14:49

## 2017-10-05 RX ADMIN — TAMSULOSIN HYDROCHLORIDE 0.4 MILLIGRAM(S): 0.4 CAPSULE ORAL at 21:16

## 2017-10-05 RX ADMIN — Medication 250 MILLIGRAM(S): at 19:02

## 2017-10-05 RX ADMIN — Medication 200 MILLIGRAM(S): at 00:11

## 2017-10-05 RX ADMIN — ATORVASTATIN CALCIUM 10 MILLIGRAM(S): 80 TABLET, FILM COATED ORAL at 21:16

## 2017-10-05 RX ADMIN — FINASTERIDE 5 MILLIGRAM(S): 5 TABLET, FILM COATED ORAL at 12:43

## 2017-10-05 RX ADMIN — DONEPEZIL HYDROCHLORIDE 10 MILLIGRAM(S): 10 TABLET, FILM COATED ORAL at 21:16

## 2017-10-05 RX ADMIN — RITONAVIR 100 MILLIGRAM(S): 100 TABLET, FILM COATED ORAL at 12:41

## 2017-10-05 RX ADMIN — MEROPENEM 200 MILLIGRAM(S): 1 INJECTION INTRAVENOUS at 05:14

## 2017-10-05 NOTE — PROGRESS NOTE ADULT - SUBJECTIVE AND OBJECTIVE BOX
Patient seen and examined bedside resting comfortably.  Confused  No complaints offered.   Denies nausea and vomiting. Tolerating diet.  Denies chest pain, dyspnea, cough.  T(F): 99.6 (10-05-17 @ 05:26), Max: 100.8 (10-04-17 @ 23:27)  HR: 100 (10-05-17 @ 10:15) (82 - 100)  BP: 135/75 (10-05-17 @ 10:15) (101/63 - 135/75)  RR: 18 (10-05-17 @ 05:26) (17 - 18)  SpO2: 90% (10-05-17 @ 10:15) (90% - 96%)  Wt(kg): --  CAPILLARY BLOOD GLUCOSE        PHYSICAL EXAM:  General: NAD, WDWN  Neuro:  Alert , confused  Abdomen: soft, NTND. Normactive BS.   : Urinary bladder does not appear distended. Pt has has been incontinent of urine since Wong was removed yesterday. Documentation of a a PVR of 289ml. No documentation that surg PA was notified of this value.    LABS:                        11.6   13.0  )-----------( 337      ( 05 Oct 2017 08:17 )             33.5     10-05    140  |  105  |  11  ----------------------------<  140<H>  3.6   |  23  |  0.89    Ca    8.4<L>      05 Oct 2017 08:17  Phos  2.3     10-05  Mg     2.7     10-05    TPro  7.7  /  Alb  2.2<L>  /  TBili  0.4  /  DBili  x   /  AST  22  /  ALT  20  /  AlkPhos  138<H>  10-04      I&O's Detail    04 Oct 2017 07:01  -  05 Oct 2017 07:00  --------------------------------------------------------  IN:    dextrose 5% + sodium chloride 0.45%.: 1660 mL    Oral Fluid: 260 mL    Solution: 250 mL  Total IN: 2170 mL    OUT:    Indwelling Catheter - Urethral: 800 mL  Total OUT: 800 mL    Total NET: 1370 mL      Medical f/u noted.    Impression: 76y Male who is incontinent of urine s/p removal of Wong cath.  + HIV  dementia  leukocytosis steady at 13    Plan:  -continue VTE prophylaxis   - continue IVAB per ID  - continue medical f/u  - will repeat bladder scan to see if urinary incontinence is due to overflow from retention, or, secondary to his HIV; dementia. Ir in retention, will need Wong replaced  -f/u AM labs  -will discuss with Alex

## 2017-10-05 NOTE — PHYSICAL THERAPY INITIAL EVALUATION ADULT - MODIFIED CLINICAL TEST OF SENSORY INTEGRATION IN BALANCE TEST
Barthel Index: Feeding Score _0__, Bathing Score _0__, Grooming Score _0__, Dressing Score _0__, Bowels Score _5__, Bladder Score _5__, Toilet Score _0__, Transfers Score _10__, Mobility Score _0__, Stairs Score _0__,     Total Score _20__

## 2017-10-05 NOTE — PHYSICAL THERAPY INITIAL EVALUATION ADULT - ADDITIONAL COMMENTS
As per wife, lives with family in private house c 4 stair steps to enter; 12 stair steps to enter. Reports has home health aide services. Bathroom and bedroom upstairs. Wife reports usually stays upstairs. Own a transport chair for outdoor trips.

## 2017-10-05 NOTE — PHYSICAL THERAPY INITIAL EVALUATION ADULT - PLANNED THERAPY INTERVENTIONS, PT EVAL
strengthening/balance training/gait training/transfer training/bed mobility training/ROM/joint mobilization/neuromuscular re-education/stretching/motor coordination training/postural re-education

## 2017-10-05 NOTE — PROGRESS NOTE ADULT - ASSESSMENT
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 Low grade fever overnight will  US now without hydro paul have trial of void     Dc planning physical therapy

## 2017-10-05 NOTE — PROGRESS NOTE ADULT - SUBJECTIVE AND OBJECTIVE BOX
Patient is a 76y old  Male who presents with a chief complaint of fever (02 Oct 2017 14:45)      INTERVAL HPI / OVERNIGHT EVENTS:    MEDICATIONS  (STANDING):  amLODIPine   Tablet 5 milliGRAM(s) Oral daily  atorvastatin 10 milliGRAM(s) Oral at bedtime  azithromycin   Tablet 500 milliGRAM(s) Oral daily  darunavir 800 milliGRAM(s) Oral daily  dextrose 5% + sodium chloride 0.45%. 1000 milliLiter(s) (80 mL/Hr) IV Continuous <Continuous>  donepezil 10 milliGRAM(s) Oral at bedtime  emtricitabine 200 mG/tenofovir 300 mG (TRUVADA) 1 Tablet(s) Oral daily  finasteride 5 milliGRAM(s) Oral daily  heparin  Injectable 5000 Unit(s) SubCutaneous every 8 hours  influenza   Vaccine 0.5 milliLiter(s) IntraMuscular once  meropenem IVPB      meropenem IVPB 1000 milliGRAM(s) IV Intermittent every 8 hours  OLANZapine 2.5 milliGRAM(s) Oral daily  ritonavir Tablet 100 milliGRAM(s) Oral daily  tamsulosin 0.4 milliGRAM(s) Oral at bedtime  vancomycin  IVPB 1000 milliGRAM(s) IV Intermittent every 12 hours    MEDICATIONS  (PRN):  acetaminophen   Tablet 650 milliGRAM(s) Oral every 6 hours PRN For Temp greater than 38 C (100.4 F)  ALBUTerol/ipratropium for Nebulization 3 milliLiter(s) Nebulizer every 6 hours PRN Bronchospasm  guaiFENesin   Syrup  (Sugar-Free) 200 milliGRAM(s) Oral every 6 hours PRN Cough      Vital Signs Last 24 Hrs  T(C): 37.8 (05 Oct 2017 16:40), Max: 38.2 (04 Oct 2017 23:27)  T(F): 100 (05 Oct 2017 16:40), Max: 100.8 (04 Oct 2017 23:27)  HR: 99 (05 Oct 2017 16:40) (88 - 100)  BP: 136/73 (05 Oct 2017 16:40) (127/72 - 136/73)  BP(mean): --  RR: 18 (05 Oct 2017 16:40) (18 - 18)  SpO2: 92% (05 Oct 2017 16:40) (90% - 96%)    PHYSICAL EXAM:  General :NAD  Constitutional:  well-groomed, well-developed  Respiratory: CTAB/L  Cardiovascular: S1 and S2, RRR, no M/G/R  Gastrointestinal: BS+, soft, NT/ND  Extremities: No peripheral edema  Vascular: 2+ peripheral pulses  Skin: No rashes      LABS:                        11.6   13.0  )-----------( 337      ( 05 Oct 2017 08:17 )             33.5     10-05    140  |  105  |  11  ----------------------------<  140<H>  3.6   |  23  |  0.89    Ca    8.4<L>      05 Oct 2017 08:17  Phos  2.3     10-05  Mg     2.7     10-05    TPro  7.7  /  Alb  2.2<L>  /  TBili  0.4  /  DBili  x   /  AST  22  /  ALT  20  /  AlkPhos  138<H>  10-04          MICROBIOLOGY:  RECENT CULTURES:  10-04 .Urine Clean Catch (Midstream) XXXX XXXX   No growth    10-02 .Blood Blood-Venous XXXX XXXX   No growth to date.          RADIOLOGY & ADDITIONAL STUDIES: Patient is a 76y old  Male who presents with a chief complaint of fever (02 Oct 2017 14:45)      INTERVAL HPI / OVERNIGHT EVENTS: fever +    MEDICATIONS  (STANDING):  amLODIPine   Tablet 5 milliGRAM(s) Oral daily  atorvastatin 10 milliGRAM(s) Oral at bedtime  azithromycin   Tablet 500 milliGRAM(s) Oral daily  darunavir 800 milliGRAM(s) Oral daily  dextrose 5% + sodium chloride 0.45%. 1000 milliLiter(s) (80 mL/Hr) IV Continuous <Continuous>  donepezil 10 milliGRAM(s) Oral at bedtime  emtricitabine 200 mG/tenofovir 300 mG (TRUVADA) 1 Tablet(s) Oral daily  finasteride 5 milliGRAM(s) Oral daily  heparin  Injectable 5000 Unit(s) SubCutaneous every 8 hours  influenza   Vaccine 0.5 milliLiter(s) IntraMuscular once  meropenem IVPB      meropenem IVPB 1000 milliGRAM(s) IV Intermittent every 8 hours  OLANZapine 2.5 milliGRAM(s) Oral daily  ritonavir Tablet 100 milliGRAM(s) Oral daily  tamsulosin 0.4 milliGRAM(s) Oral at bedtime  vancomycin  IVPB 1000 milliGRAM(s) IV Intermittent every 12 hours    MEDICATIONS  (PRN):  acetaminophen   Tablet 650 milliGRAM(s) Oral every 6 hours PRN For Temp greater than 38 C (100.4 F)  ALBUTerol/ipratropium for Nebulization 3 milliLiter(s) Nebulizer every 6 hours PRN Bronchospasm  guaiFENesin   Syrup  (Sugar-Free) 200 milliGRAM(s) Oral every 6 hours PRN Cough      Vital Signs Last 24 Hrs  T(C): 37.8 (05 Oct 2017 16:40), Max: 38.2 (04 Oct 2017 23:27)  T(F): 100 (05 Oct 2017 16:40), Max: 100.8 (04 Oct 2017 23:27)  HR: 99 (05 Oct 2017 16:40) (88 - 100)  BP: 136/73 (05 Oct 2017 16:40) (127/72 - 136/73)  BP(mean): --  RR: 18 (05 Oct 2017 16:40) (18 - 18)  SpO2: 92% (05 Oct 2017 16:40) (90% - 96%)    PHYSICAL EXAM:  General :NAD  Constitutional:  well-groomed, well-developed  Respiratory: CTAB/L  Cardiovascular: S1 and S2, RRR, no M/G/R  Gastrointestinal: BS+, soft, NT/ND  Extremities: No peripheral edema  Vascular: 2+ peripheral pulses  Skin: No rashes      LABS:                        11.6   13.0  )-----------( 337      ( 05 Oct 2017 08:17 )             33.5     10-05    140  |  105  |  11  ----------------------------<  140<H>  3.6   |  23  |  0.89    Ca    8.4<L>      05 Oct 2017 08:17  Phos  2.3     10-05  Mg     2.7     10-05    TPro  7.7  /  Alb  2.2<L>  /  TBili  0.4  /  DBili  x   /  AST  22  /  ALT  20  /  AlkPhos  138<H>  10-04          MICROBIOLOGY:  RECENT CULTURES:  10-04 .Urine Clean Catch (Midstream) XXXX XXXX   No growth    10-02 .Blood Blood-Venous XXXX XXXX   No growth to date.          RADIOLOGY & ADDITIONAL STUDIES:

## 2017-10-05 NOTE — PROGRESS NOTE ADULT - SUBJECTIVE AND OBJECTIVE BOX
INTERVAL HPI/OVERNIGHT EVENTS: Asleep; easily aroussed    MEDICATIONS  (STANDING):  amLODIPine   Tablet 5 milliGRAM(s) Oral daily  atorvastatin 10 milliGRAM(s) Oral at bedtime  azithromycin   Tablet 500 milliGRAM(s) Oral daily  darunavir 800 milliGRAM(s) Oral daily  dextrose 5% + sodium chloride 0.45%. 1000 milliLiter(s) (80 mL/Hr) IV Continuous <Continuous>  donepezil 10 milliGRAM(s) Oral at bedtime  emtricitabine 200 mG/tenofovir 300 mG (TRUVADA) 1 Tablet(s) Oral daily  finasteride 5 milliGRAM(s) Oral daily  heparin  Injectable 5000 Unit(s) SubCutaneous every 8 hours  influenza   Vaccine 0.5 milliLiter(s) IntraMuscular once  meropenem IVPB      meropenem IVPB 1000 milliGRAM(s) IV Intermittent every 8 hours  OLANZapine 2.5 milliGRAM(s) Oral daily  ritonavir Tablet 100 milliGRAM(s) Oral daily  tamsulosin 0.4 milliGRAM(s) Oral at bedtime  vancomycin  IVPB 1000 milliGRAM(s) IV Intermittent every 12 hours    MEDICATIONS  (PRN):  acetaminophen   Tablet 650 milliGRAM(s) Oral every 6 hours PRN For Temp greater than 38 C (100.4 F)  ALBUTerol/ipratropium for Nebulization 3 milliLiter(s) Nebulizer every 6 hours PRN Bronchospasm  guaiFENesin   Syrup  (Sugar-Free) 200 milliGRAM(s) Oral every 6 hours PRN Cough      Allergies    No Known Allergies    Intolerances        REVIEW OF SYSTEMS:    General: no fever	    Respiratory and Thorax: no sob  	  Cardiovascular:	no cp    Gastrointestinal:	no change in BM    Genitourinary:	no hematuria       Vital Signs Last 24 Hrs  T(C): 37.8 (05 Oct 2017 16:40), Max: 38.2 (04 Oct 2017 23:27)  T(F): 100 (05 Oct 2017 16:40), Max: 100.8 (04 Oct 2017 23:27)  HR: 99 (05 Oct 2017 16:40) (88 - 100)  BP: 136/73 (05 Oct 2017 16:40) (127/72 - 136/73)  BP(mean): --  RR: 18 (05 Oct 2017 16:40) (18 - 18)  SpO2: 92% (05 Oct 2017 16:40) (90% - 96%)    PHYSICAL EXAM:     Gastrointestinal: soft; ND    Genitourinary: no tenderness       LABS:                        11.6   13.0  )-----------( 337      ( 05 Oct 2017 08:17 )             33.5     10-05    140  |  105  |  11  ----------------------------<  140<H>  3.6   |  23  |  0.89    Ca    8.4<L>      05 Oct 2017 08:17  Phos  2.3     10-05  Mg     2.7     10-05    TPro  7.7  /  Alb  2.2<L>  /  TBili  0.4  /  DBili  x   /  AST  22  /  ALT  20  /  AlkPhos  138<H>  10-04          RADIOLOGY & ADDITIONAL TESTS:

## 2017-10-05 NOTE — PHYSICAL THERAPY INITIAL EVALUATION ADULT - GENERAL OBSERVATIONS, REHAB EVAL
Patient encountered supine in bed, vital signs as charted.  AAOx1. Not in apparent pain or shortness of breath at rest. Chest Auscultation: bibasal crackles. Palpation: no tactile fremitus. Cough: strong, spontaneous, dry, non-productive.

## 2017-10-05 NOTE — PHYSICAL THERAPY INITIAL EVALUATION ADULT - PERTINENT HX OF CURRENT PROBLEM, REHAB EVAL
Patient brought in from home due to family observing loose stools for a week, rhinorrhea and dry cough. Admitted for fever and dehydration.Chart reviewed and noted HIV (+) c low CD4%, raised WBC/serum glucose; US Renal ruled out hydronephrosis, showed x2 renal cysts.

## 2017-10-05 NOTE — PHYSICAL THERAPY INITIAL EVALUATION ADULT - CRITERIA FOR SKILLED THERAPEUTIC INTERVENTIONS
rehab potential/functional limitations in following categories/impairments found/anticipated discharge recommendation/risk reduction/prevention/anticipated equipment needs at discharge

## 2017-10-05 NOTE — PROGRESS NOTE ADULT - SUBJECTIVE AND OBJECTIVE BOX
Patient is a 76y old  Male who presents with a chief complaint of fever (02 Oct 2017 14:45)      INTERVAL HPI/OVERNIGHT EVENTS: still with elevated temperatures overnight     MEDICATIONS  (STANDING):  amLODIPine   Tablet 5 milliGRAM(s) Oral daily  atorvastatin 10 milliGRAM(s) Oral at bedtime  azithromycin   Tablet 500 milliGRAM(s) Oral daily  darunavir 800 milliGRAM(s) Oral daily  dextrose 5% + sodium chloride 0.45%. 1000 milliLiter(s) (80 mL/Hr) IV Continuous <Continuous>  donepezil 10 milliGRAM(s) Oral at bedtime  emtricitabine 200 mG/tenofovir 300 mG (TRUVADA) 1 Tablet(s) Oral daily  finasteride 5 milliGRAM(s) Oral daily  heparin  Injectable 5000 Unit(s) SubCutaneous every 8 hours  influenza   Vaccine 0.5 milliLiter(s) IntraMuscular once  meropenem IVPB      meropenem IVPB 1000 milliGRAM(s) IV Intermittent every 8 hours  OLANZapine 2.5 milliGRAM(s) Oral daily  ritonavir Tablet 100 milliGRAM(s) Oral daily  tamsulosin 0.4 milliGRAM(s) Oral at bedtime  vancomycin  IVPB 1000 milliGRAM(s) IV Intermittent every 12 hours    MEDICATIONS  (PRN):  acetaminophen   Tablet 650 milliGRAM(s) Oral every 6 hours PRN For Temp greater than 38 C (100.4 F)  ALBUTerol/ipratropium for Nebulization 3 milliLiter(s) Nebulizer every 6 hours PRN Bronchospasm  guaiFENesin   Syrup  (Sugar-Free) 200 milliGRAM(s) Oral every 6 hours PRN Cough      Allergies    No Known Allergies    Intolerances        REVIEW OF SYSTEMS:  CONSTITUTIONAL: No fever, weight loss, or fatigue  EYES: No eye pain, visual disturbances, or discharge  ENMT:  No difficulty hearing, tinnitus, vertigo; No sinus or throat pain  NECK: No pain or stiffness  BREASTS: No pain, masses, or nipple discharge  RESPIRATORY: No cough, wheezing, chills or hemoptysis; No shortness of breath  CARDIOVASCULAR: No chest pain, palpitations, dizziness, or leg swelling  GASTROINTESTINAL: No abdominal or epigastric pain. No nausea, vomiting, or hematemesis; No diarrhea or constipation. No melena or hematochezia.  GENITOURINARY: No dysuria, frequency, hematuria, or incontinence  NEUROLOGICAL: No headaches, memory loss, loss of strength, numbness, or tremors  SKIN: No itching, burning, rashes, or lesions   LYMPH NODES: No enlarged glands  ENDOCRINE: No heat or cold intolerance; No hair loss  MUSCULOSKELETAL: No joint pain or swelling; No muscle, back, or extremity pain  PSYCHIATRIC: No depression, anxiety, mood swings, or difficulty sleeping  HEME/LYMPH: No easy bruising, or bleeding gums  ALLERGY AND IMMUNOLOGIC: No hives or eczema    Vital Signs Last 24 Hrs  T(C): 37.8 (05 Oct 2017 16:40), Max: 38.2 (04 Oct 2017 23:27)  T(F): 100 (05 Oct 2017 16:40), Max: 100.8 (04 Oct 2017 23:27)  HR: 99 (05 Oct 2017 16:40) (88 - 100)  BP: 136/73 (05 Oct 2017 16:40) (127/72 - 136/73)  BP(mean): --  RR: 18 (05 Oct 2017 16:40) (18 - 18)  SpO2: 92% (05 Oct 2017 16:40) (90% - 96%)    PHYSICAL EXAM:  GENERAL: NAD, well-groomed, well-developed  HEAD:  Atraumatic, Normocephalic  EYES: EOMI, PERRLA, conjunctiva and sclera clear  ENMT: No tonsillar erythema, exudates, or enlargement; Moist mucous membranes, Good dentition, No lesions  NECK: Supple, No JVD, Normal thyroid  NERVOUS SYSTEM:  Awake dementia   CHEST/LUNG: Clear to percussion bilaterally; No rales, rhonchi, wheezing, or rubs  HEART: Regular rate and rhythm; No murmurs, rubs, or gallops  ABDOMEN: Soft, Nontender, Nondistended; Bowel sounds present  EXTREMITIES:  2+ Peripheral Pulses, No clubbing, cyanosis, or edema  LYMPH: No lymphadenopathy noted  SKIN: No rashes or lesions    LABS:                        11.6   13.0  )-----------( 337      ( 05 Oct 2017 08:17 )             33.5     10-05    140  |  105  |  11  ----------------------------<  140<H>  3.6   |  23  |  0.89    Ca    8.4<L>      05 Oct 2017 08:17  Phos  2.3     10-05  Mg     2.7     10-05    TPro  7.7  /  Alb  2.2<L>  /  TBili  0.4  /  DBili  x   /  AST  22  /  ALT  20  /  AlkPhos  138<H>  10-04        CAPILLARY BLOOD GLUCOSE          RADIOLOGY & ADDITIONAL TESTS:    Imaging Personally Reviewed:  [ ] YES  [ ] NO    Consultant(s) Notes Reviewed:  [ ] YES  [ ] NO    Care Discussed with Consultants/Other Providers [ ] YES  [ ] NO

## 2017-10-05 NOTE — PHYSICAL THERAPY INITIAL EVALUATION ADULT - ORIENTATION, REHAB EVAL
person/Pt. initiated attention but has difficulty sustaining attention. Pt. is able to follow one-step commands but inconsistently. Pt. may functional automatically in over-learned behaviors. Pt. does not initiate activities and may wander if left unsupervised.

## 2017-10-05 NOTE — PHYSICAL THERAPY INITIAL EVALUATION ADULT - IMPAIRMENTS FOUND, PT EVAL
gait, locomotion, and balance/cognitive impairment/gross motor/joint integrity and mobility/neuromotor development and sensory integration/sensory integrity/poor safety awareness/ventilation and respiration/gas exchange/ROM/aerobic capacity/endurance/arousal, attention, and cognition/integumentary integrity/muscle strength/posture

## 2017-10-06 DIAGNOSIS — J18.9 PNEUMONIA, UNSPECIFIED ORGANISM: ICD-10-CM

## 2017-10-06 DIAGNOSIS — B20 HUMAN IMMUNODEFICIENCY VIRUS [HIV] DISEASE: ICD-10-CM

## 2017-10-06 LAB
ANION GAP SERPL CALC-SCNC: 10 MMOL/L — SIGNIFICANT CHANGE UP (ref 5–17)
BUN SERPL-MCNC: 10 MG/DL — SIGNIFICANT CHANGE UP (ref 7–23)
CALCIUM SERPL-MCNC: 8.1 MG/DL — LOW (ref 8.5–10.1)
CHLORIDE SERPL-SCNC: 107 MMOL/L — SIGNIFICANT CHANGE UP (ref 96–108)
CO2 SERPL-SCNC: 24 MMOL/L — SIGNIFICANT CHANGE UP (ref 22–31)
CREAT SERPL-MCNC: 0.77 MG/DL — SIGNIFICANT CHANGE UP (ref 0.5–1.3)
GLUCOSE SERPL-MCNC: 153 MG/DL — HIGH (ref 70–99)
HCT VFR BLD CALC: 30.1 % — LOW (ref 39–50)
HGB BLD-MCNC: 10.3 G/DL — LOW (ref 13–17)
MAGNESIUM SERPL-MCNC: 2.6 MG/DL — SIGNIFICANT CHANGE UP (ref 1.6–2.6)
MCHC RBC-ENTMCNC: 31.7 PG — SIGNIFICANT CHANGE UP (ref 27–34)
MCHC RBC-ENTMCNC: 34.2 GM/DL — SIGNIFICANT CHANGE UP (ref 32–36)
MCV RBC AUTO: 92.8 FL — SIGNIFICANT CHANGE UP (ref 80–100)
PHOSPHATE SERPL-MCNC: 2.6 MG/DL — SIGNIFICANT CHANGE UP (ref 2.5–4.5)
PLATELET # BLD AUTO: 302 K/UL — SIGNIFICANT CHANGE UP (ref 150–400)
POTASSIUM SERPL-MCNC: 3.5 MMOL/L — SIGNIFICANT CHANGE UP (ref 3.5–5.3)
POTASSIUM SERPL-SCNC: 3.5 MMOL/L — SIGNIFICANT CHANGE UP (ref 3.5–5.3)
RBC # BLD: 3.25 M/UL — LOW (ref 4.2–5.8)
RBC # FLD: 12 % — SIGNIFICANT CHANGE UP (ref 11–15)
SODIUM SERPL-SCNC: 141 MMOL/L — SIGNIFICANT CHANGE UP (ref 135–145)
WBC # BLD: 9.8 K/UL — SIGNIFICANT CHANGE UP (ref 3.8–10.5)
WBC # FLD AUTO: 9.8 K/UL — SIGNIFICANT CHANGE UP (ref 3.8–10.5)

## 2017-10-06 PROCEDURE — 99233 SBSQ HOSP IP/OBS HIGH 50: CPT

## 2017-10-06 RX ADMIN — Medication 650 MILLIGRAM(S): at 23:33

## 2017-10-06 RX ADMIN — MEROPENEM 200 MILLIGRAM(S): 1 INJECTION INTRAVENOUS at 17:07

## 2017-10-06 RX ADMIN — MEROPENEM 200 MILLIGRAM(S): 1 INJECTION INTRAVENOUS at 05:28

## 2017-10-06 RX ADMIN — ATORVASTATIN CALCIUM 10 MILLIGRAM(S): 80 TABLET, FILM COATED ORAL at 22:10

## 2017-10-06 RX ADMIN — Medication 650 MILLIGRAM(S): at 05:27

## 2017-10-06 RX ADMIN — MEROPENEM 200 MILLIGRAM(S): 1 INJECTION INTRAVENOUS at 22:10

## 2017-10-06 RX ADMIN — SODIUM CHLORIDE 80 MILLILITER(S): 9 INJECTION, SOLUTION INTRAVENOUS at 05:26

## 2017-10-06 RX ADMIN — SODIUM CHLORIDE 80 MILLILITER(S): 9 INJECTION, SOLUTION INTRAVENOUS at 22:10

## 2017-10-06 RX ADMIN — HEPARIN SODIUM 5000 UNIT(S): 5000 INJECTION INTRAVENOUS; SUBCUTANEOUS at 22:10

## 2017-10-06 RX ADMIN — FINASTERIDE 5 MILLIGRAM(S): 5 TABLET, FILM COATED ORAL at 12:17

## 2017-10-06 RX ADMIN — EMTRICITABINE AND TENOFOVIR DISOPROXIL FUMARATE 1 TABLET(S): 200; 300 TABLET, FILM COATED ORAL at 12:17

## 2017-10-06 RX ADMIN — HEPARIN SODIUM 5000 UNIT(S): 5000 INJECTION INTRAVENOUS; SUBCUTANEOUS at 17:08

## 2017-10-06 RX ADMIN — OLANZAPINE 2.5 MILLIGRAM(S): 15 TABLET, FILM COATED ORAL at 12:16

## 2017-10-06 RX ADMIN — DARUNAVIR 800 MILLIGRAM(S): 75 TABLET, FILM COATED ORAL at 12:21

## 2017-10-06 RX ADMIN — AZITHROMYCIN 500 MILLIGRAM(S): 500 TABLET, FILM COATED ORAL at 12:17

## 2017-10-06 RX ADMIN — Medication 200 MILLIGRAM(S): at 12:17

## 2017-10-06 RX ADMIN — HEPARIN SODIUM 5000 UNIT(S): 5000 INJECTION INTRAVENOUS; SUBCUTANEOUS at 05:27

## 2017-10-06 RX ADMIN — Medication 250 MILLIGRAM(S): at 18:55

## 2017-10-06 RX ADMIN — DONEPEZIL HYDROCHLORIDE 10 MILLIGRAM(S): 10 TABLET, FILM COATED ORAL at 22:10

## 2017-10-06 RX ADMIN — Medication 250 MILLIGRAM(S): at 05:28

## 2017-10-06 RX ADMIN — TAMSULOSIN HYDROCHLORIDE 0.4 MILLIGRAM(S): 0.4 CAPSULE ORAL at 22:10

## 2017-10-06 RX ADMIN — AMLODIPINE BESYLATE 5 MILLIGRAM(S): 2.5 TABLET ORAL at 05:27

## 2017-10-06 RX ADMIN — RITONAVIR 100 MILLIGRAM(S): 100 TABLET, FILM COATED ORAL at 12:17

## 2017-10-06 NOTE — PROGRESS NOTE ADULT - SUBJECTIVE AND OBJECTIVE BOX
INTERVAL HPI/OVERNIGHT EVENTS:    Patient lying comfortably.  No complaint of abdominal pain.  Discuss case with RN, patient incontinently voiding with no event.        Vital Signs Last 24 Hrs  T(C): 38.7 (06 Oct 2017 05:02), Max: 38.7 (06 Oct 2017 05:02)  T(F): 101.6 (06 Oct 2017 05:02), Max: 101.6 (06 Oct 2017 05:02)  HR: 94 (06 Oct 2017 05:02) (89 - 99)  BP: 132/72 (06 Oct 2017 05:02) (120/68 - 136/73)  BP(mean): --  RR: 18 (06 Oct 2017 05:02) (18 - 18)  SpO2: 93% (06 Oct 2017 05:02) (92% - 94%)    MEDICATIONS  (STANDING):  amLODIPine   Tablet 5 milliGRAM(s) Oral daily  atorvastatin 10 milliGRAM(s) Oral at bedtime  azithromycin   Tablet 500 milliGRAM(s) Oral daily  darunavir 800 milliGRAM(s) Oral daily  dextrose 5% + sodium chloride 0.45%. 1000 milliLiter(s) (80 mL/Hr) IV Continuous <Continuous>  donepezil 10 milliGRAM(s) Oral at bedtime  emtricitabine 200 mG/tenofovir 300 mG (TRUVADA) 1 Tablet(s) Oral daily  finasteride 5 milliGRAM(s) Oral daily  heparin  Injectable 5000 Unit(s) SubCutaneous every 8 hours  influenza   Vaccine 0.5 milliLiter(s) IntraMuscular once  meropenem IVPB      meropenem IVPB 1000 milliGRAM(s) IV Intermittent every 8 hours  OLANZapine 2.5 milliGRAM(s) Oral daily  ritonavir Tablet 100 milliGRAM(s) Oral daily  tamsulosin 0.4 milliGRAM(s) Oral at bedtime  vancomycin  IVPB 1000 milliGRAM(s) IV Intermittent every 12 hours    MEDICATIONS  (PRN):  acetaminophen   Tablet 650 milliGRAM(s) Oral every 6 hours PRN For Temp greater than 38 C (100.4 F)  ALBUTerol/ipratropium for Nebulization 3 milliLiter(s) Nebulizer every 6 hours PRN Bronchospasm  guaiFENesin   Syrup  (Sugar-Free) 200 milliGRAM(s) Oral every 6 hours PRN Cough      PHYSICAL EXAM:    GENERAL: NAD  HEAD:  Atraumatic, Normocephalic  EYES: EOMI, PERRLA, conjunctiva and sclera clear  CHEST/LUNG: Clear to ausculation, bilaterally   HEART: S1S2  ABDOMEN: non distended, +BS, soft, non tender, no guarding  EXTREMITIES:  calf soft, non tender     I&O's Detail    05 Oct 2017 07:01  -  06 Oct 2017 07:00  --------------------------------------------------------  IN:    dextrose 5% + sodium chloride 0.45%.: 1600 mL    Oral Fluid: 420 mL    Solution: 300 mL    Solution: 500 mL  Total IN: 2820 mL    OUT:    Indwelling Catheter - Urethral: 1 mL    Voided: 50 mL  Total OUT: 51 mL    Total NET: 2769 mL      06 Oct 2017 07:01  -  06 Oct 2017 11:14  --------------------------------------------------------  IN:    Oral Fluid: 250 mL  Total IN: 250 mL    OUT:  Total OUT: 0 mL    Total NET: 250 mL          LABS:                        10.3   9.8   )-----------( 302      ( 06 Oct 2017 07:46 )             30.1     10-06    141  |  107  |  10  ----------------------------<  153<H>  3.5   |  24  |  0.77    Ca    8.1<L>      06 Oct 2017 07:46  Phos  2.6     10-06  Mg     2.6     10-06          Culture Results:   No enteric pathogens to date: Final culture pending (10-05 @ 04:42)  Culture Results:   No growth (10-04 @ 09:59)    RADIOLOGY & ADDITIONAL STUDIES:  < from: US Renal (10.04.17 @ 08:38) >  IMPRESSION:   1. No bilateral hydronephrosis noted.  2. Two  small right renal cysts.      < end of copied text >      Impression:    77 y/o with Alzheimer, HTN, HIV patient complicated by HIV related dementia presents with Pneumonia with acute urinary retention last USG 10/4 -- no hyrdro.        Plan:  continue to monitor voiding/renal function   PVR   continue medical management/supportive care   prophylactic measure   will discuss with Dr. Johnson.

## 2017-10-06 NOTE — PROGRESS NOTE ADULT - SUBJECTIVE AND OBJECTIVE BOX
Patient is a 76y old  Male who presents with a chief complaint of fever (02 Oct 2017 14:45)      INTERVAL HPI / OVERNIGHT EVENTS: fever continues,bnut otherwise appears comfortable and denies any complaints    MEDICATIONS  (STANDING):  amLODIPine   Tablet 5 milliGRAM(s) Oral daily  atorvastatin 10 milliGRAM(s) Oral at bedtime  azithromycin   Tablet 500 milliGRAM(s) Oral daily  darunavir 800 milliGRAM(s) Oral daily  dextrose 5% + sodium chloride 0.45%. 1000 milliLiter(s) (80 mL/Hr) IV Continuous <Continuous>  donepezil 10 milliGRAM(s) Oral at bedtime  emtricitabine 200 mG/tenofovir 300 mG (TRUVADA) 1 Tablet(s) Oral daily  finasteride 5 milliGRAM(s) Oral daily  heparin  Injectable 5000 Unit(s) SubCutaneous every 8 hours  influenza   Vaccine 0.5 milliLiter(s) IntraMuscular once  meropenem IVPB      meropenem IVPB 1000 milliGRAM(s) IV Intermittent every 8 hours  OLANZapine 2.5 milliGRAM(s) Oral daily  potassium phosphate IVPB 15 milliMole(s) IV Intermittent once  ritonavir Tablet 100 milliGRAM(s) Oral daily  tamsulosin 0.4 milliGRAM(s) Oral at bedtime  vancomycin  IVPB 1000 milliGRAM(s) IV Intermittent every 12 hours    MEDICATIONS  (PRN):  acetaminophen   Tablet 650 milliGRAM(s) Oral every 6 hours PRN For Temp greater than 38 C (100.4 F)  ALBUTerol/ipratropium for Nebulization 3 milliLiter(s) Nebulizer every 6 hours PRN Bronchospasm  guaiFENesin   Syrup  (Sugar-Free) 200 milliGRAM(s) Oral every 6 hours PRN Cough      Vital Signs Last 24 Hrs  Tmax: 101.6  HR: 99 (07 Oct 2017 17:42) (99 - 107)  BP: 137/67 (07 Oct 2017 17:42) (135/87 - 147/81)  BP(mean): --  RR: 17 (07 Oct 2017 17:42) (16 - 18)  SpO2: 95% (07 Oct 2017 17:42) (92% - 97%)    PHYSICAL EXAM:  General :NAD  Constitutional:  well-groomed, well-developed  Respiratory: CTAB/L  Cardiovascular: S1 and S2, RRR, no M/G/R  Gastrointestinal: BS+, soft, NT/ND  Extremities: No peripheral edema  Vascular: 2+ peripheral pulses  Skin: No rashes      LABS:             WBC 13-->9.8  cr : wnl  legionella Ag :neg          MICROBIOLOGY:  RECENT CULTURES:  10-05 .Stool Feces XXXX XXXX   No enteric pathogens isolated.  (Stool culture examined for Salmonella,  Shigella, Campylobacter, Aeromonas, Plesiomonas,  Vibrio, E.coli O157 and Yersinia)    10-04 .Urine Clean Catch (Midstream) XXXX XXXX   No growth    10-02 .Blood Blood-Venous XXXX XXXX   No growth at 5 days.          RADIOLOGY & ADDITIONAL STUDIES:

## 2017-10-06 NOTE — PROGRESS NOTE ADULT - SUBJECTIVE AND OBJECTIVE BOX
Patient is a 76y old  Male who presents with a chief complaint of fever (02 Oct 2017 14:45)      INTERVAL HPI/OVERNIGHT EVENTS: no acute events overnight     MEDICATIONS  (STANDING):  amLODIPine   Tablet 5 milliGRAM(s) Oral daily  atorvastatin 10 milliGRAM(s) Oral at bedtime  azithromycin   Tablet 500 milliGRAM(s) Oral daily  darunavir 800 milliGRAM(s) Oral daily  dextrose 5% + sodium chloride 0.45%. 1000 milliLiter(s) (80 mL/Hr) IV Continuous <Continuous>  donepezil 10 milliGRAM(s) Oral at bedtime  emtricitabine 200 mG/tenofovir 300 mG (TRUVADA) 1 Tablet(s) Oral daily  finasteride 5 milliGRAM(s) Oral daily  heparin  Injectable 5000 Unit(s) SubCutaneous every 8 hours  influenza   Vaccine 0.5 milliLiter(s) IntraMuscular once  meropenem IVPB      meropenem IVPB 1000 milliGRAM(s) IV Intermittent every 8 hours  OLANZapine 2.5 milliGRAM(s) Oral daily  ritonavir Tablet 100 milliGRAM(s) Oral daily  tamsulosin 0.4 milliGRAM(s) Oral at bedtime  vancomycin  IVPB 1000 milliGRAM(s) IV Intermittent every 12 hours    MEDICATIONS  (PRN):  acetaminophen   Tablet 650 milliGRAM(s) Oral every 6 hours PRN For Temp greater than 38 C (100.4 F)  ALBUTerol/ipratropium for Nebulization 3 milliLiter(s) Nebulizer every 6 hours PRN Bronchospasm  guaiFENesin   Syrup  (Sugar-Free) 200 milliGRAM(s) Oral every 6 hours PRN Cough      Allergies    No Known Allergies    Intolerances        REVIEW OF SYSTEMS:  due to dementia michelle not participate in exam     Vital Signs Last 24 Hrs  T(C): 37.8 (06 Oct 2017 16:40), Max: 38.7 (06 Oct 2017 05:02)  T(F): 100 (06 Oct 2017 16:40), Max: 101.6 (06 Oct 2017 05:02)  HR: 105 (06 Oct 2017 16:40) (89 - 105)  BP: 132/72 (06 Oct 2017 16:40) (120/68 - 132/72)  BP(mean): --  RR: 18 (06 Oct 2017 16:40) (18 - 18)  SpO2: 93% (06 Oct 2017 16:40) (93% - 94%)    PHYSICAL EXAM:  GENERAL: NAD, thin   HEAD:  Atraumatic, Normocephalic  EYES: EOMI, PERRLA, conjunctiva and sclera clear  ENMT: No tonsillar erythema, exudates, or enlargement; Moist mucous membranes, Good dentition, No lesions  NECK: Supple, No JVD, Normal thyroid  NERVOUS SYSTEM:  Awake  CHEST/LUNG: Clear to percussion bilaterally; No rales, rhonchi, wheezing, or rubs  HEART: Regular rate and rhythm; No murmurs, rubs, or gallops  ABDOMEN: Soft, Nontender, Nondistended; Bowel sounds present  EXTREMITIES:  2+ Peripheral Pulses, No clubbing, cyanosis, or edema  LYMPH: No lymphadenopathy noted  SKIN: No rashes or lesions    LABS:                        10.3   9.8   )-----------( 302      ( 06 Oct 2017 07:46 )             30.1     10-06    141  |  107  |  10  ----------------------------<  153<H>  3.5   |  24  |  0.77    Ca    8.1<L>      06 Oct 2017 07:46  Phos  2.6     10-06  Mg     2.6     10-06          CAPILLARY BLOOD GLUCOSE          RADIOLOGY & ADDITIONAL TESTS:    Imaging Personally Reviewed:  [ ] YES  [ ] NO    Consultant(s) Notes Reviewed:  [ ] YES  [ ] NO    Care Discussed with Consultants/Other Providers [ ] YES  [ ] NO

## 2017-10-07 LAB
ALBUMIN SERPL ELPH-MCNC: 2 G/DL — LOW (ref 3.3–5)
ALP SERPL-CCNC: 121 U/L — HIGH (ref 40–120)
ALT FLD-CCNC: 25 U/L — SIGNIFICANT CHANGE UP (ref 12–78)
ANION GAP SERPL CALC-SCNC: 7 MMOL/L — SIGNIFICANT CHANGE UP (ref 5–17)
AST SERPL-CCNC: 24 U/L — SIGNIFICANT CHANGE UP (ref 15–37)
BILIRUB SERPL-MCNC: 0.4 MG/DL — SIGNIFICANT CHANGE UP (ref 0.2–1.2)
BUN SERPL-MCNC: 9 MG/DL — SIGNIFICANT CHANGE UP (ref 7–23)
CALCIUM SERPL-MCNC: 8.2 MG/DL — LOW (ref 8.5–10.1)
CHLORIDE SERPL-SCNC: 105 MMOL/L — SIGNIFICANT CHANGE UP (ref 96–108)
CO2 SERPL-SCNC: 28 MMOL/L — SIGNIFICANT CHANGE UP (ref 22–31)
CREAT SERPL-MCNC: 0.79 MG/DL — SIGNIFICANT CHANGE UP (ref 0.5–1.3)
CRYPTOC AG FLD QL: NEGATIVE — SIGNIFICANT CHANGE UP
CULTURE RESULTS: SIGNIFICANT CHANGE UP
GLUCOSE SERPL-MCNC: 117 MG/DL — HIGH (ref 70–99)
HCT VFR BLD CALC: 32.7 % — LOW (ref 39–50)
HGB BLD-MCNC: 11.2 G/DL — LOW (ref 13–17)
MAGNESIUM SERPL-MCNC: 2.7 MG/DL — HIGH (ref 1.6–2.6)
MCHC RBC-ENTMCNC: 31.4 PG — SIGNIFICANT CHANGE UP (ref 27–34)
MCHC RBC-ENTMCNC: 34.2 GM/DL — SIGNIFICANT CHANGE UP (ref 32–36)
MCV RBC AUTO: 92 FL — SIGNIFICANT CHANGE UP (ref 80–100)
PHOSPHATE SERPL-MCNC: 2.3 MG/DL — LOW (ref 2.5–4.5)
PLATELET # BLD AUTO: 337 K/UL — SIGNIFICANT CHANGE UP (ref 150–400)
POTASSIUM SERPL-MCNC: 3.5 MMOL/L — SIGNIFICANT CHANGE UP (ref 3.5–5.3)
POTASSIUM SERPL-SCNC: 3.5 MMOL/L — SIGNIFICANT CHANGE UP (ref 3.5–5.3)
PROT SERPL-MCNC: 7.5 GM/DL — SIGNIFICANT CHANGE UP (ref 6–8.3)
RBC # BLD: 3.55 M/UL — LOW (ref 4.2–5.8)
RBC # FLD: 11.9 % — SIGNIFICANT CHANGE UP (ref 11–15)
SODIUM SERPL-SCNC: 140 MMOL/L — SIGNIFICANT CHANGE UP (ref 135–145)
SPECIMEN SOURCE: SIGNIFICANT CHANGE UP
WBC # BLD: 11.9 K/UL — HIGH (ref 3.8–10.5)
WBC # FLD AUTO: 11.9 K/UL — HIGH (ref 3.8–10.5)

## 2017-10-07 PROCEDURE — 99233 SBSQ HOSP IP/OBS HIGH 50: CPT

## 2017-10-07 RX ORDER — POTASSIUM PHOSPHATE, MONOBASIC POTASSIUM PHOSPHATE, DIBASIC 236; 224 MG/ML; MG/ML
15 INJECTION, SOLUTION INTRAVENOUS ONCE
Qty: 0 | Refills: 0 | Status: COMPLETED | OUTPATIENT
Start: 2017-10-07 | End: 2017-10-07

## 2017-10-07 RX ADMIN — DARUNAVIR 800 MILLIGRAM(S): 75 TABLET, FILM COATED ORAL at 14:44

## 2017-10-07 RX ADMIN — AMLODIPINE BESYLATE 5 MILLIGRAM(S): 2.5 TABLET ORAL at 06:38

## 2017-10-07 RX ADMIN — HEPARIN SODIUM 5000 UNIT(S): 5000 INJECTION INTRAVENOUS; SUBCUTANEOUS at 06:38

## 2017-10-07 RX ADMIN — OLANZAPINE 2.5 MILLIGRAM(S): 15 TABLET, FILM COATED ORAL at 11:27

## 2017-10-07 RX ADMIN — Medication 250 MILLIGRAM(S): at 06:38

## 2017-10-07 RX ADMIN — TAMSULOSIN HYDROCHLORIDE 0.4 MILLIGRAM(S): 0.4 CAPSULE ORAL at 21:57

## 2017-10-07 RX ADMIN — MEROPENEM 200 MILLIGRAM(S): 1 INJECTION INTRAVENOUS at 14:54

## 2017-10-07 RX ADMIN — HEPARIN SODIUM 5000 UNIT(S): 5000 INJECTION INTRAVENOUS; SUBCUTANEOUS at 14:45

## 2017-10-07 RX ADMIN — AZITHROMYCIN 500 MILLIGRAM(S): 500 TABLET, FILM COATED ORAL at 14:44

## 2017-10-07 RX ADMIN — EMTRICITABINE AND TENOFOVIR DISOPROXIL FUMARATE 1 TABLET(S): 200; 300 TABLET, FILM COATED ORAL at 14:44

## 2017-10-07 RX ADMIN — MEROPENEM 200 MILLIGRAM(S): 1 INJECTION INTRAVENOUS at 21:57

## 2017-10-07 RX ADMIN — POTASSIUM PHOSPHATE, MONOBASIC POTASSIUM PHOSPHATE, DIBASIC 63.75 MILLIMOLE(S): 236; 224 INJECTION, SOLUTION INTRAVENOUS at 19:37

## 2017-10-07 RX ADMIN — HEPARIN SODIUM 5000 UNIT(S): 5000 INJECTION INTRAVENOUS; SUBCUTANEOUS at 21:57

## 2017-10-07 RX ADMIN — FINASTERIDE 5 MILLIGRAM(S): 5 TABLET, FILM COATED ORAL at 14:44

## 2017-10-07 RX ADMIN — MEROPENEM 200 MILLIGRAM(S): 1 INJECTION INTRAVENOUS at 06:38

## 2017-10-07 RX ADMIN — Medication 250 MILLIGRAM(S): at 17:42

## 2017-10-07 RX ADMIN — ATORVASTATIN CALCIUM 10 MILLIGRAM(S): 80 TABLET, FILM COATED ORAL at 21:57

## 2017-10-07 RX ADMIN — DONEPEZIL HYDROCHLORIDE 10 MILLIGRAM(S): 10 TABLET, FILM COATED ORAL at 21:57

## 2017-10-07 RX ADMIN — RITONAVIR 100 MILLIGRAM(S): 100 TABLET, FILM COATED ORAL at 11:27

## 2017-10-07 NOTE — PROGRESS NOTE ADULT - SUBJECTIVE AND OBJECTIVE BOX
Patient is a 76y old  Male who presents with a chief complaint of fever (02 Oct 2017 14:45)      INTERVAL HPI/OVERNIGHT EVENTS: continues to have fever     MEDICATIONS  (STANDING):  amLODIPine   Tablet 5 milliGRAM(s) Oral daily  atorvastatin 10 milliGRAM(s) Oral at bedtime  azithromycin   Tablet 500 milliGRAM(s) Oral daily  darunavir 800 milliGRAM(s) Oral daily  dextrose 5% + sodium chloride 0.45%. 1000 milliLiter(s) (80 mL/Hr) IV Continuous <Continuous>  donepezil 10 milliGRAM(s) Oral at bedtime  emtricitabine 200 mG/tenofovir 300 mG (TRUVADA) 1 Tablet(s) Oral daily  finasteride 5 milliGRAM(s) Oral daily  heparin  Injectable 5000 Unit(s) SubCutaneous every 8 hours  influenza   Vaccine 0.5 milliLiter(s) IntraMuscular once  meropenem IVPB      meropenem IVPB 1000 milliGRAM(s) IV Intermittent every 8 hours  OLANZapine 2.5 milliGRAM(s) Oral daily  potassium phosphate IVPB 15 milliMole(s) IV Intermittent once  ritonavir Tablet 100 milliGRAM(s) Oral daily  tamsulosin 0.4 milliGRAM(s) Oral at bedtime  vancomycin  IVPB 1000 milliGRAM(s) IV Intermittent every 12 hours    MEDICATIONS  (PRN):  acetaminophen   Tablet 650 milliGRAM(s) Oral every 6 hours PRN For Temp greater than 38 C (100.4 F)  ALBUTerol/ipratropium for Nebulization 3 milliLiter(s) Nebulizer every 6 hours PRN Bronchospasm  guaiFENesin   Syrup  (Sugar-Free) 200 milliGRAM(s) Oral every 6 hours PRN Cough      Allergies    No Known Allergies    Intolerances        REVIEW OF SYSTEMS:  dementia prevents active review of systems   Vital Signs Last 24 Hrs  T(C): 37.6 (07 Oct 2017 11:35), Max: 39.4 (06 Oct 2017 23:18)  T(F): 99.6 (07 Oct 2017 11:35), Max: 102.9 (06 Oct 2017 23:18)  HR: 103 (07 Oct 2017 11:35) (102 - 107)  BP: 136/77 (07 Oct 2017 11:35) (132/72 - 147/81)  BP(mean): --  RR: 18 (07 Oct 2017 11:35) (16 - 18)  SpO2: 96% (07 Oct 2017 11:35) (92% - 97%)    PHYSICAL EXAM:  GENERAL: NAD, well-groomed, well-developed  HEAD:  Atraumatic, Normocephalic  EYES: EOMI, PERRLA, conjunctiva and sclera clear  ENMT: No tonsillar erythema, exudates, or enlargement; Moist mucous membranes, Good dentition, No lesions  NECK: Supple, No JVD, Normal thyroid  NERVOUS SYSTEM:  awake CHEST/LUNG: Clear to percussion bilaterally; No rales, rhonchi, wheezing, or rubs  HEART: Regular rate and rhythm; No murmurs, rubs, or gallops  ABDOMEN: Soft, Nontender, Nondistended; Bowel sounds present  EXTREMITIES:  2+ Peripheral Pulses, No clubbing, cyanosis, or edema  LYMPH: No lymphadenopathy noted  SKIN: No rashes or lesions    LABS:                        11.2   11.9  )-----------( 337      ( 07 Oct 2017 08:16 )             32.7     10-07    140  |  105  |  9   ----------------------------<  117<H>  3.5   |  28  |  0.79    Ca    8.2<L>      07 Oct 2017 08:16  Phos  2.3     10-07  Mg     2.7     10-07    TPro  7.5  /  Alb  2.0<L>  /  TBili  0.4  /  DBili  x   /  AST  24  /  ALT  25  /  AlkPhos  121<H>  10-07        CAPILLARY BLOOD GLUCOSE          RADIOLOGY & ADDITIONAL TESTS:    Imaging Personally Reviewed:  [ ] YES  [ ] NO    Consultant(s) Notes Reviewed:  [ ] YES  [ ] NO    Care Discussed with Consultants/Other Providers [ ] YES  [ ] NO

## 2017-10-08 LAB
ANION GAP SERPL CALC-SCNC: 8 MMOL/L — SIGNIFICANT CHANGE UP (ref 5–17)
BUN SERPL-MCNC: 9 MG/DL — SIGNIFICANT CHANGE UP (ref 7–23)
CALCIUM SERPL-MCNC: 8.1 MG/DL — LOW (ref 8.5–10.1)
CHLORIDE SERPL-SCNC: 104 MMOL/L — SIGNIFICANT CHANGE UP (ref 96–108)
CO2 SERPL-SCNC: 28 MMOL/L — SIGNIFICANT CHANGE UP (ref 22–31)
CREAT SERPL-MCNC: 0.83 MG/DL — SIGNIFICANT CHANGE UP (ref 0.5–1.3)
GALACTOMANNAN AG SERPL-ACNC: <0.5 INDEX — SIGNIFICANT CHANGE UP
GLUCOSE SERPL-MCNC: 147 MG/DL — HIGH (ref 70–99)
HCT VFR BLD CALC: 33.2 % — LOW (ref 39–50)
HGB BLD-MCNC: 11.3 G/DL — LOW (ref 13–17)
MAGNESIUM SERPL-MCNC: 2.5 MG/DL — SIGNIFICANT CHANGE UP (ref 1.6–2.6)
MCHC RBC-ENTMCNC: 31.6 PG — SIGNIFICANT CHANGE UP (ref 27–34)
MCHC RBC-ENTMCNC: 34.1 GM/DL — SIGNIFICANT CHANGE UP (ref 32–36)
MCV RBC AUTO: 92.7 FL — SIGNIFICANT CHANGE UP (ref 80–100)
PHOSPHATE SERPL-MCNC: 2.8 MG/DL — SIGNIFICANT CHANGE UP (ref 2.5–4.5)
PLATELET # BLD AUTO: 335 K/UL — SIGNIFICANT CHANGE UP (ref 150–400)
POTASSIUM SERPL-MCNC: 3.7 MMOL/L — SIGNIFICANT CHANGE UP (ref 3.5–5.3)
POTASSIUM SERPL-SCNC: 3.7 MMOL/L — SIGNIFICANT CHANGE UP (ref 3.5–5.3)
RBC # BLD: 3.58 M/UL — LOW (ref 4.2–5.8)
RBC # FLD: 12.2 % — SIGNIFICANT CHANGE UP (ref 11–15)
SODIUM SERPL-SCNC: 140 MMOL/L — SIGNIFICANT CHANGE UP (ref 135–145)
WBC # BLD: 11.1 K/UL — HIGH (ref 3.8–10.5)
WBC # FLD AUTO: 11.1 K/UL — HIGH (ref 3.8–10.5)

## 2017-10-08 PROCEDURE — 99233 SBSQ HOSP IP/OBS HIGH 50: CPT

## 2017-10-08 RX ADMIN — OLANZAPINE 2.5 MILLIGRAM(S): 15 TABLET, FILM COATED ORAL at 13:34

## 2017-10-08 RX ADMIN — HEPARIN SODIUM 5000 UNIT(S): 5000 INJECTION INTRAVENOUS; SUBCUTANEOUS at 13:42

## 2017-10-08 RX ADMIN — ATORVASTATIN CALCIUM 10 MILLIGRAM(S): 80 TABLET, FILM COATED ORAL at 22:00

## 2017-10-08 RX ADMIN — HEPARIN SODIUM 5000 UNIT(S): 5000 INJECTION INTRAVENOUS; SUBCUTANEOUS at 22:00

## 2017-10-08 RX ADMIN — MEROPENEM 200 MILLIGRAM(S): 1 INJECTION INTRAVENOUS at 05:25

## 2017-10-08 RX ADMIN — RITONAVIR 100 MILLIGRAM(S): 100 TABLET, FILM COATED ORAL at 13:35

## 2017-10-08 RX ADMIN — Medication 250 MILLIGRAM(S): at 06:08

## 2017-10-08 RX ADMIN — MEROPENEM 200 MILLIGRAM(S): 1 INJECTION INTRAVENOUS at 21:59

## 2017-10-08 RX ADMIN — MEROPENEM 200 MILLIGRAM(S): 1 INJECTION INTRAVENOUS at 13:44

## 2017-10-08 RX ADMIN — SODIUM CHLORIDE 80 MILLILITER(S): 9 INJECTION, SOLUTION INTRAVENOUS at 05:25

## 2017-10-08 RX ADMIN — Medication 250 MILLIGRAM(S): at 18:23

## 2017-10-08 RX ADMIN — FINASTERIDE 5 MILLIGRAM(S): 5 TABLET, FILM COATED ORAL at 13:34

## 2017-10-08 RX ADMIN — TAMSULOSIN HYDROCHLORIDE 0.4 MILLIGRAM(S): 0.4 CAPSULE ORAL at 22:00

## 2017-10-08 RX ADMIN — AMLODIPINE BESYLATE 5 MILLIGRAM(S): 2.5 TABLET ORAL at 06:08

## 2017-10-08 RX ADMIN — AZITHROMYCIN 500 MILLIGRAM(S): 500 TABLET, FILM COATED ORAL at 13:32

## 2017-10-08 RX ADMIN — DONEPEZIL HYDROCHLORIDE 10 MILLIGRAM(S): 10 TABLET, FILM COATED ORAL at 22:00

## 2017-10-08 RX ADMIN — DARUNAVIR 800 MILLIGRAM(S): 75 TABLET, FILM COATED ORAL at 13:32

## 2017-10-08 RX ADMIN — EMTRICITABINE AND TENOFOVIR DISOPROXIL FUMARATE 1 TABLET(S): 200; 300 TABLET, FILM COATED ORAL at 13:33

## 2017-10-08 RX ADMIN — HEPARIN SODIUM 5000 UNIT(S): 5000 INJECTION INTRAVENOUS; SUBCUTANEOUS at 06:08

## 2017-10-08 NOTE — PROGRESS NOTE ADULT - SUBJECTIVE AND OBJECTIVE BOX
Patient is a 76y old  Male who presents with a chief complaint of fever (02 Oct 2017 14:45)      INTERVAL HPI/OVERNIGHT EVENTS:  no fever overnight     MEDICATIONS  (STANDING):  amLODIPine   Tablet 5 milliGRAM(s) Oral daily  atorvastatin 10 milliGRAM(s) Oral at bedtime  azithromycin   Tablet 500 milliGRAM(s) Oral daily  darunavir 800 milliGRAM(s) Oral daily  dextrose 5% + sodium chloride 0.45%. 1000 milliLiter(s) (80 mL/Hr) IV Continuous <Continuous>  donepezil 10 milliGRAM(s) Oral at bedtime  emtricitabine 200 mG/tenofovir 300 mG (TRUVADA) 1 Tablet(s) Oral daily  finasteride 5 milliGRAM(s) Oral daily  heparin  Injectable 5000 Unit(s) SubCutaneous every 8 hours  influenza   Vaccine 0.5 milliLiter(s) IntraMuscular once  meropenem IVPB      meropenem IVPB 1000 milliGRAM(s) IV Intermittent every 8 hours  OLANZapine 2.5 milliGRAM(s) Oral daily  potassium phosphate IVPB 15 milliMole(s) IV Intermittent once  ritonavir Tablet 100 milliGRAM(s) Oral daily  tamsulosin 0.4 milliGRAM(s) Oral at bedtime  vancomycin  IVPB 1000 milliGRAM(s) IV Intermittent every 12 hours    MEDICATIONS  (PRN):  acetaminophen   Tablet 650 milliGRAM(s) Oral every 6 hours PRN For Temp greater than 38 C (100.4 F)  ALBUTerol/ipratropium for Nebulization 3 milliLiter(s) Nebulizer every 6 hours PRN Bronchospasm  guaiFENesin   Syrup  (Sugar-Free) 200 milliGRAM(s) Oral every 6 hours PRN Cough      Allergies    No Known Allergies    Intolerances        REVIEW OF SYSTEMS:  dementia prevents active review of systems     Vital Signs Last 24 Hrs  T(C): 37.2 (08 Oct 2017 14:18), Max: 37.7 (07 Oct 2017 17:42)  T(F): 98.9 (08 Oct 2017 14:18), Max: 99.8 (07 Oct 2017 17:42)  HR: 88 (08 Oct 2017 14:18) (71 - 108)  BP: 121/70 (08 Oct 2017 14:18) (119/65 - 137/67)  BP(mean): --  RR: 18 (08 Oct 2017 14:18) (17 - 18)  SpO2: 97% (08 Oct 2017 14:18) (95% - 99%)  PHYSICAL EXAM:  GENERAL: NAD, well-groomed, well-developed  HEAD:  Atraumatic, Normocephalic  EYES: EOMI, PERRLA, conjunctiva and sclera clear  ENMT: No tonsillar erythema, exudates, or enlargement; Moist mucous membranes, Good dentition, No lesions  NECK: Supple, No JVD, Normal thyroid  NERVOUS SYSTEM:  awake CHEST/LUNG: Clear to percussion bilaterally; No rales, rhonchi, wheezing, or rubs  HEART: Regular rate and rhythm; No murmurs, rubs, or gallops  ABDOMEN: Soft, Nontender, Nondistended; Bowel sounds present  EXTREMITIES:  2+ Peripheral Pulses, No clubbing, cyanosis, or edema  LYMPH: No lymphadenopathy noted  SKIN: No rashes or lesions                          11.3   11.1  )-----------( 335      ( 08 Oct 2017 07:14 )             33.2     10-08    140  |  104  |  9   ----------------------------<  147<H>  3.7   |  28  |  0.83    Ca    8.1<L>      08 Oct 2017 07:14  Phos  2.8     10-08  Mg     2.5     10-08    TPro  7.5  /  Alb  2.0<L>  /  TBili  0.4  /  DBili  x   /  AST  24  /  ALT  25  /  AlkPhos  121<H>  10-07

## 2017-10-08 NOTE — PROGRESS NOTE ADULT - ASSESSMENT
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 Low grade fever overnight will  US now without hydro voiding freely     Dc planning physical therapy

## 2017-10-09 LAB
ANION GAP SERPL CALC-SCNC: 10 MMOL/L — SIGNIFICANT CHANGE UP (ref 5–17)
ANION GAP SERPL CALC-SCNC: 9 MMOL/L — SIGNIFICANT CHANGE UP (ref 5–17)
BUN SERPL-MCNC: 13 MG/DL — SIGNIFICANT CHANGE UP (ref 7–23)
BUN SERPL-MCNC: 9 MG/DL — SIGNIFICANT CHANGE UP (ref 7–23)
CALCIUM SERPL-MCNC: 7.7 MG/DL — LOW (ref 8.5–10.1)
CALCIUM SERPL-MCNC: 8.1 MG/DL — LOW (ref 8.5–10.1)
CHLORIDE SERPL-SCNC: 101 MMOL/L — SIGNIFICANT CHANGE UP (ref 96–108)
CHLORIDE SERPL-SCNC: 105 MMOL/L — SIGNIFICANT CHANGE UP (ref 96–108)
CO2 SERPL-SCNC: 25 MMOL/L — SIGNIFICANT CHANGE UP (ref 22–31)
CO2 SERPL-SCNC: 26 MMOL/L — SIGNIFICANT CHANGE UP (ref 22–31)
CREAT SERPL-MCNC: 0.8 MG/DL — SIGNIFICANT CHANGE UP (ref 0.5–1.3)
CREAT SERPL-MCNC: 0.94 MG/DL — SIGNIFICANT CHANGE UP (ref 0.5–1.3)
GLUCOSE SERPL-MCNC: 134 MG/DL — HIGH (ref 70–99)
GLUCOSE SERPL-MCNC: 136 MG/DL — HIGH (ref 70–99)
HCT VFR BLD CALC: 31.7 % — LOW (ref 39–50)
HGB BLD-MCNC: 10.9 G/DL — LOW (ref 13–17)
MCHC RBC-ENTMCNC: 31.2 PG — SIGNIFICANT CHANGE UP (ref 27–34)
MCHC RBC-ENTMCNC: 34.3 GM/DL — SIGNIFICANT CHANGE UP (ref 32–36)
MCV RBC AUTO: 91 FL — SIGNIFICANT CHANGE UP (ref 80–100)
PLATELET # BLD AUTO: 358 K/UL — SIGNIFICANT CHANGE UP (ref 150–400)
POTASSIUM SERPL-MCNC: 3.5 MMOL/L — SIGNIFICANT CHANGE UP (ref 3.5–5.3)
POTASSIUM SERPL-MCNC: 3.8 MMOL/L — SIGNIFICANT CHANGE UP (ref 3.5–5.3)
POTASSIUM SERPL-SCNC: 3.5 MMOL/L — SIGNIFICANT CHANGE UP (ref 3.5–5.3)
POTASSIUM SERPL-SCNC: 3.8 MMOL/L — SIGNIFICANT CHANGE UP (ref 3.5–5.3)
RBC # BLD: 3.49 M/UL — LOW (ref 4.2–5.8)
RBC # FLD: 12.6 % — SIGNIFICANT CHANGE UP (ref 11–15)
SODIUM SERPL-SCNC: 137 MMOL/L — SIGNIFICANT CHANGE UP (ref 135–145)
SODIUM SERPL-SCNC: 139 MMOL/L — SIGNIFICANT CHANGE UP (ref 135–145)
VANCOMYCIN TROUGH SERPL-MCNC: 14.7 UG/ML — SIGNIFICANT CHANGE UP (ref 10–20)
WBC # BLD: 11.7 K/UL — HIGH (ref 3.8–10.5)
WBC # FLD AUTO: 11.7 K/UL — HIGH (ref 3.8–10.5)

## 2017-10-09 PROCEDURE — 99233 SBSQ HOSP IP/OBS HIGH 50: CPT

## 2017-10-09 RX ADMIN — DARUNAVIR 800 MILLIGRAM(S): 75 TABLET, FILM COATED ORAL at 12:00

## 2017-10-09 RX ADMIN — RITONAVIR 100 MILLIGRAM(S): 100 TABLET, FILM COATED ORAL at 12:01

## 2017-10-09 RX ADMIN — Medication 250 MILLIGRAM(S): at 05:46

## 2017-10-09 RX ADMIN — MEROPENEM 200 MILLIGRAM(S): 1 INJECTION INTRAVENOUS at 05:46

## 2017-10-09 RX ADMIN — DONEPEZIL HYDROCHLORIDE 10 MILLIGRAM(S): 10 TABLET, FILM COATED ORAL at 21:41

## 2017-10-09 RX ADMIN — FINASTERIDE 5 MILLIGRAM(S): 5 TABLET, FILM COATED ORAL at 12:03

## 2017-10-09 RX ADMIN — OLANZAPINE 2.5 MILLIGRAM(S): 15 TABLET, FILM COATED ORAL at 12:01

## 2017-10-09 RX ADMIN — EMTRICITABINE AND TENOFOVIR DISOPROXIL FUMARATE 1 TABLET(S): 200; 300 TABLET, FILM COATED ORAL at 12:00

## 2017-10-09 RX ADMIN — MEROPENEM 200 MILLIGRAM(S): 1 INJECTION INTRAVENOUS at 14:29

## 2017-10-09 RX ADMIN — TAMSULOSIN HYDROCHLORIDE 0.4 MILLIGRAM(S): 0.4 CAPSULE ORAL at 21:41

## 2017-10-09 RX ADMIN — ATORVASTATIN CALCIUM 10 MILLIGRAM(S): 80 TABLET, FILM COATED ORAL at 21:40

## 2017-10-09 RX ADMIN — HEPARIN SODIUM 5000 UNIT(S): 5000 INJECTION INTRAVENOUS; SUBCUTANEOUS at 13:47

## 2017-10-09 RX ADMIN — HEPARIN SODIUM 5000 UNIT(S): 5000 INJECTION INTRAVENOUS; SUBCUTANEOUS at 21:41

## 2017-10-09 RX ADMIN — Medication 250 MILLIGRAM(S): at 18:59

## 2017-10-09 RX ADMIN — Medication 650 MILLIGRAM(S): at 19:00

## 2017-10-09 RX ADMIN — MEROPENEM 200 MILLIGRAM(S): 1 INJECTION INTRAVENOUS at 21:40

## 2017-10-09 RX ADMIN — AZITHROMYCIN 500 MILLIGRAM(S): 500 TABLET, FILM COATED ORAL at 12:00

## 2017-10-09 RX ADMIN — HEPARIN SODIUM 5000 UNIT(S): 5000 INJECTION INTRAVENOUS; SUBCUTANEOUS at 05:46

## 2017-10-09 RX ADMIN — AMLODIPINE BESYLATE 5 MILLIGRAM(S): 2.5 TABLET ORAL at 05:46

## 2017-10-09 NOTE — DIETITIAN INITIAL EVALUATION ADULT. - PERTINENT LABORATORY DATA
10-09 Na 139 mmol/L Glu 134 mg/dL<H> K+ 3.8 mmol/L Cr  0.80 mg/dL BUN 9 mg/dL Phos n/a   Alb n/a   PAB n/a   Hgb 10.9 g/dL<L> Hct 31.7 %<L>, Alb=2.0(1/7), WBC=11.7(10/9)

## 2017-10-09 NOTE — DIETITIAN INITIAL EVALUATION ADULT. - OTHER INFO
Pt seen for LOS.  Pt's wife does cooking & food shopping. Pt eating 3 meals daily & Ensure clear 2 x day PTA.  Pt with sepsis PNA presents with coughing this am fed by staff. Pt with eating well during hospitalization % x 6 days, however presents with sign wt loss (5%).  WIfe prefers soft foods due to poor dentition. Last BM x 1(10/8). No GI distress. Pt seen for LOS.  Pt's wife does cooking & food shopping. Pt eating 3 meals daily & Ensure clear 2 x day PTA.  Pt with sepsis PNA presents with coughing this am fed by staff. Pt with eating well during hospitalization % x 6 days, however presents with sign wt loss (5%).  WIfe prefers soft foods due to missing front teeth. Last BM x 1(10/8). No GI distress.

## 2017-10-09 NOTE — PROGRESS NOTE ADULT - SUBJECTIVE AND OBJECTIVE BOX
Patient is a 76y old  Male who presents with a chief complaint of fever (02 Oct 2017 14:45)      INTERVAL HPI / OVERNIGHT EVENTS: still has occasional fever    MEDICATIONS  (STANDING):  amLODIPine   Tablet 5 milliGRAM(s) Oral daily  atorvastatin 10 milliGRAM(s) Oral at bedtime  azithromycin   Tablet 500 milliGRAM(s) Oral daily  darunavir 800 milliGRAM(s) Oral daily  dextrose 5% + sodium chloride 0.45%. 1000 milliLiter(s) (80 mL/Hr) IV Continuous <Continuous>  donepezil 10 milliGRAM(s) Oral at bedtime  emtricitabine 200 mG/tenofovir 300 mG (TRUVADA) 1 Tablet(s) Oral daily  finasteride 5 milliGRAM(s) Oral daily  heparin  Injectable 5000 Unit(s) SubCutaneous every 8 hours  influenza   Vaccine 0.5 milliLiter(s) IntraMuscular once  meropenem IVPB      meropenem IVPB 1000 milliGRAM(s) IV Intermittent every 8 hours  OLANZapine 2.5 milliGRAM(s) Oral daily  ritonavir Tablet 100 milliGRAM(s) Oral daily  tamsulosin 0.4 milliGRAM(s) Oral at bedtime  vancomycin  IVPB 1000 milliGRAM(s) IV Intermittent every 12 hours    MEDICATIONS  (PRN):  acetaminophen   Tablet 650 milliGRAM(s) Oral every 6 hours PRN For Temp greater than 38 C (100.4 F)  ALBUTerol/ipratropium for Nebulization 3 milliLiter(s) Nebulizer every 6 hours PRN Bronchospasm  guaiFENesin   Syrup  (Sugar-Free) 200 milliGRAM(s) Oral every 6 hours PRN Cough      Vital Signs Last 24 Hrs  T(C): 38.2 (09 Oct 2017 17:26), Max: 38.2 (09 Oct 2017 17:26)  T(F): 100.8 (09 Oct 2017 17:26), Max: 100.8 (09 Oct 2017 17:26)  HR: 102 (09 Oct 2017 17:26) (82 - 102)  BP: 135/77 (09 Oct 2017 17:26) (106/66 - 135/77)  BP(mean): --  RR: 18 (09 Oct 2017 17:26) (16 - 18)  SpO2: 94% (09 Oct 2017 17:26) (93% - 95%)    PHYSICAL EXAM:  General :NAD  Constitutional:  well-groomed, well-developed  Respiratory: CTAB/L  Cardiovascular: S1 and S2, RRR, no M/G/R  Gastrointestinal: BS+, soft, NT/ND  Extremities: No peripheral edema  Vascular: 2+ peripheral pulses  Skin: No rashes      LABS:                        10.9   11.7  )-----------( 358      ( 09 Oct 2017 06:58 )             31.7     10-09    137  |  101  |  13  ----------------------------<  136<H>  3.5   |  26  |  0.94    Ca    7.7<L>      09 Oct 2017 16:09  Phos  2.8     10-08  Mg     2.5     10-08            MICROBIOLOGY:  RECENT CULTURES:  10-05 .Stool Feces XXXX XXXX   No enteric pathogens isolated.  (Stool culture examined for Salmonella,  Shigella, Campylobacter, Aeromonas, Plesiomonas,  Vibrio, E.coli O157 and Yersinia)    10-04 .Urine Clean Catch (Midstream) XXXX XXXX   No growth      Fungitel :elevated  LDH elevated  Aspergillus galactomannan Assay :neg  Cryptococcal antigen :neg        RADIOLOGY & ADDITIONAL STUDIES:

## 2017-10-09 NOTE — DIETITIAN INITIAL EVALUATION ADULT. - PERTINENT MEDS FT
Heparin, Meropenem, Vanco, Zithromax, Preziata, Truvada, Norvir, D5 1/2@ 80ml/hr, Robitussin, Tylenol, Proscar, Flomax, Norvasc, Anticept, Lipitor, Zyprexa

## 2017-10-09 NOTE — PROGRESS NOTE ADULT - SUBJECTIVE AND OBJECTIVE BOX
Patient is a 76y old  Male who presents with a chief complaint of fever (02 Oct 2017 14:45)      INTERVAL HPI/OVERNIGHT EVENTS:  no acute events. Had low grade fever of 100.4 overnight.    MEDICATIONS  (STANDING):  amLODIPine   Tablet 5 milliGRAM(s) Oral daily  atorvastatin 10 milliGRAM(s) Oral at bedtime  azithromycin   Tablet 500 milliGRAM(s) Oral daily  darunavir 800 milliGRAM(s) Oral daily  dextrose 5% + sodium chloride 0.45%. 1000 milliLiter(s) (80 mL/Hr) IV Continuous <Continuous>  donepezil 10 milliGRAM(s) Oral at bedtime  emtricitabine 200 mG/tenofovir 300 mG (TRUVADA) 1 Tablet(s) Oral daily  finasteride 5 milliGRAM(s) Oral daily  heparin  Injectable 5000 Unit(s) SubCutaneous every 8 hours  influenza   Vaccine 0.5 milliLiter(s) IntraMuscular once  meropenem IVPB      meropenem IVPB 1000 milliGRAM(s) IV Intermittent every 8 hours  OLANZapine 2.5 milliGRAM(s) Oral daily  ritonavir Tablet 100 milliGRAM(s) Oral daily  tamsulosin 0.4 milliGRAM(s) Oral at bedtime  vancomycin  IVPB 1000 milliGRAM(s) IV Intermittent every 12 hours    MEDICATIONS  (PRN):  acetaminophen   Tablet 650 milliGRAM(s) Oral every 6 hours PRN For Temp greater than 38 C (100.4 F)  ALBUTerol/ipratropium for Nebulization 3 milliLiter(s) Nebulizer every 6 hours PRN Bronchospasm  guaiFENesin   Syrup  (Sugar-Free) 200 milliGRAM(s) Oral every 6 hours PRN Cough    Allergies    No Known Allergies    Intolerances        REVIEW OF SYSTEMS:  dementia prevents active review of systems     Vital Signs Last 24 Hrs  T(C): 37.5 (09 Oct 2017 11:02), Max: 38 (08 Oct 2017 23:10)  T(F): 99.5 (09 Oct 2017 11:02), Max: 100.4 (08 Oct 2017 23:10)  HR: 96 (09 Oct 2017 11:02) (82 - 103)  BP: 106/66 (09 Oct 2017 11:02) (106/66 - 135/75)  BP(mean): --  RR: 18 (09 Oct 2017 11:02) (16 - 18)  SpO2: 95% (09 Oct 2017 11:02) (92% - 97%)    PHYSICAL EXAM:  GENERAL: NAD, well-groomed, well-developed  HEAD:  Atraumatic, Normocephalic  EYES: EOMI, PERRLA, conjunctiva and sclera clear  ENMT: No tonsillar erythema, exudates, or enlargement; Moist mucous membranes, Good dentition, No lesions  NECK: Supple, No JVD, Normal thyroid  NERVOUS SYSTEM:  awake CHEST/LUNG: Clear to percussion bilaterally; No rales, rhonchi, wheezing, or rubs  HEART: Regular rate and rhythm; No murmurs, rubs, or gallops  ABDOMEN: Soft, Nontender, Nondistended; Bowel sounds present  EXTREMITIES:  2+ Peripheral Pulses, No clubbing, cyanosis, or edema  LYMPH: No lymphadenopathy noted  SKIN: No rashes or lesions                                                10.9   11.7  )-----------( 358      ( 09 Oct 2017 06:58 )             31.7     10-09    139  |  105  |  9   ----------------------------<  134<H>  3.8   |  25  |  0.80    Ca    8.1<L>      09 Oct 2017 06:58  Phos  2.8     10-08  Mg     2.5     10-08

## 2017-10-09 NOTE — PROGRESS NOTE ADULT - ASSESSMENT
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 Pt had low grade fever overnight. Caretaker/wife states his mental status  has improved but is not completely at baseline. Had low grade fever overnight. PT saw pt and recommended home PT with walker

## 2017-10-09 NOTE — CHART NOTE - NSCHARTNOTEFT_GEN_A_CORE
Upon Nutritional Assessment by the Registered Dietitian your patient was determined to meet criteria / has evidence of the following diagnosis/diagnoses:          [ ]  Mild Protein Calorie Malnutrition        [x ]  Moderate Protein Calorie Malnutrition        [ ] Severe Protein Calorie Malnutrition        [ ] Unspecified Protein Calorie Malnutrition        [ ] Underweight / BMI <19        [ ] Morbid Obesity / BMI > 40      Findings as based on:  •  Comprehensive nutrition assessment and consultation  •  Calorie counts (nutrient intake analysis)  •  Food acceptance and intake status from observations by staff  •  Follow up  •  Patient education  •  Intervention secondary to interdisciplinary rounds  •   concerns      Treatment:    The following diet has been recommended:  Low Na/Soft/Ensure Clear 2 x day(240kcal & 8gm protein)    PROVIDER Section:     By signing this assessment you are acknowledging and agree with the diagnosis/diagnoses assigned by the Registered Dietitian    Comments:

## 2017-10-09 NOTE — DIETITIAN INITIAL EVALUATION ADULT. - PHYSICAL APPEARANCE
BMI=22.2; +1 edema Jake feet/underweight BMI=22.2; +1 edema Jake feet; Nutrition focused physical exam conducted ; found signs of malnutrition [ ]absent [x ]present.  Subcutaneous fat loss: [WNL ] Orbital fat pads region, [WNL ]Buccal fat region, [Moderate ]Triceps region,  [Unable ]Ribs region.  Muscle wasting: [WNL ]Temples region, [moderate ]Clavicle region, [Moderate ]Shoulder region, [Unable ]Scapula region, [WNL ]Interosseous region,  [Moderate ]thigh region, [Moderate ]Calf region/underweight

## 2017-10-10 LAB
FUNGITELL: <31 PG/ML — SIGNIFICANT CHANGE UP (ref 0–59)
HCT VFR BLD CALC: 33.2 % — LOW (ref 39–50)
HGB BLD-MCNC: 11.1 G/DL — LOW (ref 13–17)
LDH SERPL L TO P-CCNC: 319 U/L — HIGH (ref 50–242)
MCHC RBC-ENTMCNC: 31.1 PG — SIGNIFICANT CHANGE UP (ref 27–34)
MCHC RBC-ENTMCNC: 33.3 GM/DL — SIGNIFICANT CHANGE UP (ref 32–36)
MCV RBC AUTO: 93.5 FL — SIGNIFICANT CHANGE UP (ref 80–100)
PLATELET # BLD AUTO: 345 K/UL — SIGNIFICANT CHANGE UP (ref 150–400)
PROCALCITONIN SERPL-MCNC: 0.05 NG/ML — HIGH (ref 0–0.04)
RBC # BLD: 3.56 M/UL — LOW (ref 4.2–5.8)
RBC # FLD: 12.3 % — SIGNIFICANT CHANGE UP (ref 11–15)
WBC # BLD: 11.2 K/UL — HIGH (ref 3.8–10.5)
WBC # FLD AUTO: 11.2 K/UL — HIGH (ref 3.8–10.5)

## 2017-10-10 PROCEDURE — 99233 SBSQ HOSP IP/OBS HIGH 50: CPT

## 2017-10-10 RX ADMIN — HEPARIN SODIUM 5000 UNIT(S): 5000 INJECTION INTRAVENOUS; SUBCUTANEOUS at 22:50

## 2017-10-10 RX ADMIN — TAMSULOSIN HYDROCHLORIDE 0.4 MILLIGRAM(S): 0.4 CAPSULE ORAL at 22:47

## 2017-10-10 RX ADMIN — OLANZAPINE 2.5 MILLIGRAM(S): 15 TABLET, FILM COATED ORAL at 11:47

## 2017-10-10 RX ADMIN — FINASTERIDE 5 MILLIGRAM(S): 5 TABLET, FILM COATED ORAL at 11:47

## 2017-10-10 RX ADMIN — SODIUM CHLORIDE 80 MILLILITER(S): 9 INJECTION, SOLUTION INTRAVENOUS at 22:49

## 2017-10-10 RX ADMIN — DONEPEZIL HYDROCHLORIDE 10 MILLIGRAM(S): 10 TABLET, FILM COATED ORAL at 22:48

## 2017-10-10 RX ADMIN — MEROPENEM 200 MILLIGRAM(S): 1 INJECTION INTRAVENOUS at 13:32

## 2017-10-10 RX ADMIN — Medication 250 MILLIGRAM(S): at 05:37

## 2017-10-10 RX ADMIN — AZITHROMYCIN 500 MILLIGRAM(S): 500 TABLET, FILM COATED ORAL at 11:47

## 2017-10-10 RX ADMIN — HEPARIN SODIUM 5000 UNIT(S): 5000 INJECTION INTRAVENOUS; SUBCUTANEOUS at 13:31

## 2017-10-10 RX ADMIN — ATORVASTATIN CALCIUM 10 MILLIGRAM(S): 80 TABLET, FILM COATED ORAL at 22:47

## 2017-10-10 RX ADMIN — EMTRICITABINE AND TENOFOVIR DISOPROXIL FUMARATE 1 TABLET(S): 200; 300 TABLET, FILM COATED ORAL at 11:47

## 2017-10-10 RX ADMIN — HEPARIN SODIUM 5000 UNIT(S): 5000 INJECTION INTRAVENOUS; SUBCUTANEOUS at 05:37

## 2017-10-10 RX ADMIN — MEROPENEM 200 MILLIGRAM(S): 1 INJECTION INTRAVENOUS at 22:50

## 2017-10-10 RX ADMIN — MEROPENEM 200 MILLIGRAM(S): 1 INJECTION INTRAVENOUS at 05:37

## 2017-10-10 RX ADMIN — Medication 250 MILLIGRAM(S): at 18:38

## 2017-10-10 RX ADMIN — AMLODIPINE BESYLATE 5 MILLIGRAM(S): 2.5 TABLET ORAL at 05:37

## 2017-10-10 RX ADMIN — RITONAVIR 100 MILLIGRAM(S): 100 TABLET, FILM COATED ORAL at 11:47

## 2017-10-10 RX ADMIN — DARUNAVIR 800 MILLIGRAM(S): 75 TABLET, FILM COATED ORAL at 11:47

## 2017-10-10 NOTE — PROGRESS NOTE ADULT - SUBJECTIVE AND OBJECTIVE BOX
Patient is a 76y old  Male who presents with a chief complaint of fever (02 Oct 2017 14:45)      INTERVAL HPI / OVERNIGHT EVENTS: fever continues,but otherwise appears comfortable and denies any complaints    MEDICATIONS  (STANDING):  amLODIPine   Tablet 5 milliGRAM(s) Oral daily  atorvastatin 10 milliGRAM(s) Oral at bedtime  azithromycin   Tablet 500 milliGRAM(s) Oral daily  darunavir 800 milliGRAM(s) Oral daily  dextrose 5% + sodium chloride 0.45%. 1000 milliLiter(s) (80 mL/Hr) IV Continuous <Continuous>  donepezil 10 milliGRAM(s) Oral at bedtime  emtricitabine 200 mG/tenofovir 300 mG (TRUVADA) 1 Tablet(s) Oral daily  finasteride 5 milliGRAM(s) Oral daily  heparin  Injectable 5000 Unit(s) SubCutaneous every 8 hours  influenza   Vaccine 0.5 milliLiter(s) IntraMuscular once  meropenem IVPB      meropenem IVPB 1000 milliGRAM(s) IV Intermittent every 8 hours  OLANZapine 2.5 milliGRAM(s) Oral daily  potassium phosphate IVPB 15 milliMole(s) IV Intermittent once  ritonavir Tablet 100 milliGRAM(s) Oral daily  tamsulosin 0.4 milliGRAM(s) Oral at bedtime  vancomycin  IVPB 1000 milliGRAM(s) IV Intermittent every 12 hours    MEDICATIONS  (PRN):  acetaminophen   Tablet 650 milliGRAM(s) Oral every 6 hours PRN For Temp greater than 38 C (100.4 F)  ALBUTerol/ipratropium for Nebulization 3 milliLiter(s) Nebulizer every 6 hours PRN Bronchospasm  guaiFENesin   Syrup  (Sugar-Free) 200 milliGRAM(s) Oral every 6 hours PRN Cough      Vital Signs Last 24 Hrs  Tmax: 100.8  HR: 99 (07 Oct 2017 17:42) (99 - 107)  BP: 137/67 (07 Oct 2017 17:42) (135/87 - 147/81)  BP(mean): --  RR: 17 (07 Oct 2017 17:42) (16 - 18)  SpO2: 95% (07 Oct 2017 17:42) (92% - 97%)    PHYSICAL EXAM:  General :NAD  Constitutional:  well-groomed, well-developed  Respiratory: CTAB/L  Cardiovascular: S1 and S2, RRR, no M/G/R  Gastrointestinal: BS+, soft, NT/ND  Extremities: No peripheral edema  Vascular: 2+ peripheral pulses  Skin: No rashes      LABS:             WBC 13-->9.8-->11.2  PCT 0.05  cr : wnl  legionella Ag :neg  repeat Fungitell <31 (WNL )          MICROBIOLOGY:  RECENT CULTURES:  10-05 .Stool Feces XXXX XXXX   No enteric pathogens isolated.  (Stool culture examined for Salmonella,  Shigella, Campylobacter, Aeromonas, Plesiomonas,  Vibrio, E.coli O157 and Yersinia)    10-04 .Urine Clean Catch (Midstream) XXXX XXXX   No growth    10-02 .Blood Blood-Venous XXXX XXXX   No growth at 5 days.          RADIOLOGY & ADDITIONAL STUDIES:

## 2017-10-10 NOTE — PROGRESS NOTE ADULT - SUBJECTIVE AND OBJECTIVE BOX
Patient is a 76y old  Male who presents with a chief complaint of fever (02 Oct 2017 14:45)  no acute events. pt had low grade fever overnight. denies n/v/c        Allergies    No Known Allergies    Intolerances        REVIEW OF SYSTEMS:  dementia prevents active review of systems     VS reviewed    PHYSICAL EXAM:  GENERAL: NAD, well-groomed, well-developed  HEAD:  Atraumatic, Normocephalic  EYES: EOMI, PERRLA, conjunctiva and sclera clear  ENMT: No tonsillar erythema, exudates, or enlargement; Moist mucous membranes, Good dentition, No lesions  NECK: Supple, No JVD, Normal thyroid  NERVOUS SYSTEM:  awake CHEST/LUNG: Clear to percussion bilaterally; No rales, rhonchi, wheezing, or rubs  HEART: Regular rate and rhythm; No murmurs, rubs, or gallops  ABDOMEN: Soft, Nontender, Nondistended; Bowel sounds present  EXTREMITIES:  2+ Peripheral Pulses, No clubbing, cyanosis, or edema  LYMPH: No lymphadenopathy noted  SKIN: No rashes or lesions

## 2017-10-10 NOTE — PROGRESS NOTE ADULT - ASSESSMENT
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 Pt continues to have low grade fever overnight. Caretaker/wife states he is back to his baseline mental status. Had low grade fever overnight. ID aware and has no change in recs. PT saw pt and recommended home PT with walker.

## 2017-10-11 ENCOUNTER — TRANSCRIPTION ENCOUNTER (OUTPATIENT)
Age: 77
End: 2017-10-11

## 2017-10-11 LAB
ANION GAP SERPL CALC-SCNC: 8 MMOL/L — SIGNIFICANT CHANGE UP (ref 5–17)
BUN SERPL-MCNC: 9 MG/DL — SIGNIFICANT CHANGE UP (ref 7–23)
CALCIUM SERPL-MCNC: 8.5 MG/DL — SIGNIFICANT CHANGE UP (ref 8.5–10.1)
CHLORIDE SERPL-SCNC: 106 MMOL/L — SIGNIFICANT CHANGE UP (ref 96–108)
CO2 SERPL-SCNC: 26 MMOL/L — SIGNIFICANT CHANGE UP (ref 22–31)
CREAT SERPL-MCNC: 0.79 MG/DL — SIGNIFICANT CHANGE UP (ref 0.5–1.3)
GLUCOSE SERPL-MCNC: 119 MG/DL — HIGH (ref 70–99)
HCT VFR BLD CALC: 30.1 % — LOW (ref 39–50)
HGB BLD-MCNC: 10.4 G/DL — LOW (ref 13–17)
MCHC RBC-ENTMCNC: 31.8 PG — SIGNIFICANT CHANGE UP (ref 27–34)
MCHC RBC-ENTMCNC: 34.3 GM/DL — SIGNIFICANT CHANGE UP (ref 32–36)
MCV RBC AUTO: 92.6 FL — SIGNIFICANT CHANGE UP (ref 80–100)
PLATELET # BLD AUTO: 347 K/UL — SIGNIFICANT CHANGE UP (ref 150–400)
POTASSIUM SERPL-MCNC: 3.6 MMOL/L — SIGNIFICANT CHANGE UP (ref 3.5–5.3)
POTASSIUM SERPL-SCNC: 3.6 MMOL/L — SIGNIFICANT CHANGE UP (ref 3.5–5.3)
RBC # BLD: 3.25 M/UL — LOW (ref 4.2–5.8)
RBC # FLD: 12.7 % — SIGNIFICANT CHANGE UP (ref 11–15)
SODIUM SERPL-SCNC: 140 MMOL/L — SIGNIFICANT CHANGE UP (ref 135–145)
WBC # BLD: 10.1 K/UL — SIGNIFICANT CHANGE UP (ref 3.8–10.5)
WBC # FLD AUTO: 10.1 K/UL — SIGNIFICANT CHANGE UP (ref 3.8–10.5)

## 2017-10-11 PROCEDURE — 71260 CT THORAX DX C+: CPT | Mod: 26

## 2017-10-11 PROCEDURE — 99233 SBSQ HOSP IP/OBS HIGH 50: CPT

## 2017-10-11 RX ORDER — POTASSIUM CHLORIDE 20 MEQ
40 PACKET (EA) ORAL ONCE
Qty: 0 | Refills: 0 | Status: COMPLETED | OUTPATIENT
Start: 2017-10-11 | End: 2017-10-11

## 2017-10-11 RX ADMIN — Medication 250 MILLIGRAM(S): at 17:46

## 2017-10-11 RX ADMIN — EMTRICITABINE AND TENOFOVIR DISOPROXIL FUMARATE 1 TABLET(S): 200; 300 TABLET, FILM COATED ORAL at 11:54

## 2017-10-11 RX ADMIN — RITONAVIR 100 MILLIGRAM(S): 100 TABLET, FILM COATED ORAL at 11:54

## 2017-10-11 RX ADMIN — Medication 40 MILLIEQUIVALENT(S): at 14:30

## 2017-10-11 RX ADMIN — DARUNAVIR 800 MILLIGRAM(S): 75 TABLET, FILM COATED ORAL at 11:54

## 2017-10-11 RX ADMIN — ATORVASTATIN CALCIUM 10 MILLIGRAM(S): 80 TABLET, FILM COATED ORAL at 22:00

## 2017-10-11 RX ADMIN — HEPARIN SODIUM 5000 UNIT(S): 5000 INJECTION INTRAVENOUS; SUBCUTANEOUS at 14:30

## 2017-10-11 RX ADMIN — SODIUM CHLORIDE 80 MILLILITER(S): 9 INJECTION, SOLUTION INTRAVENOUS at 06:03

## 2017-10-11 RX ADMIN — HEPARIN SODIUM 5000 UNIT(S): 5000 INJECTION INTRAVENOUS; SUBCUTANEOUS at 06:04

## 2017-10-11 RX ADMIN — AMLODIPINE BESYLATE 5 MILLIGRAM(S): 2.5 TABLET ORAL at 06:04

## 2017-10-11 RX ADMIN — HEPARIN SODIUM 5000 UNIT(S): 5000 INJECTION INTRAVENOUS; SUBCUTANEOUS at 22:00

## 2017-10-11 RX ADMIN — FINASTERIDE 5 MILLIGRAM(S): 5 TABLET, FILM COATED ORAL at 11:54

## 2017-10-11 RX ADMIN — TAMSULOSIN HYDROCHLORIDE 0.4 MILLIGRAM(S): 0.4 CAPSULE ORAL at 22:00

## 2017-10-11 RX ADMIN — MEROPENEM 200 MILLIGRAM(S): 1 INJECTION INTRAVENOUS at 22:00

## 2017-10-11 RX ADMIN — OLANZAPINE 2.5 MILLIGRAM(S): 15 TABLET, FILM COATED ORAL at 11:54

## 2017-10-11 RX ADMIN — DONEPEZIL HYDROCHLORIDE 10 MILLIGRAM(S): 10 TABLET, FILM COATED ORAL at 22:00

## 2017-10-11 RX ADMIN — MEROPENEM 200 MILLIGRAM(S): 1 INJECTION INTRAVENOUS at 14:30

## 2017-10-11 RX ADMIN — Medication 250 MILLIGRAM(S): at 06:06

## 2017-10-11 RX ADMIN — MEROPENEM 200 MILLIGRAM(S): 1 INJECTION INTRAVENOUS at 05:59

## 2017-10-11 NOTE — DISCHARGE NOTE ADULT - NSTOBACCOHOTLINE_GEN_A_CS
[facility] Smokers Quitline ([email or web address]-[email or web address]) [facility] Smokers Quitline (311-UW-[email or web address])

## 2017-10-11 NOTE — DISCHARGE NOTE ADULT - CARE PROVIDERS DIRECT ADDRESSES
,[email or web address]

## 2017-10-11 NOTE — DISCHARGE NOTE ADULT - PATIENT PORTAL LINK FT
“You can access the FollowHealth Patient Portal, offered by [facility], by registering with the following website: [email or web address]”

## 2017-10-11 NOTE — DISCHARGE NOTE ADULT - CARE PLAN
Principal Discharge DX:	Pneumonia due to infectious organism, unspecified laterality, unspecified part of lung  Goal:	follow up with infectious disease  Instructions for follow-up, activity and diet:	take tylenol for low grade fever  Secondary Diagnosis:	HIV (human immunodeficiency virus infection)

## 2017-10-11 NOTE — DISCHARGE NOTE ADULT - CARE PROVIDER_API CALL
Roshan Liz), Infectious Disease; Internal Medicine  [address] [place] [address]  Phone: [phone]  Fax: [phone] Roshan Liz), Infectious Disease; Internal Medicine  [address] [place] [address]  Phone: [phone]  Fax: [phone]    Codey Johnson), Urology  [address] [place] [address]  Phone: [phone]  Fax: [phone]    Carissa Dougherty (HALIE), Podiatric Medicine and Surgery  [address] [place] [address]  Phone: [phone]  Fax: [phone]

## 2017-10-11 NOTE — PROGRESS NOTE ADULT - SUBJECTIVE AND OBJECTIVE BOX
Patient is a 76y old  Male who presents with a chief complaint of fever (02 Oct 2017 14:45)    no acute events. pt had low grade fever overnight. denies n/v/c    MEDICATIONS  (STANDING):  amLODIPine   Tablet 5 milliGRAM(s) Oral daily  atorvastatin 10 milliGRAM(s) Oral at bedtime  darunavir 800 milliGRAM(s) Oral daily  dextrose 5% + sodium chloride 0.45%. 1000 milliLiter(s) (80 mL/Hr) IV Continuous <Continuous>  donepezil 10 milliGRAM(s) Oral at bedtime  emtricitabine 200 mG/tenofovir 300 mG (TRUVADA) 1 Tablet(s) Oral daily  finasteride 5 milliGRAM(s) Oral daily  heparin  Injectable 5000 Unit(s) SubCutaneous every 8 hours  influenza   Vaccine 0.5 milliLiter(s) IntraMuscular once  meropenem IVPB      meropenem IVPB 1000 milliGRAM(s) IV Intermittent every 8 hours  OLANZapine 2.5 milliGRAM(s) Oral daily  ritonavir Tablet 100 milliGRAM(s) Oral daily  tamsulosin 0.4 milliGRAM(s) Oral at bedtime  vancomycin  IVPB 1000 milliGRAM(s) IV Intermittent every 12 hours    MEDICATIONS  (PRN):  acetaminophen   Tablet 650 milliGRAM(s) Oral every 6 hours PRN For Temp greater than 38 C (100.4 F)  ALBUTerol/ipratropium for Nebulization 3 milliLiter(s) Nebulizer every 6 hours PRN Bronchospasm  guaiFENesin   Syrup  (Sugar-Free) 200 milliGRAM(s) Oral every 6 hours PRN Cough      Allergies    No Known Allergies    Intolerances        REVIEW OF SYSTEMS:  dementia prevents active review of systems     VS reviewed    PHYSICAL EXAM:  GENERAL: NAD, well-groomed, well-developed  HEAD:  Atraumatic, Normocephalic  EYES: EOMI, PERRLA, conjunctiva and sclera clear  ENMT: No tonsillar erythema, exudates, or enlargement; Moist mucous membranes, Good dentition, No lesions  NECK: Supple, No JVD, Normal thyroid  NERVOUS SYSTEM:  awake CHEST/LUNG: Clear to percussion bilaterally; No rales, rhonchi, wheezing, or rubs  HEART: Regular rate and rhythm; No murmurs, rubs, or gallops  ABDOMEN: Soft, Nontender, Nondistended; Bowel sounds present  EXTREMITIES:  2+ Peripheral Pulses, No clubbing, cyanosis, or edema  LYMPH: No lymphadenopathy noted  SKIN: No rashes or lesions                                                     10.4   10.1  )-----------( 347      ( 11 Oct 2017 07:15 )             30.1     10-11    140  |  106  |  9   ----------------------------<  119<H>  3.6   |  26  |  0.79    Ca    8.5      11 Oct 2017 07:15

## 2017-10-11 NOTE — PROGRESS NOTE ADULT - ASSESSMENT
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 Pt continues to have low grade fever overnight. Caretaker/wife states he is back to his baseline mental status. Will get CT chest to evaluate for PNA. Source of fever unknown but remains hemodynamically stable/ follow up with ID

## 2017-10-11 NOTE — DISCHARGE NOTE ADULT - MEDICATION SUMMARY - MEDICATIONS TO TAKE
I will START or STAY ON the medications listed below when I get home from the hospital:    finasteride 5 mg oral tablet  -- 1 tab(s) by mouth once a day  -- Indication: For BPH with urinary obstruction    acetaminophen 325 mg oral tablet  -- 2 tab(s) by mouth every 6 hours, As needed, For Temp greater than 38 C (100.4 F)  -- Indication: For fever     tamsulosin 0.4 mg oral capsule  -- 1 cap(s) by mouth once a day (at bedtime)  -- Indication: For BPH with urinary obstruction    tamsulosin 0.4 mg oral capsule  -- 1 cap(s) by mouth once a day (at bedtime)  -- Indication: For BPH with urinary obstruction    atorvastatin 10 mg oral tablet  -- 1 tab(s) by mouth once a day (at bedtime)  -- Indication: For Hyperlipidemia     OLANZapine 2.5 mg oral tablet  -- 1 tab(s) by mouth once a day  -- Indication: For Dementia without behavioral disturbance, unspecified dementia type    darunavir 75 mg oral tablet  --  by mouth   -- Indication: For HIV (human immunodeficiency virus infection)    emtricitabine-tenofovir disoproxil 200 mg-300 mg oral tablet  -- 1 tab(s) by mouth once a day  -- Indication: For HIV (human immunodeficiency virus infection)    ritonavir 100 mg oral tablet  -- 1 tab(s) by mouth once a day  -- Indication: For HIV (human immunodeficiency virus infection)    amLODIPine 5 mg oral tablet  -- 1 tab(s) by mouth once a day  -- Indication: For Hypertension    donepezil 10 mg oral tablet  -- 1 tab(s) by mouth once a day (at bedtime)  -- Indication: For Dementia without behavioral disturbance, unspecified dementia type    influenza virus vaccine, inactivated  -- Indication: For flu

## 2017-10-11 NOTE — DISCHARGE NOTE ADULT - HOSPITAL COURSE
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 Pt was started on joey and vanc and had low grade fevers at night which has now resolved. Pt completed 10 days of abx and cleared for discharge with follow up with his pcp and infectious disease doctor.  Copy of CT scan attached to this document. Caretaker/wife states he is back to his baseline mental status.    Problem/Plan - 1:  ·  Problem: Pneumonia due to infectious organism, unspecified laterality, unspecified part of lung.  Plan: intial  concern for possible fungal source but repeat fungitell is negative. ID aware.   abx adjusted viral and legionella studies negative   elevated procalcitonin trended down     Problem/Plan - 2:  ·  Problem: Sepsis due to pneumonia.  Plan:      Problem/Plan - 3:  ·  Problem: AIDS dementia.  Plan: no behavioral component.      Problem/Plan - 4:  ·  Problem: HIV (human immunodeficiency virus infection).  Plan: continue haart  ID appreciated.      Problem/Plan - 5:  ·  Problem: Essential hypertension.  Plan: continue home meds.      Problem/Plan - 6:  Problem: Hydronephrosis, bilateral. Plan: omar phan. voiding freely.   resolved on last US.  started on flomax and finasteride

## 2017-10-11 NOTE — DISCHARGE NOTE ADULT - OTHER SIGNIFICANT FINDINGS
< from: CT Chest w/ IV Cont (10.11.17 @ 17:50) >    EXAM:  CT CHEST IC                            PROCEDURE DATE:  10/11/2017          INTERPRETATION:  CLINICAL INFORMATION: Pneumonia and fever    COMPARISON: 7/28/2016    PROCEDURE:   CT of the Chest was performed with intravenous contrast.  94 ml of Omnipaque 350 was injected intravenously. 6 ml were discarded.  Sagittal and coronal reformats were performed.      FINDINGS:    CHEST:     LUNGS AND LARGE AIRWAYS: Patent central airways.  Bilateral groundglass   opacities. Right upper lobe nodularopacities measuring up to 9 mm,   possibly infectious. Lower lobe atelectasis.  PLEURA: Small right pleural effusion.  VESSELS: Atherosclerotic changes of the thoracic aorta.  HEART: Heart size is normal. No pericardial effusion.  MEDIASTINUM AND TOMI: Mediastinal and hilar adenopathy.  CHEST WALL AND LOWER NECK: Within normal limits.  VISUALIZED UPPER ABDOMEN: Subcentimeter hypodense renal lesions, too   small to further characterize.  BONES: Spinal degenerative changes. Pagetoid changes of L2 and L3.    IMPRESSION:     Bilateral groundglass opacities and right upper lobe nodular opacities,   compatible with suspected pneumonia.  Small right pleural effusion.                    MACHELLE ALEJANDRE M.D., ATTENDING RADIOLOGIST  This document has beenelectronically signed. Oct 12 2017  8:02AM                < end of copied text >

## 2017-10-11 NOTE — PROGRESS NOTE ADULT - SUBJECTIVE AND OBJECTIVE BOX
Patient is a 76y old  Male who presents with a chief complaint of fever (11 Oct 2017 09:31)      INTERVAL HPI / OVERNIGHT EVENTS: doing ok, fever resolving    MEDICATIONS  (STANDING):  amLODIPine   Tablet 5 milliGRAM(s) Oral daily  atorvastatin 10 milliGRAM(s) Oral at bedtime  darunavir 800 milliGRAM(s) Oral daily  dextrose 5% + sodium chloride 0.45%. 1000 milliLiter(s) (80 mL/Hr) IV Continuous <Continuous>  donepezil 10 milliGRAM(s) Oral at bedtime  emtricitabine 200 mG/tenofovir 300 mG (TRUVADA) 1 Tablet(s) Oral daily  finasteride 5 milliGRAM(s) Oral daily  heparin  Injectable 5000 Unit(s) SubCutaneous every 8 hours  influenza   Vaccine 0.5 milliLiter(s) IntraMuscular once  meropenem IVPB      meropenem IVPB 1000 milliGRAM(s) IV Intermittent every 8 hours  OLANZapine 2.5 milliGRAM(s) Oral daily  potassium chloride    Tablet ER 40 milliEquivalent(s) Oral once  ritonavir Tablet 100 milliGRAM(s) Oral daily  tamsulosin 0.4 milliGRAM(s) Oral at bedtime  vancomycin  IVPB 1000 milliGRAM(s) IV Intermittent every 12 hours    MEDICATIONS  (PRN):  acetaminophen   Tablet 650 milliGRAM(s) Oral every 6 hours PRN For Temp greater than 38 C (100.4 F)  ALBUTerol/ipratropium for Nebulization 3 milliLiter(s) Nebulizer every 6 hours PRN Bronchospasm  guaiFENesin   Syrup  (Sugar-Free) 200 milliGRAM(s) Oral every 6 hours PRN Cough      Vital Signs Last 24 Hrs  T(C): 37.4 (11 Oct 2017 12:05), Max: 37.9 (10 Oct 2017 17:41)  T(F): 99.3 (11 Oct 2017 12:05), Max: 100.2 (10 Oct 2017 17:41)  HR: 89 (11 Oct 2017 12:15) (82 - 97)  BP: 117/65 (11 Oct 2017 12:15) (108/64 - 136/68)  BP(mean): --  RR: 18 (11 Oct 2017 12:05) (16 - 18)  SpO2: 96% (11 Oct 2017 12:15) (96% - 99%)    PHYSICAL EXAM:  General :NAD  Constitutional:  well-groomed, well-developed  Respiratory: CTAB/L  Cardiovascular: S1 and S2, RRR, no M/G/R  Gastrointestinal: BS+, soft, NT/ND  Extremities: No peripheral edema  Vascular: 2+ peripheral pulses  Skin: No rashes      LABS:                        10.4   10.1  )-----------( 347      ( 11 Oct 2017 07:15 )             30.1     10-11    140  |  106  |  9   ----------------------------<  119<H>  3.6   |  26  |  0.79    Ca    8.5      11 Oct 2017 07:15            MICROBIOLOGY:  RECENT CULTURES:  10-05 .Stool Feces XXXX XXXX   No enteric pathogens isolated.  (Stool culture examined for Salmonella,  Shigella, Campylobacter, Aeromonas, Plesiomonas,  Vibrio, E.coli O157 and Yersinia)          RADIOLOGY & ADDITIONAL STUDIES:

## 2017-10-12 VITALS
RESPIRATION RATE: 19 BRPM | HEART RATE: 82 BPM | OXYGEN SATURATION: 96 % | DIASTOLIC BLOOD PRESSURE: 66 MMHG | SYSTOLIC BLOOD PRESSURE: 111 MMHG

## 2017-10-12 PROCEDURE — 99239 HOSP IP/OBS DSCHRG MGMT >30: CPT

## 2017-10-12 RX ORDER — TAMSULOSIN HYDROCHLORIDE 0.4 MG/1
1 CAPSULE ORAL
Qty: 0 | Refills: 0 | COMMUNITY
Start: 2017-10-12

## 2017-10-12 RX ORDER — ATORVASTATIN CALCIUM 80 MG/1
1 TABLET, FILM COATED ORAL
Qty: 0 | Refills: 0 | COMMUNITY

## 2017-10-12 RX ORDER — DONEPEZIL HYDROCHLORIDE 10 MG/1
1 TABLET, FILM COATED ORAL
Qty: 0 | Refills: 0 | COMMUNITY

## 2017-10-12 RX ORDER — INFLUENZA VIRUS VACCINE 15; 15; 15; 15 UG/.5ML; UG/.5ML; UG/.5ML; UG/.5ML
0 SUSPENSION INTRAMUSCULAR
Qty: 0 | Refills: 0 | COMMUNITY
Start: 2017-10-12

## 2017-10-12 RX ORDER — EMTRICITABINE AND TENOFOVIR DISOPROXIL FUMARATE 200; 300 MG/1; MG/1
0 TABLET, FILM COATED ORAL
Qty: 0 | Refills: 0 | COMMUNITY

## 2017-10-12 RX ORDER — DARUNAVIR 75 MG/1
1 TABLET, FILM COATED ORAL
Qty: 0 | Refills: 0 | COMMUNITY

## 2017-10-12 RX ORDER — TAMSULOSIN HYDROCHLORIDE 0.4 MG/1
1 CAPSULE ORAL
Qty: 30 | Refills: 1 | OUTPATIENT
Start: 2017-10-12 | End: 2017-12-10

## 2017-10-12 RX ORDER — AMLODIPINE BESYLATE 2.5 MG/1
1 TABLET ORAL
Qty: 0 | Refills: 0 | DISCHARGE
Start: 2017-10-12

## 2017-10-12 RX ORDER — EMTRICITABINE AND TENOFOVIR DISOPROXIL FUMARATE 200; 300 MG/1; MG/1
1 TABLET, FILM COATED ORAL
Qty: 0 | Refills: 0 | DISCHARGE
Start: 2017-10-12

## 2017-10-12 RX ORDER — ATORVASTATIN CALCIUM 80 MG/1
1 TABLET, FILM COATED ORAL
Qty: 0 | Refills: 0 | DISCHARGE
Start: 2017-10-12

## 2017-10-12 RX ORDER — FINASTERIDE 5 MG/1
1 TABLET, FILM COATED ORAL
Qty: 30 | Refills: 3
Start: 2017-10-12 | End: 2018-02-08

## 2017-10-12 RX ORDER — DONEPEZIL HYDROCHLORIDE 10 MG/1
1 TABLET, FILM COATED ORAL
Qty: 0 | Refills: 0 | COMMUNITY
Start: 2017-10-12

## 2017-10-12 RX ORDER — DARUNAVIR 75 MG/1
0 TABLET, FILM COATED ORAL
Qty: 0 | Refills: 0 | COMMUNITY
Start: 2017-10-12

## 2017-10-12 RX ORDER — RITONAVIR 100 MG/1
1 TABLET, FILM COATED ORAL
Qty: 0 | Refills: 0 | DISCHARGE
Start: 2017-10-12

## 2017-10-12 RX ORDER — OLANZAPINE 15 MG/1
1 TABLET, FILM COATED ORAL
Qty: 0 | Refills: 0 | COMMUNITY
Start: 2017-10-12

## 2017-10-12 RX ORDER — RITONAVIR 100 MG/1
0 TABLET, FILM COATED ORAL
Qty: 0 | Refills: 0 | COMMUNITY

## 2017-10-12 RX ORDER — ACETAMINOPHEN 500 MG
2 TABLET ORAL
Qty: 0 | Refills: 0 | COMMUNITY
Start: 2017-10-12

## 2017-10-12 RX ADMIN — OLANZAPINE 2.5 MILLIGRAM(S): 15 TABLET, FILM COATED ORAL at 12:12

## 2017-10-12 RX ADMIN — EMTRICITABINE AND TENOFOVIR DISOPROXIL FUMARATE 1 TABLET(S): 200; 300 TABLET, FILM COATED ORAL at 12:12

## 2017-10-12 RX ADMIN — AMLODIPINE BESYLATE 5 MILLIGRAM(S): 2.5 TABLET ORAL at 05:24

## 2017-10-12 RX ADMIN — DARUNAVIR 800 MILLIGRAM(S): 75 TABLET, FILM COATED ORAL at 12:12

## 2017-10-12 RX ADMIN — HEPARIN SODIUM 5000 UNIT(S): 5000 INJECTION INTRAVENOUS; SUBCUTANEOUS at 05:24

## 2017-10-12 RX ADMIN — HEPARIN SODIUM 5000 UNIT(S): 5000 INJECTION INTRAVENOUS; SUBCUTANEOUS at 14:22

## 2017-10-12 RX ADMIN — Medication 250 MILLIGRAM(S): at 05:25

## 2017-10-12 RX ADMIN — MEROPENEM 200 MILLIGRAM(S): 1 INJECTION INTRAVENOUS at 05:24

## 2017-10-12 RX ADMIN — RITONAVIR 100 MILLIGRAM(S): 100 TABLET, FILM COATED ORAL at 12:12

## 2017-10-12 RX ADMIN — MEROPENEM 200 MILLIGRAM(S): 1 INJECTION INTRAVENOUS at 14:23

## 2017-10-12 RX ADMIN — FINASTERIDE 5 MILLIGRAM(S): 5 TABLET, FILM COATED ORAL at 12:12

## 2017-10-12 RX ADMIN — INFLUENZA VIRUS VACCINE 0.5 MILLILITER(S): 15; 15; 15; 15 SUSPENSION INTRAMUSCULAR at 14:38

## 2017-10-12 NOTE — PROGRESS NOTE ADULT - PROBLEM SELECTOR PROBLEM 6
Hydronephrosis, bilateral

## 2017-10-12 NOTE — PROGRESS NOTE ADULT - SUBJECTIVE AND OBJECTIVE BOX
Patient is a 76y old  Male who presents with a chief complaint of fever (02 Oct 2017 14:45)    no acute events. no fever overnight. denies n/v/c    MEDICATIONS  (STANDING):  amLODIPine   Tablet 5 milliGRAM(s) Oral daily  atorvastatin 10 milliGRAM(s) Oral at bedtime  darunavir 800 milliGRAM(s) Oral daily  dextrose 5% + sodium chloride 0.45%. 1000 milliLiter(s) (80 mL/Hr) IV Continuous <Continuous>  donepezil 10 milliGRAM(s) Oral at bedtime  emtricitabine 200 mG/tenofovir 300 mG (TRUVADA) 1 Tablet(s) Oral daily  finasteride 5 milliGRAM(s) Oral daily  heparin  Injectable 5000 Unit(s) SubCutaneous every 8 hours  influenza   Vaccine 0.5 milliLiter(s) IntraMuscular once  meropenem IVPB      meropenem IVPB 1000 milliGRAM(s) IV Intermittent every 8 hours  OLANZapine 2.5 milliGRAM(s) Oral daily  ritonavir Tablet 100 milliGRAM(s) Oral daily  tamsulosin 0.4 milliGRAM(s) Oral at bedtime  vancomycin  IVPB 1000 milliGRAM(s) IV Intermittent every 12 hours    MEDICATIONS  (PRN):  acetaminophen   Tablet 650 milliGRAM(s) Oral every 6 hours PRN For Temp greater than 38 C (100.4 F)  ALBUTerol/ipratropium for Nebulization 3 milliLiter(s) Nebulizer every 6 hours PRN Bronchospasm  guaiFENesin   Syrup  (Sugar-Free) 200 milliGRAM(s) Oral every 6 hours PRN Cough      Allergies    No Known Allergies    Intolerances        REVIEW OF SYSTEMS:  dementia prevents active review of systems     VS reviewed    PHYSICAL EXAM:  GENERAL: NAD, well-groomed, well-developed  HEAD:  Atraumatic, Normocephalic  EYES: EOMI, PERRLA, conjunctiva and sclera clear  ENMT: No tonsillar erythema, exudates, or enlargement; Moist mucous membranes, Good dentition, No lesions  NECK: Supple, No JVD, Normal thyroid  NERVOUS SYSTEM:  awake CHEST/LUNG: Clear to percussion bilaterally; No rales, rhonchi, wheezing, or rubs  HEART: Regular rate and rhythm; No murmurs, rubs, or gallops  ABDOMEN: Soft, Nontender, Nondistended; Bowel sounds present  EXTREMITIES:  2+ Peripheral Pulses, No clubbing, cyanosis, or edema  LYMPH: No lymphadenopathy noted  SKIN: No rashes or lesions                                                                           10.4   10.1  )-----------( 347      ( 11 Oct 2017 07:15 )             30.1     10-11    140  |  106  |  9   ----------------------------<  119<H>  3.6   |  26  |  0.79    Ca    8.5      11 Oct 2017 07:15

## 2017-10-12 NOTE — PROGRESS NOTE ADULT - PROBLEM SELECTOR PLAN 3
no behavioral component
no behavioral component
compliant   cont HAART   CD 4 333(chances of PCP low)  follows with Dr Liz in [facility]
compliant   cont HAART   CD 4 333(chances of PCP low)  follows with Dr Liz in [facility]
continue haart  ID appreciated
continue haart  ID appreciated
compliant   cont HAART   CD 4 333(chances of PCP low)  follows with Dr Liz in [facility]
compliant   cont HAART   CD 4 333(chances of PCP low)  follows with Dr Liz in [facility]
compliant   cont HAART   CD 4 333(chances of PCP low)  follows with Dr Liz in [facility]
compliant   cont HAART   CD 4 333(chances of PCP low)  follows with Dr Liz in [facility]
continue haart  ID appreciated
no behavioral component
compliant   cont HAART   check CD4 and VL   follows with Dr Liz in [facility]
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
no behavioral component

## 2017-10-12 NOTE — PROGRESS NOTE ADULT - PROBLEM SELECTOR PLAN 1
sec to Pneumonia  cont vanco / meropenem till AM  if continues to be afebrile(day 9 )   fever and wbc and PCT resolving  vanco level appropriate
Followup ultrasound with nelson
Voiding spontaneously; incontinent
concern for possibvle geram negative complex pneumonia in this comprimized patient   infectious disease consult appreciated
concern for possibvle gram negative complex pneumonia in this comprimized patient   infectious disease consult appreciated    abx adjusted viral and legionella studies pending
sec to Pneumonia  d/c antibiotics (day 10 )  fever and wbc and PCT resolved  cleared for d/c home
abx adjusted viral and legionella studies negative
concern for possible gram negative complex pneumonia in this compromised patient   infectious disease consult appreciated    abx adjusted viral and legionella studies negative   elevated procalcitonin on meropenum, vanco and azithromycin
concern for possible gram negative complex pneumonia in this compromised patient   infectious disease consult appreciated    abx adjusted viral and legionella studies negative   elevated procalcitonin on meropenum, vanco and azithromycin
pt had low grade fever overnight while on vanc, azithro and joey  concern for possible fungal source although repeat fungitell is negative. ID aware.   abx adjusted viral and legionella studies negative   elevated procalcitonin on meropenum, vanco and azithromycin
pt had low grade fever overnight while on vanc, azithro and joey  concern for possible fungal source. ID aware.   abx adjusted viral and legionella studies negative   elevated procalcitonin on meropenum, vanco and azithromycin
sec to Pneumonia  cont vanco / meropenem  D/c azithro   still having occasional fever    vanco level  fungal w/u as stillfebrile
sec to Pneumonia  cont vanco / meropenem.azithro  for now   vanco level  fungal w/u as stillfebrile
sec to Pneumonia  cont vanco / meropenemf ro few more days  fever and wbc and PCT resolving  vanco level
sec to Pneumonia  cont vanco and galo for now  switch zosyn to meropenem   vanco level
cont meropenem /vanco and  azithro through monday if fever continues to resolve
sec to Pneumonia  cont Zosyn , vanco and azithro for now  follow up on legionella antigen
concern for possible gram negative complex pneumonia in this compromised patient   infectious disease consult appreciated    abx adjusted viral and legionella studies negative   elevated procalcitonin on meropenum, vanco and azithromycin
pt had low grade fever overnight while on vanc, and joey  concern for possible fungal source although repeat fungitell is negative. ID aware.   abx adjusted viral and legionella studies negative   elevated procalcitonin on meropenum, vanco and azithromycin
concern for possible gram negative complex pneumonia in this compromised patient   infectious disease consult appreciated    abx adjusted viral and legionella studies negative   elevated procalcitonin on meropenum, vanco and azithromycin

## 2017-10-12 NOTE — PROGRESS NOTE ADULT - PROBLEM SELECTOR PROBLEM 1
BPH with urinary obstruction
Hydronephrosis, bilateral
Pneumonia due to infectious organism, unspecified laterality, unspecified part of lung
Pneumonia due to infectious organism, unspecified laterality, unspecified part of lung
Sepsis, due to unspecified organism
Pneumonia due to infectious organism, unspecified laterality, unspecified part of lung
Sepsis, due to unspecified organism
Sepsis due to pneumonia
Sepsis, due to unspecified organism
Pneumonia due to infectious organism, unspecified laterality, unspecified part of lung
Sepsis, due to unspecified organism
Pneumonia due to infectious organism, unspecified laterality, unspecified part of lung
Pneumonia due to infectious organism, unspecified laterality, unspecified part of lung

## 2017-10-12 NOTE — PROGRESS NOTE ADULT - PROBLEM SELECTOR PROBLEM 3
AIDS dementia
AIDS dementia
HIV (human immunodeficiency virus infection)
AIDS dementia
HIV (human immunodeficiency virus infection)
AIDS dementia
HIV (human immunodeficiency virus infection)
AIDS dementia

## 2017-10-12 NOTE — PROGRESS NOTE ADULT - PROBLEM SELECTOR PLAN 4
continue haart  ID appreciated
continue haart  ID appreciated
alzheimer's vs AIDS dementia
alzheimer's vs AIDS dementia
continue home meds
continue home meds
alzheimer's vs AIDS dementia
continue haart  ID appreciated
continue home meds
alzheimer's vs AIDS dementia
has a nelson now,clear urine
continue haart  ID appreciated

## 2017-10-12 NOTE — PROGRESS NOTE ADULT - PROBLEM SELECTOR PLAN 5
continue home meds
CT shows hydroneprhosis   BPH  Has nelson, UA clear and UC : no growth
CT shows hydroneprhosis   BPH  Has nelson, UA clear and UC : no growth
omar phan
CT shows hydroneprhosis   BPH  Has nelson, UA clear and UC : no growth
continue home meds
nelson  to stay US pending   urology consult
nelson dc    resolved on last US
CT shows hydroneprhosis   BPH

## 2017-10-12 NOTE — PROGRESS NOTE ADULT - ASSESSMENT
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 Pt had no fever overnight. Caretaker/wife states he is back to his baseline mental status. Pt finished 10 days of abx and to be discharged today

## 2017-10-12 NOTE — PROGRESS NOTE ADULT - PROBLEM SELECTOR PLAN 2
present on admission continue broad spectrum abx as patient remains febrile  ID on board
present on admission continue broad spectrum abx as patient remains febrile
as Immunocompromised with HIV   covering for both gram neg as well as MRSA  Abx as above  legionella antigen neg  repeat fungitell neg -wont pursue work up for PCP pamela since pt compliant and CD4 333
Monitor PVR/bladder scan
TOV if hydro resolves
as Immunocompromised with HIV   covering for both gram neg as well as MRSA  Abx as above  legionella antigen neg  repeat fungitell neg -wont pursue work up for PCP pamela since pt compliant and CD4 333
continue Aricept
continue aricept
as Immunocompromised with HIV   covering for both gram neg as well as MRSA  Abx as above  legionella antigen neg  check RVP
as Immunocompromised with HIV   covering for both gram neg as well as MRSA  Abx as above  legionella antigen neg  check RVP
as Immunocompromised with HIV   covering for both gram neg as well as MRSA  Abx as above  legionella antigen neg  repeat fungitell neg -wont pursue work uo for PCP pamela since pt compliant and CD4 333
continue Aricept
present on admission continue broad spectrum abx as patient remains febrile
present on admission continue broad spectrum abx as patient remains febrile
present on admission continue broad spectrum abx as patient remains febrile  ID on board
resolved  ID on board
still occasional fever  covering for both gram neg as well as MRSA  both fungitell and ldh high but cd4 333(>200 ) so pcp possible but less liekly   pt is not short of breath whih is more common in PCP   other fungal w/u is neg   legionella neg
as Immunocompromised with HIV   covering for both gram neg as well as MRSA  Abx as above
as above
present on admission continue broad spectrum abx as patient remains febrile

## 2017-10-12 NOTE — PROGRESS NOTE ADULT - PROBLEM SELECTOR PROBLEM 4
HIV (human immunodeficiency virus infection)
HIV (human immunodeficiency virus infection)
Dementia without behavioral disturbance, unspecified dementia type
Dementia without behavioral disturbance, unspecified dementia type
Essential hypertension
Essential hypertension
Dementia without behavioral disturbance, unspecified dementia type
Essential hypertension
HIV (human immunodeficiency virus infection)
Acute urinary retention
Dementia without behavioral disturbance, unspecified dementia type
HIV (human immunodeficiency virus infection)

## 2017-10-12 NOTE — PROGRESS NOTE ADULT - PROBLEM SELECTOR PROBLEM 5
Essential hypertension
Essential hypertension
Acute urinary retention
Acute urinary retention
Hydronephrosis, bilateral
Hydronephrosis, bilateral
Acute urinary retention
Essential hypertension
Hydronephrosis, bilateral
Acute urinary retention
Essential hypertension

## 2017-10-12 NOTE — PROGRESS NOTE ADULT - PROBLEM SELECTOR PROBLEM 2
Sepsis due to pneumonia
Gram-negative pneumonia
Acute urinary retention
Alzheimer's disease of other onset
Alzheimer's disease of other onset
BPH with urinary obstruction
Gram-negative pneumonia
Alzheimer's disease of other onset
Gram-negative pneumonia
Sepsis due to pneumonia
Gram-negative pneumonia
Gram-negative pneumonia
Sepsis due to pneumonia
Sepsis due to pneumonia

## 2017-10-12 NOTE — PROGRESS NOTE ADULT - PROBLEM SELECTOR PLAN 6
nelson dc    resolved on last US
nelson dc. voiding freely.   resolved on last US
nelson dc    resolved on last US
nelson dc    resolved on last US
nelson dc. voiding freely.   resolved on last US

## 2017-10-12 NOTE — PROGRESS NOTE ADULT - SUBJECTIVE AND OBJECTIVE BOX
Patient is a 76y old  Male who presents with a chief complaint of fever (11 Oct 2017 09:31)      INTERVAL HPI / OVERNIGHT EVENTS: doing ok, fever resolved    MEDICATIONS  (STANDING):  amLODIPine   Tablet 5 milliGRAM(s) Oral daily  atorvastatin 10 milliGRAM(s) Oral at bedtime  darunavir 800 milliGRAM(s) Oral daily  dextrose 5% + sodium chloride 0.45%. 1000 milliLiter(s) (80 mL/Hr) IV Continuous <Continuous>  donepezil 10 milliGRAM(s) Oral at bedtime  emtricitabine 200 mG/tenofovir 300 mG (TRUVADA) 1 Tablet(s) Oral daily  finasteride 5 milliGRAM(s) Oral daily  heparin  Injectable 5000 Unit(s) SubCutaneous every 8 hours  influenza   Vaccine 0.5 milliLiter(s) IntraMuscular once  meropenem IVPB      meropenem IVPB 1000 milliGRAM(s) IV Intermittent every 8 hours  OLANZapine 2.5 milliGRAM(s) Oral daily  potassium chloride    Tablet ER 40 milliEquivalent(s) Oral once  ritonavir Tablet 100 milliGRAM(s) Oral daily  tamsulosin 0.4 milliGRAM(s) Oral at bedtime  vancomycin  IVPB 1000 milliGRAM(s) IV Intermittent every 12 hours    MEDICATIONS  (PRN):  acetaminophen   Tablet 650 milliGRAM(s) Oral every 6 hours PRN For Temp greater than 38 C (100.4 F)  ALBUTerol/ipratropium for Nebulization 3 milliLiter(s) Nebulizer every 6 hours PRN Bronchospasm  guaiFENesin   Syrup  (Sugar-Free) 200 milliGRAM(s) Oral every 6 hours PRN Cough      Vital Signs Last 24 Hrs  Tmax:afebrile  HR: 89 (11 Oct 2017 12:15) (82 - 97)  BP: 117/65 (11 Oct 2017 12:15) (108/64 - 136/68)  BP(mean): --  RR: 18 (11 Oct 2017 12:05) (16 - 18)  SpO2: 96% (11 Oct 2017 12:15) (96% - 99%)    PHYSICAL EXAM:  General :NAD  Constitutional:  well-groomed, well-developed  Respiratory: CTAB/L  Cardiovascular: S1 and S2, RRR, no M/G/R  Gastrointestinal: BS+, soft, NT/ND  Extremities: No peripheral edema  Vascular: 2+ peripheral pulses  Skin: No rashes      LABS:             wbc 11.2-->10  cr0.79  PCT 0.05            MICROBIOLOGY:  RECENT CULTURES:  10-05 .Stool Feces XXXX XXXX   No enteric pathogens isolated.  (Stool culture examined for Salmonella,  Shigella, Campylobacter, Aeromonas, Plesiomonas,  Vibrio, E.coli O157 and Yersinia)          RADIOLOGY & ADDITIONAL STUDIES:  < from: CT Chest w/ IV Cont (10.11.17 @ 17:50) >    IMPRESSION:     Bilateral groundglass opacities and right upper lobe nodular opacities,   compatible with suspected pneumonia.  Small right pleural effusion.    < end of copied text >

## 2017-10-12 NOTE — PROGRESS NOTE ADULT - PROVIDER SPECIALTY LIST ADULT
Hospitalist
Infectious Disease
Urology
Infectious Disease
Hospitalist
Infectious Disease
Hospitalist
Hospitalist

## 2017-10-13 ENCOUNTER — RX RENEWAL (OUTPATIENT)
Age: 77
End: 2017-10-13

## 2017-10-16 DIAGNOSIS — N40.1 BENIGN PROSTATIC HYPERPLASIA WITH LOWER URINARY TRACT SYMPTOMS: ICD-10-CM

## 2017-10-16 DIAGNOSIS — J18.9 PNEUMONIA, UNSPECIFIED ORGANISM: ICD-10-CM

## 2017-10-16 DIAGNOSIS — I10 ESSENTIAL (PRIMARY) HYPERTENSION: ICD-10-CM

## 2017-10-16 DIAGNOSIS — G30.8 OTHER ALZHEIMER'S DISEASE: ICD-10-CM

## 2017-10-16 DIAGNOSIS — R32 UNSPECIFIED URINARY INCONTINENCE: ICD-10-CM

## 2017-10-16 DIAGNOSIS — Z79.899 OTHER LONG TERM (CURRENT) DRUG THERAPY: ICD-10-CM

## 2017-10-16 DIAGNOSIS — F02.80 DEMENTIA IN OTHER DISEASES CLASSIFIED ELSEWHERE, UNSPECIFIED SEVERITY, WITHOUT BEHAVIORAL DISTURBANCE, PSYCHOTIC DISTURBANCE, MOOD DISTURBANCE, AND ANXIETY: ICD-10-CM

## 2017-10-16 DIAGNOSIS — J15.6 PNEUMONIA DUE TO OTHER GRAM-NEGATIVE BACTERIA: ICD-10-CM

## 2017-10-16 DIAGNOSIS — A41.9 SEPSIS, UNSPECIFIED ORGANISM: ICD-10-CM

## 2017-10-16 DIAGNOSIS — B20 HUMAN IMMUNODEFICIENCY VIRUS [HIV] DISEASE: ICD-10-CM

## 2017-10-16 DIAGNOSIS — E44.0 MODERATE PROTEIN-CALORIE MALNUTRITION: ICD-10-CM

## 2017-10-16 DIAGNOSIS — R19.7 DIARRHEA, UNSPECIFIED: ICD-10-CM

## 2017-10-16 DIAGNOSIS — N13.30 UNSPECIFIED HYDRONEPHROSIS: ICD-10-CM

## 2017-10-16 DIAGNOSIS — R33.8 OTHER RETENTION OF URINE: ICD-10-CM

## 2017-10-26 ENCOUNTER — APPOINTMENT (OUTPATIENT)
Dept: INFECTIOUS DISEASE | Facility: CLINIC | Age: 77
End: 2017-10-26
Payer: MEDICARE

## 2017-10-26 VITALS
DIASTOLIC BLOOD PRESSURE: 73 MMHG | HEART RATE: 108 BPM | WEIGHT: 157 LBS | TEMPERATURE: 97.6 F | HEIGHT: 72 IN | SYSTOLIC BLOOD PRESSURE: 126 MMHG | BODY MASS INDEX: 21.26 KG/M2 | OXYGEN SATURATION: 98 %

## 2017-10-26 PROCEDURE — 99214 OFFICE O/P EST MOD 30 MIN: CPT

## 2017-10-27 LAB
ALBUMIN SERPL ELPH-MCNC: 3.6 G/DL
ALP BLD-CCNC: 153 U/L
ALT SERPL-CCNC: 10 U/L
ANION GAP SERPL CALC-SCNC: 21 MMOL/L
AST SERPL-CCNC: 23 U/L
BASOPHILS # BLD AUTO: 0.02 K/UL
BASOPHILS NFR BLD AUTO: 0.3 %
BILIRUB SERPL-MCNC: 0.3 MG/DL
BUN SERPL-MCNC: 11 MG/DL
CALCIUM SERPL-MCNC: 10 MG/DL
CD3 CELLS # BLD: 3189 /UL
CD3 CELLS NFR BLD: 79 %
CD3+CD4+ CELLS # BLD: 554 /UL
CD3+CD4+ CELLS NFR BLD: 14 %
CD3+CD4+ CELLS/CD3+CD8+ CLL SPEC: 0.21 RATIO
CD3+CD8+ CELLS # SPEC: 2682 /UL
CD3+CD8+ CELLS NFR BLD: 66 %
CHLORIDE SERPL-SCNC: 101 MMOL/L
CO2 SERPL-SCNC: 21 MMOL/L
CREAT SERPL-MCNC: 1.11 MG/DL
EOSINOPHIL # BLD AUTO: 0.13 K/UL
EOSINOPHIL NFR BLD AUTO: 1.7 %
GLUCOSE SERPL-MCNC: 154 MG/DL
HCT VFR BLD CALC: 37.3 %
HGB BLD-MCNC: 12.2 G/DL
HIV1 RNA # SERPL NAA+PROBE: ABNORMAL
HIV1 RNA # SERPL NAA+PROBE: ABNORMAL COPIES/ML
IMM GRANULOCYTES NFR BLD AUTO: 0.1 %
LYMPHOCYTES # BLD AUTO: 3.73 K/UL
LYMPHOCYTES NFR BLD AUTO: 50.2 %
MAN DIFF?: NORMAL
MCHC RBC-ENTMCNC: 30.3 PG
MCHC RBC-ENTMCNC: 32.7 GM/DL
MCV RBC AUTO: 92.8 FL
MONOCYTES # BLD AUTO: 0.77 K/UL
MONOCYTES NFR BLD AUTO: 10.4 %
NEUTROPHILS # BLD AUTO: 2.77 K/UL
NEUTROPHILS NFR BLD AUTO: 37.3 %
PLATELET # BLD AUTO: 324 K/UL
POTASSIUM SERPL-SCNC: 4.4 MMOL/L
PROT SERPL-MCNC: 8.7 G/DL
RBC # BLD: 4.02 M/UL
RBC # FLD: 14.1 %
SODIUM SERPL-SCNC: 143 MMOL/L
VIRAL LOAD INTERP: NORMAL
VIRAL LOAD LOG: ABNORMAL LG COP/ML
WBC # FLD AUTO: 7.43 K/UL

## 2018-01-13 ENCOUNTER — OUTPATIENT (OUTPATIENT)
Dept: OUTPATIENT SERVICES | Facility: HOSPITAL | Age: 78
LOS: 1 days | End: 2018-01-13
Payer: MEDICARE

## 2018-01-13 ENCOUNTER — APPOINTMENT (OUTPATIENT)
Dept: CT IMAGING | Facility: IMAGING CENTER | Age: 78
End: 2018-01-13
Payer: MEDICARE

## 2018-01-13 DIAGNOSIS — Z00.8 ENCOUNTER FOR OTHER GENERAL EXAMINATION: ICD-10-CM

## 2018-01-13 PROCEDURE — 71250 CT THORAX DX C-: CPT | Mod: 26

## 2018-01-13 PROCEDURE — 71250 CT THORAX DX C-: CPT

## 2018-01-30 NOTE — PHYSICAL THERAPY INITIAL EVALUATION ADULT - LEVEL OF INDEPENDENCE: STAIR NEGOTIATION, REHAB EVAL
- Patient on 4 liters of oxygen at home. No wheezes on exam. Bibasilar crackles.  - c/w  budosenide -formoterol, Duonebs q6 hr  - Pulmonary fibrosis seen on last CT scan, could be side effect of amiodarone, will decrease amio dose to 100mg as per cards. Pulm recs appreciated.   - Should get PFT and DLCO as an outpatient to eval pulmonary fibrosis To be assessed.

## 2018-02-03 ENCOUNTER — EMERGENCY (EMERGENCY)
Facility: HOSPITAL | Age: 78
LOS: 0 days | Discharge: HOME | End: 2018-02-03
Attending: EMERGENCY MEDICINE
Payer: MEDICARE

## 2018-02-03 VITALS
TEMPERATURE: 98 F | OXYGEN SATURATION: 97 % | HEART RATE: 78 BPM | DIASTOLIC BLOOD PRESSURE: 71 MMHG | SYSTOLIC BLOOD PRESSURE: 107 MMHG | RESPIRATION RATE: 17 BRPM

## 2018-02-03 VITALS
HEART RATE: 72 BPM | WEIGHT: 160.06 LBS | DIASTOLIC BLOOD PRESSURE: 81 MMHG | RESPIRATION RATE: 19 BRPM | SYSTOLIC BLOOD PRESSURE: 151 MMHG | TEMPERATURE: 98 F | OXYGEN SATURATION: 98 %

## 2018-02-03 DIAGNOSIS — R55 SYNCOPE AND COLLAPSE: ICD-10-CM

## 2018-02-03 DIAGNOSIS — Z20.6 CONTACT WITH AND (SUSPECTED) EXPOSURE TO HUMAN IMMUNODEFICIENCY VIRUS [HIV]: ICD-10-CM

## 2018-02-03 DIAGNOSIS — F03.90 UNSPECIFIED DEMENTIA WITHOUT BEHAVIORAL DISTURBANCE: ICD-10-CM

## 2018-02-03 LAB
ALBUMIN SERPL ELPH-MCNC: 3.7 G/DL — SIGNIFICANT CHANGE UP (ref 3.3–5)
ALP SERPL-CCNC: 261 U/L — HIGH (ref 40–120)
ALT FLD-CCNC: 21 U/L — SIGNIFICANT CHANGE UP (ref 12–78)
ANION GAP SERPL CALC-SCNC: 5 MMOL/L — SIGNIFICANT CHANGE UP (ref 5–17)
APPEARANCE UR: CLEAR — SIGNIFICANT CHANGE UP
APTT BLD: 33.7 SEC — SIGNIFICANT CHANGE UP (ref 27.5–37.4)
AST SERPL-CCNC: 30 U/L — SIGNIFICANT CHANGE UP (ref 15–37)
BACTERIA # UR AUTO: ABNORMAL
BASOPHILS # BLD AUTO: 0.01 K/UL — SIGNIFICANT CHANGE UP (ref 0–0.2)
BASOPHILS NFR BLD AUTO: 0.2 % — SIGNIFICANT CHANGE UP (ref 0–2)
BILIRUB SERPL-MCNC: 0.4 MG/DL — SIGNIFICANT CHANGE UP (ref 0.2–1.2)
BILIRUB UR-MCNC: NEGATIVE — SIGNIFICANT CHANGE UP
BUN SERPL-MCNC: 11 MG/DL — SIGNIFICANT CHANGE UP (ref 7–23)
CALCIUM SERPL-MCNC: 8.7 MG/DL — SIGNIFICANT CHANGE UP (ref 8.5–10.1)
CHLORIDE SERPL-SCNC: 104 MMOL/L — SIGNIFICANT CHANGE UP (ref 96–108)
CK MB BLD-MCNC: <0.5 % — SIGNIFICANT CHANGE UP (ref 0–3.5)
CK MB CFR SERPL CALC: <0.5 NG/ML — SIGNIFICANT CHANGE UP (ref 0.5–3.6)
CK SERPL-CCNC: 102 U/L — SIGNIFICANT CHANGE UP (ref 26–308)
CO2 SERPL-SCNC: 31 MMOL/L — SIGNIFICANT CHANGE UP (ref 22–31)
COLOR SPEC: YELLOW — SIGNIFICANT CHANGE UP
CREAT SERPL-MCNC: 1.04 MG/DL — SIGNIFICANT CHANGE UP (ref 0.5–1.3)
DIFF PNL FLD: ABNORMAL
EOSINOPHIL # BLD AUTO: 0.04 K/UL — SIGNIFICANT CHANGE UP (ref 0–0.5)
EOSINOPHIL NFR BLD AUTO: 0.8 % — SIGNIFICANT CHANGE UP (ref 0–6)
EPI CELLS # UR: SIGNIFICANT CHANGE UP
GLUCOSE SERPL-MCNC: 111 MG/DL — HIGH (ref 70–99)
GLUCOSE UR QL: NEGATIVE MG/DL — SIGNIFICANT CHANGE UP
HCT VFR BLD CALC: 40.6 % — SIGNIFICANT CHANGE UP (ref 39–50)
HGB BLD-MCNC: 14 G/DL — SIGNIFICANT CHANGE UP (ref 13–17)
HYALINE CASTS # UR AUTO: ABNORMAL /LPF
IMM GRANULOCYTES NFR BLD AUTO: 0 % — SIGNIFICANT CHANGE UP (ref 0–1.5)
INR BLD: 1.04 RATIO — SIGNIFICANT CHANGE UP (ref 0.88–1.16)
KETONES UR-MCNC: NEGATIVE — SIGNIFICANT CHANGE UP
LACTATE SERPL-SCNC: 1.5 MMOL/L — SIGNIFICANT CHANGE UP (ref 0.7–2)
LEUKOCYTE ESTERASE UR-ACNC: NEGATIVE — SIGNIFICANT CHANGE UP
LYMPHOCYTES # BLD AUTO: 1.44 K/UL — SIGNIFICANT CHANGE UP (ref 1–3.3)
LYMPHOCYTES # BLD AUTO: 29.5 % — SIGNIFICANT CHANGE UP (ref 13–44)
MCHC RBC-ENTMCNC: 30.3 PG — SIGNIFICANT CHANGE UP (ref 27–34)
MCHC RBC-ENTMCNC: 34.5 GM/DL — SIGNIFICANT CHANGE UP (ref 32–36)
MCV RBC AUTO: 87.9 FL — SIGNIFICANT CHANGE UP (ref 80–100)
MONOCYTES # BLD AUTO: 0.41 K/UL — SIGNIFICANT CHANGE UP (ref 0–0.9)
MONOCYTES NFR BLD AUTO: 8.4 % — SIGNIFICANT CHANGE UP (ref 2–14)
NEUTROPHILS # BLD AUTO: 2.98 K/UL — SIGNIFICANT CHANGE UP (ref 1.8–7.4)
NEUTROPHILS NFR BLD AUTO: 61.1 % — SIGNIFICANT CHANGE UP (ref 43–77)
NITRITE UR-MCNC: NEGATIVE — SIGNIFICANT CHANGE UP
NRBC # BLD: 0 /100 WBCS — SIGNIFICANT CHANGE UP (ref 0–0)
PH UR: 6 — SIGNIFICANT CHANGE UP (ref 5–8)
PLATELET # BLD AUTO: 213 K/UL — SIGNIFICANT CHANGE UP (ref 150–400)
POTASSIUM SERPL-MCNC: 3.7 MMOL/L — SIGNIFICANT CHANGE UP (ref 3.5–5.3)
POTASSIUM SERPL-SCNC: 3.7 MMOL/L — SIGNIFICANT CHANGE UP (ref 3.5–5.3)
PROT SERPL-MCNC: 8.8 GM/DL — HIGH (ref 6–8.3)
PROT UR-MCNC: NEGATIVE MG/DL — SIGNIFICANT CHANGE UP
PROTHROM AB SERPL-ACNC: 11.4 SEC — SIGNIFICANT CHANGE UP (ref 9.8–12.7)
RBC # BLD: 4.62 M/UL — SIGNIFICANT CHANGE UP (ref 4.2–5.8)
RBC # FLD: 13.8 % — SIGNIFICANT CHANGE UP (ref 10.3–14.5)
RBC CASTS # UR COMP ASSIST: ABNORMAL /HPF (ref 0–4)
SODIUM SERPL-SCNC: 140 MMOL/L — SIGNIFICANT CHANGE UP (ref 135–145)
SP GR SPEC: 1.01 — SIGNIFICANT CHANGE UP (ref 1.01–1.02)
TROPONIN I SERPL-MCNC: <.015 NG/ML — SIGNIFICANT CHANGE UP (ref 0.01–0.04)
TROPONIN I SERPL-MCNC: <.015 NG/ML — SIGNIFICANT CHANGE UP (ref 0.01–0.04)
UROBILINOGEN FLD QL: NEGATIVE MG/DL — SIGNIFICANT CHANGE UP
WBC # BLD: 4.88 K/UL — SIGNIFICANT CHANGE UP (ref 3.8–10.5)
WBC # FLD AUTO: 4.88 K/UL — SIGNIFICANT CHANGE UP (ref 3.8–10.5)
WBC UR QL: SIGNIFICANT CHANGE UP

## 2018-02-03 PROCEDURE — 99284 EMERGENCY DEPT VISIT MOD MDM: CPT

## 2018-02-03 PROCEDURE — 71045 X-RAY EXAM CHEST 1 VIEW: CPT | Mod: 26

## 2018-02-03 PROCEDURE — 70450 CT HEAD/BRAIN W/O DYE: CPT | Mod: 26

## 2018-02-03 PROCEDURE — 93010 ELECTROCARDIOGRAM REPORT: CPT

## 2018-02-03 RX ORDER — SODIUM CHLORIDE 9 MG/ML
1000 INJECTION INTRAMUSCULAR; INTRAVENOUS; SUBCUTANEOUS ONCE
Qty: 0 | Refills: 0 | Status: COMPLETED | OUTPATIENT
Start: 2018-02-03 | End: 2018-02-03

## 2018-02-03 RX ADMIN — SODIUM CHLORIDE 1000 MILLILITER(S): 9 INJECTION INTRAMUSCULAR; INTRAVENOUS; SUBCUTANEOUS at 15:15

## 2018-02-03 NOTE — ED PROVIDER NOTE - MEDICAL DECISION MAKING DETAILS
Patient presents with sycnope.  Lab values do not require emergent intervention. repeat Lab values do not require emergent intervention. CT scan demonstrates no acute pathology. d/w family and want to d/c.  will f/u. Uneventful ED observation period. Non toxic.  Well appearing. Discussed results and outcome of testing with the patient.  Patient advised to please follow up with their primary care doctor within the next 24 hours and return to the Emergency Department for worsening symptoms or any other concerns.  Patient advised that their doctor may call  to follow up on the specific results of the tests performed today in the emergency department.

## 2018-02-03 NOTE — ED PROVIDER NOTE - PHYSICAL EXAMINATION
Gen: Alert, NAD Head: NC, AT, PERRL, EOMI, normal lids/conjunctiva ENT: normal hearing, patent oropharynx without erythema/exudate, uvula midline Neck: +supple, no tenderness/meningismus/JVD, +Trachea midline Pulm: Bilateral BS, normal resp effort, no wheeze/stridor/retractions CV: RRR, no M/R/G, +dist pulses Abd: soft, NT/ND, +BS, no hepatosplenomegaly Mskel: no edema/erythema/cyanosis Skin: no rash Neuro: Alert, no gross sensory/motor deficits

## 2018-02-03 NOTE — ED PROVIDER NOTE - OBJECTIVE STATEMENT
Pertinent PMH/PSH/FHx/SHx and Review of Systems contained within:  Patient presents to the ED for brief syncope with apparent prodrome.  PMH of AIDS dementia per family/chart, HTN, HLD, BPH.  Patient is unable to provide any Hx and is nonverbal.  Per family, were out at a store and "he just didn't' look right and grabbed onto a railing so I went over and sat him down and he passed out for a few seconds and came right back again to his normal self."  now baseline.  now no concerns.  Non toxic.  Well appearing. No aggravating or relieving factors. No other concerning Hx or factors per family.  unable to attain further ROS/PMH/PSH/FHx/SHx due to clinical condition.

## 2018-02-03 NOTE — ED ADULT TRIAGE NOTE - CHIEF COMPLAINT QUOTE
as per family patient had an episode of near syncope 30 min ago , patient has dementia , as per family no cough no fever as per family patient had an episode of near syncope 30 min ago , patient has dementia , as per family no cough no fever as per EMS

## 2018-02-03 NOTE — ED ADULT NURSE NOTE - CHIEF COMPLAINT QUOTE
as per family patient had an episode of near syncope 30 min ago , patient has dementia , as per family no cough no fever as per EMS

## 2018-02-04 LAB
CULTURE RESULTS: NO GROWTH — SIGNIFICANT CHANGE UP
SPECIMEN SOURCE: SIGNIFICANT CHANGE UP

## 2018-02-07 ENCOUNTER — RX RENEWAL (OUTPATIENT)
Age: 78
End: 2018-02-07

## 2018-02-08 LAB
CULTURE RESULTS: SIGNIFICANT CHANGE UP
SPECIMEN SOURCE: SIGNIFICANT CHANGE UP

## 2018-02-09 LAB
CULTURE RESULTS: SIGNIFICANT CHANGE UP
SPECIMEN SOURCE: SIGNIFICANT CHANGE UP

## 2018-02-12 ENCOUNTER — APPOINTMENT (OUTPATIENT)
Dept: NEUROLOGY | Facility: CLINIC | Age: 78
End: 2018-02-12
Payer: MEDICARE

## 2018-02-12 VITALS — HEART RATE: 100 BPM | SYSTOLIC BLOOD PRESSURE: 118 MMHG | DIASTOLIC BLOOD PRESSURE: 56 MMHG

## 2018-02-12 PROCEDURE — 99214 OFFICE O/P EST MOD 30 MIN: CPT

## 2018-04-06 ENCOUNTER — RX RENEWAL (OUTPATIENT)
Age: 78
End: 2018-04-06

## 2018-05-09 ENCOUNTER — RX RENEWAL (OUTPATIENT)
Age: 78
End: 2018-05-09

## 2018-05-10 ENCOUNTER — APPOINTMENT (OUTPATIENT)
Dept: INFECTIOUS DISEASE | Facility: CLINIC | Age: 78
End: 2018-05-10

## 2018-05-10 ENCOUNTER — APPOINTMENT (OUTPATIENT)
Dept: INFECTIOUS DISEASE | Facility: CLINIC | Age: 78
End: 2018-05-10
Payer: MEDICARE

## 2018-05-10 VITALS
SYSTOLIC BLOOD PRESSURE: 132 MMHG | RESPIRATION RATE: 16 BRPM | TEMPERATURE: 97 F | OXYGEN SATURATION: 95 % | DIASTOLIC BLOOD PRESSURE: 75 MMHG | HEART RATE: 91 BPM

## 2018-05-10 PROCEDURE — 99214 OFFICE O/P EST MOD 30 MIN: CPT | Mod: 25

## 2018-05-10 PROCEDURE — 90732 PPSV23 VACC 2 YRS+ SUBQ/IM: CPT

## 2018-05-10 PROCEDURE — G0009: CPT

## 2018-05-14 LAB
25(OH)D3 SERPL-MCNC: 25.8 NG/ML
ADJUSTED MITOGEN: >10 IU/ML
ADJUSTED TB AG: -0.01 IU/ML
ALBUMIN SERPL ELPH-MCNC: 4 G/DL
ALP BLD-CCNC: 236 U/L
ALT SERPL-CCNC: 9 U/L
ANION GAP SERPL CALC-SCNC: 15 MMOL/L
AST SERPL-CCNC: 24 U/L
BASOPHILS # BLD AUTO: 0.01 K/UL
BASOPHILS NFR BLD AUTO: 0.2 %
BILIRUB SERPL-MCNC: 0.3 MG/DL
BUN SERPL-MCNC: 12 MG/DL
CALCIUM SERPL-MCNC: 9.7 MG/DL
CD3 CELLS # BLD: 1911 /UL
CD3 CELLS NFR BLD: 75 %
CD3+CD4+ CELLS # BLD: 327 /UL
CD3+CD4+ CELLS NFR BLD: 13 %
CD3+CD4+ CELLS/CD3+CD8+ CLL SPEC: 0.2 RATIO
CD3+CD8+ CELLS # SPEC: 1613 /UL
CD3+CD8+ CELLS NFR BLD: 64 %
CHLORIDE SERPL-SCNC: 104 MMOL/L
CHOLEST SERPL-MCNC: 132 MG/DL
CHOLEST/HDLC SERPL: 3.2 RATIO
CO2 SERPL-SCNC: 24 MMOL/L
CREAT SERPL-MCNC: 1.08 MG/DL
EOSINOPHIL # BLD AUTO: 0.04 K/UL
EOSINOPHIL NFR BLD AUTO: 0.7 %
GLUCOSE SERPL-MCNC: 131 MG/DL
HAV IGG+IGM SER QL: NONREACTIVE
HBA1C MFR BLD HPLC: 5.6 %
HBV CORE IGG+IGM SER QL: NONREACTIVE
HBV SURFACE AB SER QL: REACTIVE
HBV SURFACE AG SER QL: NONREACTIVE
HCT VFR BLD CALC: 38.7 %
HCV AB SER QL: NONREACTIVE
HCV S/CO RATIO: 0.1 S/CO
HDLC SERPL-MCNC: 41 MG/DL
HGB BLD-MCNC: 13.2 G/DL
HIV1 RNA # SERPL NAA+PROBE: ABNORMAL
HIV1 RNA # SERPL NAA+PROBE: ABNORMAL COPIES/ML
IMM GRANULOCYTES NFR BLD AUTO: 0.2 %
LDLC SERPL CALC-MCNC: 78 MG/DL
LYMPHOCYTES # BLD AUTO: 2.5 K/UL
LYMPHOCYTES NFR BLD AUTO: 41.5 %
M TB IFN-G BLD-IMP: NEGATIVE
MAN DIFF?: NORMAL
MCHC RBC-ENTMCNC: 31.7 PG
MCHC RBC-ENTMCNC: 34.1 GM/DL
MCV RBC AUTO: 93 FL
MONOCYTES # BLD AUTO: 0.35 K/UL
MONOCYTES NFR BLD AUTO: 5.8 %
NEUTROPHILS # BLD AUTO: 3.12 K/UL
NEUTROPHILS NFR BLD AUTO: 51.6 %
PLATELET # BLD AUTO: 274 K/UL
POTASSIUM SERPL-SCNC: 4.1 MMOL/L
PROT SERPL-MCNC: 8.1 G/DL
PSA SERPL-MCNC: 0.35 NG/ML
QUANTIFERON GOLD NIL: 0.1 IU/ML
RBC # BLD: 4.16 M/UL
RBC # FLD: 13.7 %
SODIUM SERPL-SCNC: 143 MMOL/L
T PALLIDUM AB SER QL IA: NEGATIVE
TRIGL SERPL-MCNC: 66 MG/DL
VIRAL LOAD INTERP: NORMAL
VIRAL LOAD LOG: ABNORMAL LG COP/ML
WBC # FLD AUTO: 6.03 K/UL

## 2018-05-15 ENCOUNTER — OTHER (OUTPATIENT)
Age: 78
End: 2018-05-15

## 2018-07-13 ENCOUNTER — MEDICATION RENEWAL (OUTPATIENT)
Age: 78
End: 2018-07-13

## 2018-07-13 ENCOUNTER — RX RENEWAL (OUTPATIENT)
Age: 78
End: 2018-07-13

## 2018-07-15 ENCOUNTER — INPATIENT (INPATIENT)
Facility: HOSPITAL | Age: 78
LOS: 4 days | Discharge: HOME | DRG: 975 | End: 2018-07-20
Attending: HOSPITALIST | Admitting: STUDENT IN AN ORGANIZED HEALTH CARE EDUCATION/TRAINING PROGRAM
Payer: MEDICARE

## 2018-07-15 VITALS
OXYGEN SATURATION: 96 % | RESPIRATION RATE: 20 BRPM | SYSTOLIC BLOOD PRESSURE: 132 MMHG | TEMPERATURE: 100 F | HEART RATE: 108 BPM | DIASTOLIC BLOOD PRESSURE: 69 MMHG

## 2018-07-15 DIAGNOSIS — A41.9 SEPSIS, UNSPECIFIED ORGANISM: ICD-10-CM

## 2018-07-15 DIAGNOSIS — N39.0 URINARY TRACT INFECTION, SITE NOT SPECIFIED: ICD-10-CM

## 2018-07-15 DIAGNOSIS — R63.8 OTHER SYMPTOMS AND SIGNS CONCERNING FOOD AND FLUID INTAKE: ICD-10-CM

## 2018-07-15 DIAGNOSIS — Z29.9 ENCOUNTER FOR PROPHYLACTIC MEASURES, UNSPECIFIED: ICD-10-CM

## 2018-07-15 DIAGNOSIS — N40.0 BENIGN PROSTATIC HYPERPLASIA WITHOUT LOWER URINARY TRACT SYMPTOMS: ICD-10-CM

## 2018-07-15 DIAGNOSIS — I10 ESSENTIAL (PRIMARY) HYPERTENSION: ICD-10-CM

## 2018-07-15 DIAGNOSIS — G30.8 OTHER ALZHEIMER'S DISEASE: ICD-10-CM

## 2018-07-15 DIAGNOSIS — B20 HUMAN IMMUNODEFICIENCY VIRUS [HIV] DISEASE: ICD-10-CM

## 2018-07-15 LAB
ALBUMIN SERPL ELPH-MCNC: 3.7 G/DL — SIGNIFICANT CHANGE UP (ref 3.3–5)
ALP SERPL-CCNC: 216 U/L — HIGH (ref 40–120)
ALT FLD-CCNC: 23 U/L — SIGNIFICANT CHANGE UP (ref 10–45)
ANION GAP SERPL CALC-SCNC: 15 MMOL/L — SIGNIFICANT CHANGE UP (ref 5–17)
APPEARANCE UR: ABNORMAL
AST SERPL-CCNC: 69 U/L — HIGH (ref 10–40)
BACTERIA # UR AUTO: ABNORMAL /HPF
BASE EXCESS BLDV CALC-SCNC: 2.7 MMOL/L — HIGH (ref -2–2)
BASOPHILS # BLD AUTO: 0 K/UL — SIGNIFICANT CHANGE UP (ref 0–0.2)
BASOPHILS NFR BLD AUTO: 0.1 % — SIGNIFICANT CHANGE UP (ref 0–2)
BILIRUB SERPL-MCNC: 0.6 MG/DL — SIGNIFICANT CHANGE UP (ref 0.2–1.2)
BILIRUB UR-MCNC: NEGATIVE — SIGNIFICANT CHANGE UP
BUN SERPL-MCNC: 14 MG/DL — SIGNIFICANT CHANGE UP (ref 7–23)
CA-I SERPL-SCNC: 1.13 MMOL/L — SIGNIFICANT CHANGE UP (ref 1.12–1.3)
CALCIUM SERPL-MCNC: 9 MG/DL — SIGNIFICANT CHANGE UP (ref 8.4–10.5)
CHLORIDE BLDV-SCNC: 104 MMOL/L — SIGNIFICANT CHANGE UP (ref 96–108)
CHLORIDE SERPL-SCNC: 98 MMOL/L — SIGNIFICANT CHANGE UP (ref 96–108)
CO2 BLDV-SCNC: 29 MMOL/L — SIGNIFICANT CHANGE UP (ref 22–30)
CO2 SERPL-SCNC: 25 MMOL/L — SIGNIFICANT CHANGE UP (ref 22–31)
COLOR SPEC: SIGNIFICANT CHANGE UP
COMMENT - URINE: SIGNIFICANT CHANGE UP
CREAT SERPL-MCNC: 1.08 MG/DL — SIGNIFICANT CHANGE UP (ref 0.5–1.3)
DIFF PNL FLD: ABNORMAL
E COLI DNA BLD POS QL NAA+NON-PROBE: SIGNIFICANT CHANGE UP
EOSINOPHIL # BLD AUTO: 0 K/UL — SIGNIFICANT CHANGE UP (ref 0–0.5)
EOSINOPHIL NFR BLD AUTO: 0.3 % — SIGNIFICANT CHANGE UP (ref 0–6)
GAS PNL BLDV: 138 MMOL/L — SIGNIFICANT CHANGE UP (ref 136–145)
GAS PNL BLDV: SIGNIFICANT CHANGE UP
GAS PNL BLDV: SIGNIFICANT CHANGE UP
GLUCOSE BLDV-MCNC: 134 MG/DL — HIGH (ref 70–99)
GLUCOSE SERPL-MCNC: 154 MG/DL — HIGH (ref 70–99)
GLUCOSE UR QL: NEGATIVE — SIGNIFICANT CHANGE UP
GRAM STN FLD: SIGNIFICANT CHANGE UP
HCO3 BLDV-SCNC: 28 MMOL/L — SIGNIFICANT CHANGE UP (ref 21–29)
HCT VFR BLD CALC: 37.7 % — LOW (ref 39–50)
HCT VFR BLDA CALC: 38 % — LOW (ref 39–50)
HGB BLD CALC-MCNC: 12.5 G/DL — LOW (ref 13–17)
HGB BLD-MCNC: 13.2 G/DL — SIGNIFICANT CHANGE UP (ref 13–17)
KETONES UR-MCNC: NEGATIVE — SIGNIFICANT CHANGE UP
LACTATE BLDV-MCNC: 2.5 MMOL/L — HIGH (ref 0.7–2)
LEUKOCYTE ESTERASE UR-ACNC: ABNORMAL
LYMPHOCYTES # BLD AUTO: 1.7 K/UL — SIGNIFICANT CHANGE UP (ref 1–3.3)
LYMPHOCYTES # BLD AUTO: 13.5 % — SIGNIFICANT CHANGE UP (ref 13–44)
MCHC RBC-ENTMCNC: 33.5 PG — SIGNIFICANT CHANGE UP (ref 27–34)
MCHC RBC-ENTMCNC: 35.1 GM/DL — SIGNIFICANT CHANGE UP (ref 32–36)
MCV RBC AUTO: 95.3 FL — SIGNIFICANT CHANGE UP (ref 80–100)
METHOD TYPE: SIGNIFICANT CHANGE UP
MONOCYTES # BLD AUTO: 1.3 K/UL — HIGH (ref 0–0.9)
MONOCYTES NFR BLD AUTO: 10.3 % — SIGNIFICANT CHANGE UP (ref 2–14)
NEUTROPHILS # BLD AUTO: 9.3 K/UL — HIGH (ref 1.8–7.4)
NEUTROPHILS NFR BLD AUTO: 75.8 % — SIGNIFICANT CHANGE UP (ref 43–77)
NITRITE UR-MCNC: POSITIVE
PCO2 BLDV: 46 MMHG — SIGNIFICANT CHANGE UP (ref 35–50)
PH BLDV: 7.4 — SIGNIFICANT CHANGE UP (ref 7.35–7.45)
PH UR: 6.5 — SIGNIFICANT CHANGE UP (ref 5–8)
PLATELET # BLD AUTO: 179 K/UL — SIGNIFICANT CHANGE UP (ref 150–400)
PO2 BLDV: 33 MMHG — SIGNIFICANT CHANGE UP (ref 25–45)
POTASSIUM BLDV-SCNC: 3.9 MMOL/L — SIGNIFICANT CHANGE UP (ref 3.5–5.3)
POTASSIUM SERPL-MCNC: 5.3 MMOL/L — SIGNIFICANT CHANGE UP (ref 3.5–5.3)
POTASSIUM SERPL-SCNC: 5.3 MMOL/L — SIGNIFICANT CHANGE UP (ref 3.5–5.3)
PROT SERPL-MCNC: 8.9 G/DL — HIGH (ref 6–8.3)
PROT UR-MCNC: 30 MG/DL
RAPID RVP RESULT: SIGNIFICANT CHANGE UP
RBC # BLD: 3.96 M/UL — LOW (ref 4.2–5.8)
RBC # FLD: 12.1 % — SIGNIFICANT CHANGE UP (ref 10.3–14.5)
RBC CASTS # UR COMP ASSIST: SIGNIFICANT CHANGE UP /HPF (ref 0–2)
SAO2 % BLDV: 60 % — LOW (ref 67–88)
SODIUM SERPL-SCNC: 138 MMOL/L — SIGNIFICANT CHANGE UP (ref 135–145)
SP GR SPEC: 1.01 — LOW (ref 1.01–1.02)
SPECIMEN SOURCE: SIGNIFICANT CHANGE UP
UROBILINOGEN FLD QL: NEGATIVE — SIGNIFICANT CHANGE UP
WBC # BLD: 12.3 K/UL — HIGH (ref 3.8–10.5)
WBC # FLD AUTO: 12.3 K/UL — HIGH (ref 3.8–10.5)
WBC UR QL: >50 /HPF (ref 0–5)

## 2018-07-15 PROCEDURE — 93010 ELECTROCARDIOGRAM REPORT: CPT

## 2018-07-15 PROCEDURE — 71250 CT THORAX DX C-: CPT | Mod: 26

## 2018-07-15 PROCEDURE — 99223 1ST HOSP IP/OBS HIGH 75: CPT | Mod: GC

## 2018-07-15 PROCEDURE — 99285 EMERGENCY DEPT VISIT HI MDM: CPT | Mod: 25,GC

## 2018-07-15 PROCEDURE — 71045 X-RAY EXAM CHEST 1 VIEW: CPT | Mod: 26

## 2018-07-15 RX ORDER — ACETAMINOPHEN 500 MG
1000 TABLET ORAL ONCE
Qty: 0 | Refills: 0 | Status: COMPLETED | OUTPATIENT
Start: 2018-07-15 | End: 2018-07-15

## 2018-07-15 RX ORDER — AZITHROMYCIN 500 MG/1
500 TABLET, FILM COATED ORAL EVERY 24 HOURS
Qty: 0 | Refills: 0 | Status: DISCONTINUED | OUTPATIENT
Start: 2018-07-15 | End: 2018-07-15

## 2018-07-15 RX ORDER — HEPARIN SODIUM 5000 [USP'U]/ML
5000 INJECTION INTRAVENOUS; SUBCUTANEOUS EVERY 8 HOURS
Qty: 0 | Refills: 0 | Status: DISCONTINUED | OUTPATIENT
Start: 2018-07-15 | End: 2018-07-20

## 2018-07-15 RX ORDER — DONEPEZIL HYDROCHLORIDE 10 MG/1
15 TABLET, FILM COATED ORAL
Qty: 0 | Refills: 0 | COMMUNITY

## 2018-07-15 RX ORDER — DARUNAVIR 75 MG/1
75 TABLET, FILM COATED ORAL DAILY
Qty: 0 | Refills: 0 | Status: DISCONTINUED | OUTPATIENT
Start: 2018-07-15 | End: 2018-07-15

## 2018-07-15 RX ORDER — TAMSULOSIN HYDROCHLORIDE 0.4 MG/1
0.4 CAPSULE ORAL AT BEDTIME
Qty: 0 | Refills: 0 | Status: DISCONTINUED | OUTPATIENT
Start: 2018-07-15 | End: 2018-07-15

## 2018-07-15 RX ORDER — FINASTERIDE 5 MG/1
5 TABLET, FILM COATED ORAL DAILY
Qty: 0 | Refills: 0 | Status: DISCONTINUED | OUTPATIENT
Start: 2018-07-15 | End: 2018-07-20

## 2018-07-15 RX ORDER — ATORVASTATIN CALCIUM 80 MG/1
10 TABLET, FILM COATED ORAL AT BEDTIME
Qty: 0 | Refills: 0 | Status: DISCONTINUED | OUTPATIENT
Start: 2018-07-15 | End: 2018-07-20

## 2018-07-15 RX ORDER — CEFTRIAXONE 500 MG/1
1 INJECTION, POWDER, FOR SOLUTION INTRAMUSCULAR; INTRAVENOUS ONCE
Qty: 0 | Refills: 0 | Status: COMPLETED | OUTPATIENT
Start: 2018-07-15 | End: 2018-07-15

## 2018-07-15 RX ORDER — EMTRICITABINE AND TENOFOVIR DISOPROXIL FUMARATE 200; 300 MG/1; MG/1
1 TABLET, FILM COATED ORAL DAILY
Qty: 0 | Refills: 0 | Status: DISCONTINUED | OUTPATIENT
Start: 2018-07-15 | End: 2018-07-20

## 2018-07-15 RX ORDER — ACETAMINOPHEN 500 MG
650 TABLET ORAL EVERY 6 HOURS
Qty: 0 | Refills: 0 | Status: DISCONTINUED | OUTPATIENT
Start: 2018-07-15 | End: 2018-07-15

## 2018-07-15 RX ORDER — DARUNAVIR 75 MG/1
800 TABLET, FILM COATED ORAL DAILY
Qty: 0 | Refills: 0 | Status: DISCONTINUED | OUTPATIENT
Start: 2018-07-15 | End: 2018-07-20

## 2018-07-15 RX ORDER — RITONAVIR 100 MG/1
100 TABLET, FILM COATED ORAL DAILY
Qty: 0 | Refills: 0 | Status: DISCONTINUED | OUTPATIENT
Start: 2018-07-15 | End: 2018-07-20

## 2018-07-15 RX ORDER — AZITHROMYCIN 500 MG/1
500 TABLET, FILM COATED ORAL ONCE
Qty: 0 | Refills: 0 | Status: COMPLETED | OUTPATIENT
Start: 2018-07-15 | End: 2018-07-15

## 2018-07-15 RX ORDER — ACETAMINOPHEN 500 MG
650 TABLET ORAL EVERY 6 HOURS
Qty: 0 | Refills: 0 | Status: DISCONTINUED | OUTPATIENT
Start: 2018-07-15 | End: 2018-07-20

## 2018-07-15 RX ORDER — SODIUM CHLORIDE 9 MG/ML
1000 INJECTION INTRAMUSCULAR; INTRAVENOUS; SUBCUTANEOUS
Qty: 0 | Refills: 0 | Status: DISCONTINUED | OUTPATIENT
Start: 2018-07-15 | End: 2018-07-20

## 2018-07-15 RX ORDER — OLANZAPINE 15 MG/1
2.5 TABLET, FILM COATED ORAL DAILY
Qty: 0 | Refills: 0 | Status: DISCONTINUED | OUTPATIENT
Start: 2018-07-15 | End: 2018-07-20

## 2018-07-15 RX ORDER — DONEPEZIL HYDROCHLORIDE 10 MG/1
10 TABLET, FILM COATED ORAL AT BEDTIME
Qty: 0 | Refills: 0 | Status: DISCONTINUED | OUTPATIENT
Start: 2018-07-15 | End: 2018-07-20

## 2018-07-15 RX ORDER — TERAZOSIN HYDROCHLORIDE 10 MG/1
2 CAPSULE ORAL AT BEDTIME
Qty: 0 | Refills: 0 | Status: DISCONTINUED | OUTPATIENT
Start: 2018-07-15 | End: 2018-07-20

## 2018-07-15 RX ORDER — CEFTRIAXONE 500 MG/1
1 INJECTION, POWDER, FOR SOLUTION INTRAMUSCULAR; INTRAVENOUS EVERY 24 HOURS
Qty: 0 | Refills: 0 | Status: DISCONTINUED | OUTPATIENT
Start: 2018-07-16 | End: 2018-07-16

## 2018-07-15 RX ADMIN — FINASTERIDE 5 MILLIGRAM(S): 5 TABLET, FILM COATED ORAL at 17:49

## 2018-07-15 RX ADMIN — Medication 400 MILLIGRAM(S): at 01:51

## 2018-07-15 RX ADMIN — Medication 650 MILLIGRAM(S): at 17:49

## 2018-07-15 RX ADMIN — TERAZOSIN HYDROCHLORIDE 2 MILLIGRAM(S): 10 CAPSULE ORAL at 22:19

## 2018-07-15 RX ADMIN — AZITHROMYCIN 250 MILLIGRAM(S): 500 TABLET, FILM COATED ORAL at 05:15

## 2018-07-15 RX ADMIN — CEFTRIAXONE 100 GRAM(S): 500 INJECTION, POWDER, FOR SOLUTION INTRAMUSCULAR; INTRAVENOUS at 04:00

## 2018-07-15 RX ADMIN — ATORVASTATIN CALCIUM 10 MILLIGRAM(S): 80 TABLET, FILM COATED ORAL at 22:19

## 2018-07-15 RX ADMIN — DARUNAVIR 800 MILLIGRAM(S): 75 TABLET, FILM COATED ORAL at 17:56

## 2018-07-15 RX ADMIN — HEPARIN SODIUM 5000 UNIT(S): 5000 INJECTION INTRAVENOUS; SUBCUTANEOUS at 13:14

## 2018-07-15 RX ADMIN — DONEPEZIL HYDROCHLORIDE 10 MILLIGRAM(S): 10 TABLET, FILM COATED ORAL at 22:19

## 2018-07-15 RX ADMIN — EMTRICITABINE AND TENOFOVIR DISOPROXIL FUMARATE 1 TABLET(S): 200; 300 TABLET, FILM COATED ORAL at 17:49

## 2018-07-15 RX ADMIN — RITONAVIR 100 MILLIGRAM(S): 100 TABLET, FILM COATED ORAL at 17:49

## 2018-07-15 RX ADMIN — SODIUM CHLORIDE 75 MILLILITER(S): 9 INJECTION INTRAMUSCULAR; INTRAVENOUS; SUBCUTANEOUS at 13:15

## 2018-07-15 RX ADMIN — Medication 650 MILLIGRAM(S): at 10:25

## 2018-07-15 RX ADMIN — HEPARIN SODIUM 5000 UNIT(S): 5000 INJECTION INTRAVENOUS; SUBCUTANEOUS at 22:19

## 2018-07-15 NOTE — H&P ADULT - PROBLEM SELECTOR PLAN 3
-As per wife, patient diagnosed in 2001, and for the last 2-3 years has been compliant w/ meds as she gives him the medications  -followed by Dr. Liz outpatient   -will continue w/ truvada, ritonavir, and darunavir  -will call ID in the AM  -obtain CD4 count in the AM

## 2018-07-15 NOTE — ED ADULT NURSE NOTE - OBJECTIVE STATEMENT
77 year old male presents to the ED with c/o fever x 3 days. Pt wife at bedside, stating pt is primarily nonverbal at baseline with hx of dementia and parkinsons. Pt opens eyes in response to his name being called, and cooperative with care. Pt very stiff at this time, family says this is baseline as well. Pt wife is primary caregiver, and states that he has had a fever for 3 days, tmax of 101.9F oral. Temp is ED 103F orally. Pt has no nonverbal displays of pain or discomfort at this time. Family at bedside, comfort and safety maintained.

## 2018-07-15 NOTE — H&P ADULT - PROBLEM SELECTOR PLAN 1
-Patient admitted w/ fevers (103), tachycardia, w/ noted LE/nitrites on U/A, and leukocytosis concerning for sepsis 2/2 UTI  -patient s/p ceftriaxone, will continue ceftriaxone  -will give IVF, and f/u blood cx, urine culture  -will obtain bladder scan to r/o retention, and bladder/kidney u/s to determine for structural abnormality  -will call urology in the AM, as patient actively follow   -no noted clinical symptoms of pneumonia, will obtain CT chest non-contrast as per pulm, and continue to monitor. -Patient admitted w/ fevers (103), tachycardia, w/ noted LE/nitrites on U/A, and leukocytosis concerning for sepsis 2/2 UTI  -patient s/p ceftriaxone, will continue ceftriaxone  -will give IVF, and f/u blood cx, urine culture. Trend fever curve, tylenol prn for fever  -will obtain bladder scan to r/o retention, and bladder/kidney u/s to determine for structural abnormality  -will call patient's urologist office in the AM, as patient actively follows  -no noted clinical symptoms of pneumonia, will obtain CT chest non-contrast as per pulm, and continue to monitor.

## 2018-07-15 NOTE — H&P ADULT - HISTORY OF PRESENT ILLNESS
History obtained by wife at bedside as patient non-verbal at baseline.     77y..o Male w/ HIV c/b HIV related Dementia, HTN, BPH, presenting w/ c/o of fevers 101.9 (max at home) and lethargy. Patient has been having fevers x 4 days, w/ associated symptoms of chills, lethargy, inability to walk, and decreased appetite. Wife denies patient coughing, rhinorrhea, sputum production. She gave him tylenol with no relief of fevers. On 7/12, patient went to urologist's office and had bladder scan preformed, and was told he needs a catheter. Wife was given catheters to take home, but did not know how to help  use it.  Normally, patient is non-verbal at baseline due to his dementia, states "yes" occasionally. Is able to ambulate independently and if food is placed in front of him, able to eat w/ no issues (coughing/choking). Has HHA that is there for 6 hrs/day, five days a week, and helps him with showers, going to the bathroom. Wife has not noted in changes in urine. No recent sick contacts. Wife states last ID appt was 5/16th and was told that patient undetected viral load.    ED vitals:  temp 100 (max 103), , /69, RR 20, 96% RA  In the ED: given ceftriaxone, azithromycin, and tylenol  Labs Pertinent for: WBC 12, CK 1021, lactate 2.5, U/A: +LE/nitrites History obtained by wife at bedside as patient non-verbal at baseline.     77y.o Male w/ HIV c/b HIV related Dementia, HTN, BPH, presenting w/ c/o of fevers 101.9 (max at home) and lethargy. Patient has been having fevers x 4 days, w/ associated symptoms of chills, lethargy, inability to walk, and decreased appetite. Wife denies patient coughing, rhinorrhea, sputum production. She gave him tylenol with no relief of fevers. On 7/12, patient went to urologist's office and had bladder scan preformed, and was told he needs a catheter. Wife was given catheters to take home, but did not know how to help  use it.  Normally, patient is non-verbal at baseline due to his dementia, states "yes" occasionally. Is able to ambulate independently and if food is placed in front of him, able to eat w/ no issues (coughing/choking). Has HHA that is there for 6 hrs/day, five days a week, and helps him with showers, going to the bathroom. Wife has not noted in changes in urine. No recent sick contacts. Wife states last ID appt was 5/16th and was told that patient undetected viral load.    ED vitals:  temp 100 (max 103), , /69, RR 20, 96% RA  In the ED: given ceftriaxone, azithromycin, and tylenol  Labs Pertinent for: WBC 12, CK 1021, lactate 2.5, U/A: +LE/nitrites

## 2018-07-15 NOTE — ED PROVIDER NOTE - ATTENDING CONTRIBUTION TO CARE
77 yom pmhx hiv compliant w medications, severe parkinsons dementia (does walk w assistance, minimally verbal), hld, htn, bph, presents w fever x 3 days. family states pt has been much weakner and has not been able to get out of bed. no specific cough or uri sxs. tylenol not helping w fever. pt has hx of uti in past - family took pt to urologist office for eval and was given straight cath supplies in order to obtain a urine sample, however family did not know how to use it so took pt to ED for further eval. pt is followed closely by ID however family does not know exact cd4 count at this time.     ROS: unobtainable due to dementia    Physical Exam:   constitutional - chronically ill appearing, awake and alert, oriented x1, does say name but does not follow commands, appears lethargic, + chills/ shaking in ED.   head - no external evidence of trauma  cvs - rrr, no murmurs, no peripheral edema  resp - crackles bilat bases   gi - abdomen soft and nontender, no rigidity, guarding or rebound, bowel sounds present  msk - some cogwheel rigidity in all ext. which family states is baseline   neuro - alert and oriented x1, no focal deficits, CNs 2-12 grossly intact, rigidity throughout. some cogwheel rigidity of neck which family also states is his baseline   skin- no jaundice, warm and dry  psych - unable to assess    pt w likely urosepsis given hx. broad spec abx initiated after labs/ cx drawn. found to have likely pna and uti as cause for pt's fever/ weakness. dx of meningitis was considered however pt's rigidity appears to be baseline and pt has two alternate causes for current fever. rectal exam performed by resident w/o purported prostatic tenderness/ bogginess to suggest prostatitits as cause. will admit for ongoing eval for uti/ pna in immunocompromised pt. SUJATA Kearney MD

## 2018-07-15 NOTE — ED PROVIDER NOTE - OBJECTIVE STATEMENT
77M, hx HIV dementia, parkinsons, HLD HTN BPH presents with fevers x 3 days per family. Per family, patient normally A+O to name only, minimally verbal. Family states patient has had fevers and chills for 3 days, Tmax at home of 101.9. No reports of vomiting, diarrhea, cough. patient would be unable to express if any pain or other symptoms, as per family. No allergies. Meds include donepazil, olanzapine, terazosin, prezista, statin, norvir, finasteride, truvada, amlodipine  PCP Dr Gonzales

## 2018-07-15 NOTE — H&P ADULT - NSHPPHYSICALEXAM_GEN_ALL_CORE
Vital Signs Last 24 Hrs  T(C): 38.4 (15 Jul 2018 10:12), Max: 39.4 (15 Jul 2018 01:49)  T(F): 101.2 (15 Jul 2018 10:12), Max: 103 (15 Jul 2018 01:49)  HR: 95 (15 Jul 2018 10:12) (65 - 108)  BP: 127/67 (15 Jul 2018 10:12) (109/65 - 133/70)  BP(mean): --  RR: 18 (15 Jul 2018 10:12) (16 - 20)  SpO2: 96% (15 Jul 2018 10:12) (96% - 96%)  PHYSICAL EXAM:  GENERAL: NAD, well-groomed, well-developed  HEAD:  Atraumatic, Normocephalic  EYES: EOMI, PERRLA, conjunctiva and sclera clear  ENMT: Moist mucous membranes  NECK: Supple  CHEST/LUNG: Clear to percussion bilaterally; No rales, rhonchi, wheezing, or rubs  HEART: Regular rate and rhythm; No murmurs, rubs, or gallops  ABDOMEN: Soft, Nontender, Nondistended; Bowel sounds present  EXTREMITIES:  2+ Peripheral Pulses, No clubbing, cyanosis, or edema  LYMPH: No lymphadenopathy noted  SKIN: No rashes or lesions  NERVOUS SYSTEM:  Alert & Oriented X0

## 2018-07-15 NOTE — CONSULT NOTE ADULT - SUBJECTIVE AND OBJECTIVE BOX
PULM/MED CONSULT NOTE:    HPI:      MEDICATIONS  (STANDING):    MEDICATIONS  (PRN):      Allergies    No Known Allergies    Intolerances        PAST MEDICAL & SURGICAL HISTORY:  Alzheimer's disease of other onset  HTN (hypertension)  HIV (human immunodeficiency virus infection)  No significant past surgical history      FAMILY HISTORY:  No pertinent family history in first degree relatives      SOCIAL HISTORY  Smoking History:   Alcohol:  Drugs:  Occupation:    REVIEW OF SYSTEMS:    CONSTITUTIONAL: No weakness, fevers or chills  EYES/ENT: No visual changes;  No vertigo or throat pain   NECK: No pain or stiffness  RESPIRATORY: No cough, wheezing, hemoptysis; No shortness of breath  CARDIOVASCULAR: No chest pain or palpitations  GASTROINTESTINAL: No abdominal or epigastric pain. No nausea, vomiting, or hematemesis; No diarrhea or constipation. No melena or hematochezia.  GENITOURINARY: No dysuria, frequency or hematuria  NEUROLOGICAL: No numbness or weakness  SKIN: No itching, burning, rashes, or lesions   All other review of systems is negative unless indicated above.    PHYSICAL EXAM:  Vital Signs Last 24 Hrs  T(C): 37.6 (15 Jul 2018 06:56), Max: 39.4 (15 Jul 2018 01:49)  T(F): 99.7 (15 Jul 2018 06:56), Max: 103 (15 Jul 2018 01:49)  HR: 79 (15 Jul 2018 06:56) (65 - 108)  BP: 133/70 (15 Jul 2018 06:56) (109/65 - 133/70)  BP(mean): --  RR: 16 (15 Jul 2018 06:56) (16 - 20)  SpO2: 96% (15 Jul 2018 06:56) (96% - 96%)  GENERAL: NAD, well-groomed, well-developed  HEAD:  Atraumatic, Normocephalic  EYES: EOMI, PERRLA, conjunctiva and sclera clear  ENMT: No tonsillar erythema, exudates, or enlargement; Moist mucous membranes, Good dentition, No lesions  NECK: Supple, No JVD, Normal thyroid  NERVOUS SYSTEM:  Alert & Oriented X3, Good concentration; Motor Strength 5/5 B/L upper and lower extremities; DTRs 2+ intact and symmetric  CHEST/LUNG: Clear to percussion bilaterally; No rales, rhonchi, wheezing, or rubs  HEART: Regular rate and rhythm; No murmurs, rubs, or gallops  ABDOMEN: Soft, Nontender, Nondistended; Bowel sounds present  EXTREMITIES:  2+ Peripheral Pulses, No clubbing, cyanosis, or edema  LYMPH: No lymphadenopathy noted  SKIN: No rashes or lesions      LABS:                        13.2   12.3  )-----------( 179      ( 15 Jul 2018 02:22 )             37.7     07-15    138  |  98  |  14  ----------------------------<  154<H>  5.3   |  25  |  1.08    Ca    9.0      15 Jul 2018 02:22    TPro  8.9<H>  /  Alb  3.7  /  TBili  0.6  /  DBili  x   /  AST  69<H>  /  ALT  23  /  AlkPhos  216<H>  07-15      Urinalysis Basic - ( 15 Jul 2018 03:28 )    Color: PL Yellow / Appearance: SL Turbid / S.009 / pH: x  Gluc: x / Ketone: Negative  / Bili: Negative / Urobili: Negative   Blood: x / Protein: 30 mg/dL / Nitrite: Positive   Leuk Esterase: Large / RBC: 3-5 /HPF / WBC >50 /HPF   Sq Epi: x / Non Sq Epi: x / Bacteria: Moderate /HPF        CARDIAC MARKERS ( 15 Jul 2018 02:22 )  x     / x     / 1021 U/L / x     / x                      Microbiology      RADIOLOGY & ADDITIONAL STUDIES:    Xray:    CTScan:    Echo:    Assessment:    Plan: PULM/MED CONSULT NOTE:    76 yo BM nonsmoker with dementia, HIV +, htn, HLD, hx of pulmonary nodules, hx of pneumonia 10/2017,  admitted with fever since .  Wife stated there was no change in his mental status.  He continued to have good po intake.  No coughing or wheezing.   He apparently saw a urologist on  who told her his bladder "was not fully emptying".  The patient is followed by ID and neuro.    Remainder of hx from the patient's chart:    HPI:      MEDICATIONS  (STANDING):  heparin  Injectable 5000 Unit(s) SubCutaneous every 8 hours    MEDICATIONS  (PRN):  acetaminophen   Tablet 650 milliGRAM(s) Oral every 6 hours PRN For Temp greater than 38 C (100.4 F)  acetaminophen  Suppository 650 milliGRAM(s) Rectal every 6 hours PRN For Temp greater than 38 C (100.4 F)    Home meds:    amlodipine 5 mg  atorvastatin 10 mg  Prezista 800 mg  Donepezil 15 mg  Truvada 200-300  finasteride 5 mg  Zyprexa 2.5 mg  Ritonavir (Norvir) 100 mg  terazosin 2 mg            PAST MEDICAL & SURGICAL HISTORY:  Alzheimer's disease of other onset/dementia (?HIV)  HTN (hypertension)  HIV (human immunodeficiency virus infection)  No significant past surgical history      FAMILY HISTORY:  No pertinent family history in first degree relatives      SOCIAL HISTORY  Smoking History: nonsmoker        PHYSICAL EXAM:  Vital Signs Last 24 Hrs  T(C): 38.4 (15 Jul 2018 10:12), Max: 39.4 (15 Jul 2018 01:49)  T(F): 101.2 (15 Jul 2018 10:12), Max: 103 (15 Jul 2018 01:49)  HR: 95 (15 Jul 2018 10:12) (65 - 108)  BP: 127/67 (15 Jul 2018 10:12) (109/65 - 133/70)  BP(mean): --  RR: 18 (15 Jul 2018 10:12) (16 - 20)  SpO2: 96% (15 Jul 2018 10:12) (96% - 96%)  GENERAL: NAD, wife and dtr at bedside  HEAD:  Atraumatic,   NECK: Supple,  NERVOUS SYSTEM:  Disoriented; doesn't respond to simple commands; moves all extremities  CHEST/LUNG: bilateral breath sounds;  No rales,   HEART: Regular rate  ABDOMEN: Soft, Nontender,   EXTREMITIES:  No  edema      LABS:                        13.2   12.3  )-----------( 179      ( 15 Jul 2018 02:22 )             37.7     07-15    138  |  98  |  14  ----------------------------<  154<H>  5.3   |  25  |  1.08    Ca    9.0      15 Jul 2018 02:22    TPro  8.9<H>  /  Alb  3.7  /  TBili  0.6  /  DBili  x   /  AST  69<H>  /  ALT  23  /  AlkPhos  216<H>  07-15      Urinalysis Basic - ( 15 Jul 2018 03:28 )    Color: PL Yellow / Appearance: SL Turbid / S.009 / pH: x  Gluc: x / Ketone: Negative  / Bili: Negative / Urobili: Negative   Blood: x / Protein: 30 mg/dL / Nitrite: Positive   Leuk Esterase: Large / RBC: 3-5 /HPF / WBC >50 /HPF   Sq Epi: x / Non Sq Epi: x / Bacteria: Moderate /HPF        CARDIAC MARKERS ( 15 Jul 2018 02:22 )  x     / x     / 1021 U/L / x     / x          RADIOLOGY & ADDITIONAL STUDIES:    Xray:      EXAM:  XR CHEST PORTABLE URGENT 1V                            PROCEDURE DATE:  07/15/2018            INTERPRETATION:  EXAMINATION: XR CHEST PORTABLE URGENT 1V    CLINICAL INDICATION: fever    TECHNIQUE: Single portable view of the chest was obtained.    COMPARISON: 2/3/2018.    FINDINGS:     The cardiomediastinal silhouette is stable. There is no pneumothorax.   There is increased density in the right lower lung.    IMPRESSION:   Increased density in the right lower lung which, in the appropriate   clinical setting, can represent pneumonia. Chest x-ray follow-up to   resolution is advised.                PADMINI CLARK M.D., RADIOLOGY RESIDENT  This document has been electronically signed.  TRINI RACHEL M.D., ATTENDING RADIOLOGIST  This document has been electronically signed. Jul 15 2018  9:27AM

## 2018-07-15 NOTE — H&P ADULT - NSHPSOCIALHISTORY_GEN_ALL_CORE
Social History:    Marital Status:  (X)    (   ) Single    (   )    (  )   Occupation: retired   Lives with: (  ) alone  (  ) children   (X) spouse   (  ) parents  (  ) other    Substance Use (street drugs): ( X) never used  (  ) other:

## 2018-07-15 NOTE — ED PROVIDER NOTE - MEDICAL DECISION MAKING DETAILS
77M, hx HIV dementia, parkinsons, HLD HTN BPH presents with fevers x 3 days per family. Unknown source of infxn currently. Not septic per criteria. Will obtain labs, fluids, tylenol. Assess need for further workup. Currently stable, no acute distress. Will continue to follow up and re-assess. Case discussed with Attending: Caio Rose MD, PGY2

## 2018-07-15 NOTE — H&P ADULT - PROBLEM SELECTOR PLAN 4
-baseline mental status for patient: minimally verbal, only states "yes", able to ambulate independently prior to the fevers, able to eat regular diet  -followed by Dr. ta Martin  -c/w donepezil 10mgqd  -c/w olanzapine 2.5mg qd, as per wife, has not had behavioral disturbances for 6 months  -aspiration precautions

## 2018-07-15 NOTE — ED PROVIDER NOTE - PHYSICAL EXAMINATION
patient awake, not alert  A+O x0  Generalized stiffness/rigidity, per family which is baseline (neck rigidity, rigidity to upper extremities)  Unable to perform neuro exam  No response to pain from abd palpation  Lungs with poor inspiratory effort patient awake, not alert  A+O x0  Generalized stiffness/rigidity, per family which is baseline (neck rigidity, rigidity to upper extremities)  Unable to perform neuro exam  No response to pain from abd palpation  Lungs with poor inspiratory effort    Rectal exam: no blood noted. no enlarged/boggy prostate noted on my exam  Aliza Bourgeois RN Chaperone for rectal exam

## 2018-07-15 NOTE — H&P ADULT - NSHPOUTPATIENTPROVIDERS_GEN_ALL_CORE
PCP/Pulm: Dr. Gonzales  ID: Dr. Liz  Urology: Dr. Ayers PCP/Pulm: Dr. Gonzales  ID: Dr. Liz  Urology: Dr. Ayers  Neurologist: Dr. Curt Martin

## 2018-07-15 NOTE — H&P ADULT - NSHPLABSRESULTS_GEN_ALL_CORE
Personally reviewed labs, imaging, ekg, by me  07-15    138  |  98  |  14  ----------------------------<  154<H>  5.3   |  25  |  1.08    Ca    9.0      15 Jul 2018 02:22    TPro  8.9<H>  /  Alb  3.7  /  TBili  0.6  /  DBili  x   /  AST  69<H>  /  ALT  23  /  AlkPhos  216<H>  07-15                    Urinalysis Basic - ( 15 Jul 2018 03:28 )    Color: PL Yellow / Appearance: SL Turbid / S.009 / pH: x  Gluc: x / Ketone: Negative  / Bili: Negative / Urobili: Negative   Blood: x / Protein: 30 mg/dL / Nitrite: Positive   Leuk Esterase: Large / RBC: 3-5 /HPF / WBC >50 /HPF   Sq Epi: x / Non Sq Epi: x / Bacteria: Moderate /HPF                              13.2   12.3  )-----------( 179      ( 15 Jul 2018 02:22 )             37.7     CAPILLARY BLOOD GLUCOSE        Blood Gas Source Venous: Venous (07-15 @ 02:52)  < from: Xray Chest 1 View- PORTABLE-Urgent (07.15.18 @ 02:46) >    IMPRESSION:   Increased density in the right lower lung which, in the appropriate   clinical setting, can represent pneumonia. Chest x-ray follow-up to   resolution is advised.    < end of copied text >

## 2018-07-15 NOTE — H&P ADULT - ASSESSMENT
77y..o Male w/ HIV c/b HIV related Dementia, HTN, BPH, presenting w/ c/o of fevers and lethargy x 4 days, admitted w/ sepsis 2/2 UTI.

## 2018-07-16 LAB
ALBUMIN SERPL ELPH-MCNC: 2.9 G/DL — LOW (ref 3.3–5)
ALP SERPL-CCNC: 174 U/L — HIGH (ref 40–120)
ALT FLD-CCNC: 23 U/L — SIGNIFICANT CHANGE UP (ref 10–45)
ANION GAP SERPL CALC-SCNC: 14 MMOL/L — SIGNIFICANT CHANGE UP (ref 5–17)
AST SERPL-CCNC: 56 U/L — HIGH (ref 10–40)
BILIRUB SERPL-MCNC: 0.3 MG/DL — SIGNIFICANT CHANGE UP (ref 0.2–1.2)
BUN SERPL-MCNC: 14 MG/DL — SIGNIFICANT CHANGE UP (ref 7–23)
CALCIUM SERPL-MCNC: 8.7 MG/DL — SIGNIFICANT CHANGE UP (ref 8.4–10.5)
CHLORIDE SERPL-SCNC: 104 MMOL/L — SIGNIFICANT CHANGE UP (ref 96–108)
CO2 SERPL-SCNC: 24 MMOL/L — SIGNIFICANT CHANGE UP (ref 22–31)
CREAT SERPL-MCNC: 0.97 MG/DL — SIGNIFICANT CHANGE UP (ref 0.5–1.3)
GAS PNL BLDV: SIGNIFICANT CHANGE UP
GLUCOSE SERPL-MCNC: 123 MG/DL — HIGH (ref 70–99)
HCT VFR BLD CALC: 31.2 % — LOW (ref 39–50)
HGB BLD-MCNC: 11.1 G/DL — LOW (ref 13–17)
MAGNESIUM SERPL-MCNC: 2.3 MG/DL — SIGNIFICANT CHANGE UP (ref 1.6–2.6)
MCHC RBC-ENTMCNC: 34.4 PG — HIGH (ref 27–34)
MCHC RBC-ENTMCNC: 35.7 GM/DL — SIGNIFICANT CHANGE UP (ref 32–36)
MCV RBC AUTO: 96.3 FL — SIGNIFICANT CHANGE UP (ref 80–100)
PHOSPHATE SERPL-MCNC: 2.4 MG/DL — LOW (ref 2.5–4.5)
PLATELET # BLD AUTO: 190 K/UL — SIGNIFICANT CHANGE UP (ref 150–400)
POTASSIUM SERPL-MCNC: 4.1 MMOL/L — SIGNIFICANT CHANGE UP (ref 3.5–5.3)
POTASSIUM SERPL-SCNC: 4.1 MMOL/L — SIGNIFICANT CHANGE UP (ref 3.5–5.3)
PROT SERPL-MCNC: 7.6 G/DL — SIGNIFICANT CHANGE UP (ref 6–8.3)
RBC # BLD: 3.24 M/UL — LOW (ref 4.2–5.8)
RBC # FLD: 12.4 % — SIGNIFICANT CHANGE UP (ref 10.3–14.5)
SODIUM SERPL-SCNC: 142 MMOL/L — SIGNIFICANT CHANGE UP (ref 135–145)
WBC # BLD: 12.2 K/UL — HIGH (ref 3.8–10.5)
WBC # FLD AUTO: 12.2 K/UL — HIGH (ref 3.8–10.5)

## 2018-07-16 PROCEDURE — 99233 SBSQ HOSP IP/OBS HIGH 50: CPT | Mod: GC

## 2018-07-16 PROCEDURE — 99223 1ST HOSP IP/OBS HIGH 75: CPT

## 2018-07-16 RX ORDER — ERTAPENEM SODIUM 1 G/1
1000 INJECTION, POWDER, LYOPHILIZED, FOR SOLUTION INTRAMUSCULAR; INTRAVENOUS EVERY 24 HOURS
Qty: 0 | Refills: 0 | Status: DISCONTINUED | OUTPATIENT
Start: 2018-07-16 | End: 2018-07-16

## 2018-07-16 RX ORDER — ERTAPENEM SODIUM 1 G/1
1000 INJECTION, POWDER, LYOPHILIZED, FOR SOLUTION INTRAMUSCULAR; INTRAVENOUS EVERY 24 HOURS
Qty: 0 | Refills: 0 | Status: DISCONTINUED | OUTPATIENT
Start: 2018-07-16 | End: 2018-07-18

## 2018-07-16 RX ORDER — SODIUM,POTASSIUM PHOSPHATES 278-250MG
1 POWDER IN PACKET (EA) ORAL
Qty: 0 | Refills: 0 | Status: COMPLETED | OUTPATIENT
Start: 2018-07-16 | End: 2018-07-16

## 2018-07-16 RX ADMIN — ATORVASTATIN CALCIUM 10 MILLIGRAM(S): 80 TABLET, FILM COATED ORAL at 21:15

## 2018-07-16 RX ADMIN — DONEPEZIL HYDROCHLORIDE 10 MILLIGRAM(S): 10 TABLET, FILM COATED ORAL at 21:15

## 2018-07-16 RX ADMIN — EMTRICITABINE AND TENOFOVIR DISOPROXIL FUMARATE 1 TABLET(S): 200; 300 TABLET, FILM COATED ORAL at 13:48

## 2018-07-16 RX ADMIN — DARUNAVIR 800 MILLIGRAM(S): 75 TABLET, FILM COATED ORAL at 13:47

## 2018-07-16 RX ADMIN — HEPARIN SODIUM 5000 UNIT(S): 5000 INJECTION INTRAVENOUS; SUBCUTANEOUS at 13:14

## 2018-07-16 RX ADMIN — Medication 1 TABLET(S): at 13:48

## 2018-07-16 RX ADMIN — TERAZOSIN HYDROCHLORIDE 2 MILLIGRAM(S): 10 CAPSULE ORAL at 21:15

## 2018-07-16 RX ADMIN — HEPARIN SODIUM 5000 UNIT(S): 5000 INJECTION INTRAVENOUS; SUBCUTANEOUS at 05:22

## 2018-07-16 RX ADMIN — ERTAPENEM SODIUM 120 MILLIGRAM(S): 1 INJECTION, POWDER, LYOPHILIZED, FOR SOLUTION INTRAMUSCULAR; INTRAVENOUS at 17:54

## 2018-07-16 RX ADMIN — Medication 1 TABLET(S): at 17:55

## 2018-07-16 RX ADMIN — FINASTERIDE 5 MILLIGRAM(S): 5 TABLET, FILM COATED ORAL at 11:25

## 2018-07-16 RX ADMIN — HEPARIN SODIUM 5000 UNIT(S): 5000 INJECTION INTRAVENOUS; SUBCUTANEOUS at 21:15

## 2018-07-16 RX ADMIN — RITONAVIR 100 MILLIGRAM(S): 100 TABLET, FILM COATED ORAL at 13:48

## 2018-07-16 RX ADMIN — OLANZAPINE 2.5 MILLIGRAM(S): 15 TABLET, FILM COATED ORAL at 11:25

## 2018-07-16 RX ADMIN — Medication 1 TABLET(S): at 11:24

## 2018-07-16 RX ADMIN — CEFTRIAXONE 100 GRAM(S): 500 INJECTION, POWDER, FOR SOLUTION INTRAMUSCULAR; INTRAVENOUS at 05:22

## 2018-07-16 NOTE — PROGRESS NOTE ADULT - ATTENDING COMMENTS
Agree.  Clinically improving and no culture data w/ ESBL + though will escalate to penem pending sensitivities coming back.   Unclear what to make of this RLL loculated effusion, no o2 requirement. BNP > 1k likely component of CHF, hold diuretics in setting of sepsis w/ no resp distress. Loculated per radiology call so we will ask pulmonology to U/S and see if they'd like to proceed w/ tap.   Otherwise clinically improving.   Await house urology input w/ concern of structural abnormality precipitating UTI.   Addressing dietary concerns of family, patient may need help eating. Agree.  Clinically improving and no culture data w/ ESBL + though will escalate to penem pending sensitivities coming back.   Unclear what to make of this RLL loculated effusion, no o2 requirement. BNP > 1k likely component of CHF, hold diuretics in setting of sepsis w/ no resp distress. Loculated per radiology call so we will ask pulmonology to U/S and see if they'd like to proceed w/ tap.   Otherwise clinically improving.   Fevers resolved as of this AM. Will cont to trend.   Await house urology input w/ concern of structural abnormality precipitating UTI.   Addressing dietary concerns of family, patient may need help eating.

## 2018-07-16 NOTE — PROGRESS NOTE ADULT - ASSESSMENT
78 yo Male w/ HIV c/b HIV related Dementia, HTN, BPH, presenting w/ c/o of fevers and lethargy x 4 days, admitted w/ sepsis 2/2 UTI. 78 yo Male w/ HIV c/b HIV related Dementia, HTN, BPH, presenting w/ c/o of fevers and lethargy x 4 days, admitted w/ sepsis 2/2 UTI. Incidentally discovered to have R sided effusion, w/ ?loculation.

## 2018-07-16 NOTE — CONSULT NOTE ADULT - SUBJECTIVE AND OBJECTIVE BOX
HPI:    77 m with HTN, BPH, HIV c/b HIV related Dementia, on truvada, norvir, prezista, last CD4: 327, and VL<30, 2018, recent fever, chills, loss of appetite followed but urology visit which recommended straight cath at home but before the family tried pt developed shaking chills, high fever and lethargy so brought to ER, pt non verbal at baseline but wife denied cough, SOB, chocking on food or diarrhea.  ED vitals:  temp 100 (max 103), , /69, RR 20, 96% RA  In the ED: given ceftriaxone, azithromycin, and tylenol  Labs Pertinent for: WBC 12, CK 1021, lactate 2.5, U/A: +LE/nitrites (15 Jul 2018 14:26)      PAST MEDICAL & SURGICAL HISTORY:  Alzheimer's disease of other onset  HTN (hypertension)  HIV (human immunodeficiency virus infection)  No significant past surgical history      Allergies    No Known Allergies    Intolerances        ANTIMICROBIALS:  cefTRIAXone   IVPB 1 every 24 hours  darunavir 800 daily  emtricitabine 200 mG/tenofovir 300 mG (TRUVADA) 1 daily  ritonavir Tablet 100 daily      OTHER MEDS:  acetaminophen   Tablet 650 milliGRAM(s) Oral every 6 hours PRN  acetaminophen   Tablet. 650 milliGRAM(s) Oral every 6 hours PRN  atorvastatin 10 milliGRAM(s) Oral at bedtime  donepezil 10 milliGRAM(s) Oral at bedtime  finasteride 5 milliGRAM(s) Oral daily  heparin  Injectable 5000 Unit(s) SubCutaneous every 8 hours  OLANZapine 2.5 milliGRAM(s) Oral daily  potassium acid phosphate/sodium acid phosphate tablet (K-PHOS No. 2) 1 Tablet(s) Oral four times a day with meals  sodium chloride 0.9%. 1000 milliLiter(s) IV Continuous <Continuous>  terazosin 2 milliGRAM(s) Oral at bedtime      SOCIAL HISTORY:  , no smoking, alcohol or drug abuse now    FAMILY HISTORY:  reviewed, No pertinent family history in first degree relatives      ROS:    All other systems negative     Constitutional: + fever, + chills, no weight loss, no night sweats  Eye: no eye pain, no redness, no vision changes  ENT:  no sore throat, no rhinorrhea  Cardiovascular:  no chest pain, no palpitation  Respiratory:  no SOB, no cough  GI:  no abd pain, no vomiting, no diarrhea  urinary: urinary retention, no hematuria, no flank pain  : no  discharge or bleeding  musculoskeletal:  no joint pain, no joint swelling  skin:  no rash  neurology:  no headache, no seizure, no change in mental status  psych: no anxiety, no depression     Physical Exam:    General:    NAD, non toxic  Head: atraumatic, normocephalic  Eyes: normal sclera and conjunctiva  ENT:   no oropharyngeal lesions, no LAD, neck supple  Cardio:    regular S1,S2, no murmur  Respiratory:   clear b/l, no wheezing  abd:   soft, BS +, not tender, no hepatosplenomegaly  :     no CVAT, no suprapubic tenderness, + nelson with yellow clear urine  Musculoskeletal : no joint swelling, no edema  Skin:    no rash  vascular: no lines, normal pulses  Neurologic:     no focal deficits  psych: normal affect, no suicidal ideation      Drug Dosing Weight  Height (cm): 182.9 (15 Jul 2018 06:57)  Weight (kg): 64.864 (15 Jul 2018 06:57)  BMI (kg/m2): 19.4 (15 Jul 2018 06:57)  BSA (m2): 1.85 (15 Jul 2018 06:57)    Vital Signs Last 24 Hrs  T(F): 98.4 (18 @ 12:20), Max: 103.1 (07-15-18 @ 17:54)    Vital Signs Last 24 Hrs  HR: 73 (18 @ 12:20) (60 - 73)  BP: 111/60 (18 @ 12:20) (111/60 - 120/74)  RR: 18 (18 @ 12:20)  SpO2: 97% (18 @ 12:20) (96% - 97%)  Wt(kg): --                          11.1   12.2  )-----------( 190      ( 2018 06:55 )             31.2       -    142  |  104  |  14  ----------------------------<  123<H>  4.1   |  24  |  0.97    Ca    8.7      2018 06:54  Phos  2.4     -  Mg     2.3     -    TPro  7.6  /  Alb  2.9<L>  /  TBili  0.3  /  DBili  x   /  AST  56<H>  /  ALT  23  /  AlkPhos  174<H>        Urinalysis Basic - ( 15 Jul 2018 03:28 )    Color: PL Yellow / Appearance: SL Turbid / S.009 / pH: x  Gluc: x / Ketone: Negative  / Bili: Negative / Urobili: Negative   Blood: x / Protein: 30 mg/dL / Nitrite: Positive   Leuk Esterase: Large / RBC: 3-5 /HPF / WBC >50 /HPF   Sq Epi: x / Non Sq Epi: x / Bacteria: Moderate /HPF        MICROBIOLOGY:  v  .Blood Blood-Peripheral  07-15-18   No growth to date.  --  Blood Culture PCR      .Urine Catheterized  07-15-18   >100,000 CFU/ml Escherichia coli  Culture in progress  --  --          Rapid RVP Result: NotDetec (07-15 @ 02:52)          RADIOLOGY:    < from: CT Chest No Cont (07.15.18 @ 22:29) >  IMPRESSION: Small to moderate-sized right pleural effusion with areas of   loculation.     Groundglass opacities within the right upper lobe may represent   asymmetric pulmonary edema versus infection.

## 2018-07-16 NOTE — PROGRESS NOTE ADULT - ASSESSMENT
CT scan as above - GGO, small b/l effusions    Urosepsis/ positive BC    continue current mgmt  await cultures  Consider urology consult?  Follow H/H

## 2018-07-16 NOTE — PROGRESS NOTE ADULT - PROBLEM SELECTOR PLAN 1
-Patient admitted w/ fevers (103), tachycardia, w/ noted LE/nitrites on U/A, and leukocytosis concerning for sepsis 2/2 UTI  -patient s/p ceftriaxone, will continue ceftriaxone until sensitivities are back  -will give IVF, and f/u blood cx, urine culture. Trend fever curve, tylenol prn for fever  -nelson placed d/t retention  -f/u bladder u/s to r/o obstruction  -will call patient's urologist office, as patient actively follows - Patient admitted w/ fevers (103), tachycardia, w/ noted LE/nitrites on U/A, and leukocytosis concerning for sepsis 2/2 UTI  - patient s/p ceftriaxone, will escalate to penem for now until cultures are back.   - will give IVF, and f/u blood cx, urine culture. Trend fever curve, tylenol prn for fever  - nelson placed d/t retention  - f/u bladder u/s to r/o obstruction  - will call house urology after d/w family.

## 2018-07-16 NOTE — PROGRESS NOTE ADULT - PROBLEM SELECTOR PLAN 2
-management as above  -f/u chest ct report -management as above  -pulm will need to evaluate effusion w/ u/s to see if amenable to tap.

## 2018-07-16 NOTE — PROGRESS NOTE ADULT - SUBJECTIVE AND OBJECTIVE BOX
Dr. Chilango Zayas PGY1 Pager [phone]      Patient is a 77y old  Male who presents with a chief complaint of Sepsis from presumed UTI (15 Jul 2018 14:26)      INTERVAL HPI/OVERNIGHT EVENTS:  Bladder scan showed pt was retaining urine. Wong placed.    Unable to obtain ROS. Pt is nonverbal.      T(C): 37.1 (18 @ 04:05), Max: 39.5 (07-15-18 @ 17:54)  HR: 72 (18 @ 04:05) (60 - 95)  BP: 115/67 (18 @ 04:05) (115/67 - 127/67)  RR: 18 (18 @ 04:05) (18 - 20)  SpO2: 96% (18 @ 04:05) (96% - 97%)  Wt(kg): --Vital Signs Last 24 Hrs  T(C): 37.1 (2018 04:05), Max: 39.5 (15 Jul 2018 17:54)  T(F): 98.7 (2018 04:05), Max: 103.1 (15 Jul 2018 17:54)  HR: 72 (2018 04:05) (60 - 95)  BP: 115/67 (2018 04:05) (115/67 - 127/67)  BP(mean): --  RR: 18 (2018 04:05) (18 - 20)  SpO2: 96% (2018 04:05) (96% - 97%)    PHYSICAL EXAM:  GENERAL: NAD  NERVOUS SYSTEM:  Alert and awake; no FND  CHEST/LUNG: Clear to auscultation anteriorly  HEART: Regular rate and rhythm; No murmurs, rubs, or gallops  ABDOMEN: Soft, Nontender, Nondistended; Bowel sounds present  EXTREMITIES:  No edema or cyanosis    Consultant(s) Notes Reviewed:  [x ] YES  [ ] NO  Care Discussed with Consultants/Other Providers [ x] YES  [ ] NO    LABS:                        11.1   12.2  )-----------( 190      ( 2018 06:55 )             31.2     07-16    142  |  104  |  14  ----------------------------<  123<H>  4.1   |  24  |  0.97    Ca    8.7      2018 06:54  Phos  2.4     -  Mg     2.3     -    TPro  7.6  /  Alb  2.9<L>  /  TBili  0.3  /  DBili  x   /  AST  56<H>  /  ALT  23  /  AlkPhos  174<H>        Urinalysis Basic - ( 15 Jul 2018 03:28 )    Color: PL Yellow / Appearance: SL Turbid / S.009 / pH: x  Gluc: x / Ketone: Negative  / Bili: Negative / Urobili: Negative   Blood: x / Protein: 30 mg/dL / Nitrite: Positive   Leuk Esterase: Large / RBC: 3-5 /HPF / WBC >50 /HPF   Sq Epi: x / Non Sq Epi: x / Bacteria: Moderate /HPF      CAPILLARY BLOOD GLUCOSE            Urinalysis Basic - ( 15 Jul 2018 03:28 )    Color: PL Yellow / Appearance: SL Turbid / S.009 / pH: x  Gluc: x / Ketone: Negative  / Bili: Negative / Urobili: Negative   Blood: x / Protein: 30 mg/dL / Nitrite: Positive   Leuk Esterase: Large / RBC: 3-5 /HPF / WBC >50 /HPF   Sq Epi: x / Non Sq Epi: x / Bacteria: Moderate /HPF        RADIOLOGY & ADDITIONAL TESTS:    Imaging Personally Reviewed:  [X ] YES  [ ] NO Dr. Chilango Zayas PGY1 Pager [phone]      Patient is a 77y old  Male who presents with a chief complaint of Sepsis from presumed UTI (15 Jul 2018 14:26)    INTERVAL HPI/OVERNIGHT EVENTS:  Bladder scan showed pt was retaining urine. Wong placed.  Unable to obtain ROS. Pt is nonverbal.      T(C): 37.1 (18 @ 04:05), Max: 39.5 (07-15-18 @ 17:54)  HR: 72 (18 @ 04:05) (60 - 95)  BP: 115/67 (18 @ 04:05) (115/67 - 127/67)  RR: 18 (18 @ 04:05) (18 - 20)  SpO2: 96% (18 @ 04:05) (96% - 97%)  Wt(kg): --Vital Signs Last 24 Hrs  T(C): 37.1 (2018 04:05), Max: 39.5 (15 Jul 2018 17:54)  T(F): 98.7 (2018 04:05), Max: 103.1 (15 Jul 2018 17:54)  HR: 72 (2018 04:05) (60 - 95)  BP: 115/67 (2018 04:05) (115/67 - 127/67)  BP(mean): --  RR: 18 (2018 04:05) (18 - 20)  SpO2: 96% (2018 04:05) (96% - 97%)    PHYSICAL EXAM:  GENERAL: NAD  NERVOUS SYSTEM:  Alert and awake; no FND  CHEST/LUNG: Clear to auscultation anteriorly  HEART: Regular rate and rhythm; No murmurs, rubs, or gallops  ABDOMEN: Soft, Nontender, Nondistended; Bowel sounds present  EXTREMITIES:  No edema or cyanosis    Consultant(s) Notes Reviewed:  [x ] YES  [ ] NO  Care Discussed with Consultants/Other Providers [ x] YES  [ ] NO    LABS:                        11.1   12.2  )-----------( 190      ( 2018 06:55 )             31.2     07-16    142  |  104  |  14  ----------------------------<  123<H>  4.1   |  24  |  0.97    Ca    8.7      2018 06:54  Phos  2.4     -  Mg     2.3     -    TPro  7.6  /  Alb  2.9<L>  /  TBili  0.3  /  DBili  x   /  AST  56<H>  /  ALT  23  /  AlkPhos  174<H>        Urinalysis Basic - ( 15 Jul 2018 03:28 )    Color: PL Yellow / Appearance: SL Turbid / S.009 / pH: x  Gluc: x / Ketone: Negative  / Bili: Negative / Urobili: Negative   Blood: x / Protein: 30 mg/dL / Nitrite: Positive   Leuk Esterase: Large / RBC: 3-5 /HPF / WBC >50 /HPF   Sq Epi: x / Non Sq Epi: x / Bacteria: Moderate /HPF      CAPILLARY BLOOD GLUCOSE    Urinalysis Basic - ( 15 Jul 2018 03:28 )    Color: PL Yellow / Appearance: SL Turbid / S.009 / pH: x  Gluc: x / Ketone: Negative  / Bili: Negative / Urobili: Negative   Blood: x / Protein: 30 mg/dL / Nitrite: Positive   Leuk Esterase: Large / RBC: 3-5 /HPF / WBC >50 /HPF   Sq Epi: x / Non Sq Epi: x / Bacteria: Moderate /HPF    RADIOLOGY & ADDITIONAL TESTS:  Imaging Personally Reviewed:  [X ] YES  [ ] NO

## 2018-07-16 NOTE — PROGRESS NOTE ADULT - SUBJECTIVE AND OBJECTIVE BOX
PULM/MED PROGRESS NOTE:  awake, appears comfortable, lying in bed; does not answer appropriately - per baseline.      MEDICATIONS  (STANDING):  atorvastatin 10 milliGRAM(s) Oral at bedtime  cefTRIAXone   IVPB 1 Gram(s) IV Intermittent every 24 hours  darunavir 800 milliGRAM(s) Oral daily  donepezil 10 milliGRAM(s) Oral at bedtime  emtricitabine 200 mG/tenofovir 300 mG (TRUVADA) 1 Tablet(s) Oral daily  finasteride 5 milliGRAM(s) Oral daily  heparin  Injectable 5000 Unit(s) SubCutaneous every 8 hours  OLANZapine 2.5 milliGRAM(s) Oral daily  potassium acid phosphate/sodium acid phosphate tablet (K-PHOS No. 2) 1 Tablet(s) Oral four times a day with meals  ritonavir Tablet 100 milliGRAM(s) Oral daily  sodium chloride 0.9%. 1000 milliLiter(s) (75 mL/Hr) IV Continuous <Continuous>  terazosin 2 milliGRAM(s) Oral at bedtime      MEDICATIONS  (PRN):  acetaminophen   Tablet 650 milliGRAM(s) Oral every 6 hours PRN For Temp greater than 38 C (100.4 F)  acetaminophen   Tablet. 650 milliGRAM(s) Oral every 6 hours PRN Moderate Pain (4 - 6)          Vital Signs Last 24 Hrs  T(C): 37.1 (2018 04:05), Max: 39.5 (15 Jul 2018 17:54)  T(F): 98.7 (2018 04:05), Max: 103.1 (15 Jul 2018 17:54)  HR: 72 (2018 04:05) (60 - 75)  BP: 115/67 (2018 04:05) (115/67 - 120/74)  BP(mean): --  RR: 18 (2018 04:05) (18 - 20)  SpO2: 96% (2018 04:05) (96% - 97%)    PHYSICAL EXAMINATION:  (exam limited - pt's inability to follow commands)          NECK: Supple.    LUNGS: limited exam; b/l breath sounds; respirations unlabored    HEART: Regular rate    ABDOMEN: Soft, nontender, BS+    EXTREMITIES: Without edema.      LABS:                        11.1   12.2  )-----------( 190      ( 2018 06:55 )             31.2     07-16    142  |  104  |  14  ----------------------------<  123<H>  4.1   |  24  |  0.97    Ca    8.7      2018 06:54  Phos  2.4       Mg     2.3         TPro  7.6  /  Alb  2.9<L>  /  TBili  0.3  /  DBili  x   /  AST  56<H>  /  ALT  23  /  AlkPhos  174<H>        Urinalysis Basic - ( 15 Jul 2018 03:28 )    Color: PL Yellow / Appearance: SL Turbid / S.009 / pH: x  Gluc: x / Ketone: Negative  / Bili: Negative / Urobili: Negative   Blood: x / Protein: 30 mg/dL / Nitrite: Positive   Leuk Esterase: Large / RBC: 3-5 /HPF / WBC >50 /HPF   Sq Epi: x / Non Sq Epi: x / Bacteria: Moderate /HPF        CARDIAC MARKERS ( 15 Jul 2018 02:22 )  x     / x     / 1021 U/L / x     / x                    MICROBIOLOGY:  Culture Results:   No growth to date. (07-15 @ 05:19)  Culture Results:   Growth in anaerobic bottle: Gram Negative Rods  "Due to technical problems, Proteus sp. will Not be reported as part of  the BCID panel until further notice"  ***Blood Panel PCR results on this specimen are available  approximately 3 hours after the Gram stain result.***  Gram stain, PCR, and/or culture results may not always  correspond due to difference in methodologies.  ************************************************************  This PCR assay was performed using [employer].  The following targets are tested for: Enterococcus,  vancomycin resistant enterococci, Listeria monocytogenes,  coagulase negative staphylococci, S. aureus,  methicillin resistant S. aureus, Streptococcus agalactiae  (Group B), S. pneumoniae, S. pyogenes (Group A),  Acinetobacter baumannii, Enterobacter cloacae, E. coli,  Klebsiella oxytoca, K. pneumoniae, Proteus sp.,  Serratia marcescens, Haemophilus influenzae,  Neisseria meningitidis, Pseudomonas aeruginosa, Candida  albicans, C. glabrata, C krusei, C parapsilosis,  C. tropicalis and the KPC resistance gene. (07-15 @ 05:19)    One of two BC + E. coli      RADIOLOGY & ADDITIONAL STUDIES:      EXAM:  CT CHEST                            PROCEDURE DATE:  07/15/2018            INTERPRETATION:  CLINICAL INFORMATION: Fevers, rule out pneumonia.    COMPARISON: CT chest 2017.    PROCEDURE:   CT of the Chest was performed without intravenous contrast.  Sagittal and coronal reformats were performed.    FINDINGS:    CHEST:     PULMONARY ARTERY: The pulmonary artery is enlarged, measuring 3.6 cm.    LUNGS AND LARGE AIRWAYS: Patent central airways.  There are a few   scattered groundglass opacities within the dependent portion of the left   upper lobe. Areas of subsegmental atelectasis are noted within the   bilateral lower lobes. No focal pulmonary consolidations.    PLEURA: Small to moderate-sized right pleural effusion with partial   loculation along the right major fissure. Trace left pleural effusion.    VESSELS: Atherosclerotic calcifications of the aorta and coronary   arteries.    HEART: The heart size is normal. No pericardial effusion.    MEDIASTINUM AND TOMI: No lymphadenopathy.    CHEST WALL AND LOWER NECK: Within normal limits.    VISUALIZED UPPER ABDOMEN: Within normal limits.    BONES: Heterogeneously sclerotic bone which may be related to underlying   metabolic process. Degenerative changes of the spine.    IMPRESSION: Small to moderate-sized right pleural effusion with areas of   loculation.     Groundglass opacities within the right upper lobe may represent   asymmetric pulmonary edema versus infection.        MJ IBARRA M.D., RADIOLOGY RESIDENT  This document has been electronically signed.  ROXIE BARRETO M.D., ATTENDING RADIOLOGIST  This document has been electronically signed. 2018  9:55AM

## 2018-07-17 DIAGNOSIS — N40.1 BENIGN PROSTATIC HYPERPLASIA WITH LOWER URINARY TRACT SYMPTOMS: ICD-10-CM

## 2018-07-17 LAB
-  AMIKACIN: SIGNIFICANT CHANGE UP
-  AMOXICILLIN/CLAVULANIC ACID: SIGNIFICANT CHANGE UP
-  AMOXICILLIN/CLAVULANIC ACID: SIGNIFICANT CHANGE UP
-  AMPICILLIN/SULBACTAM: SIGNIFICANT CHANGE UP
-  AMPICILLIN: SIGNIFICANT CHANGE UP
-  AZTREONAM: SIGNIFICANT CHANGE UP
-  CEFAZOLIN: SIGNIFICANT CHANGE UP
-  CEFEPIME: SIGNIFICANT CHANGE UP
-  CEFOXITIN: SIGNIFICANT CHANGE UP
-  CEFTRIAXONE: SIGNIFICANT CHANGE UP
-  CIPROFLOXACIN: SIGNIFICANT CHANGE UP
-  ERTAPENEM: SIGNIFICANT CHANGE UP
-  GENTAMICIN: SIGNIFICANT CHANGE UP
-  IMIPENEM: SIGNIFICANT CHANGE UP
-  LEVOFLOXACIN: SIGNIFICANT CHANGE UP
-  MEROPENEM: SIGNIFICANT CHANGE UP
-  NITROFURANTOIN: SIGNIFICANT CHANGE UP
-  NITROFURANTOIN: SIGNIFICANT CHANGE UP
-  PIPERACILLIN/TAZOBACTAM: SIGNIFICANT CHANGE UP
-  TIGECYCLINE: SIGNIFICANT CHANGE UP
-  TIGECYCLINE: SIGNIFICANT CHANGE UP
-  TOBRAMYCIN: SIGNIFICANT CHANGE UP
-  TRIMETHOPRIM/SULFAMETHOXAZOLE: SIGNIFICANT CHANGE UP
ALBUMIN SERPL ELPH-MCNC: 3.5 G/DL — SIGNIFICANT CHANGE UP (ref 3.3–5)
ALP SERPL-CCNC: 157 U/L — HIGH (ref 40–120)
ALT FLD-CCNC: 24 U/L — SIGNIFICANT CHANGE UP (ref 10–45)
ANION GAP SERPL CALC-SCNC: 11 MMOL/L — SIGNIFICANT CHANGE UP (ref 5–17)
AST SERPL-CCNC: 55 U/L — HIGH (ref 10–40)
BILIRUB SERPL-MCNC: 0.3 MG/DL — SIGNIFICANT CHANGE UP (ref 0.2–1.2)
BUN SERPL-MCNC: 11 MG/DL — SIGNIFICANT CHANGE UP (ref 7–23)
CALCIUM SERPL-MCNC: 8.7 MG/DL — SIGNIFICANT CHANGE UP (ref 8.4–10.5)
CHLORIDE SERPL-SCNC: 106 MMOL/L — SIGNIFICANT CHANGE UP (ref 96–108)
CO2 SERPL-SCNC: 28 MMOL/L — SIGNIFICANT CHANGE UP (ref 22–31)
CREAT SERPL-MCNC: 0.9 MG/DL — SIGNIFICANT CHANGE UP (ref 0.5–1.3)
CULTURE RESULTS: SIGNIFICANT CHANGE UP
CULTURE RESULTS: SIGNIFICANT CHANGE UP
GLUCOSE SERPL-MCNC: 118 MG/DL — HIGH (ref 70–99)
HCT VFR BLD CALC: 34.1 % — LOW (ref 39–50)
HGB BLD-MCNC: 11.8 G/DL — LOW (ref 13–17)
HIV-1 VIRAL LOAD RESULT: ABNORMAL
HIV1 RNA # SERPL NAA+PROBE: SIGNIFICANT CHANGE UP
HIV1 RNA SER-IMP: SIGNIFICANT CHANGE UP
HIV1 RNA SERPL NAA+PROBE-ACNC: ABNORMAL
HIV1 RNA SERPL NAA+PROBE-LOG#: ABNORMAL LG COP/ML
MAGNESIUM SERPL-MCNC: 2.4 MG/DL — SIGNIFICANT CHANGE UP (ref 1.6–2.6)
MCHC RBC-ENTMCNC: 33.3 PG — SIGNIFICANT CHANGE UP (ref 27–34)
MCHC RBC-ENTMCNC: 34.6 GM/DL — SIGNIFICANT CHANGE UP (ref 32–36)
MCV RBC AUTO: 96.3 FL — SIGNIFICANT CHANGE UP (ref 80–100)
METHOD TYPE: SIGNIFICANT CHANGE UP
ORGANISM # SPEC MICROSCOPIC CNT: SIGNIFICANT CHANGE UP
PHOSPHATE SERPL-MCNC: 2.9 MG/DL — SIGNIFICANT CHANGE UP (ref 2.5–4.5)
PLATELET # BLD AUTO: 201 K/UL — SIGNIFICANT CHANGE UP (ref 150–400)
POTASSIUM SERPL-MCNC: 3.6 MMOL/L — SIGNIFICANT CHANGE UP (ref 3.5–5.3)
POTASSIUM SERPL-SCNC: 3.6 MMOL/L — SIGNIFICANT CHANGE UP (ref 3.5–5.3)
PROT SERPL-MCNC: 7.7 G/DL — SIGNIFICANT CHANGE UP (ref 6–8.3)
RBC # BLD: 3.54 M/UL — LOW (ref 4.2–5.8)
RBC # FLD: 12.4 % — SIGNIFICANT CHANGE UP (ref 10.3–14.5)
SODIUM SERPL-SCNC: 145 MMOL/L — SIGNIFICANT CHANGE UP (ref 135–145)
SPECIMEN SOURCE: SIGNIFICANT CHANGE UP
SPECIMEN SOURCE: SIGNIFICANT CHANGE UP
WBC # BLD: 8 K/UL — SIGNIFICANT CHANGE UP (ref 3.8–10.5)
WBC # FLD AUTO: 8 K/UL — SIGNIFICANT CHANGE UP (ref 3.8–10.5)

## 2018-07-17 PROCEDURE — 76770 US EXAM ABDO BACK WALL COMP: CPT | Mod: 26

## 2018-07-17 PROCEDURE — 99233 SBSQ HOSP IP/OBS HIGH 50: CPT | Mod: GC

## 2018-07-17 PROCEDURE — 99232 SBSQ HOSP IP/OBS MODERATE 35: CPT

## 2018-07-17 RX ADMIN — DONEPEZIL HYDROCHLORIDE 10 MILLIGRAM(S): 10 TABLET, FILM COATED ORAL at 21:44

## 2018-07-17 RX ADMIN — RITONAVIR 100 MILLIGRAM(S): 100 TABLET, FILM COATED ORAL at 12:20

## 2018-07-17 RX ADMIN — ATORVASTATIN CALCIUM 10 MILLIGRAM(S): 80 TABLET, FILM COATED ORAL at 21:44

## 2018-07-17 RX ADMIN — HEPARIN SODIUM 5000 UNIT(S): 5000 INJECTION INTRAVENOUS; SUBCUTANEOUS at 21:44

## 2018-07-17 RX ADMIN — HEPARIN SODIUM 5000 UNIT(S): 5000 INJECTION INTRAVENOUS; SUBCUTANEOUS at 13:30

## 2018-07-17 RX ADMIN — EMTRICITABINE AND TENOFOVIR DISOPROXIL FUMARATE 1 TABLET(S): 200; 300 TABLET, FILM COATED ORAL at 12:20

## 2018-07-17 RX ADMIN — TERAZOSIN HYDROCHLORIDE 2 MILLIGRAM(S): 10 CAPSULE ORAL at 21:44

## 2018-07-17 RX ADMIN — HEPARIN SODIUM 5000 UNIT(S): 5000 INJECTION INTRAVENOUS; SUBCUTANEOUS at 05:22

## 2018-07-17 RX ADMIN — FINASTERIDE 5 MILLIGRAM(S): 5 TABLET, FILM COATED ORAL at 12:20

## 2018-07-17 RX ADMIN — DARUNAVIR 800 MILLIGRAM(S): 75 TABLET, FILM COATED ORAL at 12:20

## 2018-07-17 RX ADMIN — ERTAPENEM SODIUM 120 MILLIGRAM(S): 1 INJECTION, POWDER, LYOPHILIZED, FOR SOLUTION INTRAMUSCULAR; INTRAVENOUS at 17:36

## 2018-07-17 RX ADMIN — OLANZAPINE 2.5 MILLIGRAM(S): 15 TABLET, FILM COATED ORAL at 12:20

## 2018-07-17 NOTE — PHYSICAL THERAPY INITIAL EVALUATION ADULT - GAIT DEVIATIONS NOTED, PT EVAL
decreased stride length/increased time in double stance/decreased weight-shifting ability/decreased velocity of limb motion/decreased step length/decreased hue/forward flexed posture, unsteady

## 2018-07-17 NOTE — PROGRESS NOTE ADULT - ASSESSMENT
77 m with HTN, BPH, HIV c/b HIV related Dementia, on truvada, norvir, prezista, last CD4: 327, and VL<30, 5/2018, recent fever, chills, loss of appetite followed but urology visit which recommended straight cath at home but before the family tried pt developed shaking chills, high fever and lethargy so brought to ER, pt non verbal at baseline but wife denied cough, SOB, chocking on food or diarrhea.  here Tmax: 103.1, tachy, WBC: 12.3, urine and blood cx with E-coli, pt on ceftriaxone  chest CT: ground glass opacities in RUL may represent asymmetric pulm edema vs infection  was started on ceftriaxone and then ertapenem pending E-coli sensitivities renal u/s no hydro  both E-coli strains pan sensitive    urosepsis with E-coli bacteremia due to retention  renal u/s no hydro  2 E-coli in the urine both pan sensitive    * can switch to levofloxacin 750 qd  * f/u the repeat blood cx  * will need 2 weeks of levo after the negative blood cx, can switch to PO in DC  * if worsening status or persistent fever will need an abd/pelvis CT with contrast

## 2018-07-17 NOTE — PHYSICAL THERAPY INITIAL EVALUATION ADULT - FOLLOWS COMMANDS/ANSWERS QUESTIONS, REHAB EVAL
follows simple step instructions ~10% with extensive cues for sequencing; improved follow with spouse present & gesturing

## 2018-07-17 NOTE — CONSULT NOTE ADULT - SUBJECTIVE AND OBJECTIVE BOX
Urology Consult Note    Chief Complaint:  UTI, BPH, urinary retention      History of Present Illness:  77M presents with fevers, UTI, and LUTS due to BPH. On combination therapy with flomax and finasteride. PMH significant for HIV dementia, parkinsons, HLD HTN. Patient has had fevers and chills for 3 days, Tmax at home of 101.9. Patient is unable to answer majority of the questions during the consultation.    PAST MEDICAL & SURGICAL HISTORY:  Alzheimer's disease of other onset  HTN (hypertension)  HIV (human immunodeficiency virus infection)  No significant past surgical history      FAMILY HISTORY:  No pertinent family history in first degree relatives      Allergies    No Known Allergies    Intolerances        Social History:  Denies tobacco or alcohol use    Review of Systems (per family):   Constitutional: + fevers, + chills  Skin: No rash or itching  CV: No chest pain, no chest pressure  Pulm: No shortness of breath, cough, or sputum  GI: No melena, no constipation  : Per HPI      Physical Exam:  Vital signs  T(C): 37.1 (07-17-18 @ 04:53), Max: 37.2 (07-16-18 @ 21:15)  HR: 66 (07-17-18 @ 04:53)  BP: 113/77 (07-17-18 @ 04:53)  SpO2: 94% (07-17-18 @ 04:53)  Wt(kg): --    Gen: No acute distress. In bed  HEENT: Normocephalic, neck supple  CV: Hemodynamically stable  Pulm: No increased work of breathing  Abd: soft, non-tender, non-distended  Skin: Skin normal color, normal texture  : Wong catheter draining yellow urine      Labs:      07-17 @ 07:21    WBC 8.0   / Hct 34.1  / SCr --       07-17 @ 07:15    WBC --    / Hct --    / SCr 0.90     07-17    145  |  106  |  11  ----------------------------<  118<H>  3.6   |  28  |  0.90    Ca    8.7      17 Jul 2018 07:15  Phos  2.9     07-17  Mg     2.4     07-17    TPro  7.7  /  Alb  3.5  /  TBili  0.3  /  DBili  x   /  AST  55<H>  /  ALT  24  /  AlkPhos  157<H>  07-17

## 2018-07-17 NOTE — PROGRESS NOTE ADULT - PROBLEM SELECTOR PLAN 1
Patient admitted w/ fevers (103), tachycardia, w/ noted LE/nitrites on U/A, and leukocytosis concerning for sepsis 2/2 UTI. Urine cx (+) for E. coli. Blood cx (+) for E. coli.  - patient s/p ceftriaxone; per ID, will escalate to ertapenem for now as pt had continued episodes of fever.  - f/u urine sensitivities  - blood cx (7/15) +ikley for E.coli; repeat cxs today  - c/w IVF and trend fever curve; tylenol prn for fever  - nelson placed d/t retention; f/u with [place] [facility]  - f/u bladder u/s to r/o obstruction

## 2018-07-17 NOTE — CHART NOTE - NSCHARTNOTEFT_GEN_A_CORE
Notified by nurse of clots in urine. Patient exhibiting normal behavior per family members. Patient also hemodynamically stable. No abdominal or suprapubic tenderness. Wong catheter appeared nonobstructive. Urine was of normal color. 2 or so clots visible at the time were thin and as long as 2 cm.  Notified the day team.

## 2018-07-17 NOTE — PHYSICAL THERAPY INITIAL EVALUATION ADULT - TRANSFER SAFETY CONCERNS NOTED: SIT/STAND, REHAB EVAL
decreased safety awareness/losing balance/decreased sequencing ability/decreased weight-shifting ability/decreased step length

## 2018-07-17 NOTE — PHYSICAL THERAPY INITIAL EVALUATION ADULT - PERTINENT HX OF CURRENT PROBLEM, REHAB EVAL
Pt is 77M admitted 7/15/18 PMHx HIV c/b HIV related Dementia, HTN, BPH, presenting with c/o fevers 101.9 (max at home) & lethargy. Pt with fevers x4 days associated with chills, inability to walk, decreased appetite.

## 2018-07-17 NOTE — PHYSICAL THERAPY INITIAL EVALUATION ADULT - PRECAUTIONS/LIMITATIONS, REHAB EVAL
CT CHEST: Small to moderate-sized right pleural effusion with areas of loculation. Groundglass opacities within the right upper lobe may represent asymmetric pulmonary edema versus infection. US KIDNEY/Bladder: No hydronephrosis of either kidney.Due to indwelling Wong catheter, it is difficult to assess urinary bladder wall thickening. fall precautions/CT CHEST: Small to moderate-sized right pleural effusion with areas of loculation. Groundglass opacities within the right upper lobe may represent asymmetric pulmonary edema versus infection. US KIDNEY/Bladder: No hydronephrosis of either kidney.Due to indwelling Wong catheter, it is difficult to assess urinary bladder wall thickening.

## 2018-07-17 NOTE — PHYSICAL THERAPY INITIAL EVALUATION ADULT - DISCHARGE DISPOSITION, PT EVAL
rehabilitation facility/subacute rehab; if family prefers DC home, then recommend home PT, ambulance service into home, hospital bed. Pt owns wheelchair, shower chair, commode, and rolling walker. Pt has HHA 5days/6hours; spouse states that family also assists patient with needs. Pt will require assist/supervision for all functional mobility & ADL's at this time.

## 2018-07-17 NOTE — PROGRESS NOTE ADULT - ASSESSMENT
Clinically improving  Remains afebrile  Would obtain BC if not already sent  Urology and ID followup     (Wife requests hospital bed for home use once discharged)

## 2018-07-17 NOTE — CONSULT NOTE ADULT - ASSESSMENT
71yM with UTI and BPH
77 m with HTN, BPH, HIV c/b HIV related Dementia, on truvada, norvir, prezista, last CD4: 327, and VL<30, 5/2018, recent fever, chills, loss of appetite followed but urology visit which recommended straight cath at home but before the family tried pt developed shaking chills, high fever and lethargy so brought to ER, pt non verbal at baseline but wife denied cough, SOB, chocking on food or diarrhea.  here Tmax: 103.1, tachy, WBC: 12.3, urine and blood cx with E-coli, pt on ceftriaxone  chest CT: ground glass opacities in RUL may represent asymmetric pulm edema vs infection    urosepsis with E-coli bacteremia due to retention    * switch to ertapenem as pt still febrile  * f/u the urine and blood cx  * repeat the blood cx  * if worsening status or persistent fever will need an abd/pelvis CT with contrast  * f/u with urology for nelson plan
Hx of fevers x 3 -4 days, possible RLL infiltrate on port CXR, mildly elevated WBC, possible UTI        Suggest:  Continue antibiotics  CT chest, noncontrast  ID consult  Await cultures

## 2018-07-17 NOTE — PHYSICAL THERAPY INITIAL EVALUATION ADULT - GENERAL OBSERVATIONS, REHAB EVAL
Pt received semisupine in bed, non-verbal, unable to follow commands, flat affect, spouse at bedside Pt received semisupine in bed, non-verbal, following simple commands ~10% with extensive cues; improved follow with spouse present & gesturing, flat affect, spouse at bedside

## 2018-07-17 NOTE — CONSULT NOTE ADULT - PROBLEM SELECTOR RECOMMENDATION 9
-Maintain nelson catheter for approximately 1 week then consider performing trial of void. If high post-void residual, options for bladder drainage would included placement of new nelson catheter versus clean intermittent catheterization  -Continue flomax  -Continue finasteride  -Minimize narcotics, anticholinergics, antihistamines  -Bowel regimen, avoid constipation  -Follow up cultures. Abx per primary team  -Will need outpatient follow up with urology for further evaluation and management of his BPH

## 2018-07-17 NOTE — PROGRESS NOTE ADULT - SUBJECTIVE AND OBJECTIVE BOX
PULM/MED PROGRESS NOTE:    In bed resting comfortably  Wife at bedside      MEDICATIONS  (STANDING):  atorvastatin 10 milliGRAM(s) Oral at bedtime  darunavir 800 milliGRAM(s) Oral daily  donepezil 10 milliGRAM(s) Oral at bedtime  emtricitabine 200 mG/tenofovir 300 mG (TRUVADA) 1 Tablet(s) Oral daily  ertapenem  IVPB 1000 milliGRAM(s) IV Intermittent every 24 hours  finasteride 5 milliGRAM(s) Oral daily  heparin  Injectable 5000 Unit(s) SubCutaneous every 8 hours  OLANZapine 2.5 milliGRAM(s) Oral daily  ritonavir Tablet 100 milliGRAM(s) Oral daily  sodium chloride 0.9%. 1000 milliLiter(s) (75 mL/Hr) IV Continuous <Continuous>  terazosin 2 milliGRAM(s) Oral at bedtime      MEDICATIONS  (PRN):  acetaminophen   Tablet 650 milliGRAM(s) Oral every 6 hours PRN For Temp greater than 38 C (100.4 F)  acetaminophen   Tablet. 650 milliGRAM(s) Oral every 6 hours PRN Moderate Pain (4 - 6)          Vital Signs Last 24 Hrs  T(C): 37.1 (17 Jul 2018 14:35), Max: 37.2 (16 Jul 2018 21:15)  T(F): 98.8 (17 Jul 2018 14:35), Max: 99 (16 Jul 2018 21:15)  HR: 69 (17 Jul 2018 14:35) (66 - 80)  BP: 116/75 (17 Jul 2018 14:35) (113/77 - 116/75)  BP(mean): --  RR: 18 (17 Jul 2018 14:35) (18 - 18)  SpO2: 96% (17 Jul 2018 14:35) (94% - 96%)    PHYSICAL EXAMINATION:    GENERAL:  in no apparent distress.     LUNGS: Bilateral breath sounds    HEART: Regular rate     ABDOMEN: Soft, nontender,            LABS:                        11.8   8.0   )-----------( 201      ( 17 Jul 2018 07:21 )             34.1     07-17    145  |  106  |  11  ----------------------------<  118<H>  3.6   |  28  |  0.90    Ca    8.7      17 Jul 2018 07:15  Phos  2.9     07-17  Mg     2.4     07-17    TPro  7.7  /  Alb  3.5  /  TBili  0.3  /  DBili  x   /  AST  55<H>  /  ALT  24  /  AlkPhos  157<H>  07-17        MICROBIOLOGY:  Culture Results:   No growth to date. (07-15 @ 05:19)  Culture Results:   Growth in anaerobic bottle: Escherichia coli  "Due to technical problems, Proteus sp. will Not be reported as part of  the BCID panel until further notice"  ***Blood Panel PCR results on this specimen are available  approximately 3 hours after the Gram stain result.***  Gram stain, PCR, and/or culture results may not always  correspond due to difference in methodologies.  ************************************************************  This PCR assay was performed using [employer].  The following targets are tested for: Enterococcus,  vancomycin resistant enterococci, Listeria monocytogenes,  coagulase negative staphylococci, S. aureus,  methicillin resistant S. aureus, Streptococcus agalactiae  (Group B), S. pneumoniae, S. pyogenes (Group A),  Acinetobacter baumannii, Enterobacter cloacae, E. coli,  Klebsiella oxytoca, K. pneumoniae, Proteus sp.,  Serratia marcescens, Haemophilus influenzae,  Neisseria meningitidis, Pseudomonas aeruginosa, Candida  albicans, C. glabrata, C krusei, C parapsilosis,  C. tropicalis and the KPC resistance gene. (07-15 @ 05:19)  Culture Results:   >100,000 CFU/ml Escherichia coli  >100,000 CFU/ml Escherichia coli #2 (07-15 @ 05:16)

## 2018-07-17 NOTE — PROGRESS NOTE ADULT - SUBJECTIVE AND OBJECTIVE BOX
Dr. Chilango Zayas PGY1 Pager [phone]      Patient is a 77y old  Male who presents with a chief complaint of Sepsis from presumed UTI (15 Jul 2018 14:26)      INTERVAL HPI/OVERNIGHT EVENTS:  Overnight there were reports of blood clots in the pt's urine. The urine color itself appeared normal in color. Has been afebrile, and otherwise HDS. Will continue to monitor.    Unable to obtain ROS. Pt is nonverbal.      T(C): 37.1 (07-17-18 @ 04:53), Max: 37.2 (07-16-18 @ 21:15)  HR: 66 (07-17-18 @ 04:53) (66 - 80)  BP: 113/77 (07-17-18 @ 04:53) (111/60 - 114/71)  RR: 18 (07-17-18 @ 04:53) (18 - 18)  SpO2: 94% (07-17-18 @ 04:53) (94% - 97%)  Wt(kg): --Vital Signs Last 24 Hrs  T(C): 37.1 (17 Jul 2018 04:53), Max: 37.2 (16 Jul 2018 21:15)  T(F): 98.8 (17 Jul 2018 04:53), Max: 99 (16 Jul 2018 21:15)  HR: 66 (17 Jul 2018 04:53) (66 - 80)  BP: 113/77 (17 Jul 2018 04:53) (111/60 - 114/71)  BP(mean): --  RR: 18 (17 Jul 2018 04:53) (18 - 18)  SpO2: 94% (17 Jul 2018 04:53) (94% - 97%)    PHYSICAL EXAM:  GENERAL: NAD; + nelson, with clear urine  NERVOUS SYSTEM:  Awake; no FND  CHEST/LUNG: Clear to auscultation anteriorly  HEART: Regular rate and rhythm; No murmurs, rubs, or gallops  ABDOMEN: Soft, Nontender, Nondistended; Bowel sounds present  EXTREMITIES:  No edema or cyanosis      Consultant(s) Notes Reviewed:  [x ] YES  [ ] NO  Care Discussed with Consultants/Other Providers [ x] YES  [ ] NO    LABS:                        11.8   8.0   )-----------( 201      ( 17 Jul 2018 07:21 )             34.1     07-17    145  |  106  |  11  ----------------------------<  118<H>  3.6   |  28  |  0.90    Ca    8.7      17 Jul 2018 07:15  Phos  2.9     07-17  Mg     2.4     07-17    TPro  7.7  /  Alb  3.5  /  TBili  0.3  /  DBili  x   /  AST  55<H>  /  ALT  24  /  AlkPhos  157<H>  07-17        CAPILLARY BLOOD GLUCOSE                RADIOLOGY & ADDITIONAL TESTS:    Imaging Personally Reviewed:  [X ] YES  [ ] NO Dr. Chilango Zayas PGY1 Pager [phone]    Patient is a 77y old  Male who presents with a chief complaint of Sepsis from presumed UTI (15 Jul 2018 14:26)    INTERVAL HPI/OVERNIGHT EVENTS:  Overnight there were reports of blood clots in the pt's urine. The urine color itself appeared normal in color. Has been afebrile, and otherwise HDS. Will continue to monitor.    Unable to obtain ROS. Pt is nonverbal.    T(C): 37.1 (07-17-18 @ 04:53), Max: 37.2 (07-16-18 @ 21:15)  HR: 66 (07-17-18 @ 04:53) (66 - 80)  BP: 113/77 (07-17-18 @ 04:53) (111/60 - 114/71)  RR: 18 (07-17-18 @ 04:53) (18 - 18)  SpO2: 94% (07-17-18 @ 04:53) (94% - 97%)  Wt(kg): --Vital Signs Last 24 Hrs  T(C): 37.1 (17 Jul 2018 04:53), Max: 37.2 (16 Jul 2018 21:15)  T(F): 98.8 (17 Jul 2018 04:53), Max: 99 (16 Jul 2018 21:15)  HR: 66 (17 Jul 2018 04:53) (66 - 80)  BP: 113/77 (17 Jul 2018 04:53) (111/60 - 114/71)  BP(mean): --  RR: 18 (17 Jul 2018 04:53) (18 - 18)  SpO2: 94% (17 Jul 2018 04:53) (94% - 97%)    PHYSICAL EXAM:  GENERAL: NAD; + nelson, with clear urine  NERVOUS SYSTEM:  Awake; no FND  CHEST/LUNG: Clear to auscultation anteriorly  HEART: Regular rate and rhythm; No murmurs, rubs, or gallops  ABDOMEN: Soft, Nontender, Nondistended; Bowel sounds present  EXTREMITIES:  No edema or cyanosis      Consultant(s) Notes Reviewed:  [x ] YES  [ ] NO  Care Discussed with Consultants/Other Providers [ x] YES  [ ] NO    LABS:                        11.8   8.0   )-----------( 201      ( 17 Jul 2018 07:21 )             34.1     07-17    145  |  106  |  11  ----------------------------<  118<H>  3.6   |  28  |  0.90    Ca    8.7      17 Jul 2018 07:15  Phos  2.9     07-17  Mg     2.4     07-17    TPro  7.7  /  Alb  3.5  /  TBili  0.3  /  DBili  x   /  AST  55<H>  /  ALT  24  /  AlkPhos  157<H>  07-17        CAPILLARY BLOOD GLUCOSE    RADIOLOGY & ADDITIONAL TESTS:    Imaging Personally Reviewed:  [X ] YES  [ ] NO Dr. Chilango Zayas PGY1 Pager [phone]    Patient is a 77y old  Male who presents with a chief complaint of Sepsis from presumed UTI (15 Jul 2018 14:26)    INTERVAL HPI/OVERNIGHT EVENTS:  Overnight there were reports of blood clots in the pt's urine. The urine color itself appeared normal in color. Has been afebrile, and otherwise HDS. Will continue to monitor.    Unable to obtain ROS. Pt is nonverbal.    T(C): 37.1 (07-17-18 @ 04:53), Max: 37.2 (07-16-18 @ 21:15)  HR: 66 (07-17-18 @ 04:53) (66 - 80)  BP: 113/77 (07-17-18 @ 04:53) (111/60 - 114/71)  RR: 18 (07-17-18 @ 04:53) (18 - 18)  SpO2: 94% (07-17-18 @ 04:53) (94% - 97%)  Wt(kg): --Vital Signs Last 24 Hrs  T(C): 37.1 (17 Jul 2018 04:53), Max: 37.2 (16 Jul 2018 21:15)  T(F): 98.8 (17 Jul 2018 04:53), Max: 99 (16 Jul 2018 21:15)  HR: 66 (17 Jul 2018 04:53) (66 - 80)  BP: 113/77 (17 Jul 2018 04:53) (111/60 - 114/71)  BP(mean): --  RR: 18 (17 Jul 2018 04:53) (18 - 18)  SpO2: 94% (17 Jul 2018 04:53) (94% - 97%)    PHYSICAL EXAM:  GENERAL: NAD; + nelson, with clear urine. Very small blood clot. No BRB.  NERVOUS SYSTEM:  Awake; no FND  CHEST/LUNG: Clear to auscultation anteriorly  HEART: Regular rate and rhythm; No murmurs, rubs, or gallops  ABDOMEN: Soft, Nontender, Nondistended; Bowel sounds present  EXTREMITIES:  No edema or cyanosis    Consultant(s) Notes Reviewed:  [x ] YES  [ ] NO  Care Discussed with Consultants/Other Providers [ x] YES  [ ] NO    LABS:                        11.8   8.0   )-----------( 201      ( 17 Jul 2018 07:21 )             34.1     07-17    145  |  106  |  11  ----------------------------<  118<H>  3.6   |  28  |  0.90    Ca    8.7      17 Jul 2018 07:15  Phos  2.9     07-17  Mg     2.4     07-17    TPro  7.7  /  Alb  3.5  /  TBili  0.3  /  DBili  x   /  AST  55<H>  /  ALT  24  /  AlkPhos  157<H>  07-17        CAPILLARY BLOOD GLUCOSE    RADIOLOGY & ADDITIONAL TESTS: yes     Imaging Personally Reviewed:  [X ] YES  [ ] NO

## 2018-07-17 NOTE — PHYSICAL THERAPY INITIAL EVALUATION ADULT - ADDITIONAL COMMENTS
Hx obtained from spouse at bedside; as per spouse, pt resides in private home with spouse & dtr, 5 steps to enter with bilateral handrail, flight to bedroom with handrail. PTA pt ambulatory without AD, but required supervision for safety with gait & stairs. Pt with HHA 5days/6hours who assists with ADL's, family assists patient with needs when HHA is not present. Pt has wheelchair, shower chair, commode, rolling walker (patient does not use rolling walker). Pt non-verbal at baseline.

## 2018-07-17 NOTE — PROGRESS NOTE ADULT - ATTENDING COMMENTS
Agree.  Seen and examined earlier today, resting comfortably at clinical baseline.   Afebrile, no leukocytosis, transaminitis slowly resolving.  Continue ertapenem, will need picc to complete 10-14 days of abx.  Small hematuria, no CBI. Appreciate urology reccs, if they feel otherwise will discuss. Agree.  Seen and examined earlier today, resting comfortably at clinical baseline.   Afebrile, no leukocytosis, transaminitis slowly resolving.  Will discuss ?loculated pleural effusion w/ home pulmonologist.   Urine culture w/ pan sensitive ecoli, fluoroquinolone should be okay, QTc < 450. Risks/benefits/alternatives discussed with family. 10-14 day course. Will ensure no interactions w/ HIV meds.  Small hematuria, no CBI. Appreciate urology reccs, if they feel otherwise will discuss.  Indication for nelson = retention. Known BPH + UTI +/-?Dysautonomia w/ parkinsonian phenotype.  Outpatient TTV and urology f/u.   PT/dispo planning. Agree.  Seen and examined earlier today, resting comfortably at clinical baseline.   Afebrile, no leukocytosis, transaminitis slowly resolving.  Will discuss ?loculated pleural effusion w/ home pulmonologist. Most likely simple effusion to my mind w/ GGO in lungs and +BNP w/ sepsis, no e/o empyema or malignancy otherwise. Will d/w pulm utility of tap.   Urine culture w/ pan sensitive ecoli, fluoroquinolone should be okay, QTc < 450. Risks/benefits/alternatives discussed with family. 10-14 day course. Will ensure no interactions w/ HIV meds.  Small hematuria, no CBI. Appreciate urology reccs, if they feel otherwise will discuss.  Indication for nelson = retention. Known BPH + UTI +/-?Dysautonomia w/ parkinsonian phenotype.  Outpatient TTV and urology f/u.   PT/dispo planning.

## 2018-07-17 NOTE — PROGRESS NOTE ADULT - SUBJECTIVE AND OBJECTIVE BOX
Follow Up:  bacteremia and urosepsis    Interval History: pt stable and afebrile, leukocytosis resolved    ROS:      All other systems negative    Constitutional: no fever, no chills  Head: no trauma  Eyes: no vision changes, no eye pain  ENT:  no sore throat, no rhinorrhea  Cardiovascular:  no chest pain, no palpitation  Respiratory:  no SOB, no cough  GI:  no abd pain, no vomiting, no diarrhea  urinary: has nelson, no hematuria, no flank pain  musculoskeletal:  no joint pain, no joint swelling  skin:  no rash  neurology:  no headache, no seizure, no change in mental status  psych: no anxiety, no depression         Allergies  No Known Allergies        ANTIMICROBIALS:  darunavir 800 daily  emtricitabine 200 mG/tenofovir 300 mG (TRUVADA) 1 daily  ertapenem  IVPB 1000 every 24 hours  ritonavir Tablet 100 daily      OTHER MEDS:  acetaminophen   Tablet 650 milliGRAM(s) Oral every 6 hours PRN  acetaminophen   Tablet. 650 milliGRAM(s) Oral every 6 hours PRN  atorvastatin 10 milliGRAM(s) Oral at bedtime  donepezil 10 milliGRAM(s) Oral at bedtime  finasteride 5 milliGRAM(s) Oral daily  heparin  Injectable 5000 Unit(s) SubCutaneous every 8 hours  OLANZapine 2.5 milliGRAM(s) Oral daily  sodium chloride 0.9%. 1000 milliLiter(s) IV Continuous <Continuous>  terazosin 2 milliGRAM(s) Oral at bedtime      Vital Signs Last 24 Hrs  T(C): 37.1 (17 Jul 2018 04:53), Max: 37.2 (16 Jul 2018 21:15)  T(F): 98.8 (17 Jul 2018 04:53), Max: 99 (16 Jul 2018 21:15)  HR: 66 (17 Jul 2018 04:53) (66 - 80)  BP: 113/77 (17 Jul 2018 04:53) (111/60 - 114/71)  BP(mean): --  RR: 18 (17 Jul 2018 04:53) (18 - 18)  SpO2: 94% (17 Jul 2018 04:53) (94% - 97%)  Physical Exam:    General:    NAD, non toxic  Head: atraumatic, normocephalic  Eyes: normal sclera and conjunctiva  ENT:   no oropharyngeal lesions, no LAD, neck supple  Cardio:    regular S1,S2, no murmur  Respiratory:   clear b/l, no wheezing  abd:   soft, BS +, not tender, no hepatosplenomegaly  :     no CVAT, no suprapubic tenderness, + nelson with clots earlier  Musculoskeletal : no joint swelling, no edema  Skin:    no rash  vascular: no lines, normal pulses  Neurologic:     no focal deficits  psych: normal affect, no suicidal ideation                          11.8   8.0   )-----------( 201      ( 17 Jul 2018 07:21 )             34.1       07-17    145  |  106  |  11  ----------------------------<  118<H>  3.6   |  28  |  0.90    Ca    8.7      17 Jul 2018 07:15  Phos  2.9     07-17  Mg     2.4     07-17    TPro  7.7  /  Alb  3.5  /  TBili  0.3  /  DBili  x   /  AST  55<H>  /  ALT  24  /  AlkPhos  157<H>  07-17          MICROBIOLOGY:  v  .Blood Blood-Peripheral  07-15-18   No growth to date.  --  Blood Culture PCR      .Urine Catheterized  07-15-18   >100,000 CFU/ml Escherichia coli  >100,000 CFU/ml Escherichia coli #2  --  Escherichia coli  Escherichia coli          Rapid RVP Result: NotDetec (07-15 @ 02:52)        RADIOLOGY:    < from: US Kidney and Bladder (07.16.18 @ 16:12) >    IMPRESSION:     No hydronephrosis of either kidney.    Due to indwelling Nelson catheter, it is difficult to assess urinary   bladder wall thickening.    images reviewed by me

## 2018-07-18 LAB
ALBUMIN SERPL ELPH-MCNC: 2.9 G/DL — LOW (ref 3.3–5)
ALP SERPL-CCNC: 145 U/L — HIGH (ref 40–120)
ALT FLD-CCNC: 20 U/L — SIGNIFICANT CHANGE UP (ref 10–45)
ANION GAP SERPL CALC-SCNC: 9 MMOL/L — SIGNIFICANT CHANGE UP (ref 5–17)
AST SERPL-CCNC: 33 U/L — SIGNIFICANT CHANGE UP (ref 10–40)
BILIRUB SERPL-MCNC: 0.3 MG/DL — SIGNIFICANT CHANGE UP (ref 0.2–1.2)
BUN SERPL-MCNC: 16 MG/DL — SIGNIFICANT CHANGE UP (ref 7–23)
CALCIUM SERPL-MCNC: 8.6 MG/DL — SIGNIFICANT CHANGE UP (ref 8.4–10.5)
CHLORIDE SERPL-SCNC: 104 MMOL/L — SIGNIFICANT CHANGE UP (ref 96–108)
CO2 SERPL-SCNC: 29 MMOL/L — SIGNIFICANT CHANGE UP (ref 22–31)
CREAT SERPL-MCNC: 1.09 MG/DL — SIGNIFICANT CHANGE UP (ref 0.5–1.3)
GLUCOSE SERPL-MCNC: 115 MG/DL — HIGH (ref 70–99)
HCT VFR BLD CALC: 34.4 % — LOW (ref 39–50)
HGB BLD-MCNC: 11.8 G/DL — LOW (ref 13–17)
MAGNESIUM SERPL-MCNC: 2.4 MG/DL — SIGNIFICANT CHANGE UP (ref 1.6–2.6)
MCHC RBC-ENTMCNC: 33.2 PG — SIGNIFICANT CHANGE UP (ref 27–34)
MCHC RBC-ENTMCNC: 34.3 GM/DL — SIGNIFICANT CHANGE UP (ref 32–36)
MCV RBC AUTO: 97 FL — SIGNIFICANT CHANGE UP (ref 80–100)
PHOSPHATE SERPL-MCNC: 3.3 MG/DL — SIGNIFICANT CHANGE UP (ref 2.5–4.5)
PLATELET # BLD AUTO: 224 K/UL — SIGNIFICANT CHANGE UP (ref 150–400)
POTASSIUM SERPL-MCNC: 3.7 MMOL/L — SIGNIFICANT CHANGE UP (ref 3.5–5.3)
POTASSIUM SERPL-SCNC: 3.7 MMOL/L — SIGNIFICANT CHANGE UP (ref 3.5–5.3)
PROT SERPL-MCNC: 7.2 G/DL — SIGNIFICANT CHANGE UP (ref 6–8.3)
RBC # BLD: 3.55 M/UL — LOW (ref 4.2–5.8)
RBC # FLD: 12.4 % — SIGNIFICANT CHANGE UP (ref 10.3–14.5)
SODIUM SERPL-SCNC: 142 MMOL/L — SIGNIFICANT CHANGE UP (ref 135–145)
WBC # BLD: 7.5 K/UL — SIGNIFICANT CHANGE UP (ref 3.8–10.5)
WBC # FLD AUTO: 7.5 K/UL — SIGNIFICANT CHANGE UP (ref 3.8–10.5)

## 2018-07-18 PROCEDURE — 99232 SBSQ HOSP IP/OBS MODERATE 35: CPT

## 2018-07-18 PROCEDURE — 99233 SBSQ HOSP IP/OBS HIGH 50: CPT | Mod: GC

## 2018-07-18 RX ADMIN — RITONAVIR 100 MILLIGRAM(S): 100 TABLET, FILM COATED ORAL at 12:41

## 2018-07-18 RX ADMIN — HEPARIN SODIUM 5000 UNIT(S): 5000 INJECTION INTRAVENOUS; SUBCUTANEOUS at 14:14

## 2018-07-18 RX ADMIN — DARUNAVIR 800 MILLIGRAM(S): 75 TABLET, FILM COATED ORAL at 12:41

## 2018-07-18 RX ADMIN — DONEPEZIL HYDROCHLORIDE 10 MILLIGRAM(S): 10 TABLET, FILM COATED ORAL at 21:01

## 2018-07-18 RX ADMIN — HEPARIN SODIUM 5000 UNIT(S): 5000 INJECTION INTRAVENOUS; SUBCUTANEOUS at 21:01

## 2018-07-18 RX ADMIN — OLANZAPINE 2.5 MILLIGRAM(S): 15 TABLET, FILM COATED ORAL at 12:42

## 2018-07-18 RX ADMIN — HEPARIN SODIUM 5000 UNIT(S): 5000 INJECTION INTRAVENOUS; SUBCUTANEOUS at 05:23

## 2018-07-18 RX ADMIN — EMTRICITABINE AND TENOFOVIR DISOPROXIL FUMARATE 1 TABLET(S): 200; 300 TABLET, FILM COATED ORAL at 12:41

## 2018-07-18 RX ADMIN — TERAZOSIN HYDROCHLORIDE 2 MILLIGRAM(S): 10 CAPSULE ORAL at 21:01

## 2018-07-18 RX ADMIN — ATORVASTATIN CALCIUM 10 MILLIGRAM(S): 80 TABLET, FILM COATED ORAL at 21:01

## 2018-07-18 RX ADMIN — FINASTERIDE 5 MILLIGRAM(S): 5 TABLET, FILM COATED ORAL at 12:41

## 2018-07-18 NOTE — PROGRESS NOTE ADULT - SUBJECTIVE AND OBJECTIVE BOX
Follow Up:  bacteremia and urosepsis    Interval History: pt stable and afebrile, leukocytosis resolved    ROS:      All other systems negative    Constitutional: no fever, no chills  Head: no trauma  Eyes: no vision changes, no eye pain  ENT:  no sore throat, no rhinorrhea  Cardiovascular:  no chest pain, no palpitation  Respiratory:  no SOB, no cough  GI:  no abd pain, no vomiting, no diarrhea  urinary: has nelson, no hematuria, no flank pain  musculoskeletal:  no joint pain, no joint swelling  skin:  no rash  neurology:  no headache, no seizure, no change in mental status  psych: no anxiety, no depression         Allergies  No Known Allergies        ANTIMICROBIALS:  darunavir 800 daily  emtricitabine 200 mG/tenofovir 300 mG (TRUVADA) 1 daily  levoFLOXacin IVPB 750 every 24 hours  ritonavir Tablet 100 daily      OTHER MEDS:  acetaminophen   Tablet 650 milliGRAM(s) Oral every 6 hours PRN  acetaminophen   Tablet. 650 milliGRAM(s) Oral every 6 hours PRN  atorvastatin 10 milliGRAM(s) Oral at bedtime  donepezil 10 milliGRAM(s) Oral at bedtime  finasteride 5 milliGRAM(s) Oral daily  heparin  Injectable 5000 Unit(s) SubCutaneous every 8 hours  OLANZapine 2.5 milliGRAM(s) Oral daily  sodium chloride 0.9%. 1000 milliLiter(s) IV Continuous <Continuous>  terazosin 2 milliGRAM(s) Oral at bedtime      Vital Signs Last 24 Hrs  T(C): 37 (18 Jul 2018 05:34), Max: 37.2 (17 Jul 2018 21:41)  T(F): 98.6 (18 Jul 2018 05:34), Max: 99 (17 Jul 2018 21:41)  HR: 76 (18 Jul 2018 05:34) (69 - 76)  BP: 108/67 (18 Jul 2018 05:34) (108/67 - 125/72)  BP(mean): --  RR: 18 (18 Jul 2018 05:34) (18 - 18)  SpO2: 97% (18 Jul 2018 05:34) (96% - 97%)    General:    NAD, non toxic  Head: atraumatic, normocephalic  Eyes: normal sclera and conjunctiva  ENT:   no oropharyngeal lesions, no LAD, neck supple  Cardio:    regular S1,S2, no murmur  Respiratory:   clear b/l, no wheezing  abd:   soft, BS +, not tender, no hepatosplenomegaly  :     no CVAT, no suprapubic tenderness, + nelson   Musculoskeletal : no joint swelling, no edema  Skin:    no rash  vascular: no lines, normal pulses  Neurologic:     no focal deficits  psych: normal affect, no suicidal ideation                          11.8   7.5   )-----------( 224      ( 18 Jul 2018 07:18 )             34.4       07-18    142  |  104  |  16  ----------------------------<  115<H>  3.7   |  29  |  1.09    Ca    8.6      18 Jul 2018 07:18  Phos  3.3     07-18  Mg     2.4     07-18    TPro  7.2  /  Alb  2.9<L>  /  TBili  0.3  /  DBili  x   /  AST  33  /  ALT  20  /  AlkPhos  145<H>  07-18          MICROBIOLOGY:  v  .Blood Blood-Venous  07-17-18   No growth to date.  --  --      .Blood Blood-Peripheral  07-17-18   No growth to date.  --  --      .Blood Blood-Peripheral  07-15-18   No growth to date.  --  Escherichia coli  Blood Culture PCR      .Urine Catheterized  07-15-18   >100,000 CFU/ml Escherichia coli  >100,000 CFU/ml Escherichia coli #2  --  Escherichia coli  Escherichia coli        HIV-1 RNA Quantitative, Viral Load Log: DET.  <1.47 lg /mL (07-16-18 @ 09:30)    Rapid RVP Result: NotDetec (07-15 @ 02:52)        RADIOLOGY:  Images below reviewed personally    < from: US Kidney and Bladder (07.16.18 @ 16:12) >  IMPRESSION:     No hydronephrosis of either kidney.    Due to indwelling Nelson catheter, it is difficult to assess urinary   bladder wall thickening.

## 2018-07-18 NOTE — PROGRESS NOTE ADULT - ASSESSMENT
77 m with HTN, BPH, HIV c/b HIV related Dementia, on truvada, norvir, prezista, last CD4: 327, and VL<30, 5/2018, recent fever, chills, loss of appetite followed but urology visit which recommended straight cath at home but before the family tried pt developed shaking chills, high fever and lethargy so brought to ER, pt non verbal at baseline but wife denied cough, SOB, chocking on food or diarrhea.  here Tmax: 103.1, tachy, WBC: 12.3, urine and blood cx with E-coli, pt on ceftriaxone  chest CT: ground glass opacities in RUL may represent asymmetric pulm edema vs infection  was started on ceftriaxone and then ertapenem pending E-coli sensitivities renal u/s no hydro  both E-coli strains pan sensitive    urosepsis with E-coli bacteremia due to retention  renal u/s no hydro  2 E-coli in the urine both pan sensitive  repeat blood cx 7/17 negative    * c/w levofloxacin 750 qd  * will need 2 weeks of levo after the negative blood cx until 7/30, can switch to PO in DC  * will sign off, please call with question

## 2018-07-18 NOTE — PROVIDER CONTACT NOTE (OTHER) - ASSESSMENT
Pt pulling on nelson catheter-urine is now red, fruit punch colored. No clots noted. Nelson site is WNL.

## 2018-07-18 NOTE — PROGRESS NOTE ADULT - ATTENDING COMMENTS
Agree.  Improving on abx.  Transition to fluoroquinolone, d/c home after 2nd culture neg x 48 hrs.   D/w pulm this morning, greatly appreciate his input: no tap to pleural effusion.   Dispo planning. Cont nelson on discharge w/ close urology f/u.

## 2018-07-18 NOTE — PROGRESS NOTE ADULT - ASSESSMENT
78 yo Male w/ HIV c/b HIV related Dementia, HTN, BPH, presenting w/ c/o of fevers and lethargy x 4 days, admitted w/ sepsis 2/2 E. coli UTI. Incidentally discovered to have R sided effusion, w/ loculation.

## 2018-07-18 NOTE — PROGRESS NOTE ADULT - PROBLEM SELECTOR PLAN 1
Patient admitted w/ fevers (103), tachycardia, w/ noted LE/nitrites on U/A, and leukocytosis concerning for sepsis 2/2 UTI. Urine cx (+) for E. coli. Blood cx (+) for E. coli.  - per ID ertapenem --> levofloxacin 750 mg daily; will need to continue this for two weeks following negative cx  - blood cx (7/15) +kiley for E.coli; f/u repeat cxs  - c/w IVF and trend fever curve; tylenol prn for fever  - nelson placed d/t retention; per urology, maintain nelson and consider tov in one week  - bladder u/s, no hydronephrosis

## 2018-07-18 NOTE — CHART NOTE - NSCHARTNOTEFT_GEN_A_CORE
Based on patient's ongoing issues with deconditioning and generalized weakness secondary to patient's diagnosis of uterine cancer. Patient will require a semi-electric hospital bed. This is necessary to achieve positioning and elevation not able to obtain in an ordinary bed. Bed pillows and wedges have been tried and ruled out.    Diane Santiago  PGY3  Medicine

## 2018-07-18 NOTE — PROGRESS NOTE ADULT - PROBLEM SELECTOR PLAN 2
-management as above  -per pulm, continue with current management; will f/u re: loculated pleural effusion seen on chest CT -management as above  -per pulm, continue with current management; no need to tap effusion.

## 2018-07-19 LAB
ANION GAP SERPL CALC-SCNC: 12 MMOL/L — SIGNIFICANT CHANGE UP (ref 5–17)
BUN SERPL-MCNC: 16 MG/DL — SIGNIFICANT CHANGE UP (ref 7–23)
CALCIUM SERPL-MCNC: 8.6 MG/DL — SIGNIFICANT CHANGE UP (ref 8.4–10.5)
CHLORIDE SERPL-SCNC: 105 MMOL/L — SIGNIFICANT CHANGE UP (ref 96–108)
CO2 SERPL-SCNC: 27 MMOL/L — SIGNIFICANT CHANGE UP (ref 22–31)
CREAT SERPL-MCNC: 0.96 MG/DL — SIGNIFICANT CHANGE UP (ref 0.5–1.3)
GLUCOSE SERPL-MCNC: 130 MG/DL — HIGH (ref 70–99)
HCT VFR BLD CALC: 37.7 % — LOW (ref 39–50)
HGB BLD-MCNC: 12.9 G/DL — LOW (ref 13–17)
MAGNESIUM SERPL-MCNC: 2.2 MG/DL — SIGNIFICANT CHANGE UP (ref 1.6–2.6)
MCHC RBC-ENTMCNC: 33.1 PG — SIGNIFICANT CHANGE UP (ref 27–34)
MCHC RBC-ENTMCNC: 34.1 GM/DL — SIGNIFICANT CHANGE UP (ref 32–36)
MCV RBC AUTO: 96.9 FL — SIGNIFICANT CHANGE UP (ref 80–100)
PHOSPHATE SERPL-MCNC: 3.6 MG/DL — SIGNIFICANT CHANGE UP (ref 2.5–4.5)
PLATELET # BLD AUTO: 272 K/UL — SIGNIFICANT CHANGE UP (ref 150–400)
POTASSIUM SERPL-MCNC: 3.6 MMOL/L — SIGNIFICANT CHANGE UP (ref 3.5–5.3)
POTASSIUM SERPL-SCNC: 3.6 MMOL/L — SIGNIFICANT CHANGE UP (ref 3.5–5.3)
RBC # BLD: 3.89 M/UL — LOW (ref 4.2–5.8)
RBC # FLD: 12.2 % — SIGNIFICANT CHANGE UP (ref 10.3–14.5)
SODIUM SERPL-SCNC: 144 MMOL/L — SIGNIFICANT CHANGE UP (ref 135–145)
WBC # BLD: 8.3 K/UL — SIGNIFICANT CHANGE UP (ref 3.8–10.5)
WBC # FLD AUTO: 8.3 K/UL — SIGNIFICANT CHANGE UP (ref 3.8–10.5)

## 2018-07-19 PROCEDURE — 99233 SBSQ HOSP IP/OBS HIGH 50: CPT | Mod: GC

## 2018-07-19 RX ADMIN — DARUNAVIR 800 MILLIGRAM(S): 75 TABLET, FILM COATED ORAL at 11:24

## 2018-07-19 RX ADMIN — OLANZAPINE 2.5 MILLIGRAM(S): 15 TABLET, FILM COATED ORAL at 14:24

## 2018-07-19 RX ADMIN — FINASTERIDE 5 MILLIGRAM(S): 5 TABLET, FILM COATED ORAL at 11:24

## 2018-07-19 RX ADMIN — HEPARIN SODIUM 5000 UNIT(S): 5000 INJECTION INTRAVENOUS; SUBCUTANEOUS at 14:24

## 2018-07-19 RX ADMIN — HEPARIN SODIUM 5000 UNIT(S): 5000 INJECTION INTRAVENOUS; SUBCUTANEOUS at 05:01

## 2018-07-19 RX ADMIN — HEPARIN SODIUM 5000 UNIT(S): 5000 INJECTION INTRAVENOUS; SUBCUTANEOUS at 22:13

## 2018-07-19 RX ADMIN — ATORVASTATIN CALCIUM 10 MILLIGRAM(S): 80 TABLET, FILM COATED ORAL at 22:13

## 2018-07-19 RX ADMIN — RITONAVIR 100 MILLIGRAM(S): 100 TABLET, FILM COATED ORAL at 11:24

## 2018-07-19 RX ADMIN — DONEPEZIL HYDROCHLORIDE 10 MILLIGRAM(S): 10 TABLET, FILM COATED ORAL at 22:13

## 2018-07-19 RX ADMIN — TERAZOSIN HYDROCHLORIDE 2 MILLIGRAM(S): 10 CAPSULE ORAL at 22:13

## 2018-07-19 RX ADMIN — EMTRICITABINE AND TENOFOVIR DISOPROXIL FUMARATE 1 TABLET(S): 200; 300 TABLET, FILM COATED ORAL at 11:24

## 2018-07-19 NOTE — CHART NOTE - NSCHARTNOTEFT_GEN_A_CORE
Based on patient's ongoing issues with deconditioning and generalized weakness secondary to patient's diagnosis of HIV w/ HIV related Dementia. Patient will require a semi-electric hospital bed. This is necessary to achieve positioning and elevation not able to obtain in an ordinary bed. Bed pillows and wedges have been tried and ruled out.    Diane Santiago  PGY3  Medicine

## 2018-07-19 NOTE — PROGRESS NOTE ADULT - ASSESSMENT
76 yo Male w/ HIV c/b HIV related Dementia, HTN, BPH, presenting w/ c/o of fevers and lethargy x 4 days, admitted w/ sepsis 2/2 E. coli UTI. Incidentally discovered to have R sided effusion, w/ loculation.

## 2018-07-19 NOTE — PROGRESS NOTE ADULT - SUBJECTIVE AND OBJECTIVE BOX
Dr. Chilango Zayas PGY1 Pager [phone]      Patient is a 77y old  Male who presents with a chief complaint of Sepsis from presumed UTI (15 Jul 2018 14:26)      INTERVAL HPI/OVERNIGHT EVENTS:  Wong back with blood. Otherwise, no acute events. Remains HD stable.    Unable to obtain ROS. Pt is nonverbal.      T(C): 36.9 (07-19-18 @ 05:16), Max: 36.9 (07-18-18 @ 22:53)  HR: 69 (07-19-18 @ 05:16) (69 - 70)  BP: 120/69 (07-19-18 @ 05:16) (107/68 - 122/73)  RR: 17 (07-19-18 @ 05:16) (17 - 18)  SpO2: 96% (07-19-18 @ 05:16) (96% - 98%)  Wt(kg): --Vital Signs Last 24 Hrs  T(C): 36.9 (19 Jul 2018 05:16), Max: 36.9 (18 Jul 2018 22:53)  T(F): 98.4 (19 Jul 2018 05:16), Max: 98.5 (18 Jul 2018 22:53)  HR: 69 (19 Jul 2018 05:16) (69 - 70)  BP: 120/69 (19 Jul 2018 05:16) (107/68 - 122/73)  BP(mean): --  RR: 17 (19 Jul 2018 05:16) (17 - 18)  SpO2: 96% (19 Jul 2018 05:16) (96% - 98%)    PHYSICAL EXAM:  GENERAL: NAD  NERVOUS SYSTEM:  Awake; no FND  CHEST/LUNG: Clear to auscultation anteriorly  HEART: Regular rate and rhythm; No murmurs, rubs, or gallops  ABDOMEN: Soft, Nontender, Nondistended; Bowel sounds present  EXTREMITIES:  No edema or cyanosis      Consultant(s) Notes Reviewed:  [x ] YES  [ ] NO  Care Discussed with Consultants/Other Providers [ x] YES  [ ] NO    LABS:                        12.9   8.3   )-----------( 272      ( 19 Jul 2018 07:03 )             37.7     07-19    144  |  105  |  16  ----------------------------<  130<H>  3.6   |  27  |  0.96    Ca    8.6      19 Jul 2018 07:03  Phos  3.6     07-19  Mg     2.2     07-19    TPro  7.2  /  Alb  2.9<L>  /  TBili  0.3  /  DBili  x   /  AST  33  /  ALT  20  /  AlkPhos  145<H>  07-18        CAPILLARY BLOOD GLUCOSE                RADIOLOGY & ADDITIONAL TESTS:    Imaging Personally Reviewed:  [ ] YES  [ ] NO

## 2018-07-19 NOTE — PROGRESS NOTE ADULT - ATTENDING COMMENTS
Agree.  urine bag with clear yellow urine, very slight blood tinge.  dc planning.  urologist to d/w family.  TTV.

## 2018-07-19 NOTE — PROGRESS NOTE ADULT - PROBLEM SELECTOR PLAN 1
Patient admitted w/ fevers (103), tachycardia, w/ noted LE/nitrites on U/A, and leukocytosis concerning for sepsis 2/2 UTI. Urine cx (+) for E. coli. Blood cx (+) for E. coli.  - levofloxacin 750 mg daily; will need to continue this for two weeks following negative cx (until 7/30)  - blood cx (7/15) +kiley for E.coli; repeat cxs (7/17) NGTD  - c/w IVF and trend fever curve; tylenol prn for fever  - nelson placed d/t retention; per urology, maintain nelson and consider tov in one week; will need to f/u with urology re: blood in nelson and recs for further management  - bladder u/s, no hydronephrosis

## 2018-07-20 ENCOUNTER — TRANSCRIPTION ENCOUNTER (OUTPATIENT)
Age: 78
End: 2018-07-20

## 2018-07-20 VITALS
OXYGEN SATURATION: 96 % | HEART RATE: 68 BPM | RESPIRATION RATE: 18 BRPM | DIASTOLIC BLOOD PRESSURE: 66 MMHG | TEMPERATURE: 99 F | SYSTOLIC BLOOD PRESSURE: 112 MMHG

## 2018-07-20 LAB
CULTURE RESULTS: SIGNIFICANT CHANGE UP
SPECIMEN SOURCE: SIGNIFICANT CHANGE UP

## 2018-07-20 PROCEDURE — 87486 CHLMYD PNEUM DNA AMP PROBE: CPT

## 2018-07-20 PROCEDURE — 97163 PT EVAL HIGH COMPLEX 45 MIN: CPT

## 2018-07-20 PROCEDURE — 87186 SC STD MICRODIL/AGAR DIL: CPT

## 2018-07-20 PROCEDURE — 96375 TX/PRO/DX INJ NEW DRUG ADDON: CPT

## 2018-07-20 PROCEDURE — 99285 EMERGENCY DEPT VISIT HI MDM: CPT | Mod: 25

## 2018-07-20 PROCEDURE — 87536 HIV-1 QUANT&REVRSE TRNSCRPJ: CPT

## 2018-07-20 PROCEDURE — 87086 URINE CULTURE/COLONY COUNT: CPT

## 2018-07-20 PROCEDURE — 85027 COMPLETE CBC AUTOMATED: CPT

## 2018-07-20 PROCEDURE — 84100 ASSAY OF PHOSPHORUS: CPT

## 2018-07-20 PROCEDURE — 99239 HOSP IP/OBS DSCHRG MGMT >30: CPT

## 2018-07-20 PROCEDURE — 82435 ASSAY OF BLOOD CHLORIDE: CPT

## 2018-07-20 PROCEDURE — 87581 M.PNEUMON DNA AMP PROBE: CPT

## 2018-07-20 PROCEDURE — 80048 BASIC METABOLIC PNL TOTAL CA: CPT

## 2018-07-20 PROCEDURE — 80053 COMPREHEN METABOLIC PANEL: CPT

## 2018-07-20 PROCEDURE — 93005 ELECTROCARDIOGRAM TRACING: CPT

## 2018-07-20 PROCEDURE — 87040 BLOOD CULTURE FOR BACTERIA: CPT

## 2018-07-20 PROCEDURE — 81001 URINALYSIS AUTO W/SCOPE: CPT

## 2018-07-20 PROCEDURE — 87798 DETECT AGENT NOS DNA AMP: CPT

## 2018-07-20 PROCEDURE — 87633 RESP VIRUS 12-25 TARGETS: CPT

## 2018-07-20 PROCEDURE — 82330 ASSAY OF CALCIUM: CPT

## 2018-07-20 PROCEDURE — 84132 ASSAY OF SERUM POTASSIUM: CPT

## 2018-07-20 PROCEDURE — 85014 HEMATOCRIT: CPT

## 2018-07-20 PROCEDURE — 71045 X-RAY EXAM CHEST 1 VIEW: CPT

## 2018-07-20 PROCEDURE — 96374 THER/PROPH/DIAG INJ IV PUSH: CPT

## 2018-07-20 PROCEDURE — 83735 ASSAY OF MAGNESIUM: CPT

## 2018-07-20 PROCEDURE — 82550 ASSAY OF CK (CPK): CPT

## 2018-07-20 PROCEDURE — 71250 CT THORAX DX C-: CPT

## 2018-07-20 PROCEDURE — 97110 THERAPEUTIC EXERCISES: CPT

## 2018-07-20 PROCEDURE — 87150 DNA/RNA AMPLIFIED PROBE: CPT

## 2018-07-20 PROCEDURE — 97116 GAIT TRAINING THERAPY: CPT

## 2018-07-20 PROCEDURE — 83605 ASSAY OF LACTIC ACID: CPT

## 2018-07-20 PROCEDURE — 82947 ASSAY GLUCOSE BLOOD QUANT: CPT

## 2018-07-20 PROCEDURE — 84295 ASSAY OF SERUM SODIUM: CPT

## 2018-07-20 PROCEDURE — 76770 US EXAM ABDO BACK WALL COMP: CPT

## 2018-07-20 PROCEDURE — 82803 BLOOD GASES ANY COMBINATION: CPT

## 2018-07-20 RX ORDER — AMLODIPINE BESYLATE 2.5 MG/1
5 TABLET ORAL DAILY
Qty: 0 | Refills: 0 | Status: DISCONTINUED | OUTPATIENT
Start: 2018-07-20 | End: 2018-07-20

## 2018-07-20 RX ADMIN — DARUNAVIR 800 MILLIGRAM(S): 75 TABLET, FILM COATED ORAL at 11:31

## 2018-07-20 RX ADMIN — OLANZAPINE 2.5 MILLIGRAM(S): 15 TABLET, FILM COATED ORAL at 11:31

## 2018-07-20 RX ADMIN — HEPARIN SODIUM 5000 UNIT(S): 5000 INJECTION INTRAVENOUS; SUBCUTANEOUS at 05:34

## 2018-07-20 RX ADMIN — AMLODIPINE BESYLATE 5 MILLIGRAM(S): 2.5 TABLET ORAL at 11:47

## 2018-07-20 RX ADMIN — HEPARIN SODIUM 5000 UNIT(S): 5000 INJECTION INTRAVENOUS; SUBCUTANEOUS at 15:18

## 2018-07-20 RX ADMIN — RITONAVIR 100 MILLIGRAM(S): 100 TABLET, FILM COATED ORAL at 11:31

## 2018-07-20 RX ADMIN — EMTRICITABINE AND TENOFOVIR DISOPROXIL FUMARATE 1 TABLET(S): 200; 300 TABLET, FILM COATED ORAL at 11:31

## 2018-07-20 NOTE — PROGRESS NOTE ADULT - PROBLEM SELECTOR PLAN 7
-Regular diet  -aspiration precautions  -PT consult
-Regular diet  -aspiration precautions  -PT, subacute rehab
-Regular diet  -aspiration precautions  -f/u PT consult

## 2018-07-20 NOTE — PROGRESS NOTE ADULT - PROBLEM SELECTOR PLAN 1
Patient admitted w/ fevers (103), tachycardia, w/ noted LE/nitrites on U/A, and leukocytosis concerning for sepsis 2/2 UTI. Urine cx (+) for E. coli. Blood cx (+) for E. coli.  - levofloxacin 750 mg IV daily; will need to continue this for two weeks following negative cx (until 7/30); switch to oral at d/c  - blood cx (7/15) +kiley for E.coli; repeat cxs (7/17) NGTD  - trend fever curve; tylenol prn for fever  - nelson placed d/t retention; tov attempted yesterday, pt had spontaneous void  - bladder u/s, no hydronephrosis  - pt is medically stable for d/c

## 2018-07-20 NOTE — PROGRESS NOTE ADULT - PROBLEM SELECTOR PLAN 6
-c/w flomax 0.4mg (w/ hold parameters)  -c/w finasteride 5mg

## 2018-07-20 NOTE — PROGRESS NOTE ADULT - ATTENDING COMMENTS
Agree. Seen and examined, well appearing this AM upright in bed. Minimally talkative.  Wong out, TTV successful. F/u w/ urology next week.  Appreciate urology d/w family.  Complete 14 days total abx.  Risks/benefits/alternatives for all tx including abx discussed w/ wife.  34 minutes spent coordinating d/c.

## 2018-07-20 NOTE — DISCHARGE NOTE ADULT - CARE PROVIDER_API CALL
Sushil Gonzales), Internal Medicine  [address]  [address]  [place] [address]  Phone: [phone]  Fax: [phone]    Perez Nunez), Administration  [address] [place] [address]  Phone: [phone]  Fax: [phone]    Roshan Liz), Infectious Disease; Internal Medicine  [address] [place] [id number]  Phone: [phone]  Fax: [phone]

## 2018-07-20 NOTE — DISCHARGE NOTE ADULT - CARE PROVIDERS DIRECT ADDRESSES
,DirectAddress_Unknown,DirectAddress_Unknown,[email or web address]

## 2018-07-20 NOTE — PROGRESS NOTE ADULT - SUBJECTIVE AND OBJECTIVE BOX
Dr. Chilango Zayas PGY1 Pager [phone]      Patient is a 77y old  Male who presents with a chief complaint of Sepsis from presumed UTI (15 Jul 2018 14:26)      INTERVAL HPI/OVERNIGHT EVENTS:  Wong removed yesterday. TOV successful.     Unable to obtain ROS. Pt is nonverbal.      T(C): 36.8 (07-20-18 @ 04:37), Max: 37.3 (07-19-18 @ 22:06)  HR: 83 (07-20-18 @ 04:37) (69 - 83)  BP: 124/72 (07-20-18 @ 04:37) (111/69 - 135/65)  RR: 18 (07-20-18 @ 04:37) (18 - 18)  SpO2: 99% (07-20-18 @ 04:37) (98% - 99%)  Wt(kg): --Vital Signs Last 24 Hrs  T(C): 36.8 (20 Jul 2018 04:37), Max: 37.3 (19 Jul 2018 22:06)  T(F): 98.2 (20 Jul 2018 04:37), Max: 99.2 (19 Jul 2018 22:06)  HR: 83 (20 Jul 2018 04:37) (69 - 83)  BP: 124/72 (20 Jul 2018 04:37) (111/69 - 135/65)  BP(mean): --  RR: 18 (20 Jul 2018 04:37) (18 - 18)  SpO2: 99% (20 Jul 2018 04:37) (98% - 99%)    PHYSICAL EXAM:  GENERAL: NAD  NERVOUS SYSTEM:  Awake; no FND  CHEST/LUNG: Clear to auscultation anteriorly  HEART: Regular rate and rhythm; No murmurs, rubs, or gallops  ABDOMEN: Soft, Nontender, Nondistended; Bowel sounds present  EXTREMITIES:  No edema or cyanosis    Consultant(s) Notes Reviewed:  [x ] YES  [ ] NO  Care Discussed with Consultants/Other Providers [ x] YES  [ ] NO    LABS:                        12.9   8.3   )-----------( 272      ( 19 Jul 2018 07:03 )             37.7     07-19    144  |  105  |  16  ----------------------------<  130<H>  3.6   |  27  |  0.96    Ca    8.6      19 Jul 2018 07:03  Phos  3.6     07-19  Mg     2.2     07-19          CAPILLARY BLOOD GLUCOSE                RADIOLOGY & ADDITIONAL TESTS:    Imaging Personally Reviewed:  [X ] YES  [ ] NO

## 2018-07-20 NOTE — PROGRESS NOTE ADULT - ASSESSMENT
Improving urosepsis/UTI    small pleural effusions (R>L) - reviewed with thor radiology (Dr. MERYL Guerrero) - appears on prior CTs, increased recently ;  b/l, with large heart ? cardiac etio?; discussed with dr. Bartlett; to follow as outpt    Continue discharge planning

## 2018-07-20 NOTE — PROGRESS NOTE ADULT - SUBJECTIVE AND OBJECTIVE BOX
PULM/MED PROGRESS NOTE  awake, somewhat more alert (repeats my statement  "Good morning"); appears  comfortable in bed.      MEDICATIONS  (STANDING):  atorvastatin 10 milliGRAM(s) Oral at bedtime  darunavir 800 milliGRAM(s) Oral daily  donepezil 10 milliGRAM(s) Oral at bedtime  emtricitabine 200 mG/tenofovir 300 mG (TRUVADA) 1 Tablet(s) Oral daily  finasteride 5 milliGRAM(s) Oral daily  heparin  Injectable 5000 Unit(s) SubCutaneous every 8 hours  levoFLOXacin IVPB 750 milliGRAM(s) IV Intermittent every 24 hours  OLANZapine 2.5 milliGRAM(s) Oral daily  ritonavir Tablet 100 milliGRAM(s) Oral daily  sodium chloride 0.9%. 1000 milliLiter(s) (75 mL/Hr) IV Continuous <Continuous>  terazosin 2 milliGRAM(s) Oral at bedtime      MEDICATIONS  (PRN):  acetaminophen   Tablet 650 milliGRAM(s) Oral every 6 hours PRN For Temp greater than 38 C (100.4 F)  acetaminophen   Tablet. 650 milliGRAM(s) Oral every 6 hours PRN Moderate Pain (4 - 6)          Vital Signs Last 24 Hrs  T(C): 36.8 (20 Jul 2018 04:37), Max: 37.3 (19 Jul 2018 22:06)  T(F): 98.2 (20 Jul 2018 04:37), Max: 99.2 (19 Jul 2018 22:06)  HR: 83 (20 Jul 2018 04:37) (69 - 83)  BP: 124/72 (20 Jul 2018 04:37) (111/69 - 135/65)  BP(mean): --  RR: 18 (20 Jul 2018 04:37) (18 - 18)  SpO2: 99% (20 Jul 2018 04:37) (98% - 99%)    PHYSICAL EXAMINATION:        NECK: Supple.    LUNGS: Clear anteriorly    HEART: Regular rate    ABDOMEN: Soft, nontender,    EXTREMITIES: Without  edema    LABS:                        12.9   8.3   )-----------( 272      ( 19 Jul 2018 07:03 )             37.7     07-19    144  |  105  |  16  ----------------------------<  130<H>  3.6   |  27  |  0.96    Ca    8.6      19 Jul 2018 07:03  Phos  3.6     07-19  Mg     2.2     07-19    MICROBIOLOGY:  Culture Results:   No growth to date. (07-17 @ 12:28)  Culture Results:   No growth to date. (07-17 @ 08:50)

## 2018-07-20 NOTE — PROGRESS NOTE ADULT - PROBLEM SELECTOR PROBLEM 6
BPH (benign prostatic hyperplasia)

## 2018-07-20 NOTE — DISCHARGE NOTE ADULT - PLAN OF CARE
Resolution. Please be sure to follow up with your urologist, Dr. Nunez, after discharge. You came into the hospital because of fever and weakness. You were found to have an infection in your urine caused by E. coli. You have a history of benign prostatic hyperplasia (BPH), which is why you were seen by a urologist while in the hospital. Please continue to take your BPH medications as prescribed. Also, be sure to follow up with Dr. Nunez for further management of your urologic conditions. Minimize viral load. Please be sure to follow up with Dr. Liz after discharge. You have a history of HIV infection. You were continued on your medication while you were being treated for your urinary tract infection. Please continue to take your medication as prescribed and be sure to follow up with Dr. Liz for continued management. Resolution. Please be sure to follow up with your PCP, Dr. Gonzales, within 1-2 weeks of discharge from the hospital. You came into the hospital because you had a fever and was weaker than usual. While in the hospital you were found to have an infection in your urine caused by the bacteria E. coli. You were also found to have the bacteria in your blood. You were placed on antibiotics which improved your symptoms. You will complete your course of antibiotics for 10 more days (stop date 7/30), giving you a total of 14 days.

## 2018-07-20 NOTE — PROGRESS NOTE ADULT - PROBLEM SELECTOR PLAN 4
-baseline mental status for patient: minimally verbal, only states "yes", able to ambulate independently prior to the fevers, able to eat regular diet  -followed by Dr. Curt Martin  -c/w donepezil 10mgqd  -c/w olanzapine 2.5mg qd, as per wife, has not had behavioral disturbances for 6 months  -aspiration precautions
-baseline mental status for patient: minimally verbal, only states "yes", able to ambulate independently prior to the fevers, able to eat regular diet  -followed by Dr. Curt Martin  -c/w donepezil 10mgqd  -c/w olanzapine 2.5mg qd, as per wife, has not had behavioral disturbances for 6 months  -aspiration precautions
-baseline mental status for patient: minimally verbal, only states "yes", able to ambulate independently prior to the fevers, able to eat regular diet  -followed by Dr. ta Martin  -c/w donepezil 10mgqd  -c/w olanzapine 2.5mg qd, as per wife, has not had behavioral disturbances for 6 months  -aspiration precautions

## 2018-07-20 NOTE — PROGRESS NOTE ADULT - PROBLEM SELECTOR PLAN 3
-As per wife, patient diagnosed in 2001, and for the last 2-3 years has been compliant w/ meds as she gives him the medications  -followed by Dr. Liz outpatient   -will continue w/ truvada, ritonavir, and darunavir
-As per wife, patient diagnosed in 2001, and for the last 2-3 years has been compliant w/ meds as she gives him the medications  -followed by Dr. Liz outpatient   -will continue w/ truvada, ritonavir, and darunavir  -per ID, if worsening clinical status CT abd/pel with contrast
-As per wife, patient diagnosed in 2001, and for the last 2-3 years has been compliant w/ meds as she gives him the medications  -followed by Dr. Liz outpatient   -will continue w/ truvada, ritonavir, and darunavir  -per ID, if worsening clinical status CT abd/pel with contrast  -f/u CD4 count
-As per wife, patient diagnosed in 2001, and for the last 2-3 years has been compliant w/ meds as she gives him the medications  -followed by Dr. Liz outpatient   -will continue w/ truvada, ritonavir, and darunavir  -per ID, if worsening clinical status CT abd/pel with contrast  -f/u CD4 count
-As per wife, patient diagnosed in 2001, and for the last 2-3 years has been compliant w/ meds as she gives him the medications  -followed by Dr. Liz outpatient   -will continue w/ truvada, ritonavir, and darunavir  -will call ID   -f/u CD4 count

## 2018-07-20 NOTE — DISCHARGE NOTE ADULT - HOSPITAL COURSE
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 coli UTI. The pt was also incidentally discovered to have R sided effusion, w/ loculation. Initial blood cxs were positive for E. coli. Repeat taken on 7/17 have been NGTD. The pt is currently on levofloxacin for a total 14 day course from the last negative cx. Clinically he has improved and has remained afebrile and HD stable. His stay was c/b urinary retention, for which a nelson was placed. TOV was attempted on 7/19. Pt was able to spontaneously void. Mr. Goel is a 78 yo man w/ HIV c/b HIV related Dementia, HTN, BPH, was admitted on 7/15/18 w/ c/o of fevers and lethargy x 4 days, in the setting of sepsis 2/2 E. coli UTI. The pt was also incidentally discovered to have R sided effusion, w/ loculation, no intervention per home pulmonologist/PCP who reviewed imaging. Initial blood cxs were positive for E. coli. Repeat taken on 7/17 have been NGTD. The pt is currently on levofloxacin for a total 14 day course from the last negative cx. Clinically he has improved and has remained afebrile and HD stable. His stay was c/b urinary retention, for which a nelson was placed. TOV was attempted on 7/19. Pt was able to spontaneously void and has f/u w/ urology and pulmonology. Followed in house by urology, ID, home pulmonologist. At time of discharge patient had improved to his baseline clinical status per wife. Close f/u and strict RTC.

## 2018-07-20 NOTE — DISCHARGE NOTE ADULT - ADDITIONAL INSTRUCTIONS
Please be sure to follow up with Dr. Gonzales within 1-2 weeks after discharge from the hospital. Please be sure to continue your antibiotics for 10 more days. Stop date will be 7/30.    Please be sure to follow up with Dr. Nunez following discharge from the hospital.    Please be sure to follow up with Dr. Liz following discharge from the hospital.

## 2018-07-20 NOTE — PROGRESS NOTE ADULT - PROBLEM/PLAN-8
DISPLAY PLAN FREE TEXT
good balance

## 2018-07-20 NOTE — DISCHARGE NOTE ADULT - PATIENT PORTAL LINK FT
You can access the [facility], offered by [facility], by registering with the following website: [email or web address]

## 2018-07-20 NOTE — DISCHARGE NOTE ADULT - MEDICATION SUMMARY - MEDICATIONS TO TAKE
I will START or STAY ON the medications listed below when I get home from the hospital:    finasteride 5 mg oral tablet  -- 1 tab(s) by mouth once a day  -- Indication: For BPH (benign prostatic hyperplasia)    terazosin 2 mg oral capsule  -- 1 cap(s) by mouth once a day (at bedtime)  -- Indication: For BPH (benign prostatic hyperplasia)    atorvastatin 10 mg oral tablet  -- 1 tab(s) by mouth once a day (at bedtime)  -- Indication: For Cardioprotective    OLANZapine 2.5 mg oral tablet  -- 1 tab(s) by mouth once a day  -- Indication: For Alzheimer's disease of other onset    Prezista 800 mg oral tablet  -- 1 tab(s) by mouth once a day  -- Indication: For HIV (human immunodeficiency virus infection)    emtricitabine-tenofovir disoproxil 200 mg-300 mg oral tablet  -- 1 tab(s) by mouth once a day  -- Indication: For HIV (human immunodeficiency virus infection)    ritonavir 100 mg oral tablet  -- 1 tab(s) by mouth once a day  -- Indication: For HIV (human immunodeficiency virus infection)    amLODIPine 5 mg oral tablet  -- 1 tab(s) by mouth once a day  -- Indication: For Hypertension    donepezil 10 mg oral tablet  -- 1 tab(s) by mouth once a day (at bedtime)  -- Indication: For Alzheimer's disease of other onset    Levaquin 750 mg oral tablet  -- 1 tab(s) by mouth every 24 hours for 10 days.  Start Date: 7/21  Stop Date: 7/30  -- Indication: For Urinary tract infection

## 2018-07-20 NOTE — PROGRESS NOTE ADULT - PROBLEM SELECTOR PLAN 5
-BP stable  -will hold anti-htn (home amlodipine 5mg) in setting of sepsis
-BP stable  -will hold anti-htn (home amlodipine 5mg) as pressures have remained stable
-BP stable  -will hold anti-htn (home amlodipine 5mg) as pressures have remained stable
-BP stable  -will hold anti-htn (home amlodipine 5mg) in setting of sepsis
-BP stable  -will hold anti-htn (home amlodipine 5mg) in setting of sepsis

## 2018-07-20 NOTE — PROGRESS NOTE ADULT - PROVIDER SPECIALTY LIST ADULT
Infectious Disease
Infectious Disease
Internal Medicine
Pulmonology
Internal Medicine
Internal Medicine

## 2018-07-20 NOTE — PROGRESS NOTE ADULT - PROBLEM SELECTOR PROBLEM 4
Alzheimer's disease of other onset

## 2018-07-20 NOTE — DISCHARGE NOTE ADULT - CARE PLAN
Principal Discharge DX:	Sepsis due to Escherichia coli  Goal:	Resolution. Please be sure to follow up with your PCP, Dr. Gonzales, within 1-2 weeks of discharge from the hospital.  Assessment and plan of treatment:	You came into the hospital because you had a fever and was weaker than usual. While in the hospital you were found to have an infection in your urine caused by the bacteria E. coli. You were also found to have the bacteria in your blood. You were placed on antibiotics which improved your symptoms. You will complete your course of antibiotics for 10 more days (stop date 7/30), giving you a total of 14 days.  Secondary Diagnosis:	UTI (urinary tract infection)  Goal:	Resolution. Please be sure to follow up with your urologist, Dr. Nunez, after discharge.  Assessment and plan of treatment:	You came into the hospital because of fever and weakness. You were found to have an infection in your urine caused by E. coli. You have a history of benign prostatic hyperplasia (BPH), which is why you were seen by a urologist while in the hospital. Please continue to take your BPH medications as prescribed. Also, be sure to follow up with Dr. Nunez for further management of your urologic conditions.  Secondary Diagnosis:	HIV (human immunodeficiency virus infection)  Goal:	Minimize viral load. Please be sure to follow up with Dr. Liz after discharge.  Assessment and plan of treatment:	You have a history of HIV infection. You were continued on your medication while you were being treated for your urinary tract infection. Please continue to take your medication as prescribed and be sure to follow up with Dr. Liz for continued management.

## 2018-08-02 ENCOUNTER — OUTPATIENT (OUTPATIENT)
Dept: OUTPATIENT SERVICES | Facility: HOSPITAL | Age: 78
LOS: 1 days | End: 2018-08-02
Payer: MEDICARE

## 2018-08-02 ENCOUNTER — APPOINTMENT (OUTPATIENT)
Dept: UROLOGY | Facility: CLINIC | Age: 78
End: 2018-08-02
Payer: MEDICARE

## 2018-08-02 VITALS
WEIGHT: 143.05 LBS | RESPIRATION RATE: 17 BRPM | DIASTOLIC BLOOD PRESSURE: 63 MMHG | HEIGHT: 72 IN | SYSTOLIC BLOOD PRESSURE: 133 MMHG | HEART RATE: 90 BPM | BODY MASS INDEX: 19.38 KG/M2

## 2018-08-02 DIAGNOSIS — R33.9 RETENTION OF URINE, UNSPECIFIED: ICD-10-CM

## 2018-08-02 DIAGNOSIS — R33.8 OTHER RETENTION OF URINE: ICD-10-CM

## 2018-08-02 DIAGNOSIS — N31.9 NEUROMUSCULAR DYSFUNCTION OF BLADDER, UNSPECIFIED: ICD-10-CM

## 2018-08-02 DIAGNOSIS — N40.0 BENIGN PROSTATIC HYPERPLASIA WITHOUT LOWER URINARY TRACT SYMPTOMS: ICD-10-CM

## 2018-08-02 PROCEDURE — 51701 INSERT BLADDER CATHETER: CPT

## 2018-08-02 PROCEDURE — 51798 US URINE CAPACITY MEASURE: CPT

## 2018-08-02 PROCEDURE — 99204 OFFICE O/P NEW MOD 45 MIN: CPT | Mod: 25

## 2018-08-02 RX ORDER — NYSTATIN 100000 [USP'U]/ML
100000 SUSPENSION ORAL
Qty: 2 | Refills: 0 | Status: COMPLETED | COMMUNITY
Start: 2017-12-20 | End: 2018-08-02

## 2018-08-02 RX ORDER — FINASTERIDE 5 MG/1
5 TABLET, FILM COATED ORAL DAILY
Refills: 0 | Status: DISCONTINUED | COMMUNITY
Start: 2017-10-26 | End: 2018-08-02

## 2018-08-02 RX ORDER — TAMSULOSIN HYDROCHLORIDE 0.4 MG/1
0.4 CAPSULE ORAL
Refills: 0 | Status: DISCONTINUED | COMMUNITY
Start: 2017-10-26 | End: 2018-08-02

## 2018-08-07 ENCOUNTER — INPATIENT (INPATIENT)
Facility: HOSPITAL | Age: 78
LOS: 7 days | Discharge: NURSING, REHAB OR LONG-TERM CARE | DRG: 698 | End: 2018-08-15
Attending: GENERAL PRACTICE | Admitting: GENERAL PRACTICE
Payer: MEDICARE

## 2018-08-07 VITALS
TEMPERATURE: 99 F | WEIGHT: 143.08 LBS | DIASTOLIC BLOOD PRESSURE: 81 MMHG | OXYGEN SATURATION: 96 % | HEART RATE: 96 BPM | RESPIRATION RATE: 18 BRPM | SYSTOLIC BLOOD PRESSURE: 140 MMHG

## 2018-08-07 PROCEDURE — 99285 EMERGENCY DEPT VISIT HI MDM: CPT | Mod: GC

## 2018-08-08 DIAGNOSIS — B20 HUMAN IMMUNODEFICIENCY VIRUS [HIV] DISEASE: ICD-10-CM

## 2018-08-08 DIAGNOSIS — I10 ESSENTIAL (PRIMARY) HYPERTENSION: ICD-10-CM

## 2018-08-08 DIAGNOSIS — Z29.9 ENCOUNTER FOR PROPHYLACTIC MEASURES, UNSPECIFIED: ICD-10-CM

## 2018-08-08 DIAGNOSIS — R79.89 OTHER SPECIFIED ABNORMAL FINDINGS OF BLOOD CHEMISTRY: ICD-10-CM

## 2018-08-08 DIAGNOSIS — N30.00 ACUTE CYSTITIS WITHOUT HEMATURIA: ICD-10-CM

## 2018-08-08 DIAGNOSIS — N39.0 URINARY TRACT INFECTION, SITE NOT SPECIFIED: ICD-10-CM

## 2018-08-08 DIAGNOSIS — N40.1 BENIGN PROSTATIC HYPERPLASIA WITH LOWER URINARY TRACT SYMPTOMS: ICD-10-CM

## 2018-08-08 DIAGNOSIS — J18.1 LOBAR PNEUMONIA, UNSPECIFIED ORGANISM: ICD-10-CM

## 2018-08-08 DIAGNOSIS — G30.8 OTHER ALZHEIMER'S DISEASE: ICD-10-CM

## 2018-08-08 LAB
ALBUMIN SERPL ELPH-MCNC: 3.6 G/DL — SIGNIFICANT CHANGE UP (ref 3.3–5)
ALP SERPL-CCNC: 165 U/L — HIGH (ref 40–120)
ALT FLD-CCNC: 15 U/L — SIGNIFICANT CHANGE UP (ref 10–45)
ANION GAP SERPL CALC-SCNC: 11 MMOL/L — SIGNIFICANT CHANGE UP (ref 5–17)
ANION GAP SERPL CALC-SCNC: 9 MMOL/L — SIGNIFICANT CHANGE UP (ref 5–17)
APPEARANCE UR: ABNORMAL
AST SERPL-CCNC: 33 U/L — SIGNIFICANT CHANGE UP (ref 10–40)
BACTERIA # UR AUTO: ABNORMAL /HPF
BASE EXCESS BLDV CALC-SCNC: 0.8 MMOL/L — SIGNIFICANT CHANGE UP (ref -2–2)
BASOPHILS # BLD AUTO: 0 K/UL — SIGNIFICANT CHANGE UP (ref 0–0.2)
BASOPHILS NFR BLD AUTO: 0.1 % — SIGNIFICANT CHANGE UP (ref 0–2)
BILIRUB SERPL-MCNC: 0.3 MG/DL — SIGNIFICANT CHANGE UP (ref 0.2–1.2)
BILIRUB UR-MCNC: NEGATIVE — SIGNIFICANT CHANGE UP
BUN SERPL-MCNC: 12 MG/DL — SIGNIFICANT CHANGE UP (ref 7–23)
BUN SERPL-MCNC: 17 MG/DL — SIGNIFICANT CHANGE UP (ref 7–23)
CA-I SERPL-SCNC: 1.25 MMOL/L — SIGNIFICANT CHANGE UP (ref 1.12–1.3)
CALCIUM SERPL-MCNC: 8.5 MG/DL — SIGNIFICANT CHANGE UP (ref 8.4–10.5)
CALCIUM SERPL-MCNC: 9 MG/DL — SIGNIFICANT CHANGE UP (ref 8.4–10.5)
CHLORIDE BLDV-SCNC: 114 MMOL/L — HIGH (ref 96–108)
CHLORIDE SERPL-SCNC: 100 MMOL/L — SIGNIFICANT CHANGE UP (ref 96–108)
CHLORIDE SERPL-SCNC: 107 MMOL/L — SIGNIFICANT CHANGE UP (ref 96–108)
CO2 BLDV-SCNC: 29 MMOL/L — SIGNIFICANT CHANGE UP (ref 22–30)
CO2 SERPL-SCNC: 23 MMOL/L — SIGNIFICANT CHANGE UP (ref 22–31)
CO2 SERPL-SCNC: 25 MMOL/L — SIGNIFICANT CHANGE UP (ref 22–31)
COLOR SPEC: SIGNIFICANT CHANGE UP
CREAT SERPL-MCNC: 0.88 MG/DL — SIGNIFICANT CHANGE UP (ref 0.5–1.3)
CREAT SERPL-MCNC: 0.98 MG/DL — SIGNIFICANT CHANGE UP (ref 0.5–1.3)
DIFF PNL FLD: ABNORMAL
ENTEROCOC DNA BLD POS QL NAA+NON-PROBE: SIGNIFICANT CHANGE UP
EOSINOPHIL # BLD AUTO: 0 K/UL — SIGNIFICANT CHANGE UP (ref 0–0.5)
EOSINOPHIL NFR BLD AUTO: 0.2 % — SIGNIFICANT CHANGE UP (ref 0–6)
GAS PNL BLDV: 144 MMOL/L — SIGNIFICANT CHANGE UP (ref 136–145)
GAS PNL BLDV: SIGNIFICANT CHANGE UP
GAS PNL BLDV: SIGNIFICANT CHANGE UP
GLUCOSE BLDV-MCNC: 120 MG/DL — HIGH (ref 70–99)
GLUCOSE SERPL-MCNC: 128 MG/DL — HIGH (ref 70–99)
GLUCOSE SERPL-MCNC: 152 MG/DL — HIGH (ref 70–99)
GLUCOSE UR QL: NEGATIVE — SIGNIFICANT CHANGE UP
GRAM STN FLD: SIGNIFICANT CHANGE UP
HCO3 BLDV-SCNC: 27 MMOL/L — SIGNIFICANT CHANGE UP (ref 21–29)
HCT VFR BLD CALC: 34.8 % — LOW (ref 39–50)
HCT VFR BLD CALC: 35.1 % — LOW (ref 39–50)
HCT VFR BLDA CALC: 31 % — LOW (ref 39–50)
HGB BLD CALC-MCNC: 10.2 G/DL — LOW (ref 13–17)
HGB BLD-MCNC: 11.9 G/DL — LOW (ref 13–17)
HGB BLD-MCNC: 12.1 G/DL — LOW (ref 13–17)
KETONES UR-MCNC: NEGATIVE — SIGNIFICANT CHANGE UP
LACTATE BLDV-MCNC: 3.3 MMOL/L — HIGH (ref 0.7–2)
LACTATE SERPL-SCNC: 1.7 MMOL/L — SIGNIFICANT CHANGE UP (ref 0.7–2)
LACTATE SERPL-SCNC: 2.6 MMOL/L — HIGH (ref 0.7–2)
LEUKOCYTE ESTERASE UR-ACNC: ABNORMAL
LYMPHOCYTES # BLD AUTO: 1.5 K/UL — SIGNIFICANT CHANGE UP (ref 1–3.3)
LYMPHOCYTES # BLD AUTO: 13.4 % — SIGNIFICANT CHANGE UP (ref 13–44)
MAGNESIUM SERPL-MCNC: 1.8 MG/DL — SIGNIFICANT CHANGE UP (ref 1.6–2.6)
MCHC RBC-ENTMCNC: 32.2 PG — SIGNIFICANT CHANGE UP (ref 27–34)
MCHC RBC-ENTMCNC: 32.7 PG — SIGNIFICANT CHANGE UP (ref 27–34)
MCHC RBC-ENTMCNC: 34.1 GM/DL — SIGNIFICANT CHANGE UP (ref 32–36)
MCHC RBC-ENTMCNC: 34.8 GM/DL — SIGNIFICANT CHANGE UP (ref 32–36)
MCV RBC AUTO: 94 FL — SIGNIFICANT CHANGE UP (ref 80–100)
MCV RBC AUTO: 94.7 FL — SIGNIFICANT CHANGE UP (ref 80–100)
METHOD TYPE: SIGNIFICANT CHANGE UP
MONOCYTES # BLD AUTO: 0.5 K/UL — SIGNIFICANT CHANGE UP (ref 0–0.9)
MONOCYTES NFR BLD AUTO: 5 % — SIGNIFICANT CHANGE UP (ref 2–14)
NEUTROPHILS # BLD AUTO: 8.9 K/UL — HIGH (ref 1.8–7.4)
NEUTROPHILS NFR BLD AUTO: 81.2 % — HIGH (ref 43–77)
NITRITE UR-MCNC: POSITIVE
OTHER CELLS CSF MANUAL: 8 ML/DL — LOW (ref 18–22)
PCO2 BLDV: 54 MMHG — HIGH (ref 35–50)
PH BLDV: 7.32 — LOW (ref 7.35–7.45)
PH UR: 6.5 — SIGNIFICANT CHANGE UP (ref 5–8)
PHOSPHATE SERPL-MCNC: 2.3 MG/DL — LOW (ref 2.5–4.5)
PLATELET # BLD AUTO: 195 K/UL — SIGNIFICANT CHANGE UP (ref 150–400)
PLATELET # BLD AUTO: 221 K/UL — SIGNIFICANT CHANGE UP (ref 150–400)
PO2 BLDV: 33 MMHG — SIGNIFICANT CHANGE UP (ref 25–45)
POTASSIUM BLDV-SCNC: 4.5 MMOL/L — SIGNIFICANT CHANGE UP (ref 3.5–5.3)
POTASSIUM SERPL-MCNC: 4 MMOL/L — SIGNIFICANT CHANGE UP (ref 3.5–5.3)
POTASSIUM SERPL-MCNC: 5 MMOL/L — SIGNIFICANT CHANGE UP (ref 3.5–5.3)
POTASSIUM SERPL-SCNC: 4 MMOL/L — SIGNIFICANT CHANGE UP (ref 3.5–5.3)
POTASSIUM SERPL-SCNC: 5 MMOL/L — SIGNIFICANT CHANGE UP (ref 3.5–5.3)
PROT SERPL-MCNC: 8 G/DL — SIGNIFICANT CHANGE UP (ref 6–8.3)
PROT UR-MCNC: NEGATIVE — SIGNIFICANT CHANGE UP
RBC # BLD: 3.7 M/UL — LOW (ref 4.2–5.8)
RBC # BLD: 3.71 M/UL — LOW (ref 4.2–5.8)
RBC # FLD: 13.5 % — SIGNIFICANT CHANGE UP (ref 10.3–14.5)
RBC # FLD: 13.5 % — SIGNIFICANT CHANGE UP (ref 10.3–14.5)
RBC CASTS # UR COMP ASSIST: ABNORMAL /HPF (ref 0–2)
SAO2 % BLDV: 55 % — LOW (ref 67–88)
SODIUM SERPL-SCNC: 134 MMOL/L — LOW (ref 135–145)
SODIUM SERPL-SCNC: 141 MMOL/L — SIGNIFICANT CHANGE UP (ref 135–145)
SP GR SPEC: 1.01 — LOW (ref 1.01–1.02)
SPECIMEN SOURCE: SIGNIFICANT CHANGE UP
UROBILINOGEN FLD QL: NEGATIVE — SIGNIFICANT CHANGE UP
WBC # BLD: 10.9 K/UL — HIGH (ref 3.8–10.5)
WBC # BLD: 11.3 K/UL — HIGH (ref 3.8–10.5)
WBC # FLD AUTO: 10.9 K/UL — HIGH (ref 3.8–10.5)
WBC # FLD AUTO: 11.3 K/UL — HIGH (ref 3.8–10.5)
WBC UR QL: SIGNIFICANT CHANGE UP /HPF (ref 0–5)

## 2018-08-08 PROCEDURE — 71045 X-RAY EXAM CHEST 1 VIEW: CPT | Mod: 26

## 2018-08-08 PROCEDURE — 76770 US EXAM ABDO BACK WALL COMP: CPT | Mod: 26

## 2018-08-08 PROCEDURE — 99223 1ST HOSP IP/OBS HIGH 75: CPT

## 2018-08-08 RX ORDER — EMTRICITABINE AND TENOFOVIR DISOPROXIL FUMARATE 200; 300 MG/1; MG/1
1 TABLET, FILM COATED ORAL DAILY
Qty: 0 | Refills: 0 | Status: DISCONTINUED | OUTPATIENT
Start: 2018-08-08 | End: 2018-08-15

## 2018-08-08 RX ORDER — RITONAVIR 100 MG/1
100 TABLET, FILM COATED ORAL DAILY
Qty: 0 | Refills: 0 | Status: DISCONTINUED | OUTPATIENT
Start: 2018-08-08 | End: 2018-08-08

## 2018-08-08 RX ORDER — SODIUM CHLORIDE 9 MG/ML
1000 INJECTION INTRAMUSCULAR; INTRAVENOUS; SUBCUTANEOUS ONCE
Qty: 0 | Refills: 0 | Status: COMPLETED | OUTPATIENT
Start: 2018-08-08 | End: 2018-08-08

## 2018-08-08 RX ORDER — RITONAVIR 100 MG/1
100 TABLET, FILM COATED ORAL DAILY
Qty: 0 | Refills: 0 | Status: DISCONTINUED | OUTPATIENT
Start: 2018-08-08 | End: 2018-08-15

## 2018-08-08 RX ORDER — TERAZOSIN HYDROCHLORIDE 10 MG/1
1 CAPSULE ORAL
Qty: 0 | Refills: 0 | COMMUNITY

## 2018-08-08 RX ORDER — AMLODIPINE BESYLATE 2.5 MG/1
5 TABLET ORAL DAILY
Qty: 0 | Refills: 0 | Status: DISCONTINUED | OUTPATIENT
Start: 2018-08-08 | End: 2018-08-15

## 2018-08-08 RX ORDER — TAMSULOSIN HYDROCHLORIDE 0.4 MG/1
0.4 CAPSULE ORAL AT BEDTIME
Qty: 0 | Refills: 0 | Status: DISCONTINUED | OUTPATIENT
Start: 2018-08-08 | End: 2018-08-15

## 2018-08-08 RX ORDER — OLANZAPINE 15 MG/1
2.5 TABLET, FILM COATED ORAL DAILY
Qty: 0 | Refills: 0 | Status: DISCONTINUED | OUTPATIENT
Start: 2018-08-08 | End: 2018-08-15

## 2018-08-08 RX ORDER — FINASTERIDE 5 MG/1
5 TABLET, FILM COATED ORAL DAILY
Qty: 0 | Refills: 0 | Status: DISCONTINUED | OUTPATIENT
Start: 2018-08-08 | End: 2018-08-15

## 2018-08-08 RX ORDER — ATORVASTATIN CALCIUM 80 MG/1
10 TABLET, FILM COATED ORAL AT BEDTIME
Qty: 0 | Refills: 0 | Status: DISCONTINUED | OUTPATIENT
Start: 2018-08-08 | End: 2018-08-15

## 2018-08-08 RX ORDER — DARUNAVIR 75 MG/1
800 TABLET, FILM COATED ORAL DAILY
Qty: 0 | Refills: 0 | Status: DISCONTINUED | OUTPATIENT
Start: 2018-08-08 | End: 2018-08-15

## 2018-08-08 RX ORDER — PIPERACILLIN AND TAZOBACTAM 4; .5 G/20ML; G/20ML
3.38 INJECTION, POWDER, LYOPHILIZED, FOR SOLUTION INTRAVENOUS ONCE
Qty: 0 | Refills: 0 | Status: COMPLETED | OUTPATIENT
Start: 2018-08-08 | End: 2018-08-08

## 2018-08-08 RX ORDER — ENOXAPARIN SODIUM 100 MG/ML
40 INJECTION SUBCUTANEOUS EVERY 24 HOURS
Qty: 0 | Refills: 0 | Status: DISCONTINUED | OUTPATIENT
Start: 2018-08-08 | End: 2018-08-15

## 2018-08-08 RX ORDER — ACETAMINOPHEN 500 MG
650 TABLET ORAL ONCE
Qty: 0 | Refills: 0 | Status: DISCONTINUED | OUTPATIENT
Start: 2018-08-08 | End: 2018-08-08

## 2018-08-08 RX ORDER — DONEPEZIL HYDROCHLORIDE 10 MG/1
10 TABLET, FILM COATED ORAL AT BEDTIME
Qty: 0 | Refills: 0 | Status: DISCONTINUED | OUTPATIENT
Start: 2018-08-08 | End: 2018-08-15

## 2018-08-08 RX ORDER — SODIUM CHLORIDE 9 MG/ML
1000 INJECTION INTRAMUSCULAR; INTRAVENOUS; SUBCUTANEOUS
Qty: 0 | Refills: 0 | Status: DISCONTINUED | OUTPATIENT
Start: 2018-08-08 | End: 2018-08-15

## 2018-08-08 RX ORDER — ACETAMINOPHEN 500 MG
1000 TABLET ORAL ONCE
Qty: 0 | Refills: 0 | Status: COMPLETED | OUTPATIENT
Start: 2018-08-08 | End: 2018-08-08

## 2018-08-08 RX ADMIN — FINASTERIDE 5 MILLIGRAM(S): 5 TABLET, FILM COATED ORAL at 20:14

## 2018-08-08 RX ADMIN — EMTRICITABINE AND TENOFOVIR DISOPROXIL FUMARATE 1 TABLET(S): 200; 300 TABLET, FILM COATED ORAL at 12:32

## 2018-08-08 RX ADMIN — DONEPEZIL HYDROCHLORIDE 10 MILLIGRAM(S): 10 TABLET, FILM COATED ORAL at 20:14

## 2018-08-08 RX ADMIN — ATORVASTATIN CALCIUM 10 MILLIGRAM(S): 80 TABLET, FILM COATED ORAL at 20:14

## 2018-08-08 RX ADMIN — RITONAVIR 100 MILLIGRAM(S): 100 TABLET, FILM COATED ORAL at 16:05

## 2018-08-08 RX ADMIN — TAMSULOSIN HYDROCHLORIDE 0.4 MILLIGRAM(S): 0.4 CAPSULE ORAL at 20:14

## 2018-08-08 RX ADMIN — DARUNAVIR 800 MILLIGRAM(S): 75 TABLET, FILM COATED ORAL at 12:32

## 2018-08-08 RX ADMIN — SODIUM CHLORIDE 100 MILLILITER(S): 9 INJECTION INTRAMUSCULAR; INTRAVENOUS; SUBCUTANEOUS at 07:48

## 2018-08-08 RX ADMIN — PIPERACILLIN AND TAZOBACTAM 3.38 GRAM(S): 4; .5 INJECTION, POWDER, LYOPHILIZED, FOR SOLUTION INTRAVENOUS at 05:02

## 2018-08-08 RX ADMIN — PIPERACILLIN AND TAZOBACTAM 200 GRAM(S): 4; .5 INJECTION, POWDER, LYOPHILIZED, FOR SOLUTION INTRAVENOUS at 03:06

## 2018-08-08 RX ADMIN — SODIUM CHLORIDE 1000 MILLILITER(S): 9 INJECTION INTRAMUSCULAR; INTRAVENOUS; SUBCUTANEOUS at 02:27

## 2018-08-08 RX ADMIN — ENOXAPARIN SODIUM 40 MILLIGRAM(S): 100 INJECTION SUBCUTANEOUS at 06:11

## 2018-08-08 RX ADMIN — SODIUM CHLORIDE 1000 MILLILITER(S): 9 INJECTION INTRAMUSCULAR; INTRAVENOUS; SUBCUTANEOUS at 05:01

## 2018-08-08 RX ADMIN — SODIUM CHLORIDE 1000 MILLILITER(S): 9 INJECTION INTRAMUSCULAR; INTRAVENOUS; SUBCUTANEOUS at 06:11

## 2018-08-08 RX ADMIN — OLANZAPINE 2.5 MILLIGRAM(S): 15 TABLET, FILM COATED ORAL at 12:32

## 2018-08-08 RX ADMIN — Medication 400 MILLIGRAM(S): at 06:53

## 2018-08-08 RX ADMIN — SODIUM CHLORIDE 1000 MILLILITER(S): 9 INJECTION INTRAMUSCULAR; INTRAVENOUS; SUBCUTANEOUS at 01:02

## 2018-08-08 NOTE — H&P ADULT - PROBLEM SELECTOR PLAN 2
pt with coughing fit for a short period fo time, no phlegm production; has resolved per wife and denies any choking/coughing with food - however, CXR with b/l LL opacities, likely atelectasis but cannot r/o PNA   - prior CT chest reviewed showing small-mod R pl eff   - would c/w Levaquin for now to cover respiratory rosalva; if remains febrile or worsening respiratory symptoms, would obtain CT chest to better evaluate and broaden abx to cover HCAP

## 2018-08-08 NOTE — ED PROVIDER NOTE - PHYSICAL EXAMINATION
Gen: NAD  Head: NCAT  Lung: CTAB, no respiratory distress, no wheezing, rales, rhonchi  CV: normal s1/s2, rrr, no murmurs, Normal perfusion  Abd: soft, NTND  MSK: No edema, no visible deformities, full range of motion in all 4 extremities  Neuro: No focal neurologic deficits  Skin: No rash

## 2018-08-08 NOTE — ED PROVIDER NOTE - ATTENDING CONTRIBUTION TO CARE
76 yo man w/ HIV c/b HIV related Dementia, HTN, BPH, was admitted on 7/15/18  for sepsis 2/2 E. coli UTI.  pt returns today with fever upto 101.9, given apap, 9:30.  pt wife self caths him for urinary retention, nelson, ua, labs, cxr, vss in ed, sepsis work up ordered, abd soft, nt, bladder not tender or distended.

## 2018-08-08 NOTE — ED PROVIDER NOTE - OBJECTIVE STATEMENT
76yo male PMH HIV, dementia, HTN presenting with fever Tmax 101.7F x 1 day, a/w cough while eating. Given Tylenol at 7:30pm. Patient is nonverbal and unable to state complaints. H/o BPH with intermittent straight cath. Recent admission 2 weeks ago for E coli bacteremia.     PMD: Sushil Gonzales  ID: Dr. [email or web address]

## 2018-08-08 NOTE — PROVIDER CONTACT NOTE (CRITICAL VALUE NOTIFICATION) - TEST AND RESULT REPORTED:
blood cultures anaerobic bottle gram + cocci pairs in chairs blood cultures anaerobic bottle gram + cocci in pairs & chains

## 2018-08-08 NOTE — H&P ADULT - PROBLEM SELECTOR PLAN 3
As per wife, patient dx in 2001, and compliant with ART for past 2-3 years; VL on last admission <30; CD4 327 (5/2018)  -c/w Truvada and Prezista with ritonavir   -ID consult in AM

## 2018-08-08 NOTE — ED ADULT NURSE REASSESSMENT NOTE - NS ED NURSE REASSESS COMMENT FT1
Spoke to Gregory from pharmacy about Ritonavir - sending liquid form from [facility].

## 2018-08-08 NOTE — ED ADULT NURSE NOTE - INTERVENTIONS DEFINITIONS
Provide visual clues: red socks/Call bell, personal items and telephone within reach/Stretcher in lowest position, wheels locked, appropriate side rails in place/Non-slip footwear when patient is off stretcher/Provide visual cue, wrist band, yellow gown, etc./Physically safe environment: no spills, clutter or unnecessary equipment

## 2018-08-08 NOTE — CONSULT NOTE ADULT - SUBJECTIVE AND OBJECTIVE BOX
PULM/MED CONSULT NOTE:    76 yo BM nonsmoker admitted with fevr, cough x 1 day, along with recent hx of family performing straight cath since seeing urology 18.    The patient was recently hospitalized for UTI with positive E. coli blood cultures.  He recently completed a course of Levaquin.    The patient was noted to have small b/l effusions on recent chest CT.  He also had mild cardiomegaly.  (reviewed with radiology; discussed with pt's wife who agreed with non-agressive care and to monitor.)    Hx from chart:      HPI:  77M with PMH of HIV (last VL<30) c/b HIV related dementia, HTN, BPH, recent hospitalization for E coli UTI/bacteremia presenting with fever. History obtained from pts wife at bedside as pt is baseline non verbal. Per wife, she noted pt to be a little fatigued and lethargic on Monday; and then on Tuesday evening, when wife returned from work, was told pt had been spitting up all day and wife noticed him later to have a coughing fit that appeared painful; she took a temperature and found him to be febrile 101.9 and gave him tylenol and brought him to ED. Per wife, had not noted any rigors, has been eating and tolerating diet as normal, eats solid foods without coughing/choking. Per wife, since being in the ED, pt has not had any further coughing fits and appears back to his baseline.   Prior hospitalization was c/b urinary retention and able to pass TOV prior to discharge; however, when pt followed up with urology about a week later, pt was again noted to be retaining a significant amount; pt has been getting straight cath'd by family 3times per day for past week.    At baseline, pt is very minimally/non verbal, able to walk short distances without assistance, able to feed himself with supervision, need shelp with other ADLs, has HHA 9hours per day. (08 Aug 2018 04:59)      MEDICATIONS  (STANDING):  amLODIPine   Tablet 5 milliGRAM(s) Oral daily  atorvastatin 10 milliGRAM(s) Oral at bedtime  darunavir 800 milliGRAM(s) Oral daily  donepezil 10 milliGRAM(s) Oral at bedtime  emtricitabine 200 mG/tenofovir 300 mG (TRUVADA) 1 Tablet(s) Oral daily  enoxaparin Injectable 40 milliGRAM(s) SubCutaneous every 24 hours  finasteride 5 milliGRAM(s) Oral daily  levoFLOXacin IVPB 750 milliGRAM(s) IV Intermittent every 24 hours  OLANZapine 2.5 milliGRAM(s) Oral daily  ritonavir  Solution 100 milliGRAM(s) Oral daily  sodium chloride 0.9%. 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  HTN (hypertension)  HIV (human immunodeficiency virus infection)  HLD  No significant past surgical history      SOCIAL HISTORY  Smoking History: nonsmoker    PHYSICAL EXAM:  Vital Signs Last 24 Hrs  T(C): 37.3 (08 Aug 2018 18:04), Max: 39.6 (08 Aug 2018 06:50)  T(F): 99.2 (08 Aug 2018 18:04), Max: 103.2 (08 Aug 2018 06:50)  HR: 71 (08 Aug 2018 18:04) (71 - 136)  BP: 125/71 (08 Aug 2018 18:04) (112/68 - 149/60)  BP(mean): --  RR: 18 (08 Aug 2018 18:04) (18 - 20)  SpO2: 96% (08 Aug 2018 18:04) (95% - 97%)  GENERAL: NAD; seen with wife at bedside  Nonverbally responsive      NECK: Supple,     CHEST/LUNG: Bilateral breath sounds, anteriorly;  exam limited  HEART: Regular rate and rhythm;  ABDOMEN: Soft, Nontender, Bowel sounds present  EXTREMITIES:  No edema        LABS:                        11.9   11.3  )-----------( 195      ( 08 Aug 2018 07:08 )             35.1     08-    141  |  107  |  12  ----------------------------<  128<H>  4.0   |  23  |  0.88    Ca    8.5      08 Aug 2018 07:08  Phos  2.3     08-08  Mg     1.8     08-    TPro  8.0  /  Alb  3.6  /  TBili  0.3  /  DBili  x   /  AST  33  /  ALT  15  /  AlkPhos  165<H>  08-07      Urinalysis Basic - ( 08 Aug 2018 01:06 )    Color: PL Yellow / Appearance: SL Turbid / S.007 / pH: x  Gluc: x / Ketone: Negative  / Bili: Negative / Urobili: Negative   Blood: x / Protein: Negative / Nitrite: Positive   Leuk Esterase: Large / RBC: 5-10 /HPF / WBC 26-50 /HPF   Sq Epi: x / Non Sq Epi: x / Bacteria: Few /HPF    Lactate, Blood: 1.7 mmol/L (18 @ 13:50)  Lactate, Blood: 2.6 mmol/L (18 @ 07:08)            RADIOLOGY & ADDITIONAL STUDIES:    Xray: EXAM:  XR CHEST AP OR PA 1V                            PROCEDURE DATE:  2018            INTERPRETATION:  CLINICAL INFORMATION: Cough, fever.    EXAM: AP portable radiograph of the chest.    COMPARISON: Chest radiograph 7/15/2018.    FINDINGS:  Small patchy bibasilar opacities.  No pleural effusion or pneumothorax.  Cardiac size is not accurately evaluated in this projection.  Intact visualized osseous structures.    IMPRESSION:  Small patchy bibasilar opacities which may represent multifocal   pneumonia. A follow-up chest radiograph is recommended after treatment.      KARINA INIGUEZ M.D., RADIOLOGY RESIDENT  This document has been electronically signed.  HENRY APARICIO M.D., ATTENDING RADIOLOGIST  This document has been electronically signed. Aug  8 2018  9:22AM        Assessment:     Febrile illness, likely related to UTI.  Of note was that the family was recently taught and started catheterization in the home.    CT from July related b/l effusions, possiby related to cardiac disease.     Plan:    Cultures pending  Increased hydration  Abx

## 2018-08-08 NOTE — H&P ADULT - NSHPLABSRESULTS_GEN_ALL_CORE
Labs personally reviewed and interpreted by me - mild leukocytosis 10.9 w left shift, baseline Hb, baseline Cr, mild hypoNa at 134, +UA  Imaging personally reviewed and interpreted by me - CXR with small b/l opacities, cannot r/o PNA  EKG personally reviewed and interpreted by me -                           12.1   10.9  )-----------( 221      ( 07 Aug 2018 23:59 )             34.8         134<L>  |  100  |  17  ----------------------------<  152<H>  5.0   |  25  |  0.98    Ca    9.0      07 Aug 2018 23:59    TPro  8.0  /  Alb  3.6  /  TBili  0.3  /  DBili  x   /  AST  33  /  ALT  15  /  AlkPhos  165<H>      Urinalysis Basic - ( 08 Aug 2018 01:06 )    Color: PL Yellow / Appearance: SL Turbid / S.007 / pH: x  Gluc: x / Ketone: Negative  / Bili: Negative / Urobili: Negative   Blood: x / Protein: Negative / Nitrite: Positive   Leuk Esterase: Large / RBC: 5-10 /HPF / WBC 26-50 /HPF   Sq Epi: x / Non Sq Epi: x / Bacteria: Few /HPF      < from: Xray Chest 1 View AP/PA (18 @ 01:02) >  ******PRELIMINARY REPORT******       INTERPRETATION:  Small patchy bibasilar opacities, likely atelectasis.   Superimposed pneumonia should be excluded on a clinical basis. Labs personally reviewed and interpreted by me - mild leukocytosis 10.9 w left shift, baseline Hb, baseline Cr, mild hypoNa at 134, +UA  Imaging personally reviewed and interpreted by me - CXR with small b/l opacities, cannot r/o PNA  EKG personally reviewed and interpreted by me - NSR 75BPM, no FARA,                           12.1   10.9  )-----------( 221      ( 07 Aug 2018 23:59 )             34.8         134<L>  |  100  |  17  ----------------------------<  152<H>  5.0   |  25  |  0.98    Ca    9.0      07 Aug 2018 23:59    TPro  8.0  /  Alb  3.6  /  TBili  0.3  /  DBili  x   /  AST  33  /  ALT  15  /  AlkPhos  165<H>      Urinalysis Basic - ( 08 Aug 2018 01:06 )    Color: PL Yellow / Appearance: SL Turbid / S.007 / pH: x  Gluc: x / Ketone: Negative  / Bili: Negative / Urobili: Negative   Blood: x / Protein: Negative / Nitrite: Positive   Leuk Esterase: Large / RBC: 5-10 /HPF / WBC 26-50 /HPF   Sq Epi: x / Non Sq Epi: x / Bacteria: Few /HPF      < from: Xray Chest 1 View AP/PA (18 @ 01:02) >  ******PRELIMINARY REPORT******       INTERPRETATION:  Small patchy bibasilar opacities, likely atelectasis.   Superimposed pneumonia should be excluded on a clinical basis.

## 2018-08-08 NOTE — ED ADULT NURSE REASSESSMENT NOTE - NS ED NURSE REASSESS COMMENT FT1
Pt. temp 100.2 at this time. Spoke to admitting team MD Guerrero and states no intervention at this time. Pt. receiving maintenance fluids. Pt. resting comfortably in stretcher. Side rails up, yellow band and rock socks applied. Family at bedside. Awaiting bed assignment.

## 2018-08-08 NOTE — ED ADULT NURSE NOTE - OBJECTIVE STATEMENT
77 y.o M w. PMH of alzheimers, HTN, HIV, BPH, non-verbal BIBA w. c/o of fever x 1 day. According to the spouse pt had a fever of 101 at home, gave tyelnol at 745pm, states he was admitted a few weeks ago for a UTI, according to his urologists pt has to be straight cath'd intermittently due to residual urine left over in the bladder from enlarged prostate. Pt is non-verbal, awake responsive to pain, lungs CTA, +bs abdomen soft non-distended, +central pulses, safety and comfort maintained, no acute distress noted at this time.

## 2018-08-08 NOTE — H&P ADULT - NSHPPHYSICALEXAM_GEN_ALL_CORE
Vital Signs Last 24 Hrs  T(C): 36.8 (08 Aug 2018 05:01), Max: 37.3 (07 Aug 2018 22:15)  T(F): 98.2 (08 Aug 2018 05:01), Max: 99.1 (07 Aug 2018 22:15)  HR: 75 (08 Aug 2018 05:01) (72 - 96)  BP: 135/78 (08 Aug 2018 05:01) (112/68 - 140/81)  BP(mean): --  RR: 19 (08 Aug 2018 05:01) (18 - 19)  SpO2: 96% (08 Aug 2018 05:01) (96% - 96%) Vital Signs Last 24 Hrs  T(C): 36.8 (08 Aug 2018 05:01), Max: 37.3 (07 Aug 2018 22:15)  T(F): 98.2 (08 Aug 2018 05:01), Max: 99.1 (07 Aug 2018 22:15)  HR: 75 (08 Aug 2018 05:01) (72 - 96)  BP: 135/78 (08 Aug 2018 05:01) (112/68 - 140/81)  BP(mean): --  RR: 19 (08 Aug 2018 05:01) (18 - 19)  SpO2: 96% (08 Aug 2018 05:01) (96% - 96%)    PHYSICAL EXAM:  GENERAL: NAD, well-developed  HEAD:  Atraumatic, normocephalic  EYES: EOMI, sclera clear  NECK: Supple, no JVD  CHEST/LUNG: limited exam 2/2 pt cooperation - grossly clear  HEART: Regular rate and rhythm; no murmurs  ABDOMEN: Soft, nontender, bowel sounds present  EXTREMITIES: no clubbing, cyanosis, or edema  PSYCH: unable to assess   NEUROLOGY: unable to assess   SKIN: No rashes or lesions  MUSCULOSKELETAL: no back pain, L upper extremity tremors

## 2018-08-08 NOTE — ED ADULT NURSE REASSESSMENT NOTE - NS ED NURSE REASSESS COMMENT FT1
Received report from Cole CORTEZ. Pt. has hx of Alzheimer's and is nonverbal, wife is at bedside. Pt. received IV Tylenol as per MD order for fever of 103.2 at 0650. Pt. remains febrile - 102.7 axillary at this time and is tachy to the 130s. Pt. does not appear symptomatic. Pt. resting comfortably in stretcher - does not appear to be in any respiratory distress, unlabored breathing noted. Advanced Practice Team covering pt. notified - called [phone] and spoke with MD Guerrero. MD came and assessed pt. at bedside. Pt. receiving fluids. Will continue to monitor. Family at bedside. Pt. admitted for UTI - awaiting bed assignment.

## 2018-08-08 NOTE — ED ADULT NURSE REASSESSMENT NOTE - NS ED NURSE REASSESS COMMENT FT1
confirmed w/ pharmacy that Norvir solution is available for pt. who is unable to swallow pills. Primary RN spoke w/ [facility] to have Norvir tablet order changed to solution prior to administration. Order to be changed from tablet to solution. Awaiting solution to be sent.

## 2018-08-08 NOTE — H&P ADULT - ASSESSMENT
77M with PMH of HIV (last VL<30) c/b HIV related dementia, HTN, BPH, recent hospitalization for E coli UTI/bacteremia presenting with fever - with CXR showing patchy opacities and +UA - admitted for further treatment.

## 2018-08-08 NOTE — H&P ADULT - PROBLEM SELECTOR PLAN 1
pt with BPH with urinary retention and instrumentation, fevers and +UA, with recent treatment for UTI  last cx grew pan sensitive E coli - will treat with Levaquin to also cover possible respiratory rosalva   f/u blood and urine cultures, tailor abx appropriately   trend fever curve   maintenance IVF for now

## 2018-08-08 NOTE — H&P ADULT - ATTENDING COMMENTS
Patient assigned to me by night hospitalist in charge for management and care for patient for this evening only. Care to be resumed by day hospitalist (Dr De La Cruz) in the morning and thereafter.

## 2018-08-08 NOTE — CHART NOTE - NSCHARTNOTEFT_GEN_A_CORE
Notified by RN for positive blood culture results; gram positive cocci in pairs and chains in anaerobic bottle. Pt admitted for UTI on IV levaquin. D/w Dr. De La Cruz; will continue to monitor pt.     Gena GODOY  #[id number] Notified by RN for positive blood culture results; gram positive cocci in pairs and chains in anaerobic bottle seen in BCx send on 8/8. Pt admitted for UTI on IV levaquin. D/w Dr. De La Cruz; will continue to monitor pt.     Gena GODOY  #[id number]

## 2018-08-08 NOTE — H&P ADULT - HISTORY OF PRESENT ILLNESS
77M with PMH of HIV (last VL<30) c/b HIV related dementia, HTN, BPH, recent hospitalization for E coli UTI/bacteremia presenting 77M with PMH of HIV (last VL<30) c/b HIV related dementia, HTN, BPH, recent hospitalization for E coli UTI/bacteremia presenting with fever. History obtained from pts wife at bedside as pt is baseline non verbal. Per wife, she noted pt to be a little fatigued and lethargic on Monday; and then on Tuesday evening, when wife returned from work, was told pt had been spitting up all day and wife noticed him later to have a coughing fit that appeared painful; she took a temperature and found him to be febrile 101.9 and gave him tylenol and brought him to ED. Per wife, had not noted any rigors, has been eating and tolerating diet as normal, eats solid foods without coughing/choking. Per wife, since being in the ED, pt has not had any further coughing fits and appears back to his baseline.   Prior hospitalization was c/b urinary retention and able to pass TOV prior to discharge; however, when pt followed up with urology about a week later, pt was again noted to be retaining a significant amount; pt has been getting straight cath'd by family 3times per day for past week.    At baseline, pt is very minimally/non verbal, able to walk short distances without assistance, able to feed himself with supervision, need shelp with other ADLs, has HHA 9hours per day.

## 2018-08-09 ENCOUNTER — TRANSCRIPTION ENCOUNTER (OUTPATIENT)
Age: 78
End: 2018-08-09

## 2018-08-09 ENCOUNTER — APPOINTMENT (OUTPATIENT)
Dept: INFECTIOUS DISEASE | Facility: CLINIC | Age: 78
End: 2018-08-09

## 2018-08-09 DIAGNOSIS — J90 PLEURAL EFFUSION, NOT ELSEWHERE CLASSIFIED: ICD-10-CM

## 2018-08-09 DIAGNOSIS — F03.90 UNSPECIFIED DEMENTIA WITHOUT BEHAVIORAL DISTURBANCE: ICD-10-CM

## 2018-08-09 DIAGNOSIS — R78.81 BACTEREMIA: ICD-10-CM

## 2018-08-09 DIAGNOSIS — N13.30 UNSPECIFIED HYDRONEPHROSIS: ICD-10-CM

## 2018-08-09 DIAGNOSIS — N39.0 URINARY TRACT INFECTION, SITE NOT SPECIFIED: ICD-10-CM

## 2018-08-09 LAB
-  COAGULASE NEGATIVE STAPHYLOCOCCUS: SIGNIFICANT CHANGE UP
ALBUMIN SERPL ELPH-MCNC: 3.5 G/DL — SIGNIFICANT CHANGE UP (ref 3.3–5)
ALP SERPL-CCNC: 146 U/L — HIGH (ref 40–120)
ALT FLD-CCNC: 11 U/L — SIGNIFICANT CHANGE UP (ref 10–45)
ANION GAP SERPL CALC-SCNC: 7 MMOL/L — SIGNIFICANT CHANGE UP (ref 5–17)
AST SERPL-CCNC: 25 U/L — SIGNIFICANT CHANGE UP (ref 10–40)
BILIRUB SERPL-MCNC: 0.4 MG/DL — SIGNIFICANT CHANGE UP (ref 0.2–1.2)
BUN SERPL-MCNC: 9 MG/DL — SIGNIFICANT CHANGE UP (ref 7–23)
CALCIUM SERPL-MCNC: 9 MG/DL — SIGNIFICANT CHANGE UP (ref 8.4–10.5)
CHLORIDE SERPL-SCNC: 106 MMOL/L — SIGNIFICANT CHANGE UP (ref 96–108)
CO2 SERPL-SCNC: 26 MMOL/L — SIGNIFICANT CHANGE UP (ref 22–31)
CREAT SERPL-MCNC: 0.85 MG/DL — SIGNIFICANT CHANGE UP (ref 0.5–1.3)
GLUCOSE SERPL-MCNC: 118 MG/DL — HIGH (ref 70–99)
GRAM STN FLD: SIGNIFICANT CHANGE UP
GRAM STN FLD: SIGNIFICANT CHANGE UP
HCT VFR BLD CALC: 36.3 % — LOW (ref 39–50)
HGB BLD-MCNC: 12.7 G/DL — LOW (ref 13–17)
MCHC RBC-ENTMCNC: 32.2 PG — SIGNIFICANT CHANGE UP (ref 27–34)
MCHC RBC-ENTMCNC: 35 GM/DL — SIGNIFICANT CHANGE UP (ref 32–36)
MCV RBC AUTO: 91.9 FL — SIGNIFICANT CHANGE UP (ref 80–100)
METHOD TYPE: SIGNIFICANT CHANGE UP
P AERUGINOSA DNA BLD POS NAA+NON-PROBE: SIGNIFICANT CHANGE UP
PLATELET # BLD AUTO: 197 K/UL — SIGNIFICANT CHANGE UP (ref 150–400)
POTASSIUM SERPL-MCNC: 3.7 MMOL/L — SIGNIFICANT CHANGE UP (ref 3.5–5.3)
POTASSIUM SERPL-SCNC: 3.7 MMOL/L — SIGNIFICANT CHANGE UP (ref 3.5–5.3)
PROT SERPL-MCNC: 7.4 G/DL — SIGNIFICANT CHANGE UP (ref 6–8.3)
RBC # BLD: 3.95 M/UL — LOW (ref 4.2–5.8)
RBC # FLD: 15.2 % — HIGH (ref 10.3–14.5)
SODIUM SERPL-SCNC: 139 MMOL/L — SIGNIFICANT CHANGE UP (ref 135–145)
WBC # BLD: 10.05 K/UL — SIGNIFICANT CHANGE UP (ref 3.8–10.5)
WBC # FLD AUTO: 10.05 K/UL — SIGNIFICANT CHANGE UP (ref 3.8–10.5)

## 2018-08-09 PROCEDURE — 74177 CT ABD & PELVIS W/CONTRAST: CPT | Mod: 26

## 2018-08-09 PROCEDURE — 99222 1ST HOSP IP/OBS MODERATE 55: CPT

## 2018-08-09 RX ORDER — PIPERACILLIN AND TAZOBACTAM 4; .5 G/20ML; G/20ML
3.38 INJECTION, POWDER, LYOPHILIZED, FOR SOLUTION INTRAVENOUS EVERY 8 HOURS
Qty: 0 | Refills: 0 | Status: DISCONTINUED | OUTPATIENT
Start: 2018-08-09 | End: 2018-08-14

## 2018-08-09 RX ORDER — DOCUSATE SODIUM 100 MG
100 CAPSULE ORAL THREE TIMES A DAY
Qty: 0 | Refills: 0 | Status: DISCONTINUED | OUTPATIENT
Start: 2018-08-09 | End: 2018-08-15

## 2018-08-09 RX ORDER — SENNA PLUS 8.6 MG/1
2 TABLET ORAL AT BEDTIME
Qty: 0 | Refills: 0 | Status: DISCONTINUED | OUTPATIENT
Start: 2018-08-09 | End: 2018-08-15

## 2018-08-09 RX ORDER — POLYETHYLENE GLYCOL 3350 17 G/17G
17 POWDER, FOR SOLUTION ORAL DAILY
Qty: 0 | Refills: 0 | Status: DISCONTINUED | OUTPATIENT
Start: 2018-08-09 | End: 2018-08-15

## 2018-08-09 RX ORDER — VANCOMYCIN HCL 1 G
1000 VIAL (EA) INTRAVENOUS ONCE
Qty: 0 | Refills: 0 | Status: COMPLETED | OUTPATIENT
Start: 2018-08-09 | End: 2018-08-09

## 2018-08-09 RX ORDER — GENTAMICIN SULFATE 40 MG/ML
120 VIAL (ML) INJECTION ONCE
Qty: 0 | Refills: 0 | Status: COMPLETED | OUTPATIENT
Start: 2018-08-09 | End: 2018-08-09

## 2018-08-09 RX ADMIN — EMTRICITABINE AND TENOFOVIR DISOPROXIL FUMARATE 1 TABLET(S): 200; 300 TABLET, FILM COATED ORAL at 12:11

## 2018-08-09 RX ADMIN — POLYETHYLENE GLYCOL 3350 17 GRAM(S): 17 POWDER, FOR SOLUTION ORAL at 18:50

## 2018-08-09 RX ADMIN — RITONAVIR 100 MILLIGRAM(S): 100 TABLET, FILM COATED ORAL at 18:48

## 2018-08-09 RX ADMIN — PIPERACILLIN AND TAZOBACTAM 25 GRAM(S): 4; .5 INJECTION, POWDER, LYOPHILIZED, FOR SOLUTION INTRAVENOUS at 13:25

## 2018-08-09 RX ADMIN — Medication 250 MILLIGRAM(S): at 06:48

## 2018-08-09 RX ADMIN — DONEPEZIL HYDROCHLORIDE 10 MILLIGRAM(S): 10 TABLET, FILM COATED ORAL at 20:24

## 2018-08-09 RX ADMIN — OLANZAPINE 2.5 MILLIGRAM(S): 15 TABLET, FILM COATED ORAL at 12:11

## 2018-08-09 RX ADMIN — TAMSULOSIN HYDROCHLORIDE 0.4 MILLIGRAM(S): 0.4 CAPSULE ORAL at 20:24

## 2018-08-09 RX ADMIN — DARUNAVIR 800 MILLIGRAM(S): 75 TABLET, FILM COATED ORAL at 12:11

## 2018-08-09 RX ADMIN — FINASTERIDE 5 MILLIGRAM(S): 5 TABLET, FILM COATED ORAL at 12:11

## 2018-08-09 RX ADMIN — Medication 100 MILLIGRAM(S): at 12:10

## 2018-08-09 RX ADMIN — ATORVASTATIN CALCIUM 10 MILLIGRAM(S): 80 TABLET, FILM COATED ORAL at 20:24

## 2018-08-09 RX ADMIN — PIPERACILLIN AND TAZOBACTAM 25 GRAM(S): 4; .5 INJECTION, POWDER, LYOPHILIZED, FOR SOLUTION INTRAVENOUS at 21:18

## 2018-08-09 RX ADMIN — AMLODIPINE BESYLATE 5 MILLIGRAM(S): 2.5 TABLET ORAL at 05:16

## 2018-08-09 RX ADMIN — ENOXAPARIN SODIUM 40 MILLIGRAM(S): 100 INJECTION SUBCUTANEOUS at 05:18

## 2018-08-09 NOTE — PHYSICAL THERAPY INITIAL EVALUATION ADULT - TRANSFER TRAINING, PT EVAL
GOAL: Pt will perform sit to/from stand transfers w/ minAx1 with/without AD as needed within 3-4weeks.

## 2018-08-09 NOTE — PHYSICAL THERAPY INITIAL EVALUATION ADULT - ADDITIONAL COMMENTS
Hx obtained from previous chart as pt is a poor historian; pt resides in private home with spouse & dtr, 5 steps to enter with bilateral handrail, flight to bedroom with handrail. PTA pt ambulatory without AD, but required supervision for safety with gait & stairs. Pt with HHA 5days/6hours who assists with ADL's, family assists patient with needs when HHA is not present. Pt has wheelchair, shower chair, commode, rolling walker (patient does not use rolling walker). Pt non-verbal at baseline.

## 2018-08-09 NOTE — CONSULT NOTE ADULT - PROBLEM SELECTOR RECOMMENDATION 9
?  /GI source  Ct abd pelvis  Follow ID sensi of isolate  Repeat blood Cx  Urology eval  Empiric zosyn + gentamicin X 1  tailor antimicrobials per ID sensi and results

## 2018-08-09 NOTE — CONSULT NOTE ADULT - ASSESSMENT
76 yo male with known urinary retention managed at home with clean intermittent catheterization   - no need for bladder scans. should be straight cathed every six hours to empty bladder regardless of incontinent episodes  - cont proscar & flomax 76 yo male with known urinary retention managed at home with clean intermittent catheterization   - no need for bladder scans. should be straight cathed every six hours to empty bladder regardless of incontinent episodes  - cont proscar & flomax  - abx per id 78 yo male with known urinary retention managed at home with clean intermittent catheterization; mild bl hydro with distended bladder on ultrasound  - no need for bladder scans. should be straight cathed every six hours to empty bladder regardless of incontinent episodes  - cont proscar & flomax  - abx per id   - recheck renal sono 72 hrs after patient has been straight cathed on schedule 78 yo male with known urinary retention managed at home with clean intermittent catheterization; mild bl hydro with distended bladder on ultrasound  - no need for bladder scans  - would strongly recommend placement of indwelling Wong catheter in setting of active urinary infection  - once infection clears, he should be restarted on his home CIC regimen (TID)  - cont proscar & flomax  - abx per id   - recheck renal sono 72 hrs after patient has had Wong placed to ensure resolution of hydro      - Discussed with Dr. Ponce

## 2018-08-09 NOTE — PHYSICAL THERAPY INITIAL EVALUATION ADULT - PERTINENT HX OF CURRENT PROBLEM, REHAB EVAL
Pt is a 77 y.o. male 77M with PMH of HIV (last VL<30) c/b HIV related dementia, HTN, BPH, recent hospitalization for E coli UTI/bacteremia presenting with fever. US Bladder 8/8: Milde bilateral hydronephrosis CXR 8/8: Small patchy bibasilar opacities which may represent multifocal pna

## 2018-08-09 NOTE — DISCHARGE NOTE ADULT - HOSPITAL COURSE
77M with PMH of HIV (last VL<30) c/b HIV related dementia, HTN, BPH, recent hospitalization for E coli UTI/bacteremia presenting with fever. History obtained from pts wife at bedside as pt is baseline non verbal. Per wife, she noted pt to be a little fatigued and lethargic on Monday; and then on Tuesday evening, when wife returned from work, was told pt had been spitting up all day and wife noticed him later to have a coughing fit that appeared painful; she took a temperature and found him to be febrile 101.9 and gave him tylenol and brought him to ED. Per wife, had not noted any rigors, has been eating and tolerating diet as normal, eats solid foods without coughing/choking. Per wife, since being in the ED, pt has not had any further coughing fits and appears back to his baseline.   Prior hospitalization was c/b urinary retention and able to pass TOV prior to discharge; however, when pt followed up with urology about a week later, pt was again noted to be retaining a significant amount; pt has been getting straight cath'd by family 3times per day for past week. 77M with PMH of HIV (last VL<30) c/b HIV related dementia, HTN, BPH, recent hospitalization for E coli UTI/bacteremia presenting with fever. History obtained from pts wife at bedside as pt is baseline non verbal. Per wife, she noted pt to be a little fatigued and lethargic on Monday; and then on Tuesday evening, when wife returned from work, was told pt had been spitting up all day and wife noticed him later to have a coughing fit that appeared painful; she took a temperature and found him to be febrile 101.9 and gave him tylenol and brought him to ED. Per wife, had not noted any rigors, has been eating and tolerating diet as normal, eats solid foods without coughing/choking. Per wife, since being in the ED, pt has not had any further coughing fits and appears back to his baseline.   Prior hospitalization was c/b urinary retention and able to pass TOV prior to discharge; however, when pt followed up with urology about a week later, pt was again noted to be retaining a significant amount; pt has been getting straight cath'd by family 3times per day for past week.  Patient currently voiding and bladder scan showing max of 222cc.  Stable for discharge to [facility] with straight cath BID and IV abx via PICC through 8/23 - f/u with ID, PMD, and urology 77M with PMH of HIV (last VL<30) c/b HIV related dementia, HTN, BPH, recent hospitalization for E coli UTI/bacteremia presenting with fever. History obtained from pts wife at bedside as pt is baseline non verbal. Per wife, she noted pt to be a little fatigued and lethargic on Monday; and then on Tuesday evening, when wife returned from work, was told pt had been spitting up all day and wife noticed him later to have a coughing fit that appeared painful; she took a temperature and found him to be febrile 101.9 and gave him tylenol and brought him to ED. Per wife, had not noted any rigors, has been eating and tolerating diet as normal, eats solid foods without coughing/choking. Per wife, since being in the ED, pt has not had any further coughing fits and appears back to his baseline.   Prior hospitalization was c/b urinary retention and able to pass TOV prior to discharge; however, when pt followed up with urology about a week later, pt was again noted to be retaining a significant amount; pt has been getting straight cath'd by family 3times per day for past week.  E coli UTI/bacteremia - Polymicrobial bacteremia including enterococcus,and CNS  Also E coli in urine Cx  No pseudomonas in blood cx found eventually-Repeat PCR negative  Likely urinary source ? secondary to obstruction  CT with no abscess  Wong removed -patient currently voiding and bladder scan showing max of 222cc.  Stable for discharge to [facility] with straight cath BID and IV abx via PICC through 8/23 - f/u with ID, PMD, and urology

## 2018-08-09 NOTE — DISCHARGE NOTE ADULT - SECONDARY DIAGNOSIS.
Alzheimer's disease of other onset Bacteremia Benign prostatic hyperplasia with urinary retention HIV (human immunodeficiency virus infection)

## 2018-08-09 NOTE — CONSULT NOTE ADULT - ATTENDING COMMENTS
Patient seen and examined, agree with above.
Case d/w Med NP  Message left on wifes cell  Will tailor plan for ID issues  per course,results.Will defer to primary team on management of other issues.  Will Follow.  Beeper [id number]-[id number]/afterhours/No response-[phone]

## 2018-08-09 NOTE — DISCHARGE NOTE ADULT - ADDITIONAL INSTRUCTIONS
You will need to have a CBC and CMP drawn on Monday, August 20 and have results faxed to Dr. Liz [phone].  You will also need blood cultures x 2 sets drawn on August 28, 2018 and results faxed to Dr. Liz.      You will need to follow up with Dr. Liz (Infectious Disease) in two weeks - please call to make an appointment. You will need to have a CBC and CMP drawn on Monday, August 20 and have results faxed to Dr. Liz [phone].  You will also need blood cultures x 2 sets drawn on August 28, 2018 and results faxed to Dr. Liz.      You will need to follow up with Dr. Liz (Infectious Disease) in two weeks - please call to make an appointment.    You will need to be intermittently catheterized twice a day for urinary retention.    You will need to follow up with your urologist and primary medical doctor within one week of discharge - please call to make appointments. You will need to have a CBC and CMP drawn on Monday, August 20 and have results faxed to Dr. Liz [phone].  You will also need blood cultures x 2 sets drawn on August 28, 2018 and results faxed to Dr. Liz.      You will need to follow up with Dr. Liz (Infectious Disease) in two weeks - please call to make an appointment.    You will need to be intermittently catheterized (straight cath) twice a day for urinary retention.    You will need to follow up with your urologist and primary medical doctor within one week of discharge - please call to make appointments.

## 2018-08-09 NOTE — CHART NOTE - NSCHARTNOTEFT_GEN_A_CORE
Notified by RN for positive blood culture; gram positive cocci in clusters in aerobic bottles. Pt is afebrile. Pt is being treated for UTI/PNA with IV levaquin. Vanco x 1 dosed. D/w Dr. De La Cruz; agrees with above plan. Call placed for ID consult; awaiting call back. Will continue to monitor. Will endorse to the day team.    Gena GODOY  #[id number]

## 2018-08-09 NOTE — DISCHARGE NOTE ADULT - PLAN OF CARE
Continue with current medication regimen. symptoms improved HOME CARE INSTRUCTIONS  Drink enough water and fluids to keep your urine clear or pale yellow.  Avoid caffeine, tea, and carbonated beverages. They tend to irritate your bladder.  Empty your bladder often. Avoid holding urine for long periods of time.  Empty your bladder before and after sexual intercourse.  After a bowel movement, women should cleanse from front to back. Use each tissue only once.  SEEK MEDICAL CARE IF:  You have back pain.  You develop a fever.  Your symptoms do not begin to resolve within 3 days.  SEEK IMMEDIATE MEDICAL CARE IF:  You have severe back pain or lower abdominal pain.  You develop chills.  You have nausea or vomiting.  You have continued burning or discomfort with urination. You are being discharged on antibiotics (Ampicillin and Ceftriaxone) which you will take through 8/23/18, via your PICC line.  You will need to have a CBC and CMP drawn on Monday, August 20 and have results faxed to Dr. Liz [phone].  You will also need blood cultures x 2 sets drawn on August 28, 2018 and results faxed to Dr. Liz.  You will need to follow up with Dr. Liz (Infectious Disease) in two weeks - please call to make an appointment. Continue to self catheterize three times a day.  You have an enlarged prostate gland which gets bigger as men get older - it is a very common problem and has nothing to do with prostate cancer  Call your doctor if you are urinating more frequently, have trouble starting to urinate, have weak stream, urine leaking or dribbling, and feeling as though bladder is not empty after urination  Your doctor will monitor your prostate with a rectal exam as well as urine or blood testing  You can help yourself by reducing the amount of fluid you drink before going to bed, limiting the amount of alcohol & caffeine you drink   Avoid cold & allergy medication that contain decongestants or antihistamines which make BPH symptoms worse  You can also "double void" by waiting a moment after urinating & trying again  Take your medication as prescribed - one medication helps to relax the muscle around the urethra and the other medication you may take prevents the prostate from growing more or even shrinking the prostate You will need to be intermittently catheterized twice a day for urinary retention.    You will need to follow up with your urologist within one week of discharge - please call to make an appointment.  You have an enlarged prostate gland which gets bigger as men get older - it is a very common problem and has nothing to do with prostate cancer  Call your doctor if you are urinating more frequently, have trouble starting to urinate, have weak stream, urine leaking or dribbling, and feeling as though bladder is not empty after urination  Your doctor will monitor your prostate with a rectal exam as well as urine or blood testing  You can help yourself by reducing the amount of fluid you drink before going to bed, limiting the amount of alcohol & caffeine you drink   Avoid cold & allergy medication that contain decongestants or antihistamines which make BPH symptoms worse  You can also "double void" by waiting a moment after urinating & trying again  Take your medication as prescribed - one medication helps to relax the muscle around the urethra and the other medication you may take prevents the prostate from growing more or even shrinking the prostate Continue with current medication regimen.  You will need to follow up with your primary medical doctor within one week of discharge - please call to make an appointment. You will need to be straight catheterized twice a day for urinary retention.    You will need to follow up with your urologist within one week of discharge - please call to make an appointment.  You have an enlarged prostate gland which gets bigger as men get older - it is a very common problem and has nothing to do with prostate cancer  Call your doctor if you are urinating more frequently, have trouble starting to urinate, have weak stream, urine leaking or dribbling, and feeling as though bladder is not empty after urination  Your doctor will monitor your prostate with a rectal exam as well as urine or blood testing  You can help yourself by reducing the amount of fluid you drink before going to bed, limiting the amount of alcohol & caffeine you drink   Avoid cold & allergy medication that contain decongestants or antihistamines which make BPH symptoms worse  You can also "double void" by waiting a moment after urinating & trying again  Take your medication as prescribed - one medication helps to relax the muscle around the urethra and the other medication you may take prevents the prostate from growing more or even shrinking the prostate

## 2018-08-09 NOTE — DISCHARGE NOTE ADULT - CARE PLAN
Principal Discharge DX:	UTI (urinary tract infection)  Goal:	symptoms improved  Assessment and plan of treatment:	HOME CARE INSTRUCTIONS  Drink enough water and fluids to keep your urine clear or pale yellow.  Avoid caffeine, tea, and carbonated beverages. They tend to irritate your bladder.  Empty your bladder often. Avoid holding urine for long periods of time.  Empty your bladder before and after sexual intercourse.  After a bowel movement, women should cleanse from front to back. Use each tissue only once.  SEEK MEDICAL CARE IF:  You have back pain.  You develop a fever.  Your symptoms do not begin to resolve within 3 days.  SEEK IMMEDIATE MEDICAL CARE IF:  You have severe back pain or lower abdominal pain.  You develop chills.  You have nausea or vomiting.  You have continued burning or discomfort with urination.  Secondary Diagnosis:	Bacteremia  Assessment and plan of treatment:	You are being discharged on antibiotics (Ampicillin and Ceftriaxone) which you will take through 8/23/18, via your PICC line.  You will need to have a CBC and CMP drawn on Monday, August 20 and have results faxed to Dr. Liz [phone].  You will also need blood cultures x 2 sets drawn on August 28, 2018 and results faxed to Dr. Liz.  You will need to follow up with Dr. Liz (Infectious Disease) in two weeks - please call to make an appointment.  Secondary Diagnosis:	Benign prostatic hyperplasia with urinary retention  Assessment and plan of treatment:	Continue to self catheterize three times a day.  You have an enlarged prostate gland which gets bigger as men get older - it is a very common problem and has nothing to do with prostate cancer  Call your doctor if you are urinating more frequently, have trouble starting to urinate, have weak stream, urine leaking or dribbling, and feeling as though bladder is not empty after urination  Your doctor will monitor your prostate with a rectal exam as well as urine or blood testing  You can help yourself by reducing the amount of fluid you drink before going to bed, limiting the amount of alcohol & caffeine you drink   Avoid cold & allergy medication that contain decongestants or antihistamines which make BPH symptoms worse  You can also "double void" by waiting a moment after urinating & trying again  Take your medication as prescribed - one medication helps to relax the muscle around the urethra and the other medication you may take prevents the prostate from growing more or even shrinking the prostate  Secondary Diagnosis:	HIV (human immunodeficiency virus infection)  Assessment and plan of treatment:	Continue with current medication regimen.  Secondary Diagnosis:	Alzheimer's disease of other onset  Assessment and plan of treatment:	Continue with current medication regimen. Principal Discharge DX:	UTI (urinary tract infection)  Goal:	symptoms improved  Assessment and plan of treatment:	HOME CARE INSTRUCTIONS  Drink enough water and fluids to keep your urine clear or pale yellow.  Avoid caffeine, tea, and carbonated beverages. They tend to irritate your bladder.  Empty your bladder often. Avoid holding urine for long periods of time.  Empty your bladder before and after sexual intercourse.  After a bowel movement, women should cleanse from front to back. Use each tissue only once.  SEEK MEDICAL CARE IF:  You have back pain.  You develop a fever.  Your symptoms do not begin to resolve within 3 days.  SEEK IMMEDIATE MEDICAL CARE IF:  You have severe back pain or lower abdominal pain.  You develop chills.  You have nausea or vomiting.  You have continued burning or discomfort with urination.  Secondary Diagnosis:	Bacteremia  Assessment and plan of treatment:	You are being discharged on antibiotics (Ampicillin and Ceftriaxone) which you will take through 8/23/18, via your PICC line.  You will need to have a CBC and CMP drawn on Monday, August 20 and have results faxed to Dr. Liz [phone].  You will also need blood cultures x 2 sets drawn on August 28, 2018 and results faxed to Dr. Liz.  You will need to follow up with Dr. Liz (Infectious Disease) in two weeks - please call to make an appointment.  Secondary Diagnosis:	Benign prostatic hyperplasia with urinary retention  Assessment and plan of treatment:	You will need to be intermittently catheterized twice a day for urinary retention.    You will need to follow up with your urologist within one week of discharge - please call to make an appointment.  You have an enlarged prostate gland which gets bigger as men get older - it is a very common problem and has nothing to do with prostate cancer  Call your doctor if you are urinating more frequently, have trouble starting to urinate, have weak stream, urine leaking or dribbling, and feeling as though bladder is not empty after urination  Your doctor will monitor your prostate with a rectal exam as well as urine or blood testing  You can help yourself by reducing the amount of fluid you drink before going to bed, limiting the amount of alcohol & caffeine you drink   Avoid cold & allergy medication that contain decongestants or antihistamines which make BPH symptoms worse  You can also "double void" by waiting a moment after urinating & trying again  Take your medication as prescribed - one medication helps to relax the muscle around the urethra and the other medication you may take prevents the prostate from growing more or even shrinking the prostate  Secondary Diagnosis:	HIV (human immunodeficiency virus infection)  Assessment and plan of treatment:	Continue with current medication regimen.  Secondary Diagnosis:	Alzheimer's disease of other onset  Assessment and plan of treatment:	Continue with current medication regimen.  You will need to follow up with your primary medical doctor within one week of discharge - please call to make an appointment. Principal Discharge DX:	UTI (urinary tract infection)  Goal:	symptoms improved  Assessment and plan of treatment:	HOME CARE INSTRUCTIONS  Drink enough water and fluids to keep your urine clear or pale yellow.  Avoid caffeine, tea, and carbonated beverages. They tend to irritate your bladder.  Empty your bladder often. Avoid holding urine for long periods of time.  Empty your bladder before and after sexual intercourse.  After a bowel movement, women should cleanse from front to back. Use each tissue only once.  SEEK MEDICAL CARE IF:  You have back pain.  You develop a fever.  Your symptoms do not begin to resolve within 3 days.  SEEK IMMEDIATE MEDICAL CARE IF:  You have severe back pain or lower abdominal pain.  You develop chills.  You have nausea or vomiting.  You have continued burning or discomfort with urination.  Secondary Diagnosis:	Bacteremia  Assessment and plan of treatment:	You are being discharged on antibiotics (Ampicillin and Ceftriaxone) which you will take through 8/23/18, via your PICC line.  You will need to have a CBC and CMP drawn on Monday, August 20 and have results faxed to Dr. Liz [phone].  You will also need blood cultures x 2 sets drawn on August 28, 2018 and results faxed to Dr. Liz.  You will need to follow up with Dr. Liz (Infectious Disease) in two weeks - please call to make an appointment.  Secondary Diagnosis:	Benign prostatic hyperplasia with urinary retention  Assessment and plan of treatment:	You will need to be straight catheterized twice a day for urinary retention.    You will need to follow up with your urologist within one week of discharge - please call to make an appointment.  You have an enlarged prostate gland which gets bigger as men get older - it is a very common problem and has nothing to do with prostate cancer  Call your doctor if you are urinating more frequently, have trouble starting to urinate, have weak stream, urine leaking or dribbling, and feeling as though bladder is not empty after urination  Your doctor will monitor your prostate with a rectal exam as well as urine or blood testing  You can help yourself by reducing the amount of fluid you drink before going to bed, limiting the amount of alcohol & caffeine you drink   Avoid cold & allergy medication that contain decongestants or antihistamines which make BPH symptoms worse  You can also "double void" by waiting a moment after urinating & trying again  Take your medication as prescribed - one medication helps to relax the muscle around the urethra and the other medication you may take prevents the prostate from growing more or even shrinking the prostate  Secondary Diagnosis:	HIV (human immunodeficiency virus infection)  Assessment and plan of treatment:	Continue with current medication regimen.  Secondary Diagnosis:	Alzheimer's disease of other onset  Assessment and plan of treatment:	Continue with current medication regimen.  You will need to follow up with your primary medical doctor within one week of discharge - please call to make an appointment.

## 2018-08-09 NOTE — PHYSICAL THERAPY INITIAL EVALUATION ADULT - PLANNED THERAPY INTERVENTIONS, PT EVAL
GOAL: Pt will perform 5 stairs with or without U HR as needed within 3-4weeks./gait training/bed mobility training/transfer training

## 2018-08-09 NOTE — DISCHARGE NOTE ADULT - MEDICATION SUMMARY - MEDICATIONS TO TAKE
I will START or STAY ON the medications listed below when I get home from the hospital:    CBC and CMP, and Blood cultures  -- Draw CBC and CMP on 8/20/18 and Draw Blood Cultures x 2 sets on 8/28/18 and fax to ID Dr. Liz [phone]  -- Indication: For Bacteremia    finasteride 5 mg oral tablet  -- 1 tab(s) by mouth once a day  -- Indication: For Benign prostatic hyperplasia with urinary retention    tamsulosin 0.4 mg oral capsule  -- 1 cap(s) by mouth once a day  -- Indication: For Benign prostatic hyperplasia with urinary retention    atorvastatin 10 mg oral tablet  -- 1 tab(s) by mouth once a day (at bedtime)  -- Indication: For Hyperlipidemia    OLANZapine 2.5 mg oral tablet  -- 1 tab(s) by mouth once a day  -- Indication: For Antipsychotic    Prezista 800 mg oral tablet  -- 1 tab(s) by mouth once a day  -- Indication: For HIV (human immunodeficiency virus infection)    emtricitabine-tenofovir disoproxil 200 mg-300 mg oral tablet  -- 1 tab(s) by mouth once a day  -- Indication: For HIV (human immunodeficiency virus infection)    ritonavir 100 mg oral tablet  -- 1 tab(s) by mouth once a day  -- Indication: For HIV (human immunodeficiency virus infection)    amLODIPine 5 mg oral tablet  -- 1 tab(s) by mouth once a day  -- Indication: For Essential hypertension    cefTRIAXone 1 g injection  -- 1 gram(s) intravenously once a day   through 8/23/18 and then discontinue  -- Indication: For Bacteremia    donepezil 10 mg oral tablet  -- 1 tab(s) by mouth once a day (at bedtime)  -- Indication: For Bacteremia    senna oral tablet  -- 2 tab(s) by mouth once a day (at bedtime)  -- Indication: For Constipation    docusate sodium 100 mg oral capsule  -- 1 cap(s) by mouth 3 times a day  -- Indication: For Constipation    polyethylene glycol 3350 oral powder for reconstitution  -- 17 gram(s) by mouth once a day  -- Indication: For Constipation    ampicillin 2 g injection  -- 2 gram(s) intravenously every 6 hours   Through 8/23/18 and then discontinue  -- Indication: For Bacteremia I will START or STAY ON the medications listed below when I get home from the hospital:    CBC and CMP, and Blood cultures  -- Draw CBC and CMP on 8/20/18 and Draw Blood Cultures x 2 sets on 8/28/18 and fax to ID Dr. Liz [phone]  -- Indication: For Bacteremia    finasteride 5 mg oral tablet  -- 1 tab(s) by mouth once a day  -- Indication: For Benign prostatic hyperplasia with urinary retention    tamsulosin 0.4 mg oral capsule  -- 1 cap(s) by mouth once a day  -- Indication: For Benign prostatic hyperplasia with urinary retention    atorvastatin 10 mg oral tablet  -- 1 tab(s) by mouth once a day (at bedtime)  -- Indication: For Hyperlipidemia    OLANZapine 2.5 mg oral tablet  -- 1 tab(s) by mouth once a day  -- Indication: For Antipsychotic    Prezista 800 mg oral tablet  -- 1 tab(s) by mouth once a day  -- Indication: For HIV (human immunodeficiency virus infection)    emtricitabine-tenofovir disoproxil 200 mg-300 mg oral tablet  -- 1 tab(s) by mouth once a day  -- Indication: For HIV (human immunodeficiency virus infection)    ritonavir 100 mg oral tablet  -- 1 tab(s) by mouth once a day  -- Indication: For HIV (human immunodeficiency virus infection)    amLODIPine 5 mg oral tablet  -- 1 tab(s) by mouth once a day  -- Indication: For Essential hypertension    cefTRIAXone 1 g injection  -- 1 gram(s) intravenously once a day   through 8/23/18 and then discontinue  -- Indication: For Bacteremia    donepezil 10 mg oral tablet  -- 1 tab(s) by mouth once a day (at bedtime)  -- Indication: For Bacteremia    senna oral tablet  -- 2 tab(s) by mouth once a day (at bedtime)  -- Indication: For Constipation    docusate sodium 100 mg oral capsule  -- 1 cap(s) by mouth 3 times a day  -- Indication: For Constipation    polyethylene glycol 3350 oral powder for reconstitution  -- 17 gram(s) by mouth once a day  -- Indication: For Constipation    ampicillin 2 g injection  -- 2 gram(s) intravenously every 6 hours   Through 8/23/18 and then discontinue  -- Indication: For Bacteremia

## 2018-08-09 NOTE — CONSULT NOTE ADULT - ASSESSMENT
77M with PMH of HIV (last VL<30) c/b HIV related dementia, HTN, BPH, recent hospitalization for E coli UTI/bacteremia presenting with fever. History obtained from pts wife at bedside as pt is baseline non verbal. Per wife, she noted pt to be a little fatigued and lethargic on Monday; and then on Tuesday evening, when wife returned from work, was told pt had been spitting up all day and wife noticed him later to have a coughing fit that appeared painful; she took a temperature and found him to be febrile 101.9   Polymicrobial bacteremia including enterococcus,pseudomonas and CNS  Likely urinary source ? secondary to obstruction  Could also be occult GI source though abdominal exam benign  Patient was on levaquin-raising concern for drug resistance  His HIV is well controlled though he has progressive dementia and has been non- semi verbal     Rec:

## 2018-08-09 NOTE — DISCHARGE NOTE ADULT - MEDICATION SUMMARY - MEDICATIONS TO STOP TAKING
I will STOP taking the medications listed below when I get home from the hospital:    Levaquin 750 mg oral tablet  -- 1 tab(s) by mouth every 24 hours for 10 days.  Start Date: 7/21  Stop Date: 7/30

## 2018-08-09 NOTE — PROGRESS NOTE ADULT - SUBJECTIVE AND OBJECTIVE BOX
PULM/MED PROGRESS NOTE:      awake, appears comfortable.  wife at bedside  No cough or apparent SOB      MEDICATIONS  (STANDING):  amLODIPine   Tablet 5 milliGRAM(s) Oral daily  atorvastatin 10 milliGRAM(s) Oral at bedtime  darunavir 800 milliGRAM(s) Oral daily  donepezil 10 milliGRAM(s) Oral at bedtime  emtricitabine 200 mG/tenofovir 300 mG (TRUVADA) 1 Tablet(s) Oral daily  enoxaparin Injectable 40 milliGRAM(s) SubCutaneous every 24 hours  finasteride 5 milliGRAM(s) Oral daily  OLANZapine 2.5 milliGRAM(s) Oral daily  piperacillin/tazobactam IVPB. 3.375 Gram(s) IV Intermittent every 8 hours  ritonavir  Solution 100 milliGRAM(s) Oral daily  sodium chloride 0.9%. 1000 milliLiter(s) (100 mL/Hr) IV Continuous <Continuous>  tamsulosin 0.4 milliGRAM(s) Oral at bedtime      MEDICATIONS  (PRN):          Vital Signs Last 24 Hrs  T(C): 36.9 (09 Aug 2018 12:34), Max: 37.3 (08 Aug 2018 18:04)  T(F): 98.4 (09 Aug 2018 12:34), Max: 99.2 (08 Aug 2018 18:04)  HR: 79 (09 Aug 2018 12:34) (70 - 79)  BP: 122/70 (09 Aug 2018 12:34) (115/67 - 167/87)  BP(mean): --  RR: 18 (09 Aug 2018 12:34) (18 - 18)  SpO2: 96% (09 Aug 2018 12:34) (95% - 96%)    PHYSICAL EXAMINATION:    GENERAL: The patient is awake and in no apparent distress.     NECK: Supple.    LUNGS: Bilateral breath sounds    HEART: Regular rate     ABDOMEN: Soft, nontender    EXTREMITIES: No edema.      LABS:                        12.7   10.05 )-----------( 197      ( 09 Aug 2018 07:28 )             36.3     -    139  |  106  |  9   ----------------------------<  118<H>  3.7   |  26  |  0.85    Ca    9.0      09 Aug 2018 05:44  Phos  2.3     08-08  Mg     1.8     08-08    TPro  7.4  /  Alb  3.5  /  TBili  0.4  /  DBili  x   /  AST  25  /  ALT  11  /  AlkPhos  146<H>        Urinalysis Basic - ( 08 Aug 2018 01:06 )    Color: PL Yellow / Appearance: SL Turbid / S.007 / pH: x  Gluc: x / Ketone: Negative  / Bili: Negative / Urobili: Negative   Blood: x / Protein: Negative / Nitrite: Positive   Leuk Esterase: Large / RBC: 5-10 /HPF / WBC 26-50 /HPF   Sq Epi: x / Non Sq Epi: x / Bacteria: Few /HPF      MICROBIOLOGY:  Culture Results:   >100,000 CFU/ml Escherichia coli ( @ 03:03)  Culture Results:   Growth in anaerobic bottle: Gram Positive Cocci in Pairs and Chains  ***Blood Panel PCR results on this specimen are available  approximately 3 hours after the Gram stain result.***  Gram stain, PCR, and/or culture results may not always  correspond due to difference in methodologies.  ************************************************************  This PCR assay was performed using [employer].  The following targets are tested for: Enterococcus,  vancomycin resistant enterococci, Listeriamonocytogenes,  coagulase negative staphylococci, S. aureus,  methicillin resistant S. aureus, Streptococcus agalactiae  (Group B), S. pneumoniae, S. pyogenes (Group A),  Acinetobacter baumannii, Enterobacter cloacae, E. coli,  Klebsiella oxytoca, K.pneumoniae, Proteus sp.,  Serratia marcescens, Haemophilus influenzae,  Neisseria meningitidis, Pseudomonas aeruginosa, Candida  albicans, C. glabrata, C krusei, C parapsilosis,  C. tropicalis and the KPC resistance gene.  "Due to technical problems,Proteus sp. will Not be reported as part of  the BCID panel until further notice" ( @ 02:12)  Culture Results:   Growth in aerobic bottle: Gram Positive Cocci in Clusters  "Due to technical problems, Proteus sp. will Not be reported as part of  the BCID panel until further notice"  ***Blood Panel PCR results on this specimen are available  approximately 3 hours after the Gram stain result.***  Gram stain, PCR, and/or culture results may not always  correspond due to difference in methodologies.  ************************************************************  This PCR assay was performed using [employer].  The following targets are tested for: Enterococcus,  vancomycin resistant enterococci, Listeria monocytogenes,  coagulase negative staphylococci, S. aureus,  methicillin resistant S. aureus, Streptococcus agalactiae  (Group B), S. pneumoniae, S.pyogenes (Group A),  Acinetobacter baumannii, Enterobacter cloacae, E. coli,  Klebsiella oxytoca, K. pneumoniae, Proteus sp.,  Serratia marcescens, Haemophilus influenzae,  Neisseria meningitidis, Pseudomonas aeruginosa, Candida  albicans, C. glabrata, C krusei, C parapsilosis,  C. tropicalis and the KPC resistance gene. ( @ 02:12)      RADIOLOGY & ADDITIONAL STUDIES:

## 2018-08-09 NOTE — PHYSICAL THERAPY INITIAL EVALUATION ADULT - DISCHARGE DISPOSITION, PT EVAL
Received urine culture results showing Klebsiella pneumoniae, Dr Yuen ordered Macrobid 100mg BID [id number] , a Rx was sent electronically to [facility], and the patient was called and given the medication instructions, patient voiced understanding at this time.   subacute rehab/rehabilitation facility

## 2018-08-09 NOTE — DISCHARGE NOTE ADULT - PATIENT PORTAL LINK FT
You can access the [facility] Patient Portal, offered by [facility], by registering with the following website: [email or web address]

## 2018-08-09 NOTE — CONSULT NOTE ADULT - SUBJECTIVE AND OBJECTIVE BOX
Patient is a 77y old  Male who presents with a chief complaint of UTI (08 Aug 2018 23:32)    From HPI" HPI:  77M with PMH of HIV (last VL<30) c/b HIV related dementia, HTN, BPH, recent hospitalization for E coli UTI/bacteremia presenting with fever. History obtained from pts wife at bedside as pt is baseline non verbal. Per wife, she noted pt to be a little fatigued and lethargic on Monday; and then on Tuesday evening, when wife returned from work, was told pt had been spitting up all day and wife noticed him later to have a coughing fit that appeared painful; she took a temperature and found him to be febrile 101.9 and gave him tylenol and brought him to ED. Per wife, had not noted any rigors, has been eating and tolerating diet as normal, eats solid foods without coughing/choking. Per wife, since being in the ED, pt has not had any further coughing fits and appears back to his baseline.   Prior hospitalization was c/b urinary retention and able to pass TOV prior to discharge; however, when pt followed up with urology about a week later, pt was again noted to be retaining a significant amount; pt has been getting straight cath'd by family 3times per day for past week.    At baseline, pt is very minimally/non verbal, able to walk short distances without assistance, able to feed himself with supervision, need shelp with other ADLs, has HHA 9hours per day. (08 Aug 2018 04:59)  "    Above verified-agree with above unless noted below.  patient when seen unable to provide any history  called wife but got voicemail    ID consulted     PAST MEDICAL & SURGICAL HISTORY:  Alzheimer's disease of other onset  HTN (hypertension)  HIV (human immunodeficiency virus infection)  No significant past surgical history      Social history:      no current Smoking,    ETOH,      IVDU       FAMILY HISTORY:  No pertinent family history in first degree relatives  - Reviewed,Non contributory     REVIEW OF SYSTEMS  Currently not obtainable     Allergies    No Known Allergies    Intolerances        Antimicrobials:    darunavir 800 milliGRAM(s) Oral daily  emtricitabine 200 mG/tenofovir 300 mG (TRUVADA) 1 Tablet(s) Oral daily  gentamicin   IVPB 120 milliGRAM(s) IV Intermittent once  piperacillin/tazobactam IVPB. 3.375 Gram(s) IV Intermittent every 8 hours  ritonavir  Solution 100 milliGRAM(s) Oral daily  recd Vanco yesterday  Also on levaquin as OP    Other medications reviewed      Vital Signs Last 24 Hrs  T(C): 36.6 (09 Aug 2018 04:56), Max: 37.3 (08 Aug 2018 18:04)  T(F): 97.8 (09 Aug 2018 04:56), Max: 99.2 (08 Aug 2018 18:04)  HR: 76 (09 Aug 2018 04:56) (71 - 76)  BP: 167/87 (09 Aug 2018 04:56) (115/69 - 167/87)  BP(mean): --  RR: 18 (09 Aug 2018 04:56) (18 - 20)  SpO2: 95% (09 Aug 2018 04:56) (95% - 96%)    PHYSICAL EXAM:Pleasant patient in no acute distress.      Constitutional:Comfortable.Awake and alert  +cachexia     Eyes:PERRL .NO discharge or conjunctival injection    ENMT:No sinus tenderness.No thrush.No pharyngeal exudate or erythema does not cooperate with oral exam    Neck:Supple,No LN,no JVD      Respiratory:Good air entry bilaterally,CTA    Cardiovascular:S1 S2 wnl, No murmurs,rub or gallops    Gastrointestinal:Soft BS(+) no tenderness no masses ,No rebound or guarding    Genitourinary:No CVA tendereness         Extremities:No cyanosis,clubbing or edema.    Vascular:peripheral pulses felt    Neurological:alert awake  Does not follow commands  No verbal response    Skin:No rash     Lymph Nodes:No palpable LNs    Musculoskeletal:No joint swelling or LOM              Labs:                            12.7   10.05 )-----------( 197      ( 09 Aug 2018 07:28 )             36.3     WBC Count: 10.05 (08-09-18 @ 07:28)  WBC Count: 11.3 (08-08-18 @ 07:08)  WBC Count: 10.9 (08-07-18 @ 23:59)      08-09    139  |  106  |  9   ----------------------------<  118<H>  3.7   |  26  |  0.85    Ca    9.0      09 Aug 2018 05:44  Phos  2.3     08-08  Mg     1.8     08-08    TPro  7.4  /  Alb  3.5  /  TBili  0.4  /  DBili  x   /  AST  25  /  ALT  11  /  AlkPhos  146<H>  08-09            Culture - Blood (collected 08 Aug 2018 02:12)  Source: .Blood Blood  Gram Stain (09 Aug 2018 05:42):    Growth in aerobic bottle: Gram Positive Cocci in Clusters  Preliminary Report (09 Aug 2018 05:42):    Growth in aerobic bottle: Gram Positive Cocci in Clusters    Organism: Blood Culture PCR (09 Aug 2018 08:02)      -  Coagulase negative Staphylococcus: Detec      -  Pseudomonas aeruginosa: Detec      Method Type: PCR    Culture - Blood (collected 08 Aug 2018 02:12)  Source: .Blood Blood  Gram Stain (08 Aug 2018 19:57):    Growth in anaerobic bottle: Gram Positive Cocci in Pairs and Chains  Preliminary Report (08 Aug 2018 19:57):    Growth in anaerobic bottle: Gram Positive Cocci in Pairs and Chains    *     -  Enterococcus species: Detec      Method Type: PCR          < from: Xray Chest 1 View AP/PA (08.08.18 @ 01:02) >  IMPRESSION:  Small patchy bibasilar opacities which may represent multifocal   pneumonia. A follow-up chest radiograph is recommended after treatment.        < end of copied text >    < from: US Kidney and Bladder (08.08.18 @ 15:39) >  IMPRESSION:     Mild bilateral hydronephrosis.    Large postvoid residual of 442 cc, compatible with retention, with a   mildly thickened and trabeculated urinary bladder wall,may be seen in   chronic bladder obstruction. Echogenic debris within the urinary bladder   (together with the thickened wall), may be seen in urinary tract   infection.            < end of copied text >      HIV-1 RNA Quantitative, Viral Load (07.16.18 @ 09:30)    HIV-1 RNA Quantitative, Vir Load Interp: See Comment Viral loads lower than 30 copies/mL can be detected, but cannot be  measured reliably. METHOD: Transcription mediated amplification (TMA) –  [employer]. Results from HIV-1 RNA tests that use other methods  might differ.  Abbreviations:  DET. = Detected,  not det. = Not Detected  n/a = not available  DET. <30 =  HIV-1 RNA detected at less than <30 copies/mL.  DET. <1.47 = HIV-1 RNA detected at less than 1.47 log(copies/mL).  .    HIV-1 RNA Quantitative, Viral Load Log: DET.  <1.47 lg /mL    HIV-1 RNA Quantitative, Viral Load:   DET. <30    HIV-1 Viral Load Result: DET.        Culture - Blood (07.15.18 @ 05:19)    -  Gentamicin: S <=1    -  Imipenem: S <=1    -  Meropenem: S <=1    -  Piperacillin/Tazobactam: S <=8    -  Levofloxacin: S <=1    -  Tobramycin: S <=2    -  Trimethoprim/Sulfamethoxazole: S <=0.5/9.5    -  Escherichia coli: Detec    -  Ciprofloxacin: S <=0.5    Gram Stain:   Growth in anaerobic bottle: Gram Negative Rods    -  Amikacin: S <=8    -  Ampicillin: S <=2 These ampicillin results predict results for amoxicillin    -  Ampicillin/Sulbactam: S <=4/2    -  Aztreonam: S <=4    -  Cefazolin: S <=2    -  Cefepime: S <=2    -  Cefoxitin: S <=4    -  Ceftriaxone: S <=1 Enterobacter, Citrobacter, and Serratia may develop resistance during prolonged therapy    -  Ertapenem: S <=0.5    Specimen Source: .Blood Blood-Venous    Organism: Escherichia coli    Organism: Blood Culture PCR

## 2018-08-09 NOTE — CONSULT NOTE ADULT - SUBJECTIVE AND OBJECTIVE BOX
77y Male with hx of retention who has seen by Kris  and was set up to do clean intermittent catheterization at home three times a day performed by his wife. He is incontinent at times. He is unable to add to the history taking.     Here with fevers and found to have polymicrobial bacteremia.       PAST MEDICAL & SURGICAL HISTORY:  Alzheimer's disease of other onset  HTN (hypertension)  HIV (human immunodeficiency virus infection)  No significant past surgical history      MEDICATIONS  (STANDING):  amLODIPine   Tablet 5 milliGRAM(s) Oral daily  atorvastatin 10 milliGRAM(s) Oral at bedtime  darunavir 800 milliGRAM(s) Oral daily  donepezil 10 milliGRAM(s) Oral at bedtime  emtricitabine 200 mG/tenofovir 300 mG (TRUVADA) 1 Tablet(s) Oral daily  enoxaparin Injectable 40 milliGRAM(s) SubCutaneous every 24 hours  finasteride 5 milliGRAM(s) Oral daily  OLANZapine 2.5 milliGRAM(s) Oral daily  piperacillin/tazobactam IVPB. 3.375 Gram(s) IV Intermittent every 8 hours  ritonavir  Solution 100 milliGRAM(s) Oral daily  sodium chloride 0.9%. 1000 milliLiter(s) (100 mL/Hr) IV Continuous <Continuous>  tamsulosin 0.4 milliGRAM(s) Oral at bedtime    MEDICATIONS  (PRN):      FAMILY HISTORY:  No pertinent family history in first degree relatives      Allergies    No Known Allergies    Intolerances        SOCIAL HISTORY:       REVIEW OF SYSTEMS: Otherwise negative as stated in HPI    Physical Exam  Vital signs  T(C): 36.9 (18 @ 12:34), Max: 37.3 (18 @ 18:04)  HR: 79 (18 @ 12:34)  BP: 122/70 (18 @ 12:34)  SpO2: 96% (18 @ 12:34)  Wt(kg): --    Output    UOP    Gen:  AWAKE ALERT NAD AXOX3    Pulm:  NO RESP DISTRESS  	  CV:  S1S2    GI:  SOFT NT/ND    :                            	      LABS:                            12.7   10.05 )-----------( 197      ( 09 Aug 2018 07:28 )             36.3             139  |  106  |  9   ----------------------------<  118<H>  3.7   |  26  |  0.85    Ca    9.0      09 Aug 2018 05:44  Phos  2.3       Mg     1.8         TPro  7.4  /  Alb  3.5  /  TBili  0.4  /  DBili  x   /  AST  25  /  ALT  11  /  AlkPhos  146<H>        Urinalysis Basic - ( 08 Aug 2018 01:06 )    Color: PL Yellow / Appearance: SL Turbid / S.007 / pH: x  Gluc: x / Ketone: Negative  / Bili: Negative / Urobili: Negative   Blood: x / Protein: Negative / Nitrite: Positive   Leuk Esterase: Large / RBC: 5-10 /HPF / WBC 26-50 /HPF   Sq Epi: x / Non Sq Epi: x / Bacteria: Few /HPF        Urine Cx:  Culture - Urine (18 @ 03:03)    Specimen Source: .Urine Clean Catch (Midstream)    Culture Results:   >100,000 CFU/ml Escherichia coli      Culture - Blood (18 @ 02:12)    -  Coagulase negative Staphylococcus: Detec  Culture - Blood (18 @ 02:12)    Gram Stain:   Growth in anaerobic bottle: Gram Positive Cocci in Pairs and Chains    -  Enterococcus species: Detec    Specimen Source: .Blood Blood    Organism: Blood Culture PCR    Culture Results:   Growth in anaerobic bottle: Gram Positive Cocci in Pairs and Chains  ***Blood Panel PCR results on this specimen are available  approximately 3 hours after the Gram stain result.***  Gram stain, PCR, and/or culture results may not always  correspond due to difference in methodologies.  ************************************************************  This PCR assay was performed using [employer].  The following targets are tested for: Enterococcus,  vancomycin resistant enterococci, Listeriamonocytogenes,  coagulase negative staphylococci, S. aureus,  methicillin resistant S. aureus, Streptococcus agalactiae  (Group B), S. pneumoniae, S. pyogenes (Group A),  Acinetobacter baumannii, Enterobacter cloacae, E. coli,  Klebsiella oxytoca, K.pneumoniae, Proteus sp.,  Serratia marcescens, Haemophilus influenzae,  Neisseria meningitidis, Pseudomonas aeruginosa, Candida  albicans, C. glabrata, C krusei, C parapsilosis,  C. tropicalis and the KPC resistance gene.  "Due to technical problems,Proteus sp. will Not be reported as part of  the BCID panel until further notice"    Organism Identification: Blood Culture PCR    Method Type: PCR      -  Pseudomonas aeruginosa: Detec    Gram Stain:   Growth in aerobic bottle: Gram Positive Cocci in Clusters    Specimen Source: .Blood Blood    Organism: Blood Culture PCR    Culture Results:   Growth in aerobic bottle: Gram Positive Cocci in Clusters  "Due to technical problems, Proteus sp. will Not be reported as part of  the BCID panel until further notice"  ***Blood Panel PCR results on this specimen are available  approximately 3 hours after the Gram stain result.***  Gram stain, PCR, and/or culture results may not always  correspond due to difference in methodologies.  ************************************************************  This PCR assay was performed using [employer].  The following targets are tested for: Enterococcus,  vancomycin resistant enterococci, Listeria monocytogenes,  coagulase negative staphylococci, S. aureus,  methicillin resistant S. aureus, Streptococcus agalactiae  (Group B), S. pneumoniae, S.pyogenes (Group A),  Acinetobacter baumannii, Enterobacter cloacae, E. coli,  Klebsiella oxytoca, K. pneumoniae, Proteus sp.,  Serratia marcescens, Haemophilus influenzae,  Neisseria meningitidis, Pseudomonas aeruginosa, Candida  albicans, C. glabrata, C krusei, C parapsilosis,  C. tropicalis and the KPC resistance gene.    Organism Identification: Blood Culture PCR    Method Type: PCR          RADIOLOGY:  < from: US Kidney and Bladder (18 @ 15:39) >  FINDINGS:    Right kidney: 10.9 cm. Mild hydronephrosis. No renal mass or calculi.    Left kidney:  10.2 cm. Mild Hydronephrosis. No renal mass or calculi.    Urinary bladder: Distended with a prevoid volume of 483 cc. Postvoid   volume not significantly changed at 442 cc. Also mildly thickened and   trabeculated urinary bladder wall with echogenic debris within the   urinary bladder. Trabeculations may be secondary to chronic bladder   outlet obstruction. Bilateral ureteral jets.    IMPRESSION:     Mild bilateral hydronephrosis.    Large postvoid residual of 442 cc, compatible with retention, with a   mildly thickened and trabeculated urinary bladder wall,may be seen in   chronic bladder obstruction. Echogenic debris within the urinary bladder   (together with the thickened wall), may be seen in urinary tract   infection. 77y Male with hx of retention who has seen by Kris  and was set up to do clean intermittent catheterization at home three times a day performed by his wife. He is incontinent at times. He is unable to add to the history taking.     Here with fevers and found to have polymicrobial bacteremia.       PAST MEDICAL & SURGICAL HISTORY:  Alzheimer's disease of other onset  HTN (hypertension)  HIV (human immunodeficiency virus infection)  No significant past surgical history      MEDICATIONS  (STANDING):  amLODIPine   Tablet 5 milliGRAM(s) Oral daily  atorvastatin 10 milliGRAM(s) Oral at bedtime  darunavir 800 milliGRAM(s) Oral daily  donepezil 10 milliGRAM(s) Oral at bedtime  emtricitabine 200 mG/tenofovir 300 mG (TRUVADA) 1 Tablet(s) Oral daily  enoxaparin Injectable 40 milliGRAM(s) SubCutaneous every 24 hours  finasteride 5 milliGRAM(s) Oral daily  OLANZapine 2.5 milliGRAM(s) Oral daily  piperacillin/tazobactam IVPB. 3.375 Gram(s) IV Intermittent every 8 hours  ritonavir  Solution 100 milliGRAM(s) Oral daily  sodium chloride 0.9%. 1000 milliLiter(s) (100 mL/Hr) IV Continuous <Continuous>  tamsulosin 0.4 milliGRAM(s) Oral at bedtime    MEDICATIONS  (PRN):      FAMILY HISTORY:  No pertinent family history in first degree relatives      Allergies    No Known Allergies    Intolerances        SOCIAL HISTORY:       REVIEW OF SYSTEMS: Otherwise negative as stated in HPI    Physical Exam  Vital signs  T(C): 36.9 (18 @ 12:34), Max: 37.3 (18 @ 18:04)  HR: 79 (18 @ 12:34)  BP: 122/70 (18 @ 12:34)  SpO2: 96% (18 @ 12:34)  Wt(kg): --    Output    UOP    Gen:  AWAKE ALERT NAD AXOX3    Pulm:  NO RESP DISTRESS  	  CV:  S1S2    GI:  SOFT NT/ND  + BLADDER PALP NONTENDER     :  CIRC PHALLUS  BL DESC TESTIS NONTENDER NO MASS                           	      LABS:                            12.7   10.05 )-----------( 197      ( 09 Aug 2018 07:28 )             36.3             139  |  106  |  9   ----------------------------<  118<H>  3.7   |  26  |  0.85    Ca    9.0      09 Aug 2018 05:44  Phos  2.3       Mg     1.8         TPro  7.4  /  Alb  3.5  /  TBili  0.4  /  DBili  x   /  AST  25  /  ALT  11  /  AlkPhos  146<H>        Urinalysis Basic - ( 08 Aug 2018 01:06 )    Color: PL Yellow / Appearance: SL Turbid / S.007 / pH: x  Gluc: x / Ketone: Negative  / Bili: Negative / Urobili: Negative   Blood: x / Protein: Negative / Nitrite: Positive   Leuk Esterase: Large / RBC: 5-10 /HPF / WBC 26-50 /HPF   Sq Epi: x / Non Sq Epi: x / Bacteria: Few /HPF        Urine Cx:  Culture - Urine (18 @ 03:03)    Specimen Source: .Urine Clean Catch (Midstream)    Culture Results:   >100,000 CFU/ml Escherichia coli      Culture - Blood (18 @ 02:12)    -  Coagulase negative Staphylococcus: Detec  Culture - Blood (18 @ 02:12)    Gram Stain:   Growth in anaerobic bottle: Gram Positive Cocci in Pairs and Chains    -  Enterococcus species: Detec    Specimen Source: .Blood Blood    Organism: Blood Culture PCR    Culture Results:   Growth in anaerobic bottle: Gram Positive Cocci in Pairs and Chains  ***Blood Panel PCR results on this specimen are available  approximately 3 hours after the Gram stain result.***  Gram stain, PCR, and/or culture results may not always  correspond due to difference in methodologies.  ************************************************************  This PCR assay was performed using [employer].  The following targets are tested for: Enterococcus,  vancomycin resistant enterococci, Listeriamonocytogenes,  coagulase negative staphylococci, S. aureus,  methicillin resistant S. aureus, Streptococcus agalactiae  (Group B), S. pneumoniae, S. pyogenes (Group A),  Acinetobacter baumannii, Enterobacter cloacae, E. coli,  Klebsiella oxytoca, K.pneumoniae, Proteus sp.,  Serratia marcescens, Haemophilus influenzae,  Neisseria meningitidis, Pseudomonas aeruginosa, Candida  albicans, C. glabrata, C krusei, C parapsilosis,  C. tropicalis and the KPC resistance gene.  "Due to technical problems,Proteus sp. will Not be reported as part of  the BCID panel until further notice"    Organism Identification: Blood Culture PCR    Method Type: PCR      -  Pseudomonas aeruginosa: Detec    Gram Stain:   Growth in aerobic bottle: Gram Positive Cocci in Clusters    Specimen Source: .Blood Blood    Organism: Blood Culture PCR    Culture Results:   Growth in aerobic bottle: Gram Positive Cocci in Clusters  "Due to technical problems, Proteus sp. will Not be reported as part of  the BCID panel until further notice"  ***Blood Panel PCR results on this specimen are available  approximately 3 hours after the Gram stain result.***  Gram stain, PCR, and/or culture results may not always  correspond due to difference in methodologies.  ************************************************************  This PCR assay was performed using [employer].  The following targets are tested for: Enterococcus,  vancomycin resistant enterococci, Listeria monocytogenes,  coagulase negative staphylococci, S. aureus,  methicillin resistant S. aureus, Streptococcus agalactiae  (Group B), S. pneumoniae, S.pyogenes (Group A),  Acinetobacter baumannii, Enterobacter cloacae, E. coli,  Klebsiella oxytoca, K. pneumoniae, Proteus sp.,  Serratia marcescens, Haemophilus influenzae,  Neisseria meningitidis, Pseudomonas aeruginosa, Candida  albicans, C. glabrata, C krusei, C parapsilosis,  C. tropicalis and the KPC resistance gene.    Organism Identification: Blood Culture PCR    Method Type: PCR          RADIOLOGY:  < from: US Kidney and Bladder (18 @ 15:39) >  FINDINGS:    Right kidney: 10.9 cm. Mild hydronephrosis. No renal mass or calculi.    Left kidney:  10.2 cm. Mild Hydronephrosis. No renal mass or calculi.    Urinary bladder: Distended with a prevoid volume of 483 cc. Postvoid   volume not significantly changed at 442 cc. Also mildly thickened and   trabeculated urinary bladder wall with echogenic debris within the   urinary bladder. Trabeculations may be secondary to chronic bladder   outlet obstruction. Bilateral ureteral jets.    IMPRESSION:     Mild bilateral hydronephrosis.    Large postvoid residual of 442 cc, compatible with retention, with a   mildly thickened and trabeculated urinary bladder wall,may be seen in   chronic bladder obstruction. Echogenic debris within the urinary bladder   (together with the thickened wall), may be seen in urinary tract   infection. 77y Male with hx of retention who has seen by Kris  and was set up to do clean intermittent catheterization at home three times a day performed by his wife. He is incontinent at times. He is unable to add to the history taking.     Here with fevers and found to have polymicrobial bacteremia.       PAST MEDICAL & SURGICAL HISTORY:  Alzheimer's disease of other onset  HTN (hypertension)  HIV (human immunodeficiency virus infection)  No significant past surgical history      MEDICATIONS  (STANDING):  amLODIPine   Tablet 5 milliGRAM(s) Oral daily  atorvastatin 10 milliGRAM(s) Oral at bedtime  darunavir 800 milliGRAM(s) Oral daily  donepezil 10 milliGRAM(s) Oral at bedtime  emtricitabine 200 mG/tenofovir 300 mG (TRUVADA) 1 Tablet(s) Oral daily  enoxaparin Injectable 40 milliGRAM(s) SubCutaneous every 24 hours  finasteride 5 milliGRAM(s) Oral daily  OLANZapine 2.5 milliGRAM(s) Oral daily  piperacillin/tazobactam IVPB. 3.375 Gram(s) IV Intermittent every 8 hours  ritonavir  Solution 100 milliGRAM(s) Oral daily  sodium chloride 0.9%. 1000 milliLiter(s) (100 mL/Hr) IV Continuous <Continuous>  tamsulosin 0.4 milliGRAM(s) Oral at bedtime    MEDICATIONS  (PRN):      FAMILY HISTORY:  No pertinent family history in first degree relatives      Allergies    No Known Allergies    Intolerances        SOCIAL HISTORY:       REVIEW OF SYSTEMS: Otherwise negative as stated in HPI    Physical Exam  Vital signs  T(C): 36.9 (18 @ 12:34), Max: 37.3 (18 @ 18:04)  HR: 79 (18 @ 12:34)  BP: 122/70 (18 @ 12:34)  SpO2: 96% (18 @ 12:34)  Wt(kg): --    Output    UOP    Gen:  AWAKE ALERT NAD AXOX3    Pulm:  NO RESP DISTRESS  	  CV:  S1S2    GI:  SOFT NT/ND  + BLADDER PALP NONTENDER   NO CVAT BL     :  CIRC PHALLUS  BL DESC TESTIS NONTENDER NO MASS                           	      LABS:                            12.7   10.05 )-----------( 197      ( 09 Aug 2018 07:28 )             36.3         08-09    139  |  106  |  9   ----------------------------<  118<H>  3.7   |  26  |  0.85    Ca    9.0      09 Aug 2018 05:44  Phos  2.3       Mg     1.8         TPro  7.4  /  Alb  3.5  /  TBili  0.4  /  DBili  x   /  AST  25  /  ALT  11  /  AlkPhos  146<H>        Urinalysis Basic - ( 08 Aug 2018 01:06 )    Color: PL Yellow / Appearance: SL Turbid / S.007 / pH: x  Gluc: x / Ketone: Negative  / Bili: Negative / Urobili: Negative   Blood: x / Protein: Negative / Nitrite: Positive   Leuk Esterase: Large / RBC: 5-10 /HPF / WBC 26-50 /HPF   Sq Epi: x / Non Sq Epi: x / Bacteria: Few /HPF        Urine Cx:  Culture - Urine (18 @ 03:03)    Specimen Source: .Urine Clean Catch (Midstream)    Culture Results:   >100,000 CFU/ml Escherichia coli      Culture - Blood (18 @ 02:12)    -  Coagulase negative Staphylococcus: Detec  Culture - Blood (18 @ 02:12)    Gram Stain:   Growth in anaerobic bottle: Gram Positive Cocci in Pairs and Chains    -  Enterococcus species: Detec    Specimen Source: .Blood Blood    Organism: Blood Culture PCR    Culture Results:   Growth in anaerobic bottle: Gram Positive Cocci in Pairs and Chains  ***Blood Panel PCR results on this specimen are available  approximately 3 hours after the Gram stain result.***  Gram stain, PCR, and/or culture results may not always  correspond due to difference in methodologies.  ************************************************************  This PCR assay was performed using [employer].  The following targets are tested for: Enterococcus,  vancomycin resistant enterococci, Listeriamonocytogenes,  coagulase negative staphylococci, S. aureus,  methicillin resistant S. aureus, Streptococcus agalactiae  (Group B), S. pneumoniae, S. pyogenes (Group A),  Acinetobacter baumannii, Enterobacter cloacae, E. coli,  Klebsiella oxytoca, K.pneumoniae, Proteus sp.,  Serratia marcescens, Haemophilus influenzae,  Neisseria meningitidis, Pseudomonas aeruginosa, Candida  albicans, C. glabrata, C krusei, C parapsilosis,  C. tropicalis and the KPC resistance gene.  "Due to technical problems,Proteus sp. will Not be reported as part of  the BCID panel until further notice"    Organism Identification: Blood Culture PCR    Method Type: PCR      -  Pseudomonas aeruginosa: Detec    Gram Stain:   Growth in aerobic bottle: Gram Positive Cocci in Clusters    Specimen Source: .Blood Blood    Organism: Blood Culture PCR    Culture Results:   Growth in aerobic bottle: Gram Positive Cocci in Clusters  "Due to technical problems, Proteus sp. will Not be reported as part of  the BCID panel until further notice"  ***Blood Panel PCR results on this specimen are available  approximately 3 hours after the Gram stain result.***  Gram stain, PCR, and/or culture results may not always  correspond due to difference in methodologies.  ************************************************************  This PCR assay was performed using [employer].  The following targets are tested for: Enterococcus,  vancomycin resistant enterococci, Listeria monocytogenes,  coagulase negative staphylococci, S. aureus,  methicillin resistant S. aureus, Streptococcus agalactiae  (Group B), S. pneumoniae, S.pyogenes (Group A),  Acinetobacter baumannii, Enterobacter cloacae, E. coli,  Klebsiella oxytoca, K. pneumoniae, Proteus sp.,  Serratia marcescens, Haemophilus influenzae,  Neisseria meningitidis, Pseudomonas aeruginosa, Candida  albicans, C. glabrata, C krusei, C parapsilosis,  C. tropicalis and the KPC resistance gene.    Organism Identification: Blood Culture PCR    Method Type: PCR          RADIOLOGY:  < from: US Kidney and Bladder (18 @ 15:39) >  FINDINGS:    Right kidney: 10.9 cm. Mild hydronephrosis. No renal mass or calculi.    Left kidney:  10.2 cm. Mild Hydronephrosis. No renal mass or calculi.    Urinary bladder: Distended with a prevoid volume of 483 cc. Postvoid   volume not significantly changed at 442 cc. Also mildly thickened and   trabeculated urinary bladder wall with echogenic debris within the   urinary bladder. Trabeculations may be secondary to chronic bladder   outlet obstruction. Bilateral ureteral jets.    IMPRESSION:     Mild bilateral hydronephrosis.    Large postvoid residual of 442 cc, compatible with retention, with a   mildly thickened and trabeculated urinary bladder wall,may be seen in   chronic bladder obstruction. Echogenic debris within the urinary bladder   (together with the thickened wall), may be seen in urinary tract   infection.

## 2018-08-09 NOTE — DISCHARGE NOTE ADULT - CARE PROVIDER_API CALL
Roshan Liz), Infectious Disease; Internal Medicine  [address] [place] [id number]  Phone: [phone]  Fax: [phone]

## 2018-08-09 NOTE — PROGRESS NOTE ADULT - SUBJECTIVE AND OBJECTIVE BOX
_________________________________________________________________________________________  ========>>  M E D I C A L   A T T E N D I N G    F O L L O W  U P  N O T E  <<=========  -----------------------------------------------------------------------------------------------------    - Patient seen and examined by me earlier today.   - In summary,  SCAR GRIFFIN is a 77y year old man who originally presented with fever  - Patient today overall doing ok, comfortable, eating OK. more awake today per family     ==================>> REVIEW OF SYSTEM <<=================    (limited)  GEN: no fever, no chills, no pain  RESP: no SOB, no cough, no sputum  CVS: no chest pain, no palpitations, no edema  GI: no abdominal pain  : no dysuria, no frequency, no hematuria  Neuro: no headache, no dizziness  Derm : no itching, no rash    ==================>> PHYSICAL EXAM <<=================    GEN: A&O X 1, NAD , comfortable  HEENT: NCAT, PERRL, MMM, hearing intact  Neck: supple , no JVD  CVS: S1S2 , regular , No M/R/G appreciated  PULM: CTA B/L,  no W/R/R appreciated  ABD.: soft. non tender, non distended,  bowel sounds present  Extrem: intact pulses , no edema   PSYCH : flat affect       ==================>> MEDICATIONS <<====================    MEDICATIONS  (STANDING):  amLODIPine   Tablet 5 milliGRAM(s) Oral daily  atorvastatin 10 milliGRAM(s) Oral at bedtime  darunavir 800 milliGRAM(s) Oral daily  docusate sodium 100 milliGRAM(s) Oral three times a day  donepezil 10 milliGRAM(s) Oral at bedtime  emtricitabine 200 mG/tenofovir 300 mG (TRUVADA) 1 Tablet(s) Oral daily  enoxaparin Injectable 40 milliGRAM(s) SubCutaneous every 24 hours  finasteride 5 milliGRAM(s) Oral daily  OLANZapine 2.5 milliGRAM(s) Oral daily  piperacillin/tazobactam IVPB. 3.375 Gram(s) IV Intermittent every 8 hours  polyethylene glycol 3350 17 Gram(s) Oral daily  ritonavir  Solution 100 milliGRAM(s) Oral daily  senna 2 Tablet(s) Oral at bedtime  sodium chloride 0.9%. 1000 milliLiter(s) (100 mL/Hr) IV Continuous <Continuous>  tamsulosin 0.4 milliGRAM(s) Oral at bedtime    ==================>> VITAL SIGNS <<==================    T(C): 36.9 (18 @ 12:34), Max: 36.9 (18 @ 12:34)  HR: 79 (18 @ 12:34) (70 - 79)  BP: 122/70 (18 @ 12:34) (115/67 - 167/87)  RR: 18 (18 @ 12:34) (18 - 18)  SpO2: 96% (18 @ 12:34) (95% - 96%)    I&O's Summary    08 Aug 2018 07:01  -  09 Aug 2018 07:00  --------------------------------------------------------  IN: 60 mL / OUT: 0 mL / NET: 60 mL    09 Aug 2018 07:01  -  09 Aug 2018 19:21  --------------------------------------------------------  IN: 120 mL / OUT: 750 mL / NET: -630 mL     ==================>> LAB AND IMAGING <<==================                        12.7   10.05 )-----------( 197      ( 09 Aug 2018 07:28 )             36.3        -    139  |  106  |  9   ----------------------------<  118<H>  3.7   |  26  |  0.85    Ca    9.0      09 Aug 2018 05:44  Phos  2.3       Mg     1.8         TPro  7.4  /  Alb  3.5  /  TBili  0.4  /  DBili  x   /  AST  25  /  ALT  11  /  AlkPhos  146<H>         Urinalysis Basic - ( 08 Aug 2018 01:06 )  Color: PL Yellow / Appearance: SL Turbid / S.007 / pH: x  Gluc: x / Ketone: Negative  / Bili: Negative / Urobili: Negative   Blood: x / Protein: Negative / Nitrite: Positive   Leuk Esterase: Large / RBC: 5-10 /HPF / WBC 26-50 /HPF   Sq Epi: x / Non Sq Epi: x / Bacteria: Few /HPF    _______________________  C U L T U R E S :    Culture - Urine (collected 08 Aug 2018 03:03)  Source: .Urine Clean Catch (Midstream)  Preliminary Report (09 Aug 2018 11:56):    >100,000 CFU/ml Escherichia coli    Culture - Blood (collected 08 Aug 2018 02:12)  Source: .Blood Blood  Gram Stain (09 Aug 2018 05:42):    Growth in aerobic bottle: Gram Positive Cocci in Clusters  Preliminary Report (09 Aug 2018 05:42):    Growth in aerobic bottle: Gram Positive Cocci in Clusters    "Due to technical problems, Proteus sp. will Not be reported as part of    the BCID panel until further notice"    ***Blood Panel PCR results on this specimen are available    approximately 3 hours after the Gram stain result.***    Gram stain, PCR, and/or culture results may not always    correspond due to difference in methodologies.    ************************************************************    This PCR assay was performed using [employer].    The following targets are tested for: Enterococcus,    vancomycin resistant enterococci, Listeria monocytogenes,    coagulase negative staphylococci, S. aureus,    methicillin resistant S. aureus, Streptococcus agalactiae    (Group B), S. pneumoniae, S.pyogenes (Group A),    Acinetobacter baumannii, Enterobacter cloacae, E. coli,    Klebsiella oxytoca, K. pneumoniae, Proteus sp.,    Serratia marcescens, Haemophilus influenzae,    Neisseria meningitidis, Pseudomonas aeruginosa, Candida    albicans, C. glabrata, C krusei, C parapsilosis,    C. tropicalis and the KPC resistance gene.  Organism: Blood Culture PCR (09 Aug 2018 08:02)  Organism: Blood Culture PCR (09 Aug 2018 08:02)    Sensitivities:      -  Coagulase negative Staphylococcus: Detec      -  Pseudomonas aeruginosa: Detec      Method Type: PCR    Culture - Blood (collected 08 Aug 2018 02:12)  Source: .Blood Blood  Gram Stain (09 Aug 2018 18:18):    Growth in anaerobic bottle: Gram Positive Cocci in Pairs and Chains    Growth in aerobic bottle: Gram positive cocci in pairs  Preliminary Report (09 Aug 2018 18:19):    Growth in anaerobic bottle: Gram Positive Cocci in Pairs and Chains    ***Blood Panel PCR results on this specimen are available    approximately 3 hours after the Gram stain result.***    Gram stain, PCR, and/or culture results may not always    correspond due to difference in methodologies.    ************************************************************    This PCR assay was performed using [employer].    The following targets are tested for: Enterococcus,    vancomycin resistant enterococci, Listeriamonocytogenes,    coagulase negative staphylococci, S. aureus,    methicillin resistant S. aureus, Streptococcus agalactiae    (Group B), S. pneumoniae, S. pyogenes (Group A),    Acinetobacter baumannii, Enterobacter cloacae, E. coli,    Klebsiella oxytoca, K.pneumoniae, Proteus sp.,    Serratia marcescens, Haemophilus influenzae,    Neisseria meningitidis, Pseudomonas aeruginosa, Candida    albicans, C. glabrata, C krusei, C parapsilosis,    C. tropicalis and the KPC resistance gene.    "Due to technical problems,Proteus sp. will Not be reported as part of    the BCID panel until further notice"    Growth in aerobic bottle: Gram positive cocci in pairs  Organism: Blood Culture PCR (08 Aug 2018 21:41)  Organism: Blood Culture PCR (08 Aug 2018 21:41)    Sensitivities:      -  Enterococcus species: Detec      Method Type: PCR    < from: US Kidney and Bladder (18 @ 15:39) >  IMPRESSION:   Mild bilateral hydronephrosis.  Large postvoid residual of 442 cc, compatible with retention, with a   mildly thickened and trabeculated urinary bladder wall,may be seen in   chronic bladder obstruction. Echogenic debris within the urinary bladder   (together with the thickened wall), may be seen in urinary tract   infection.    < end of copied text >  < from: CT Abdomen and Pelvis w/ Oral Cont and w/ IV Cont (18 @ 18:09) >  INTERPRETATION:  Bladder wall thickening, which may represent cystitis.   Mild bilateral hydroureteronephrosis, slightly decreased compared to   study from 10/2/2017. Small right pleural effusion. Pagetoid changes of   L2, L3 and the left iliac bone. Follow-up official report in the AM.  < end of copied text >    ___________________________________________________________________________________  ===============>>  A S S E S S M E N T   A N D   P L A N <<===============  ------------------------------------------------------------------------------------------    · Assessment		  77M with PMH of HIV (last VL<30) c/b HIV related dementia, HTN, BPH, recent hospitalization for E coli UTI/bacteremia presenting with fever - with CXR showing patchy opacities and +UA - admitted for further treatment.     Problem/Plan - 1:  ·  Problem: Acute cystitis in pt with BPH with urinary retention history ,  Bacteremia   f/u blood and urine cultures, tailor abx appropriately   ID consult sand mgmt appreciated    Problem/Plan - 2:  ·  Problem: Pneumonia of lower lobe; small-mod R pl eff   - continue abx per ID  - appreciated  and discussed with pulm: no plan for thoracentesis of chronic effusions     Problem/Plan - 3:  ·  Problem: HIV  -c/w Truvada and Prezista with ritonavir   -ID consult f/u    Problem/Plan - 4:  ·  Problem:  Essential hypertension  BP stable   - c/w norvasc 5mg   monitor BP.    Problem/Plan - 5:  ·  Problem: Alzheimer's disease   c/w donepezil, olanzapine.     -GI/DVT Prophylaxis.    --------------------------------------------  Case discussed with pt, family, pulm, iD, NP  Education given on findings and plan of care  ___________________________  DENISE De La Cruz D.O.  Pager: [phone] _________________________________________________________________________________________  ========>>  M E D I C A L   A T T E N D I N G    F O L L O W  U P  N O T E  <<=========  -----------------------------------------------------------------------------------------------------    - Patient seen and examined by me earlier today.   - In summary,  SCAR GRIFFIN is a 77y year old man who originally presented with fever  - Patient today overall doing ok, comfortable, eating OK. more awake today per family     ==================>> REVIEW OF SYSTEM <<=================    (limited)  GEN: no fever, no chills, no pain  RESP: no SOB, no cough, no sputum  CVS: no chest pain, no palpitations, no edema  GI: no abdominal pain  : no dysuria, no frequency, no hematuria  Neuro: no headache, no dizziness  Derm : no itching, no rash    ==================>> PHYSICAL EXAM <<=================    GEN: A&O X 1, NAD , comfortable  HEENT: NCAT, PERRL, MMM, hearing intact  Neck: supple , no JVD  CVS: S1S2 , regular , No M/R/G appreciated  PULM: CTA B/L,  no W/R/R appreciated  ABD.: soft. non tender, non distended,  bowel sounds present  Extrem: intact pulses , no edema   PSYCH : flat affect       ==================>> MEDICATIONS <<====================    MEDICATIONS  (STANDING):  amLODIPine   Tablet 5 milliGRAM(s) Oral daily  atorvastatin 10 milliGRAM(s) Oral at bedtime  darunavir 800 milliGRAM(s) Oral daily  docusate sodium 100 milliGRAM(s) Oral three times a day  donepezil 10 milliGRAM(s) Oral at bedtime  emtricitabine 200 mG/tenofovir 300 mG (TRUVADA) 1 Tablet(s) Oral daily  enoxaparin Injectable 40 milliGRAM(s) SubCutaneous every 24 hours  finasteride 5 milliGRAM(s) Oral daily  OLANZapine 2.5 milliGRAM(s) Oral daily  piperacillin/tazobactam IVPB. 3.375 Gram(s) IV Intermittent every 8 hours  polyethylene glycol 3350 17 Gram(s) Oral daily  ritonavir  Solution 100 milliGRAM(s) Oral daily  senna 2 Tablet(s) Oral at bedtime  sodium chloride 0.9%. 1000 milliLiter(s) (100 mL/Hr) IV Continuous <Continuous>  tamsulosin 0.4 milliGRAM(s) Oral at bedtime    ==================>> VITAL SIGNS <<==================    T(C): 36.9 (18 @ 12:34), Max: 36.9 (18 @ 12:34)  HR: 79 (18 @ 12:34) (70 - 79)  BP: 122/70 (18 @ 12:34) (115/67 - 167/87)  RR: 18 (18 @ 12:34) (18 - 18)  SpO2: 96% (18 @ 12:34) (95% - 96%)    I&O's Summary    08 Aug 2018 07:01  -  09 Aug 2018 07:00  --------------------------------------------------------  IN: 60 mL / OUT: 0 mL / NET: 60 mL    09 Aug 2018 07:01  -  09 Aug 2018 19:21  --------------------------------------------------------  IN: 120 mL / OUT: 750 mL / NET: -630 mL     ==================>> LAB AND IMAGING <<==================                        12.7   10.05 )-----------( 197      ( 09 Aug 2018 07:28 )             36.3        -    139  |  106  |  9   ----------------------------<  118<H>  3.7   |  26  |  0.85    Ca    9.0      09 Aug 2018 05:44  Phos  2.3       Mg     1.8         TPro  7.4  /  Alb  3.5  /  TBili  0.4  /  DBili  x   /  AST  25  /  ALT  11  /  AlkPhos  146<H>         Urinalysis Basic - ( 08 Aug 2018 01:06 )  Color: PL Yellow / Appearance: SL Turbid / S.007 / pH: x  Gluc: x / Ketone: Negative  / Bili: Negative / Urobili: Negative   Blood: x / Protein: Negative / Nitrite: Positive   Leuk Esterase: Large / RBC: 5-10 /HPF / WBC 26-50 /HPF   Sq Epi: x / Non Sq Epi: x / Bacteria: Few /HPF    _______________________  C U L T U R E S :    Culture - Urine (collected 08 Aug 2018 03:03)  Source: .Urine Clean Catch (Midstream)  Preliminary Report (09 Aug 2018 11:56):    >100,000 CFU/ml Escherichia coli    Culture - Blood (collected 08 Aug 2018 02:12)  Source: .Blood Blood  Gram Stain (09 Aug 2018 05:42):    Growth in aerobic bottle: Gram Positive Cocci in Clusters  Preliminary Report (09 Aug 2018 05:42):    Growth in aerobic bottle: Gram Positive Cocci in Clusters    "Due to technical problems, Proteus sp. will Not be reported as part of    the BCID panel until further notice"    ***Blood Panel PCR results on this specimen are available    approximately 3 hours after the Gram stain result.***    Gram stain, PCR, and/or culture results may not always    correspond due to difference in methodologies.    ************************************************************    This PCR assay was performed using [employer].    The following targets are tested for: Enterococcus,    vancomycin resistant enterococci, Listeria monocytogenes,    coagulase negative staphylococci, S. aureus,    methicillin resistant S. aureus, Streptococcus agalactiae    (Group B), S. pneumoniae, S.pyogenes (Group A),    Acinetobacter baumannii, Enterobacter cloacae, E. coli,    Klebsiella oxytoca, K. pneumoniae, Proteus sp.,    Serratia marcescens, Haemophilus influenzae,    Neisseria meningitidis, Pseudomonas aeruginosa, Candida    albicans, C. glabrata, C krusei, C parapsilosis,    C. tropicalis and the KPC resistance gene.  Organism: Blood Culture PCR (09 Aug 2018 08:02)  Organism: Blood Culture PCR (09 Aug 2018 08:02)    Sensitivities:      -  Coagulase negative Staphylococcus: Detec      -  Pseudomonas aeruginosa: Detec      Method Type: PCR    Culture - Blood (collected 08 Aug 2018 02:12)  Source: .Blood Blood  Gram Stain (09 Aug 2018 18:18):    Growth in anaerobic bottle: Gram Positive Cocci in Pairs and Chains    Growth in aerobic bottle: Gram positive cocci in pairs  Preliminary Report (09 Aug 2018 18:19):    Growth in anaerobic bottle: Gram Positive Cocci in Pairs and Chains    ***Blood Panel PCR results on this specimen are available    approximately 3 hours after the Gram stain result.***    Gram stain, PCR, and/or culture results may not always    correspond due to difference in methodologies.    ************************************************************    This PCR assay was performed using [employer].    The following targets are tested for: Enterococcus,    vancomycin resistant enterococci, Listeriamonocytogenes,    coagulase negative staphylococci, S. aureus,    methicillin resistant S. aureus, Streptococcus agalactiae    (Group B), S. pneumoniae, S. pyogenes (Group A),    Acinetobacter baumannii, Enterobacter cloacae, E. coli,    Klebsiella oxytoca, K.pneumoniae, Proteus sp.,    Serratia marcescens, Haemophilus influenzae,    Neisseria meningitidis, Pseudomonas aeruginosa, Candida    albicans, C. glabrata, C krusei, C parapsilosis,    C. tropicalis and the KPC resistance gene.    "Due to technical problems,Proteus sp. will Not be reported as part of    the BCID panel until further notice"    Growth in aerobic bottle: Gram positive cocci in pairs  Organism: Blood Culture PCR (08 Aug 2018 21:41)  Organism: Blood Culture PCR (08 Aug 2018 21:41)    Sensitivities:      -  Enterococcus species: Detec      Method Type: PCR    < from: US Kidney and Bladder (18 @ 15:39) >  IMPRESSION:   Mild bilateral hydronephrosis.  Large postvoid residual of 442 cc, compatible with retention, with a   mildly thickened and trabeculated urinary bladder wall,may be seen in   chronic bladder obstruction. Echogenic debris within the urinary bladder   (together with the thickened wall), may be seen in urinary tract   infection.    < end of copied text >  < from: CT Abdomen and Pelvis w/ Oral Cont and w/ IV Cont (18 @ 18:09) >  INTERPRETATION:  Bladder wall thickening, which may represent cystitis.   Mild bilateral hydroureteronephrosis, slightly decreased compared to   study from 10/2/2017. Small right pleural effusion. Pagetoid changes of   L2, L3 and the left iliac bone. Follow-up official report in the AM.  < end of copied text >    ___________________________________________________________________________________  ===============>>  A S S E S S M E N T   A N D   P L A N <<===============  ------------------------------------------------------------------------------------------    · Assessment		  77M with PMH of HIV (last VL<30) c/b HIV related dementia, HTN, BPH, recent hospitalization for E coli UTI/bacteremia presenting with fever - with CXR showing patchy opacities and +UA - admitted for further treatment.     Problem/Plan - 1:  ·  Problem: Acute cystitis in pt with BPH with urinary retention history ,  Bacteremia   f/u blood and urine cultures, tailor abx appropriately   ID consult sand mgmt appreciated  check bladder scan Q shift    Problem/Plan - 2:  ·  Problem: Pneumonia of lower lobe; small-mod R pl eff   - continue abx per ID  - appreciated  and discussed with pulm: no plan for thoracentesis of chronic effusions     Problem/Plan - 3:  ·  Problem: HIV  -c/w Truvada and Prezista with ritonavir   -ID consult f/u    Problem/Plan - 4:  ·  Problem:  Essential hypertension  BP stable   - c/w norvasc 5mg   monitor BP.    Problem/Plan - 5:  ·  Problem: Alzheimer's disease   c/w donepezil, olanzapine.     -GI/DVT Prophylaxis.    --------------------------------------------  Case discussed with pt, family, pulm, iD, NP  Education given on findings and plan of care  ___________________________  HIliana De La Cruz D.O.  Pager: [phone]

## 2018-08-10 DIAGNOSIS — F03.90 UNSPECIFIED DEMENTIA WITHOUT BEHAVIORAL DISTURBANCE: ICD-10-CM

## 2018-08-10 LAB
-  AMIKACIN: SIGNIFICANT CHANGE UP
-  AMOXICILLIN/CLAVULANIC ACID: SIGNIFICANT CHANGE UP
-  AMPICILLIN/SULBACTAM: SIGNIFICANT CHANGE UP
-  AMPICILLIN: SIGNIFICANT CHANGE UP
-  AMPICILLIN: SIGNIFICANT CHANGE UP
-  AZTREONAM: SIGNIFICANT CHANGE UP
-  CEFAZOLIN: SIGNIFICANT CHANGE UP
-  CEFEPIME: SIGNIFICANT CHANGE UP
-  CEFOXITIN: SIGNIFICANT CHANGE UP
-  CEFTRIAXONE: SIGNIFICANT CHANGE UP
-  CIPROFLOXACIN: SIGNIFICANT CHANGE UP
-  ERTAPENEM: SIGNIFICANT CHANGE UP
-  GENTAMICIN SYNERGY: SIGNIFICANT CHANGE UP
-  GENTAMICIN: SIGNIFICANT CHANGE UP
-  IMIPENEM: SIGNIFICANT CHANGE UP
-  LEVOFLOXACIN: SIGNIFICANT CHANGE UP
-  MEROPENEM: SIGNIFICANT CHANGE UP
-  NITROFURANTOIN: SIGNIFICANT CHANGE UP
-  PIPERACILLIN/TAZOBACTAM: SIGNIFICANT CHANGE UP
-  STREPTOMYCIN SYNERGY: SIGNIFICANT CHANGE UP
-  TIGECYCLINE: SIGNIFICANT CHANGE UP
-  TOBRAMYCIN: SIGNIFICANT CHANGE UP
-  TRIMETHOPRIM/SULFAMETHOXAZOLE: SIGNIFICANT CHANGE UP
-  VANCOMYCIN: SIGNIFICANT CHANGE UP
ANION GAP SERPL CALC-SCNC: 12 MMOL/L — SIGNIFICANT CHANGE UP (ref 5–17)
BUN SERPL-MCNC: 11 MG/DL — SIGNIFICANT CHANGE UP (ref 7–23)
CALCIUM SERPL-MCNC: 8.7 MG/DL — SIGNIFICANT CHANGE UP (ref 8.4–10.5)
CHLORIDE SERPL-SCNC: 103 MMOL/L — SIGNIFICANT CHANGE UP (ref 96–108)
CO2 SERPL-SCNC: 27 MMOL/L — SIGNIFICANT CHANGE UP (ref 22–31)
CREAT SERPL-MCNC: 0.98 MG/DL — SIGNIFICANT CHANGE UP (ref 0.5–1.3)
CULTURE RESULTS: SIGNIFICANT CHANGE UP
GLUCOSE SERPL-MCNC: 106 MG/DL — HIGH (ref 70–99)
GRAM STN FLD: SIGNIFICANT CHANGE UP
HCT VFR BLD CALC: 34.2 % — LOW (ref 39–50)
HGB BLD-MCNC: 11.4 G/DL — LOW (ref 13–17)
MCHC RBC-ENTMCNC: 31.1 PG — SIGNIFICANT CHANGE UP (ref 27–34)
MCHC RBC-ENTMCNC: 33.3 GM/DL — SIGNIFICANT CHANGE UP (ref 32–36)
MCV RBC AUTO: 93.2 FL — SIGNIFICANT CHANGE UP (ref 80–100)
METHOD TYPE: SIGNIFICANT CHANGE UP
METHOD TYPE: SIGNIFICANT CHANGE UP
ORGANISM # SPEC MICROSCOPIC CNT: SIGNIFICANT CHANGE UP
ORGANISM # SPEC MICROSCOPIC CNT: SIGNIFICANT CHANGE UP
PHOSPHATE SERPL-MCNC: 3.2 MG/DL — SIGNIFICANT CHANGE UP (ref 2.5–4.5)
PLATELET # BLD AUTO: 212 K/UL — SIGNIFICANT CHANGE UP (ref 150–400)
POTASSIUM SERPL-MCNC: 3.7 MMOL/L — SIGNIFICANT CHANGE UP (ref 3.5–5.3)
POTASSIUM SERPL-SCNC: 3.7 MMOL/L — SIGNIFICANT CHANGE UP (ref 3.5–5.3)
RBC # BLD: 3.67 M/UL — LOW (ref 4.2–5.8)
RBC # FLD: 14.6 % — HIGH (ref 10.3–14.5)
SODIUM SERPL-SCNC: 142 MMOL/L — SIGNIFICANT CHANGE UP (ref 135–145)
SPECIMEN SOURCE: SIGNIFICANT CHANGE UP
WBC # BLD: 6.71 K/UL — SIGNIFICANT CHANGE UP (ref 3.8–10.5)
WBC # FLD AUTO: 6.71 K/UL — SIGNIFICANT CHANGE UP (ref 3.8–10.5)

## 2018-08-10 PROCEDURE — 93306 TTE W/DOPPLER COMPLETE: CPT | Mod: 26

## 2018-08-10 PROCEDURE — 99233 SBSQ HOSP IP/OBS HIGH 50: CPT

## 2018-08-10 RX ADMIN — PIPERACILLIN AND TAZOBACTAM 25 GRAM(S): 4; .5 INJECTION, POWDER, LYOPHILIZED, FOR SOLUTION INTRAVENOUS at 05:30

## 2018-08-10 RX ADMIN — DONEPEZIL HYDROCHLORIDE 10 MILLIGRAM(S): 10 TABLET, FILM COATED ORAL at 21:59

## 2018-08-10 RX ADMIN — FINASTERIDE 5 MILLIGRAM(S): 5 TABLET, FILM COATED ORAL at 21:59

## 2018-08-10 RX ADMIN — PIPERACILLIN AND TAZOBACTAM 25 GRAM(S): 4; .5 INJECTION, POWDER, LYOPHILIZED, FOR SOLUTION INTRAVENOUS at 22:00

## 2018-08-10 RX ADMIN — EMTRICITABINE AND TENOFOVIR DISOPROXIL FUMARATE 1 TABLET(S): 200; 300 TABLET, FILM COATED ORAL at 11:38

## 2018-08-10 RX ADMIN — ATORVASTATIN CALCIUM 10 MILLIGRAM(S): 80 TABLET, FILM COATED ORAL at 21:59

## 2018-08-10 RX ADMIN — SENNA PLUS 2 TABLET(S): 8.6 TABLET ORAL at 22:00

## 2018-08-10 RX ADMIN — AMLODIPINE BESYLATE 5 MILLIGRAM(S): 2.5 TABLET ORAL at 06:21

## 2018-08-10 RX ADMIN — DARUNAVIR 800 MILLIGRAM(S): 75 TABLET, FILM COATED ORAL at 11:38

## 2018-08-10 RX ADMIN — PIPERACILLIN AND TAZOBACTAM 25 GRAM(S): 4; .5 INJECTION, POWDER, LYOPHILIZED, FOR SOLUTION INTRAVENOUS at 13:59

## 2018-08-10 RX ADMIN — Medication 100 MILLIGRAM(S): at 21:59

## 2018-08-10 RX ADMIN — Medication 100 MILLIGRAM(S): at 13:59

## 2018-08-10 RX ADMIN — OLANZAPINE 2.5 MILLIGRAM(S): 15 TABLET, FILM COATED ORAL at 11:38

## 2018-08-10 RX ADMIN — TAMSULOSIN HYDROCHLORIDE 0.4 MILLIGRAM(S): 0.4 CAPSULE ORAL at 22:00

## 2018-08-10 RX ADMIN — ENOXAPARIN SODIUM 40 MILLIGRAM(S): 100 INJECTION SUBCUTANEOUS at 05:30

## 2018-08-10 RX ADMIN — POLYETHYLENE GLYCOL 3350 17 GRAM(S): 17 POWDER, FOR SOLUTION ORAL at 11:38

## 2018-08-10 RX ADMIN — RITONAVIR 100 MILLIGRAM(S): 100 TABLET, FILM COATED ORAL at 11:38

## 2018-08-10 NOTE — PROGRESS NOTE ADULT - PROBLEM SELECTOR PROBLEM 5
Pneumonia of lower lobe due to infectious organism, unspecified laterality
HIV (human immunodeficiency virus infection)

## 2018-08-10 NOTE — PROGRESS NOTE ADULT - PROBLEM SELECTOR PLAN 1
Likely from  source  Repeat blood Cx  Follow ID sensi  Continue zosyn,s/p gent X 1 yesterday  If pseudomonas persists will need further double coverage   check echo  Tailor per results and course

## 2018-08-10 NOTE — PROGRESS NOTE ADULT - ATTENDING COMMENTS
Will tailor plan for ID issues  per course,results.Will defer to primary team on management of other issues.  Case d/w Med NP  Infectious Diseases Service will cover over weekend.  Please call [phone] if issues

## 2018-08-10 NOTE — PROGRESS NOTE ADULT - SUBJECTIVE AND OBJECTIVE BOX
PULM/MED PROGRESS NOTE:  awake, alert comfortable, sitting in chair  Does not respond to verbal commands/questions  Hands on Wong - at risk of pulling out - discussed with nursing and RN mgr      MEDICATIONS  (STANDING):  amLODIPine   Tablet 5 milliGRAM(s) Oral daily  atorvastatin 10 milliGRAM(s) Oral at bedtime  darunavir 800 milliGRAM(s) Oral daily  docusate sodium 100 milliGRAM(s) Oral three times a day  donepezil 10 milliGRAM(s) Oral at bedtime  emtricitabine 200 mG/tenofovir 300 mG (TRUVADA) 1 Tablet(s) Oral daily  enoxaparin Injectable 40 milliGRAM(s) SubCutaneous every 24 hours  finasteride 5 milliGRAM(s) Oral daily  OLANZapine 2.5 milliGRAM(s) Oral daily  piperacillin/tazobactam IVPB. 3.375 Gram(s) IV Intermittent every 8 hours  polyethylene glycol 3350 17 Gram(s) Oral daily  ritonavir  Solution 100 milliGRAM(s) Oral daily  senna 2 Tablet(s) Oral at bedtime  sodium chloride 0.9%. 1000 milliLiter(s) (100 mL/Hr) IV Continuous <Continuous>  tamsulosin 0.4 milliGRAM(s) Oral at bedtime      MEDICATIONS  (PRN):          Vital Signs Last 24 Hrs  T(C): 36.7 (10 Aug 2018 05:51), Max: 36.9 (09 Aug 2018 12:34)  T(F): 98.1 (10 Aug 2018 05:51), Max: 98.4 (09 Aug 2018 12:34)  HR: 81 (10 Aug 2018 05:51) (67 - 81)  BP: 116/75 (10 Aug 2018 05:51) (98/66 - 122/70)  BP(mean): --  RR: 18 (10 Aug 2018 05:51) (18 - 18)  SpO2: 96% (10 Aug 2018 05:51) (96% - 99%)    PHYSICAL EXAMINATION: (limited by pt's inability to be directed)    NECK: Supple.    LUNGS: B/L breath sounds;      HEART: Regular rhythm    ABDOMEN: Soft, nontender    EXTREMITIES: Without  edema.        LABS:                        11.4   6.71  )-----------( 212      ( 10 Aug 2018 07:15 )             34.2     08-10    142  |  103  |  11  ----------------------------<  106<H>  3.7   |  27  |  0.98    Ca    8.7      10 Aug 2018 06:07  Phos  3.2     08-10    TPro  7.4  /  Alb  3.5  /  TBili  0.4  /  DBili  x   /  AST  25  /  ALT  11  /  AlkPhos  146<H>  08-09      MICROBIOLOGY:  Culture Results:   >100,000 CFU/ml Escherichia coli (08-08 @ 03:03)  Culture Results:   Growth in anaerobic bottle: Enterococcus faecalis  ***Blood Panel PCR results on this specimen are available  approximately 3 hours after the Gram stain result.***  Gram stain, PCR, and/or culture results may not always  correspond due to difference in methodologies.  ************************************************************  This PCR assay was performed using [employer].  The following targets are tested for: Enterococcus,  vancomycin resistant enterococci, Listeria monocytogenes,  coagulase negative staphylococci, S. aureus,  methicillin resistant S. aureus, Streptococcus agalactiae  (Group B), S. pneumoniae, S. pyogenes (Group A),  Acinetobacter baumannii, Enterobacter cloacae, E. coli,  Klebsiella oxytoca, K. pneumoniae, Proteus sp.,  Serratia marcescens, Haemophilus influenzae,  Neisseria meningitidis, Pseudomonas aeruginosa, Candida  albicans, C. glabrata, C krusei, C parapsilosis,  C. tropicalis and the KPC resistance gene.  "Due to technical problems, Proteus sp. will Not be reported as part of  the BCID panel until further notice"  Growth in aerobic bottle: Gram positive cocci in pairs (08-08 @ 02:12)  Culture Results:   Growth in aerobic bottle: Gram Positive Cocci in Clusters  "Due to technical problems, Proteus sp. will Not be reported as part of  the BCID panel until further notice"  ***Blood Panel PCR results on this specimen are available  approximately 3 hours after the Gram stain result.***  Gram stain, PCR, and/or culture results may not always  correspond due to difference in methodologies.  ************************************************************  This PCR assay was performed using [employer].  The following targets are tested for: Enterococcus,  vancomycin resistant enterococci, Listeria monocytogenes,  coagulase negative staphylococci, S. aureus,  methicillin resistant S. aureus, Streptococcus agalactiae  (Group B), S. pneumoniae, S.pyogenes (Group A),  Acinetobacter baumannii, Enterobacter cloacae, E. coli,  Klebsiella oxytoca, K. pneumoniae, Proteus sp.,  Serratia marcescens, Haemophilus influenzae,  Neisseria meningitidis, Pseudomonas aeruginosa, Candida  albicans, C. glabrata, C krusei, C parapsilosis,  C. tropicalis and the KPC resistance gene.  Growth in anaerobic bottle: Gram Positive Cocci in Clusters (08-08 @ 02:12)      RADIOLOGY & ADDITIONAL STUDIES:    CT a/p report pending;  Prelim: Small B/L effusions, R>L present, inc cor

## 2018-08-10 NOTE — PROGRESS NOTE ADULT - SUBJECTIVE AND OBJECTIVE BOX
_________________________________________________________________________________________  ========>>  M E D I C A L   A T T E N D I N G    F O L L O W  U P  N O T E  <<=========  -----------------------------------------------------------------------------------------------------    - Patient seen and examined by me approximately sixty minutes ago.   - In summary,  SCAR GRIFFIN is a 77y year old man who originally presented with fever  - Patient today overall doing ok, comfortable, eating OK. otherwise stable per Dtr at bedside     ==================>> REVIEW OF SYSTEM <<=================    (limited)  GEN: no fever, no chills, no pain  RESP: no SOB, no cough, no sputum  CVS: no chest pain, no palpitations, no edema  GI: no abdominal pain  : no dysuria, no frequency, no hematuria  Neuro: no headache, no dizziness  Derm : no itching, no rash    ==================>> PHYSICAL EXAM <<=================    GEN: A&O X 1, NAD , comfortable  HEENT: NCAT, PERRL, MMM, hearing intact  Neck: supple , no JVD  CVS: S1S2 , regular , No M/R/G appreciated  PULM: CTA B/L,  no W/R/R appreciated  ABD.: soft. non tender, non distended,  bowel sounds present  Extrem: intact pulses , no edema   PSYCH : flat affect        ==================>> MEDICATIONS <<====================    amLODIPine   Tablet 5 milliGRAM(s) Oral daily  atorvastatin 10 milliGRAM(s) Oral at bedtime  darunavir 800 milliGRAM(s) Oral daily  docusate sodium 100 milliGRAM(s) Oral three times a day  donepezil 10 milliGRAM(s) Oral at bedtime  emtricitabine 200 mG/tenofovir 300 mG (TRUVADA) 1 Tablet(s) Oral daily  enoxaparin Injectable 40 milliGRAM(s) SubCutaneous every 24 hours  finasteride 5 milliGRAM(s) Oral daily  OLANZapine 2.5 milliGRAM(s) Oral daily  piperacillin/tazobactam IVPB. 3.375 Gram(s) IV Intermittent every 8 hours  polyethylene glycol 3350 17 Gram(s) Oral daily  ritonavir  Solution 100 milliGRAM(s) Oral daily  senna 2 Tablet(s) Oral at bedtime  sodium chloride 0.9%. 1000 milliLiter(s) IV Continuous <Continuous>  tamsulosin 0.4 milliGRAM(s) Oral at bedtime    ==================>> VITAL SIGNS <<==================    Vital Signs Last 24 Hrs  T(C): 36.9 (08-10-18 @ 12:18)  T(F): 98.4 (08-10-18 @ 12:18), Max: 98.4 (08-10-18 @ 12:18)  HR: 79 (08-10-18 @ 12:18) (67 - 81)  BP: 99/68 (08-10-18 @ 12:18)  BP(mean): --  RR: 18 (08-10-18 @ 12:18) (18 - 18)  SpO2: 97% (08-10-18 @ 12:18) (96% - 99%)       ==================>> LAB AND IMAGING <<==================                        11.4   6.71  )-----------( 212      ( 10 Aug 2018 07:15 )             34.2        08-10    142  |  103  |  11  ----------------------------<  106<H>  3.7   |  27  |  0.98    Ca    8.7      10 Aug 2018 06:07  Phos  3.2     08-10    TPro  7.4  /  Alb  3.5  /  TBili  0.4  /  DBili  x   /  AST  25  /  ALT  11  /  AlkPhos  146<H>  08-09    _______________________  C U L T U R E S :    Culture - Blood (collected 09 Aug 2018 15:02)  Source: .Blood Blood-Peripheral  Preliminary Report (10 Aug 2018 16:01):    No growth to date.    Culture - Blood (collected 09 Aug 2018 15:02)  Source: .Blood Blood-Peripheral  Preliminary Report (10 Aug 2018 16:01):    No growth to date.    Culture - Urine (collected 08 Aug 2018 03:03)  Source: .Urine Clean Catch (Midstream)  Final Report (10 Aug 2018 11:42):    >100,000 CFU/ml Escherichia coli  Organism: Escherichia coli (10 Aug 2018 11:42)  Organism: Escherichia coli (10 Aug 2018 11:42)    Sensitivities:      -  Amikacin: S <=8      -  Amoxicillin/Clavulanic Acid: I 16/8      -  Ampicillin: R >16 These ampicillin results predict results for amoxicillin      -  Ampicillin/Sulbactam: R >16/8      -  Aztreonam: S <=4      -  Cefazolin: I 4 For uncomplicated UTI with K. pneumoniae, E. coli, or P. mirablis: JOSUE <=16 is sensitive and JOSUE >=32 is resistant. This also predicts results for oral agents cefaclor, cefdinir, cefpodoxime, cefprozil, cefuroxime axetil, cephalexin and locarbef for uncomplicated UTI. Note that some isolates may be susceptible to these agents while testing resistant to cefazolin.      -  Cefepime: S <=2      -  Cefoxitin: S <=4      -  Ceftriaxone: S <=1 Enterobacter, Citrobacter, and Serratia may develop resistance during prolonged therapy      -  Ciprofloxacin: R >2      -  Ertapenem: S <=0.5      -  Gentamicin: S <=1      -  Imipenem: S <=1      -  Levofloxacin: R >4      -  Meropenem: S <=1      -  Nitrofurantoin: S <=32 Should not be used to treat pyelonephritis      -  Piperacillin/Tazobactam: S <=8      -  Tigecycline: S <=1      -  Tobramycin: S <=2      -  Trimethoprim/Sulfamethoxazole: S <=0.5/9.5      Method Type: JOSUE    Culture - Blood (collected 08 Aug 2018 02:12)  Source: .Blood Blood  Gram Stain (10 Aug 2018 05:51):    Growth in aerobic bottle: Gram Positive Cocci in Clusters    Growth in anaerobic bottle: Gram Positive Cocci in Clusters  Preliminary Report (10 Aug 2018 12:25):    Growth in aerobic bottle: Coag Negative Staphylococcus    Single set isolate, possible contaminant. Contact    Microbiology if susceptibility testing clinically    indicated.    "Due to technical problems, Proteus sp. will Not be reported as part of    the BCID panel until further notice"    ***Blood Panel PCR results on this specimen are available    approximately 3 hours after the Gram stain result.***    Gram stain, PCR, and/or culture results may not always    correspond due to difference in methodologies.    ************************************************************    This PCR assay was performed using [employer].    The following targets are tested for: Enterococcus,    vancomycin resistant enterococci, Listeria monocytogenes,    coagulase negative staphylococci, S. aureus,    methicillin resistant S. aureus, Streptococcus agalactiae    (Group B), S. pneumoniae, S. pyogenes (Group A),    Acinetobacter baumannii, Enterobacter cloacae, E. coli,    Klebsiella oxytoca, K. pneumoniae, Proteus sp.,    Serratia marcescens, Haemophilus influenzae,    Neisseria meningitidis, Pseudomonas aeruginosa, Candida    albicans, C. glabrata, C krusei, C parapsilosis,    C. tropicalis and the KPC resistance gene.    Growth in anaerobic bottle: Gram Positive Cocci in Clusters  Organism: Blood Culture PCR (09 Aug 2018 08:02)  Organism: Blood Culture PCR (09 Aug 2018 08:02)    Sensitivities:      -  Coagulase negative Staphylococcus: Detec      -  Pseudomonas aeruginosa: Detec      Method Type: PCR    Culture - Blood (collected 08 Aug 2018 02:12)  Source: .Blood Blood  Gram Stain (09 Aug 2018 18:18):    Growth in anaerobic bottle: Gram Positive Cocci in Pairs and Chains    Growth in aerobic bottle: Gram positive cocci in pairs  Preliminary Report (09 Aug 2018 23:22):    Growth in anaerobic bottle: Enterococcus faecalis    ***Blood Panel PCR results on this specimen are available    approximately 3 hours after the Gram stain result.***    Gram stain, PCR, and/or culture results may not always    correspond due to difference in methodologies.    ************************************************************    This PCR assay was performed using [employer].    The following targets are tested for: Enterococcus,    vancomycin resistant enterococci, Listeria monocytogenes,    coagulase negative staphylococci, S. aureus,    methicillin resistant S. aureus, Streptococcus agalactiae    (Group B), S. pneumoniae, S. pyogenes (Group A),    Acinetobacter baumannii, Enterobacter cloacae, E. coli,    Klebsiella oxytoca, K. pneumoniae, Proteus sp.,    Serratia marcescens, Haemophilus influenzae,    Neisseria meningitidis, Pseudomonas aeruginosa, Candida    albicans, C. glabrata, C krusei, C parapsilosis,    C. tropicalis and the KPC resistance gene.    "Due to technical problems, Proteus sp. will Not be reported as part of    the BCID panel until further notice"    Growth in aerobic bottle: Gram positive cocci in pairs  Organism: Blood Culture PCR (08 Aug 2018 21:41)  Organism: Blood Culture PCR (08 Aug 2018 21:41)    Sensitivities:      -  Enterococcus species: Detec      Method Type: PCR    < from: CT Abdomen and Pelvis w/ Oral Cont and w/ IV Cont (08.09.18 @ 18:09) >  IMPRESSION:  Findings suggestive of cystitis. Mildurothelial enhancement and mildly   prominent collecting system bilaterally can be secondary to ascending   infection.  < end of copied text >    ___________________________________________________________________________________  ===============>>  A S S E S S M E N T   A N D   P L A N <<===============  ------------------------------------------------------------------------------------------    · Assessment		  77M with PMH of HIV (last VL<30) c/b HIV related dementia, HTN, BPH, recent hospitalization for E coli UTI/bacteremia presenting with fever - with CXR showing patchy opacities and +UA - admitted for further treatment.     Problem/Plan - 1:  ·  Problem: Acute cystitis in pt with BPH with urinary retention history ,  Bacteremia   f/u blood and urine cultures, tailor abx appropriately   ID consult sand nayana appreciated: likely Gu source of bacteremia   check bladder scan Q shift      per Dtr: pt following with  as outpatient and had has appointment in a couple of weeks for urodynamic studies..     Problem/Plan - 2:  ·  Problem: Pneumonia of lower lobe; small-mod R pl eff   - continue abx per ID  - appreciated  and discussed with pulm: no plan for thoracentesis of chronic effusions     Problem/Plan - 3:  ·  Problem: HIV  -c/w HAART  -ID consult f/u    Problem/Plan - 4:  ·  Problem:  Essential hypertension  BP stable   Continue Current medications and monitor.     Problem/Plan - 5:  ·  Problem: Alzheimer's disease   c/w donepezil, olanzapine.     -GI/DVT Prophylaxis.    --------------------------------------------  Case discussed with pt, family,  NP  Education given on findings and plan of care  ___________________________  H. ALANNA De La Cruz.  Pager: [phone]

## 2018-08-10 NOTE — PROGRESS NOTE ADULT - ASSESSMENT
77M with PMH of HIV (last VL<30) c/b HIV related dementia, HTN, BPH, recent hospitalization for E coli UTI/bacteremia presenting with fever. History obtained from pts wife at bedside as pt is baseline non verbal. Per wife, she noted pt to be a little fatigued and lethargic on Monday; and then on Tuesday evening, when wife returned from work, was told pt had been spitting up all day and wife noticed him later to have a coughing fit that appeared painful; she took a temperature and found him to be febrile 101.9   Polymicrobial bacteremia including enterococcus,pseudomonas and CNS  Likely urinary source ? secondary to obstruction  Could also be occult GI source though abdominal exam benign  CT with no abscess  Seen by urology-input noted  Patient was on levaquin-raising concern for drug resistance  His HIV is well controlled though he has progressive dementia and has been non- semi verbal     Rec:

## 2018-08-10 NOTE — PROGRESS NOTE ADULT - SUBJECTIVE AND OBJECTIVE BOX
Patient is a 77y old  Male who presents with a chief complaint of UTI (09 Aug 2018 13:37)    Being followed by ID for bacteremia, HIV    Interval history:More awake  some verbal response  sitting OOB in chair  No other acute events      ROS:  Not obtainable but per rn no diarrhea  Antimicrobials:    darunavir 800 milliGRAM(s) Oral daily  emtricitabine 200 mG/tenofovir 300 mG (TRUVADA) 1 Tablet(s) Oral daily  piperacillin/tazobactam IVPB. 3.375 Gram(s) IV Intermittent every 8 hours  ritonavir  Solution 100 milliGRAM(s) Oral daily      other medications reviewed    Vital Signs Last 24 Hrs  T(C): 36.7 (08-10-18 @ 05:51), Max: 36.9 (08-09-18 @ 12:34)  T(F): 98.1 (08-10-18 @ 05:51), Max: 98.4 (08-09-18 @ 12:34)  HR: 81 (08-10-18 @ 05:51) (67 - 81)  BP: 116/75 (08-10-18 @ 05:51) (98/66 - 122/70)  BP(mean): --  RR: 18 (08-10-18 @ 05:51) (18 - 18)  SpO2: 96% (08-10-18 @ 05:51) (96% - 99%)    Physical Exam:    Constitutional well preserved,comfortable,pleasant    HEENT PERRLANo pallor or icterus    No oral exudate or erythema    Neck supple no JVD or LN    Chest Good AE,CTA    CVS RRR S1 S2 WNl No murmur or rub or gallop    Abd soft BS normal No tenderness no masses nelson    Ext No cyanosis clubbing or edema    IV site no erythema tenderness or discharge    Joints no swelling or LOM    CNS As above     Lab Data:                          11.4   6.71  )-----------( 212      ( 10 Aug 2018 07:15 )             34.2       08-10    142  |  103  |  11  ----------------------------<  106<H>  3.7   |  27  |  0.98    Ca    8.7      10 Aug 2018 06:07  Phos  3.2     08-10    TPro  7.4  /  Alb  3.5  /  TBili  0.4  /  DBili  x   /  AST  25  /  ALT  11  /  AlkPhos  146<H>  08-09        Culture - Urine (collected 08 Aug 2018 03:03)  Source: .Urine Clean Catch (Midstream)  Preliminary Report (09 Aug 2018 11:56):    >100,000 CFU/ml Escherichia coli    Culture - Blood (collected 08 Aug 2018 02:12)  Source: .Blood Blood  Gram Stain (10 Aug 2018 05:51):    Growth in aerobic bottle: Gram Positive Cocci in Clusters    Growth in anaerobic bottle: Gram Positive Cocci in Clusters  Preliminary Report (10 Aug 2018 05:51):    Growth in aerobic bottle: Gram Positive Cocci in Clusters   Organism: Blood Culture PCR (09 Aug 2018 08:02)  Organism: Blood Culture PCR (09 Aug 2018 08:02)      -  Coagulase negative Staphylococcus: Detec      -  Pseudomonas aeruginosa: Detec      Method Type: PCR    Culture - Blood (collected 08 Aug 2018 02:12)  Source: .Blood Blood    Growth in anaerobic bottle: Enterococcus faecalis   Organism: Blood Culture PCR (08 Aug 2018 21:41)      -  Enterococcus species: Detec      Method Type: PCR          < from: CT Abdomen and Pelvis w/ Oral Cont and w/ IV Cont (08.09.18 @ 18:09) >    IMPRESSION:  Findings suggestive of cystitis. Mildurothelial enhancement and mildly   prominent collecting system bilaterally can be secondary to ascending   infection.    Agree with the preliminary report submitted at the time of the exam.                       < end of copied text >

## 2018-08-11 LAB
-  AMPICILLIN/SULBACTAM: SIGNIFICANT CHANGE UP
-  CEFAZOLIN: SIGNIFICANT CHANGE UP
-  CLINDAMYCIN: SIGNIFICANT CHANGE UP
-  ERYTHROMYCIN: SIGNIFICANT CHANGE UP
-  GENTAMICIN: SIGNIFICANT CHANGE UP
-  OXACILLIN: SIGNIFICANT CHANGE UP
-  PENICILLIN: SIGNIFICANT CHANGE UP
-  RIFAMPIN: SIGNIFICANT CHANGE UP
-  TETRACYCLINE: SIGNIFICANT CHANGE UP
-  TRIMETHOPRIM/SULFAMETHOXAZOLE: SIGNIFICANT CHANGE UP
-  VANCOMYCIN: SIGNIFICANT CHANGE UP
CULTURE RESULTS: SIGNIFICANT CHANGE UP
METHOD TYPE: SIGNIFICANT CHANGE UP
ORGANISM # SPEC MICROSCOPIC CNT: SIGNIFICANT CHANGE UP
SPECIMEN SOURCE: SIGNIFICANT CHANGE UP

## 2018-08-11 PROCEDURE — 99232 SBSQ HOSP IP/OBS MODERATE 35: CPT

## 2018-08-11 RX ADMIN — RITONAVIR 100 MILLIGRAM(S): 100 TABLET, FILM COATED ORAL at 12:03

## 2018-08-11 RX ADMIN — DARUNAVIR 800 MILLIGRAM(S): 75 TABLET, FILM COATED ORAL at 12:02

## 2018-08-11 RX ADMIN — AMLODIPINE BESYLATE 5 MILLIGRAM(S): 2.5 TABLET ORAL at 06:45

## 2018-08-11 RX ADMIN — Medication 100 MILLIGRAM(S): at 06:45

## 2018-08-11 RX ADMIN — DONEPEZIL HYDROCHLORIDE 10 MILLIGRAM(S): 10 TABLET, FILM COATED ORAL at 21:27

## 2018-08-11 RX ADMIN — OLANZAPINE 2.5 MILLIGRAM(S): 15 TABLET, FILM COATED ORAL at 12:02

## 2018-08-11 RX ADMIN — TAMSULOSIN HYDROCHLORIDE 0.4 MILLIGRAM(S): 0.4 CAPSULE ORAL at 21:28

## 2018-08-11 RX ADMIN — EMTRICITABINE AND TENOFOVIR DISOPROXIL FUMARATE 1 TABLET(S): 200; 300 TABLET, FILM COATED ORAL at 12:02

## 2018-08-11 RX ADMIN — SENNA PLUS 2 TABLET(S): 8.6 TABLET ORAL at 21:27

## 2018-08-11 RX ADMIN — Medication 100 MILLIGRAM(S): at 21:27

## 2018-08-11 RX ADMIN — POLYETHYLENE GLYCOL 3350 17 GRAM(S): 17 POWDER, FOR SOLUTION ORAL at 12:02

## 2018-08-11 RX ADMIN — PIPERACILLIN AND TAZOBACTAM 25 GRAM(S): 4; .5 INJECTION, POWDER, LYOPHILIZED, FOR SOLUTION INTRAVENOUS at 14:59

## 2018-08-11 RX ADMIN — PIPERACILLIN AND TAZOBACTAM 25 GRAM(S): 4; .5 INJECTION, POWDER, LYOPHILIZED, FOR SOLUTION INTRAVENOUS at 06:44

## 2018-08-11 RX ADMIN — PIPERACILLIN AND TAZOBACTAM 25 GRAM(S): 4; .5 INJECTION, POWDER, LYOPHILIZED, FOR SOLUTION INTRAVENOUS at 21:35

## 2018-08-11 RX ADMIN — FINASTERIDE 5 MILLIGRAM(S): 5 TABLET, FILM COATED ORAL at 12:02

## 2018-08-11 RX ADMIN — ENOXAPARIN SODIUM 40 MILLIGRAM(S): 100 INJECTION SUBCUTANEOUS at 06:45

## 2018-08-11 RX ADMIN — ATORVASTATIN CALCIUM 10 MILLIGRAM(S): 80 TABLET, FILM COATED ORAL at 21:27

## 2018-08-11 NOTE — PROGRESS NOTE ADULT - SUBJECTIVE AND OBJECTIVE BOX
_________________________________________________________________________________________  ========>>  M E D I C A L   A T T E N D I N G    F O L L O W  U P  N O T E  <<=========  -----------------------------------------------------------------------------------------------------    - Patient seen and examined by me approximately sixty minutes ago.   - In summary,  SCAR GRIFFIN is a 77y year old man who originally presented with fever  - Patient today overall doing ok, comfortable, eating OK. otherwise stable per RN at bedside     ==================>> REVIEW OF SYSTEM <<=================    (limited)  GEN: no fever, no chills, no pain  RESP: no SOB, no cough, no sputum  CVS: no chest pain, no palpitations, no edema  GI: no abdominal pain  : no dysuria, no frequency, no hematuria  Neuro: no headache, no dizziness  Derm : no itching, no rash    ==================>> PHYSICAL EXAM <<=================    GEN: A&O X 1, NAD , comfortable in recliner   HEENT: NCAT, PERRL, MMM, hearing intact  Neck: supple , no JVD  CVS: S1S2 , regular , No M/R/G appreciated  PULM: CTA B/L,  no W/R/R appreciated  ABD.: soft. non tender, non distended,  bowel sounds present  Extrem: intact pulses , no edema   PSYCH : flat affect        ==================>> MEDICATIONS <<====================    amLODIPine   Tablet 5 milliGRAM(s) Oral daily  atorvastatin 10 milliGRAM(s) Oral at bedtime  darunavir 800 milliGRAM(s) Oral daily  docusate sodium 100 milliGRAM(s) Oral three times a day  donepezil 10 milliGRAM(s) Oral at bedtime  emtricitabine 200 mG/tenofovir 300 mG (TRUVADA) 1 Tablet(s) Oral daily  enoxaparin Injectable 40 milliGRAM(s) SubCutaneous every 24 hours  finasteride 5 milliGRAM(s) Oral daily  OLANZapine 2.5 milliGRAM(s) Oral daily  piperacillin/tazobactam IVPB. 3.375 Gram(s) IV Intermittent every 8 hours  polyethylene glycol 3350 17 Gram(s) Oral daily  ritonavir  Solution 100 milliGRAM(s) Oral daily  senna 2 Tablet(s) Oral at bedtime  sodium chloride 0.9%. 1000 milliLiter(s) IV Continuous <Continuous>  tamsulosin 0.4 milliGRAM(s) Oral at bedtime    MEDICATIONS  (PRN):    ==================>> VITAL SIGNS <<==================    Vital Signs Last 24 Hrs  T(C): 36.7 (08-11-18 @ 06:16)  T(F): 98.1 (08-11-18 @ 06:16), Max: 98.1 (08-11-18 @ 06:16)  HR: 75 (08-11-18 @ 06:16) (71 - 75)  BP: 99/63 (08-11-18 @ 06:16)  BP(mean): --  RR: 18 (08-11-18 @ 06:16) (18 - 18)  SpO2: 98% (08-11-18 @ 06:16) (98% - 99%)    CAPILLARY BLOOD GLUCOSE         ==================>> LAB AND IMAGING <<==================                        11.4   6.71  )-----------( 212      ( 10 Aug 2018 07:15 )             34.2        08-10    142  |  103  |  11  ----------------------------<  106<H>  3.7   |  27  |  0.98    Ca    8.7      10 Aug 2018 06:07  Phos  3.2     08-10      _______________________  C U L T U R E S :    Culture - Blood (collected 09 Aug 2018 15:02)  Source: .Blood Blood-Peripheral  Preliminary Report (10 Aug 2018 16:01):    No growth to date.    Culture - Blood (collected 09 Aug 2018 15:02)  Source: .Blood Blood-Peripheral  Preliminary Report (10 Aug 2018 16:01):    No growth to date.    Culture - Urine (collected 08 Aug 2018 03:03)  Source: .Urine Clean Catch (Midstream)  Final Report (10 Aug 2018 11:42):    >100,000 CFU/ml Escherichia coli  Organism: Escherichia coli (10 Aug 2018 11:42)  Organism: Escherichia coli (10 Aug 2018 11:42)    Sensitivities:      -  Amikacin: S <=8      -  Amoxicillin/Clavulanic Acid: I 16/8      -  Ampicillin: R >16 These ampicillin results predict results for amoxicillin      -  Ampicillin/Sulbactam: R >16/8      -  Aztreonam: S <=4      -  Cefazolin: I 4 For uncomplicated UTI with K. pneumoniae, E. coli, or P. mirablis: JOSUE <=16 is sensitive and JOSUE >=32 is resistant. This also predicts results for oral agents cefaclor, cefdinir, cefpodoxime, cefprozil, cefuroxime axetil, cephalexin and locarbef for uncomplicated UTI. Note that some isolates may be susceptible to these agents while testing resistant to cefazolin.      -  Cefepime: S <=2      -  Cefoxitin: S <=4      -  Ceftriaxone: S <=1 Enterobacter, Citrobacter, and Serratia may develop resistance during prolonged therapy      -  Ciprofloxacin: R >2      -  Ertapenem: S <=0.5      -  Gentamicin: S <=1      -  Imipenem: S <=1      -  Levofloxacin: R >4      -  Meropenem: S <=1      -  Nitrofurantoin: S <=32 Should not be used to treat pyelonephritis      -  Piperacillin/Tazobactam: S <=8      -  Tigecycline: S <=1      -  Tobramycin: S <=2      -  Trimethoprim/Sulfamethoxazole: S <=0.5/9.5      Method Type: JOSUE    Culture - Blood (collected 08 Aug 2018 02:12)  Source: .Blood Blood  Gram Stain (10 Aug 2018 05:51):    Growth in aerobic bottle: Gram Positive Cocci in Clusters    Growth in anaerobic bottle: Gram Positive Cocci in Clusters  Preliminary Report (10 Aug 2018 12:25):    Growth in aerobic bottle: Coag Negative Staphylococcus    Single set isolate, possible contaminant. Contact    Microbiology if susceptibility testing clinically    indicated.    "Due to technical problems, Proteus sp. will Not be reported as part of    the BCID panel until further notice"    ***Blood Panel PCR results on this specimen are available    approximately 3 hours after the Gram stain result.***    Gram stain, PCR, and/or culture results may not always    correspond due to difference in methodologies.    ************************************************************    This PCR assay was performed using [employer].    The following targets are tested for: Enterococcus,    vancomycin resistant enterococci, Listeria monocytogenes,    coagulase negative staphylococci, S. aureus,    methicillin resistant S. aureus, Streptococcus agalactiae    (Group B), S. pneumoniae, S. pyogenes (Group A),    Acinetobacter baumannii, Enterobacter cloacae, E. coli,    Klebsiella oxytoca, K. pneumoniae, Proteus sp.,    Serratia marcescens, Haemophilus influenzae,    Neisseria meningitidis, Pseudomonas aeruginosa, Candida    albicans, C. glabrata, C krusei, C parapsilosis,    C. tropicalis and the KPC resistance gene.    Growth in anaerobic bottle: Gram Positive Cocci in Clusters  Organism: Blood Culture PCR (09 Aug 2018 08:02)  Organism: Blood Culture PCR (09 Aug 2018 08:02)    Sensitivities:      -  Coagulase negative Staphylococcus: Detec      -  Pseudomonas aeruginosa: Detec      Method Type: PCR    Culture - Blood (collected 08 Aug 2018 02:12)  Source: .Blood Blood  Gram Stain (09 Aug 2018 18:18):    Growth in anaerobic bottle: Gram Positive Cocci in Pairs and Chains    Growth in aerobic bottle: Gram positive cocci in pairs  Preliminary Report (10 Aug 2018 20:05):    Growth in anaerobic bottle: Enterococcus faecalis    Growth in aerobic bottle: Coag Negative Staphylococcus    ***Blood Panel PCR results on this specimen are available    approximately 3 hours after the Gram stain result.***    Gram stain, PCR, and/or culture results may not always    correspond due to difference in methodologies.    ************************************************************    This PCR assay was performed using [employer].    The following targets are tested for: Enterococcus,    vancomycin resistant enterococci, Listeria monocytogenes,    coagulase negative staphylococci, S. aureus,    methicillin resistant S. aureus, Streptococcus agalactiae    (Group B), S. pneumoniae, S. pyogenes (Group A),    Acinetobacter baumannii, Enterobacter cloacae, E. coli,    Klebsiella oxytoca, K. pneumoniae, Proteus sp.,    Serratia marcescens, Haemophilus influenzae,    Neisseria meningitidis, Pseudomonas aeruginosa, Candida    albicans, C. glabrata, C krusei, C parapsilosis,    C. tropicalis and the KPC resistance gene.    "Due to technical problems, Proteus sp. will Not be reported as part of    the BCID panel until further notice"  Organism: Blood Culture PCR  Enterococcus faecalis (10 Aug 2018 23:48)  Organism: Enterococcus faecalis (10 Aug 2018 23:48)    Sensitivities:      -  Ampicillin: S <=2 Predicts results to ampicillin/sulbactam, amoxacillin-clavulanate and  piperacillin-tazobactam.      -  Gentamicin synergy: R      -  Streptomycin synergy: R      -  Vancomycin: S 2      Method Type: JOSUE  Organism: Blood Culture PCR (08 Aug 2018 21:41)    Sensitivities:      -  Enterococcus species: Detec      Method Type: PCR      < from: CT Abdomen and Pelvis w/ Oral Cont and w/ IV Cont (08.09.18 @ 18:09) >  IMPRESSION:  Findings suggestive of cystitis. Mildurothelial enhancement and mildly   prominent collecting system bilaterally can be secondary to ascending   infection.  < end of copied text >    ___________________________________________________________________________________  ===============>>  A S S E S S M E N T   A N D   P L A N <<===============  ------------------------------------------------------------------------------------------    · Assessment		  77M with PMH of HIV (last VL<30) c/b HIV related dementia, HTN, BPH, recent hospitalization for E coli UTI/bacteremia presenting with fever - with CXR showing patchy opacities and +UA - admitted for further treatment.     Problem/Plan - 1:  ·  Problem: Acute cystitis in pt with BPH with urinary retention history ,  Bacteremia   f/u blood and urine cultures, tailor abx appropriately   ID mgmt appreciated: likely Gu source of bacteremia   check bladder scan Q shift      (per Dtr: pt following with  as outpatient and had has appointment in a couple of weeks for urodynamic studies.. )    Problem/Plan - 2:  ·  Problem: Pneumonia of lower lobe; small-mod R pl eff   - continue abx per ID  - as discussed with pulm: no plan for thoracentesis of chronic effusions     Problem/Plan - 3:  ·  Problem: HIV  -c/w HAART  -ID consult f/u    Problem/Plan - 4:  ·  Problem:  Essential hypertension  BP stable   Continue Current medications and monitor.     Problem/Plan - 5:  ·  Problem: Alzheimer's disease   c/w donepezil, olanzapine.     -GI/DVT Prophylaxis.    --------------------------------------------  Case discussed with pt, family,  Rn  Education given on findings and plan of care  ___________________________  H. ALANNA De La Cruz.  Pager: [phone]

## 2018-08-11 NOTE — PROGRESS NOTE ADULT - ASSESSMENT
77M with HIV (last VL<30) c/b HIV related dementia, HTN, BPH, recent hospitalization for E coli UTI/bacteremia presenting with fever.    Polymicrobial bacteremia including enterococcus, pseudomonas and CNS, repeat blood culture negative  Likely urinary source ? secondary to obstruction but the urine cx had E-coli  abd CT with cystitis and b/l enhancement of the collecting system  no abscess  Seen by urology-input noted  Patient was on levaquin, raising concern for drug resistance  His HIV is well controlled though he has progressive dementia and has been non- semi verbal     * f/u the pseudomonas sensitivity  * c/w zosyn for now  * c/w truvada, norvir, prezista  * f/u with urology

## 2018-08-11 NOTE — PROGRESS NOTE ADULT - SUBJECTIVE AND OBJECTIVE BOX
Follow Up:  polybacterial bacteremia, UTI    Interval History: pt stable and afebrile, now WBC normal, repeat blood cx negative    ROS:    Unobtainable because: pt demented and non verbal        Allergies  No Known Allergies        ANTIMICROBIALS:  darunavir 800 daily  emtricitabine 200 mG/tenofovir 300 mG (TRUVADA) 1 daily  piperacillin/tazobactam IVPB. 3.375 every 8 hours  ritonavir  Solution 100 daily      OTHER MEDS:  amLODIPine   Tablet 5 milliGRAM(s) Oral daily  atorvastatin 10 milliGRAM(s) Oral at bedtime  docusate sodium 100 milliGRAM(s) Oral three times a day  donepezil 10 milliGRAM(s) Oral at bedtime  enoxaparin Injectable 40 milliGRAM(s) SubCutaneous every 24 hours  finasteride 5 milliGRAM(s) Oral daily  OLANZapine 2.5 milliGRAM(s) Oral daily  polyethylene glycol 3350 17 Gram(s) Oral daily  senna 2 Tablet(s) Oral at bedtime  sodium chloride 0.9%. 1000 milliLiter(s) IV Continuous <Continuous>  tamsulosin 0.4 milliGRAM(s) Oral at bedtime      Vital Signs Last 24 Hrs  T(C): 36.7 (11 Aug 2018 06:16), Max: 36.9 (10 Aug 2018 12:18)  T(F): 98.1 (11 Aug 2018 06:16), Max: 98.4 (10 Aug 2018 12:18)  HR: 75 (11 Aug 2018 06:16) (71 - 79)  BP: 99/63 (11 Aug 2018 06:16) (99/63 - 103/65)  BP(mean): --  RR: 18 (11 Aug 2018 06:16) (18 - 18)  SpO2: 98% (11 Aug 2018 06:16) (97% - 99%)    Physical Exam:  General:    NAD,  non toxic  Head: atraumatic, normocephalic  Eye: normal sclera and conjunctiva  ENT:    no oropharyngeal lesions,   no LAD,   neck supple  Cardio:     regular S1, S2,  no murmur  Respiratory:    clear b/l,    no wheezing  abd:     soft,   BS +,   no tenderness,    no organomegaly  :   no CVAT,  no suprapubic tenderness, + nelson  Musculoskeletal:   no joint swelling,   no edema  vascular: no lines, normal pulses  Skin:    no rash  Neurologic:     baseline non verbal due to dementia, now awake                        11.4   6.71  )-----------( 212      ( 10 Aug 2018 07:15 )             34.2       08-10    142  |  103  |  11  ----------------------------<  106<H>  3.7   |  27  |  0.98    Ca    8.7      10 Aug 2018 06:07  Phos  3.2     08-10            MICROBIOLOGY:  v  .Blood Blood-Peripheral  08-09-18   No growth to date.  --  --      .Urine Clean Catch (Midstream)  08-08-18   >100,000 CFU/ml Escherichia coli  --  Escherichia coli      .Blood Blood  08-08-18   Growth in anaerobic bottle: Enterococcus faecalis  Growth in aerobic bottle: Coag Negative Staphylococcus  ***Blood Panel PCR results on this specimen are available  approximately 3 hours after the Gram stain result.***  Gram stain, PCR, and/or culture results may not always  correspond due to difference in methodologies.  ************************************************************  This PCR assay was performed using [employer].  The following targets are tested for: Enterococcus,  vancomycin resistant enterococci, Listeria monocytogenes,  coagulase negative staphylococci, S. aureus,  methicillin resistant S. aureus, Streptococcus agalactiae  (Group B), S. pneumoniae, S. pyogenes (Group A),  Acinetobacter baumannii, Enterobacter cloacae, E. coli,  Klebsiella oxytoca, K. pneumoniae, Proteus sp.,  Serratia marcescens, Haemophilus influenzae,  Neisseria meningitidis, Pseudomonas aeruginosa, Candida  albicans, C. glabrata, C krusei, C parapsilosis,  C. tropicalis and the KPC resistance gene.  "Due to technical problems, Proteus sp. will Not be reported as part of  the BCID panel until further notice"  --  Blood Culture PCR  Enterococcus faecalis      .Blood Blood-Venous  07-17-18   No growth at 5 days.  --  --      .Blood Blood-Peripheral  07-17-18   No growth at 5 days.  --  --      .Blood Blood-Peripheral  07-15-18   No growth at 5 days.  --  Escherichia coli  Blood Culture PCR      .Urine Catheterized  07-15-18   >100,000 CFU/ml Escherichia coli  >100,000 CFU/ml Escherichia coli #2  --  Escherichia coli  Escherichia coli        HIV-1 RNA Quantitative, Viral Load Log: DET.  <1.47 lg /mL (07-16-18 @ 09:30)          RADIOLOGY:  Images below reviewed personally  < from: CT Abdomen and Pelvis w/ Oral Cont and w/ IV Cont (08.09.18 @ 18:09) >    IMPRESSION:  Findings suggestive of cystitis. Mildurothelial enhancement and mildly   prominent collecting system bilaterally can be secondary to ascending   infection.    Agree with the preliminary report submitted at the time of the exam.

## 2018-08-12 LAB
ANION GAP SERPL CALC-SCNC: 8 MMOL/L — SIGNIFICANT CHANGE UP (ref 5–17)
BASOPHILS # BLD AUTO: 0.01 K/UL — SIGNIFICANT CHANGE UP (ref 0–0.2)
BASOPHILS NFR BLD AUTO: 0.2 % — SIGNIFICANT CHANGE UP (ref 0–2)
BUN SERPL-MCNC: 17 MG/DL — SIGNIFICANT CHANGE UP (ref 7–23)
CALCIUM SERPL-MCNC: 8.9 MG/DL — SIGNIFICANT CHANGE UP (ref 8.4–10.5)
CHLORIDE SERPL-SCNC: 104 MMOL/L — SIGNIFICANT CHANGE UP (ref 96–108)
CO2 SERPL-SCNC: 28 MMOL/L — SIGNIFICANT CHANGE UP (ref 22–31)
CREAT SERPL-MCNC: 1.06 MG/DL — SIGNIFICANT CHANGE UP (ref 0.5–1.3)
EOSINOPHIL # BLD AUTO: 0.15 K/UL — SIGNIFICANT CHANGE UP (ref 0–0.5)
EOSINOPHIL NFR BLD AUTO: 2.6 % — SIGNIFICANT CHANGE UP (ref 0–6)
GLUCOSE SERPL-MCNC: 119 MG/DL — HIGH (ref 70–99)
HCT VFR BLD CALC: 32.8 % — LOW (ref 39–50)
HGB BLD-MCNC: 11.1 G/DL — LOW (ref 13–17)
IMM GRANULOCYTES NFR BLD AUTO: 0.2 % — SIGNIFICANT CHANGE UP (ref 0–1.5)
LYMPHOCYTES # BLD AUTO: 2.43 K/UL — SIGNIFICANT CHANGE UP (ref 1–3.3)
LYMPHOCYTES # BLD AUTO: 41.8 % — SIGNIFICANT CHANGE UP (ref 13–44)
MCHC RBC-ENTMCNC: 31.2 PG — SIGNIFICANT CHANGE UP (ref 27–34)
MCHC RBC-ENTMCNC: 33.8 GM/DL — SIGNIFICANT CHANGE UP (ref 32–36)
MCV RBC AUTO: 92.1 FL — SIGNIFICANT CHANGE UP (ref 80–100)
MONOCYTES # BLD AUTO: 0.52 K/UL — SIGNIFICANT CHANGE UP (ref 0–0.9)
MONOCYTES NFR BLD AUTO: 8.9 % — SIGNIFICANT CHANGE UP (ref 2–14)
NEUTROPHILS # BLD AUTO: 2.7 K/UL — SIGNIFICANT CHANGE UP (ref 1.8–7.4)
NEUTROPHILS NFR BLD AUTO: 46.3 % — SIGNIFICANT CHANGE UP (ref 43–77)
PLATELET # BLD AUTO: 225 K/UL — SIGNIFICANT CHANGE UP (ref 150–400)
POTASSIUM SERPL-MCNC: 4 MMOL/L — SIGNIFICANT CHANGE UP (ref 3.5–5.3)
POTASSIUM SERPL-SCNC: 4 MMOL/L — SIGNIFICANT CHANGE UP (ref 3.5–5.3)
RBC # BLD: 3.56 M/UL — LOW (ref 4.2–5.8)
RBC # FLD: 14.5 % — SIGNIFICANT CHANGE UP (ref 10.3–14.5)
SODIUM SERPL-SCNC: 140 MMOL/L — SIGNIFICANT CHANGE UP (ref 135–145)
WBC # BLD: 5.82 K/UL — SIGNIFICANT CHANGE UP (ref 3.8–10.5)
WBC # FLD AUTO: 5.82 K/UL — SIGNIFICANT CHANGE UP (ref 3.8–10.5)

## 2018-08-12 RX ADMIN — Medication 100 MILLIGRAM(S): at 21:08

## 2018-08-12 RX ADMIN — PIPERACILLIN AND TAZOBACTAM 25 GRAM(S): 4; .5 INJECTION, POWDER, LYOPHILIZED, FOR SOLUTION INTRAVENOUS at 21:09

## 2018-08-12 RX ADMIN — DARUNAVIR 800 MILLIGRAM(S): 75 TABLET, FILM COATED ORAL at 12:19

## 2018-08-12 RX ADMIN — EMTRICITABINE AND TENOFOVIR DISOPROXIL FUMARATE 1 TABLET(S): 200; 300 TABLET, FILM COATED ORAL at 12:19

## 2018-08-12 RX ADMIN — PIPERACILLIN AND TAZOBACTAM 25 GRAM(S): 4; .5 INJECTION, POWDER, LYOPHILIZED, FOR SOLUTION INTRAVENOUS at 05:20

## 2018-08-12 RX ADMIN — ENOXAPARIN SODIUM 40 MILLIGRAM(S): 100 INJECTION SUBCUTANEOUS at 05:21

## 2018-08-12 RX ADMIN — Medication 100 MILLIGRAM(S): at 12:19

## 2018-08-12 RX ADMIN — RITONAVIR 100 MILLIGRAM(S): 100 TABLET, FILM COATED ORAL at 12:19

## 2018-08-12 RX ADMIN — TAMSULOSIN HYDROCHLORIDE 0.4 MILLIGRAM(S): 0.4 CAPSULE ORAL at 21:08

## 2018-08-12 RX ADMIN — FINASTERIDE 5 MILLIGRAM(S): 5 TABLET, FILM COATED ORAL at 12:19

## 2018-08-12 RX ADMIN — Medication 100 MILLIGRAM(S): at 05:21

## 2018-08-12 RX ADMIN — OLANZAPINE 2.5 MILLIGRAM(S): 15 TABLET, FILM COATED ORAL at 12:18

## 2018-08-12 RX ADMIN — DONEPEZIL HYDROCHLORIDE 10 MILLIGRAM(S): 10 TABLET, FILM COATED ORAL at 21:08

## 2018-08-12 RX ADMIN — POLYETHYLENE GLYCOL 3350 17 GRAM(S): 17 POWDER, FOR SOLUTION ORAL at 12:19

## 2018-08-12 RX ADMIN — PIPERACILLIN AND TAZOBACTAM 25 GRAM(S): 4; .5 INJECTION, POWDER, LYOPHILIZED, FOR SOLUTION INTRAVENOUS at 12:19

## 2018-08-12 RX ADMIN — AMLODIPINE BESYLATE 5 MILLIGRAM(S): 2.5 TABLET ORAL at 05:21

## 2018-08-12 RX ADMIN — ATORVASTATIN CALCIUM 10 MILLIGRAM(S): 80 TABLET, FILM COATED ORAL at 21:08

## 2018-08-12 RX ADMIN — SENNA PLUS 2 TABLET(S): 8.6 TABLET ORAL at 21:08

## 2018-08-12 NOTE — PROGRESS NOTE ADULT - SUBJECTIVE AND OBJECTIVE BOX
_________________________________________________________________________________________  ========>>  M E D I C A L   A T T E N D I N G    F O L L O W  U P  N O T E  <<=========  -----------------------------------------------------------------------------------------------------    - Patient seen and examined by me approximately sixty minutes ago.   - In summary,  SCAR GRIFFIN is a 77y year old man who originally presented with fever  - Patient today overall doing ok, comfortable, eating OK. otherwise stable per RN at bedside     ==================>> REVIEW OF SYSTEM <<=================    (limited)  GEN: no pain  RESP: no SOB  CVS: no chest pain  GI: no abdominal pain  Neuro: no headache, no dizziness  Derm : no itching    ==================>> PHYSICAL EXAM <<=================    GEN: A&O X 1, NAD , comfortable in bed, less interactive today  HEENT: NCAT, PERRL, MMM, hearing intact  Neck: supple , no JVD  CVS: S1S2 , regular , No M/R/G appreciated  PULM: CTA B/L,  no W/R/R appreciated  ABD.: soft. non tender, non distended,  bowel sounds present  Extrem: intact pulses , no edema   PSYCH : flat affect          ==================>> MEDICATIONS <<====================    amLODIPine   Tablet 5 milliGRAM(s) Oral daily  atorvastatin 10 milliGRAM(s) Oral at bedtime  darunavir 800 milliGRAM(s) Oral daily  docusate sodium 100 milliGRAM(s) Oral three times a day  donepezil 10 milliGRAM(s) Oral at bedtime  emtricitabine 200 mG/tenofovir 300 mG (TRUVADA) 1 Tablet(s) Oral daily  enoxaparin Injectable 40 milliGRAM(s) SubCutaneous every 24 hours  finasteride 5 milliGRAM(s) Oral daily  OLANZapine 2.5 milliGRAM(s) Oral daily  piperacillin/tazobactam IVPB. 3.375 Gram(s) IV Intermittent every 8 hours  polyethylene glycol 3350 17 Gram(s) Oral daily  ritonavir  Solution 100 milliGRAM(s) Oral daily  senna 2 Tablet(s) Oral at bedtime  sodium chloride 0.9%. 1000 milliLiter(s) IV Continuous <Continuous>  tamsulosin 0.4 milliGRAM(s) Oral at bedtime    ==================>> VITAL SIGNS <<==================    Vital Signs Last 24 Hrs  T(C): 37.3 (08-12-18 @ 13:59)  T(F): 99.1 (08-12-18 @ 13:59), Max: 99.1 (08-12-18 @ 13:59)  HR: 76 (08-12-18 @ 13:59) (71 - 80)  BP: 120/72 (08-12-18 @ 13:59)  RR: 18 (08-12-18 @ 13:59) (17 - 18)  SpO2: 95% (08-12-18 @ 13:59) (94% - 95%)       ==================>> LAB AND IMAGING <<==================                        11.1   5.82  )-----------( 225      ( 12 Aug 2018 09:03 )             32.8     140  |  104  |  17  ----------------------------<  119<H>  4.0   |  28  |  1.06    Ca    8.9      12 Aug 2018 07:27    < from: CT Abdomen and Pelvis w/ Oral Cont and w/ IV Cont (08.09.18 @ 18:09) >  IMPRESSION:  Findings suggestive of cystitis. Mildurothelial enhancement and mildly   prominent collecting system bilaterally can be secondary to ascending   infection.  < end of copied text >    ___________________________________________________________________________________  ===============>>  A S S E S S M E N T   A N D   P L A N <<===============  ------------------------------------------------------------------------------------------    · Assessment		  77M with PMH of HIV (last VL<30) c/b HIV related dementia, HTN, BPH, recent hospitalization for E coli UTI/bacteremia presenting with fever - with CXR showing patchy opacities and +UA - admitted for further treatment.     Problem/Plan - 1:  ·  Problem: Acute cystitis in pt with BPH with urinary retention history ,  Bacteremia   ID mgmt appreciated: likely Gu source of bacteremia     continue abx course per ID  check bladder scan Q shift      (per Dtr: pt following with  as outpatient and had has appointment in a couple of weeks for urodynamic studies.. )    Problem/Plan - 2:  ·  Problem: Pneumonia of lower lobe; small-mod R pl eff   - continue and finish abx per ID    Problem/Plan - 3:  ·  Problem: HIV  -c/w HAART  -ID consult f/u    Problem/Plan - 4:  ·  Problem:  Essential hypertension  BP stable   Continue Current medications and monitor.     Problem/Plan - 5:  ·  Problem: Alzheimer's disease   c/w donepezil, olanzapine.     -GI/DVT Prophylaxis.  - PT, OOB as able   Dispo plan soon   --------------------------------------------  Case discussed with pt, family,  Rn  Education given on findings and plan of care  ___________________________  H. ALANNA De La Cruz.  Pager: [phone]

## 2018-08-13 LAB
ANION GAP SERPL CALC-SCNC: 13 MMOL/L — SIGNIFICANT CHANGE UP (ref 5–17)
BUN SERPL-MCNC: 12 MG/DL — SIGNIFICANT CHANGE UP (ref 7–23)
CALCIUM SERPL-MCNC: 9.1 MG/DL — SIGNIFICANT CHANGE UP (ref 8.4–10.5)
CHLORIDE SERPL-SCNC: 102 MMOL/L — SIGNIFICANT CHANGE UP (ref 96–108)
CO2 SERPL-SCNC: 26 MMOL/L — SIGNIFICANT CHANGE UP (ref 22–31)
CREAT SERPL-MCNC: 0.96 MG/DL — SIGNIFICANT CHANGE UP (ref 0.5–1.3)
CULTURE RESULTS: SIGNIFICANT CHANGE UP
GLUCOSE SERPL-MCNC: 130 MG/DL — HIGH (ref 70–99)
HCT VFR BLD CALC: 36 % — LOW (ref 39–50)
HGB BLD-MCNC: 11.7 G/DL — LOW (ref 13–17)
MCHC RBC-ENTMCNC: 30.5 PG — SIGNIFICANT CHANGE UP (ref 27–34)
MCHC RBC-ENTMCNC: 32.5 GM/DL — SIGNIFICANT CHANGE UP (ref 32–36)
MCV RBC AUTO: 93.8 FL — SIGNIFICANT CHANGE UP (ref 80–100)
ORGANISM # SPEC MICROSCOPIC CNT: SIGNIFICANT CHANGE UP
ORGANISM # SPEC MICROSCOPIC CNT: SIGNIFICANT CHANGE UP
PLATELET # BLD AUTO: 209 K/UL — SIGNIFICANT CHANGE UP (ref 150–400)
POTASSIUM SERPL-MCNC: 4 MMOL/L — SIGNIFICANT CHANGE UP (ref 3.5–5.3)
POTASSIUM SERPL-SCNC: 4 MMOL/L — SIGNIFICANT CHANGE UP (ref 3.5–5.3)
RBC # BLD: 3.84 M/UL — LOW (ref 4.2–5.8)
RBC # FLD: 14.3 % — SIGNIFICANT CHANGE UP (ref 10.3–14.5)
SODIUM SERPL-SCNC: 141 MMOL/L — SIGNIFICANT CHANGE UP (ref 135–145)
SPECIMEN SOURCE: SIGNIFICANT CHANGE UP
WBC # BLD: 10.2 K/UL — SIGNIFICANT CHANGE UP (ref 3.8–10.5)
WBC # FLD AUTO: 10.2 K/UL — SIGNIFICANT CHANGE UP (ref 3.8–10.5)

## 2018-08-13 PROCEDURE — 99233 SBSQ HOSP IP/OBS HIGH 50: CPT

## 2018-08-13 PROCEDURE — 71045 X-RAY EXAM CHEST 1 VIEW: CPT | Mod: 26

## 2018-08-13 RX ORDER — HALOPERIDOL DECANOATE 100 MG/ML
1 INJECTION INTRAMUSCULAR ONCE
Qty: 0 | Refills: 0 | Status: COMPLETED | OUTPATIENT
Start: 2018-08-13 | End: 2018-08-13

## 2018-08-13 RX ADMIN — DONEPEZIL HYDROCHLORIDE 10 MILLIGRAM(S): 10 TABLET, FILM COATED ORAL at 22:51

## 2018-08-13 RX ADMIN — ATORVASTATIN CALCIUM 10 MILLIGRAM(S): 80 TABLET, FILM COATED ORAL at 22:51

## 2018-08-13 RX ADMIN — Medication 100 MILLIGRAM(S): at 17:10

## 2018-08-13 RX ADMIN — PIPERACILLIN AND TAZOBACTAM 25 GRAM(S): 4; .5 INJECTION, POWDER, LYOPHILIZED, FOR SOLUTION INTRAVENOUS at 22:51

## 2018-08-13 RX ADMIN — Medication 100 MILLIGRAM(S): at 05:25

## 2018-08-13 RX ADMIN — RITONAVIR 100 MILLIGRAM(S): 100 TABLET, FILM COATED ORAL at 13:09

## 2018-08-13 RX ADMIN — FINASTERIDE 5 MILLIGRAM(S): 5 TABLET, FILM COATED ORAL at 13:09

## 2018-08-13 RX ADMIN — Medication 100 MILLIGRAM(S): at 22:51

## 2018-08-13 RX ADMIN — HALOPERIDOL DECANOATE 1 MILLIGRAM(S): 100 INJECTION INTRAMUSCULAR at 16:45

## 2018-08-13 RX ADMIN — SENNA PLUS 2 TABLET(S): 8.6 TABLET ORAL at 22:51

## 2018-08-13 RX ADMIN — TAMSULOSIN HYDROCHLORIDE 0.4 MILLIGRAM(S): 0.4 CAPSULE ORAL at 22:51

## 2018-08-13 RX ADMIN — AMLODIPINE BESYLATE 5 MILLIGRAM(S): 2.5 TABLET ORAL at 05:25

## 2018-08-13 RX ADMIN — PIPERACILLIN AND TAZOBACTAM 25 GRAM(S): 4; .5 INJECTION, POWDER, LYOPHILIZED, FOR SOLUTION INTRAVENOUS at 13:09

## 2018-08-13 RX ADMIN — ENOXAPARIN SODIUM 40 MILLIGRAM(S): 100 INJECTION SUBCUTANEOUS at 05:25

## 2018-08-13 RX ADMIN — PIPERACILLIN AND TAZOBACTAM 25 GRAM(S): 4; .5 INJECTION, POWDER, LYOPHILIZED, FOR SOLUTION INTRAVENOUS at 05:25

## 2018-08-13 RX ADMIN — Medication 100 MILLIGRAM(S): at 13:09

## 2018-08-13 RX ADMIN — EMTRICITABINE AND TENOFOVIR DISOPROXIL FUMARATE 1 TABLET(S): 200; 300 TABLET, FILM COATED ORAL at 13:09

## 2018-08-13 RX ADMIN — DARUNAVIR 800 MILLIGRAM(S): 75 TABLET, FILM COATED ORAL at 13:09

## 2018-08-13 RX ADMIN — OLANZAPINE 2.5 MILLIGRAM(S): 15 TABLET, FILM COATED ORAL at 13:09

## 2018-08-13 NOTE — PROGRESS NOTE ADULT - PROBLEM SELECTOR PLAN 1
Likely from  source  Repeat blood Cx  Follow ID sensi  Continue zosyn for now but if stable will soon attempt to de-escalate  Check echo  If repeat Cx stay negative may be able to do 14 days and then check surveillance Cx Likely from  source  Repeat blood Cx  Follow ID sensi  Continue zosyn for now but if stable will soon attempt to de-escalate  Check echo  If repeat Cx stay negative may be able to do 14 days  IV via picc and then check surveillance Cx

## 2018-08-13 NOTE — PROGRESS NOTE ADULT - SUBJECTIVE AND OBJECTIVE BOX
_________________________________________________________________________________________  ========>>  M E D I C A L   A T T E N D I N G    F O L L O W  U P  N O T E  <<=========  -----------------------------------------------------------------------------------------------------    - Patient seen and examined by me approximately sixty minutes ago.   - In summary,  SCAR GRIFFIN is a 77y year old man who originally presented with fever  - Patient today overall doing ok, comfortable, eating better today, OOB to chair     ==================>> REVIEW OF SYSTEM <<=================    (limited)  GEN: no pain  RESP: no SOB  CVS: no chest pain  GI: no abdominal pain  Neuro: no headache, no dizziness  Derm : no itching    ==================>> PHYSICAL EXAM <<=================    GEN: A&O X 1, NAD , comfortable in chair, more interactive   HEENT: NCAT, PERRL, MMM, hearing intact  Neck: supple , no JVD  CVS: S1S2 , regular , No M/R/G appreciated  PULM: CTA B/L,  no W/R/R appreciated  ABD.: soft. non tender, non distended,  bowel sounds present  Extrem: intact pulses , no edema     nelson draining clear yellow urine   PSYCH : flat affect        ==================>> MEDICATIONS <<====================    amLODIPine   Tablet 5 milliGRAM(s) Oral daily  atorvastatin 10 milliGRAM(s) Oral at bedtime  darunavir 800 milliGRAM(s) Oral daily  docusate sodium 100 milliGRAM(s) Oral three times a day  donepezil 10 milliGRAM(s) Oral at bedtime  emtricitabine 200 mG/tenofovir 300 mG (TRUVADA) 1 Tablet(s) Oral daily  enoxaparin Injectable 40 milliGRAM(s) SubCutaneous every 24 hours  finasteride 5 milliGRAM(s) Oral daily  OLANZapine 2.5 milliGRAM(s) Oral daily  piperacillin/tazobactam IVPB. 3.375 Gram(s) IV Intermittent every 8 hours  polyethylene glycol 3350 17 Gram(s) Oral daily  ritonavir  Solution 100 milliGRAM(s) Oral daily  senna 2 Tablet(s) Oral at bedtime  sodium chloride 0.9%. 1000 milliLiter(s) IV Continuous <Continuous>  tamsulosin 0.4 milliGRAM(s) Oral at bedtime    ==================>> VITAL SIGNS <<==================    Vital Signs Last 24 Hrs  T(C): 36.6 (08-13-18 @ 12:37)  T(F): 97.9 (08-13-18 @ 12:37), Max: 99.1 (08-12-18 @ 13:59)  HR: 84 (08-13-18 @ 12:37) (76 - 105)  BP: 116/77 (08-13-18 @ 12:37)  RR: 18 (08-13-18 @ 12:37) (18 - 18)  SpO2: 99% (08-13-18 @ 12:37) (95% - 99%)       ==================>> LAB AND IMAGING <<==================                        11.7   10.20 )-----------( 209      ( 13 Aug 2018 08:14 )             36.0     141  |  102  |  12  ----------------------------<  130<H>  4.0   |  26  |  0.96    Ca    9.1      13 Aug 2018 07:13    < from: CT Abdomen and Pelvis w/ Oral Cont and w/ IV Cont (08.09.18 @ 18:09) >  IMPRESSION:  Findings suggestive of cystitis. Mildurothelial enhancement and mildly   prominent collecting system bilaterally can be secondary to ascending   infection.  < end of copied text >    _______________________  C U L T U R E S :    Culture - Blood (collected 09 Aug 2018 15:02)  Source: .Blood Blood-Peripheral  Preliminary Report (10 Aug 2018 16:01):    No growth to date.    Culture - Blood (collected 09 Aug 2018 15:02)  Source: .Blood Blood-Peripheral  Preliminary Report (10 Aug 2018 16:01):    No growth to date.    Culture - Urine (collected 08 Aug 2018 03:03)  Source: .Urine Clean Catch (Midstream)  Final Report (10 Aug 2018 11:42):    >100,000 CFU/ml Escherichia coli  Organism: Escherichia coli (10 Aug 2018 11:42)  Organism: Escherichia coli (10 Aug 2018 11:42)    Sensitivities:      -  Amikacin: S <=8      -  Amoxicillin/Clavulanic Acid: I 16/8      -  Ampicillin: R >16 These ampicillin results predict results for amoxicillin      -  Ampicillin/Sulbactam: R >16/8      -  Aztreonam: S <=4      -  Cefazolin: I 4 For uncomplicated UTI with K. pneumoniae, E. coli, or P. mirablis: JOSUE <=16 is sensitive and JOSUE >=32 is resistant. This also predicts results for oral agents cefaclor, cefdinir, cefpodoxime, cefprozil, cefuroxime axetil, cephalexin and locarbef for uncomplicated UTI. Note that some isolates may be susceptible to these agents while testing resistant to cefazolin.      -  Cefepime: S <=2      -  Cefoxitin: S <=4      -  Ceftriaxone: S <=1 Enterobacter, Citrobacter, and Serratia may develop resistance during prolonged therapy      -  Ciprofloxacin: R >2      -  Ertapenem: S <=0.5      -  Gentamicin: S <=1      -  Imipenem: S <=1      -  Levofloxacin: R >4      -  Meropenem: S <=1      -  Nitrofurantoin: S <=32 Should not be used to treat pyelonephritis      -  Piperacillin/Tazobactam: S <=8      -  Tigecycline: S <=1      -  Tobramycin: S <=2      -  Trimethoprim/Sulfamethoxazole: S <=0.5/9.5      Method Type: JOSUE    Culture - Blood (collected 08 Aug 2018 02:12)  Source: .Blood Blood  Gram Stain (10 Aug 2018 05:51):    Growth in aerobic bottle: Gram Positive Cocci in Clusters    Growth in anaerobic bottle: Gram Positive Cocci in Clusters  Final Report (13 Aug 2018 11:34):    Growth in aerobic and anaerobic bottles: Coag Negative Staphylococcus    Single set isolate, possible contaminant. Contact    Microbiology if susceptibility testing clinically    indicated.    "Due to technical problems, Proteus sp. will Not be reported aspart of    the BCID panel until further notice"    ***Blood Panel PCR results on this specimen are available    approximately 3 hours after the Gram stain result.***    Gram stain, PCR, and/or culture results may not always    correspond due to difference in methodologies.    ************************************************************    This PCR assay was performed using [employer].    The following targets are tested for: Enterococcus,    vancomycin resistant enterococci, Listeria monocytogenes,    coagulase negative staphylococci, S. aureus,    methicillin resistant S. aureus, Streptococcus agalactiae    (Group B), S. pneumoniae, S. pyogenes (Group A),    Acinetobacter baumannii, Enterobacter cloacae, E. coli,    Klebsiella oxytoca, K. pneumoniae, Proteus sp.,    Serratia marcescens, Haemophilus influenzae,    Neisseria meningitidis, Pseudomonas aeruginosa, Candida    albicans, C. glabrata, C krusei, C parapsilosis,    C. tropicalis and the KPC resistance gene.  Organism: Blood Culture PCR (13 Aug 2018 11:34)  Organism: Blood Culture PCR (13 Aug 2018 11:34)    Sensitivities:      -  Coagulase negative Staphylococcus: Detec      -  Pseudomonas aeruginosa: Detec      Method Type: PCR    Culture - Blood (collected 08 Aug 2018 02:12)  Source: .Blood Blood  Gram Stain (09 Aug 2018 18:18):    Growth in anaerobic bottle: Gram Positive Cocci in Pairs and Chains    Growth in aerobic bottle: Gram positive cocci in pairs  Final Report (11 Aug 2018 18:48):    Growth in anaerobic bottle: Enterococcus faecalis    Growth in aerobic bottle: Coag Negative Staphylococcus    ***Blood Panel PCR results on this specimen are available    approximately 3 hours after the Gram stain result.***    Gram stain, PCR, and/or culture results may not always    correspond due to difference in methodologies.    ************************************************************    This PCR assay was performed using [employer].    The following targets are tested for: Enterococcus,    vancomycin resistant enterococci, Listeria monocytogenes,    coagulase negative staphylococci, S. aureus,    methicillin resistant S. aureus, Streptococcus agalactiae    (Group B), S. pneumoniae, S. pyogenes (Group A),    Acinetobacter baumannii, Enterobacter cloacae, E. coli,    Klebsiella oxytoca, K. pneumoniae, Proteus sp.,    Serratia marcescens, Haemophilus influenzae,    Neisseria meningitidis, Pseudomonas aeruginosa, Candida    albicans, C. glabrata, C krusei, C parapsilosis,    C. tropicalis and the KPC resistance gene.    "Due to technical problems, Proteus sp. will Not be reported as part of    the BCID panel until further notice"  Organism: Blood Culture PCR  Enterococcus faecalis  Coag Negative Staphylococcus (11 Aug 2018 18:48)  Organism: Coag Negative Staphylococcus (11 Aug 2018 18:48)    Sensitivities:      -  Ampicillin/Sulbactam: R <=8/4      -  Cefazolin: R 8      -  Clindamycin: R >4      -  Erythromycin: R >4      -  Gentamicin: R >8 Should not be used as monotherapy      -  Oxacillin: R >2      -  Penicillin: R 8      -  RIF- Rifampin: S <=1 Should not be used as monotherapy      -  Tetra/Doxy: S <=1      -  Trimethoprim/Sulfamethoxazole: R >2/38      -  Vancomycin: S 4      Method Type: JOSUE  Organism: Enterococcus faecalis (11 Aug 2018 18:48)    Sensitivities:      -  Ampicillin: S <=2 Predicts results to ampicillin/sulbactam, amoxacillin-clavulanate and  piperacillin-tazobactam.      -  Gentamicin synergy: R      -  Streptomycin synergy: R      -  Vancomycin: S 2      Method Type: JOSUE  Organism: Blood Culture PCR (11 Aug 2018 18:48)    Sensitivities:      -  Enterococcus species: Detec      Method Type: PCR  ___________________________________________________________________________________  ===============>>  A S S E S S M E N T   A N D   P L A N <<===============  ------------------------------------------------------------------------------------------    · Assessment		  77M with PMH of HIV (last VL<30) c/b HIV related dementia, HTN, BPH, recent hospitalization for E coli UTI/bacteremia presenting with fever - with CXR showing patchy opacities and +UA - admitted for further treatment.     Problem/Plan - 1:  ·  Problem: Acute cystitis in pt with BPH with urinary retention history ,  Bacteremia   ID mgmt appreciated: likely Gu source of bacteremia     continue abx course per ID >> PICC line to complete abx as outpatient   Trial of void today      (per Dtr: pt following with  as outpatient and had has appointment in a week or so for urodynamic studies.. )    Problem/Plan - 2:  ·  Problem: Pneumonia of lower lobe; small-mod R pl eff   - continue and finish abx per ID    Problem/Plan - 3:  ·  Problem: HIV  -c/w HAART  -ID consult f/u    Problem/Plan - 4:  ·  Problem:  Essential hypertension  BP stable   Continue Current medications and monitor.     Problem/Plan - 5:  ·  Problem: Alzheimer's disease   c/w donepezil, olanzapine.     -GI/DVT Prophylaxis.  - PT, OOB as able   Dispo plan pending PICC   --------------------------------------------  Case discussed with pt, Dtr, NP, Pulm, RN  Education given on findings and plan of care  ___________________________  H. ALANNA De La Cruz.  Pager: [phone]

## 2018-08-13 NOTE — PROGRESS NOTE ADULT - SUBJECTIVE AND OBJECTIVE BOX
PULM/MED PROGRESS NOTE:      awake, sitting in chair comfortably.  wife at bedside    Occl cough.    MEDICATIONS  (STANDING):  amLODIPine   Tablet 5 milliGRAM(s) Oral daily  atorvastatin 10 milliGRAM(s) Oral at bedtime  darunavir 800 milliGRAM(s) Oral daily  docusate sodium 100 milliGRAM(s) Oral three times a day  donepezil 10 milliGRAM(s) Oral at bedtime  emtricitabine 200 mG/tenofovir 300 mG (TRUVADA) 1 Tablet(s) Oral daily  enoxaparin Injectable 40 milliGRAM(s) SubCutaneous every 24 hours  finasteride 5 milliGRAM(s) Oral daily  OLANZapine 2.5 milliGRAM(s) Oral daily  piperacillin/tazobactam IVPB. 3.375 Gram(s) IV Intermittent every 8 hours  polyethylene glycol 3350 17 Gram(s) Oral daily  ritonavir  Solution 100 milliGRAM(s) Oral daily  senna 2 Tablet(s) Oral at bedtime  sodium chloride 0.9%. 1000 milliLiter(s) (100 mL/Hr) IV Continuous <Continuous>  tamsulosin 0.4 milliGRAM(s) Oral at bedtime      MEDICATIONS  (PRN):          Vital Signs Last 24 Hrs  T(C): 36.9 (13 Aug 2018 05:21), Max: 37.3 (12 Aug 2018 13:59)  T(F): 98.4 (13 Aug 2018 05:21), Max: 99.1 (12 Aug 2018 13:59)  HR: 87 (13 Aug 2018 05:21) (76 - 105)  BP: 107/71 (13 Aug 2018 05:21) (106/70 - 120/72)  BP(mean): --  RR: 18 (13 Aug 2018 05:21) (18 - 18)  SpO2: 98% (13 Aug 2018 05:21) (95% - 98%)    PHYSICAL EXAMINATION:    GENERAL: The patient is awake and in no apparent distress.     NECK: Supple.    LUNGS: Bilateral breath sounds    HEART: Regular rate and rhythm     ABDOMEN: Soft, nontender    EXTREMITIES: Without  edema.        LABS:                        11.7   10.20 )-----------( 209      ( 13 Aug 2018 08:14 )             36.0     08-13    141  |  102  |  12  ----------------------------<  130<H>  4.0   |  26  |  0.96    Ca    9.1      13 Aug 2018 07:13      MICROBIOLOGY:  Culture Results:   No growth to date. (08-09 @ 15:02)  Culture Results:   No growth to date. (08-09 @ 15:02)

## 2018-08-13 NOTE — PROGRESS NOTE ADULT - SUBJECTIVE AND OBJECTIVE BOX
Patient is a 77y old  Male who presents with a chief complaint of UTI (09 Aug 2018 13:37)    Being followed by ID for bacteremia,UTI    Interval history:awake but minimal verbal response  No acute events      ROS:  Not obrtainable    Antimicrobials:    darunavir 800 milliGRAM(s) Oral daily  emtricitabine 200 mG/tenofovir 300 mG (TRUVADA) 1 Tablet(s) Oral daily  piperacillin/tazobactam IVPB. 3.375 Gram(s) IV Intermittent every 8 hours  ritonavir  Solution 100 milliGRAM(s) Oral daily    Other medications reviewed    Vital Signs Last 24 Hrs  T(C): 36.6 (08-13-18 @ 12:37), Max: 37.3 (08-12-18 @ 13:59)  T(F): 97.9 (08-13-18 @ 12:37), Max: 99.1 (08-12-18 @ 13:59)  HR: 84 (08-13-18 @ 12:37) (76 - 105)  BP: 116/77 (08-13-18 @ 12:37) (106/70 - 120/72)  BP(mean): --  RR: 18 (08-13-18 @ 12:37) (18 - 18)  SpO2: 99% (08-13-18 @ 12:37) (95% - 99%)    Physical Exam:        HEENT PERRLA EOMI    No oral exudate or erythema    Chest Good AE,CTA    CVS RRR S1 S2 WNl No murmur or rub or gallop    Abd soft BS normal No tenderness no masses nelson  IV site no erythema tenderness or discharge    CNS as above     Lab Data:                          11.7   10.20 )-----------( 209      ( 13 Aug 2018 08:14 )             36.0       08-13    141  |  102  |  12  ----------------------------<  130<H>  4.0   |  26  |  0.96    Ca    9.1      13 Aug 2018 07:13          Culture - Blood (collected 09 Aug 2018 15:02)  Source: .Blood Blood-Peripheral  Preliminary Report (10 Aug 2018 16:01):    No growth to date.    Culture - Blood (collected 09 Aug 2018 15:02)  Source: .Blood Blood-Peripheral  Preliminary Report (10 Aug 2018 16:01):    No growth to date.      Culture - Urine (08.08.18 @ 03:03)    -  Imipenem: S <=1    -  Levofloxacin: R >4    -  Nitrofurantoin: S <=32 Should not be used to treat pyelonephritis    -  Meropenem: S <=1    -  Piperacillin/Tazobactam: S <=8    -  Tigecycline: S <=1    -  Tobramycin: S <=2    -  Trimethoprim/Sulfamethoxazole: S <=0.5/9.5    -  Gentamicin: S <=1    -  Amikacin: S <=8    -  Amoxicillin/Clavulanic Acid: I 16/8    -  Ampicillin: R >16 These ampicillin results predict results for amoxicillin    -  Aztreonam: S <=4    -  Cefazolin: I 4 For uncomplicated UTI with K. pneumoniae, E. coli, or P. mirablis: JOSUE <=16 is sensitive and JOSUE >=32 is resistant. This also predicts results for oral agents cefaclor, cefdinir, cefpodoxime, cefprozil, cefuroxime axetil, cephalexin and locarbef for uncomplicated UTI. Note that some isolates may be susceptible to these agents while testing resistant to cefazolin.    -  Cefepime: S <=2    -  Cefoxitin: S <=4    -  Ceftriaxone: S <=1 Enterobacter, Citrobacter, and Serratia may develop resistance during prolonged therapy    -  Ciprofloxacin: R >2    -  Ampicillin/Sulbactam: R >16/8    -  Ertapenem: S <=0.5    Specimen Source: .Urine Clean Catch (Midstream)    Culture Results:   >100,000 CFU/ml Escherichia coli    Organism Identification: Escherichia coli    Organism: Escherichia coli    Method Type: JOSUE          Culture - Blood (08.08.18 @ 02:12)    Gram Stain:   Growth in aerobic bottle: Gram Positive Cocci in Clusters  Growth in anaerobic bottle: Gram Positive Cocci in Clusters    -  Coagulase negative Staphylococcus: Detec    -  Pseudomonas aeruginosa: Detec    Specimen Source: .Blood Blood    Organism: Blood Culture PCR    Culture Results:   Growth in aerobic and anaerobic bottles: Coag Negative Staphylococcus  Single set isolate, possible contaminant. Contact  Microbiology if susceptibility testing clinically  indicated.  "    Culture - Blood (08.08.18 @ 02:12)    -  Ampicillin/Sulbactam: R <=8/4    Gram Stain:   Growth in anaerobic bottle: Gram Positive Cocci in Pairs and Chains  Growth in aerobic bottle: Gram positive cocci in pairs    -  Ampicillin: S <=2 Predicts results to ampicillin/sulbactam, amoxacillin-clavulanate and  piperacillin-tazobactam.    -  Erythromycin: R >4    -  Clindamycin: R >4    -  Cefazolin: R 8    -  Enterococcus species: Detec    -  Gentamicin synergy: R    -  Streptomycin synergy: R    -  Vancomycin: S 2    -  Vancomycin: S 4    -  Trimethoprim/Sulfamethoxazole: R >2/38    -  RIF- Rifampin: S <=1 Should not be used as monotherapy    -  Tetra/Doxy: S <=1    -  Oxacillin: R >2    -  Penicillin: R 8    -  Gentamicin: R >8 Should not be used as monotherapy    Specimen Source: .Blood Blood    Organism: Enterococcus faecalis    Organism: Coag Negative Staphylococcus          SPOKE to lab-Initial blood Cx PCR also had pseudomonas-but no growth-PCR recheked on my request-no pseudomonas-only CNS in taht set

## 2018-08-13 NOTE — PROGRESS NOTE ADULT - ATTENDING COMMENTS
Case d/w Med NP  Will tailor plan for ID issues  per course,results.Will defer to primary team on management of other issues.  Will Follow.  Beeper [phone]-hone/afterhours/No response-[phone]

## 2018-08-13 NOTE — PROGRESS NOTE ADULT - ASSESSMENT
-Small pleural effusions - to monitor  -s/p urosepsis - tent for PICC line to continue outpt IV abx  -dementia/HIV continue current regimen    Discussed with pt's wife and with Dr. De La Cruz

## 2018-08-13 NOTE — PROGRESS NOTE ADULT - ASSESSMENT
77M with PMH of HIV (last VL<30) c/b HIV related dementia, HTN, BPH, recent hospitalization for E coli UTI/bacteremia presenting with fever. History obtained from pts wife at bedside as pt is baseline non verbal. Per wife, she noted pt to be a little fatigued and lethargic on Monday; and then on Tuesday evening, when wife returned from work, was told pt had been spitting up all day and wife noticed him later to have a coughing fit that appeared painful; she took a temperature and found him to be febrile 101.9   Polymicrobial bacteremia including enterococcus,and CNS  Also E coli in urine Cx  No pseudomonas in blood cx found eventually-Repeat PCR negative  Likely urinary source ? secondary to obstruction  CT with no abscess  Seen by urology-input noted  His HIV is well controlled though he has progressive dementia and has been non- semi verbal     Rec:

## 2018-08-14 LAB
ANION GAP SERPL CALC-SCNC: 10 MMOL/L — SIGNIFICANT CHANGE UP (ref 5–17)
BUN SERPL-MCNC: 18 MG/DL — SIGNIFICANT CHANGE UP (ref 7–23)
CALCIUM SERPL-MCNC: 9.3 MG/DL — SIGNIFICANT CHANGE UP (ref 8.4–10.5)
CHLORIDE SERPL-SCNC: 102 MMOL/L — SIGNIFICANT CHANGE UP (ref 96–108)
CO2 SERPL-SCNC: 30 MMOL/L — SIGNIFICANT CHANGE UP (ref 22–31)
CREAT SERPL-MCNC: 1.04 MG/DL — SIGNIFICANT CHANGE UP (ref 0.5–1.3)
CULTURE RESULTS: SIGNIFICANT CHANGE UP
CULTURE RESULTS: SIGNIFICANT CHANGE UP
GLUCOSE SERPL-MCNC: 109 MG/DL — HIGH (ref 70–99)
HCT VFR BLD CALC: 31.7 % — LOW (ref 39–50)
HGB BLD-MCNC: 10.9 G/DL — LOW (ref 13–17)
MCHC RBC-ENTMCNC: 32 PG — SIGNIFICANT CHANGE UP (ref 27–34)
MCHC RBC-ENTMCNC: 34.4 GM/DL — SIGNIFICANT CHANGE UP (ref 32–36)
MCV RBC AUTO: 93 FL — SIGNIFICANT CHANGE UP (ref 80–100)
PLATELET # BLD AUTO: 204 K/UL — SIGNIFICANT CHANGE UP (ref 150–400)
POTASSIUM SERPL-MCNC: 4 MMOL/L — SIGNIFICANT CHANGE UP (ref 3.5–5.3)
POTASSIUM SERPL-SCNC: 4 MMOL/L — SIGNIFICANT CHANGE UP (ref 3.5–5.3)
RBC # BLD: 3.41 M/UL — LOW (ref 4.2–5.8)
RBC # FLD: 14.6 % — HIGH (ref 10.3–14.5)
SODIUM SERPL-SCNC: 142 MMOL/L — SIGNIFICANT CHANGE UP (ref 135–145)
SPECIMEN SOURCE: SIGNIFICANT CHANGE UP
SPECIMEN SOURCE: SIGNIFICANT CHANGE UP
WBC # BLD: 8.37 K/UL — SIGNIFICANT CHANGE UP (ref 3.8–10.5)
WBC # FLD AUTO: 8.37 K/UL — SIGNIFICANT CHANGE UP (ref 3.8–10.5)

## 2018-08-14 PROCEDURE — 99233 SBSQ HOSP IP/OBS HIGH 50: CPT

## 2018-08-14 RX ORDER — AMPICILLIN TRIHYDRATE 250 MG
2 CAPSULE ORAL
Qty: 80 | Refills: 0
Start: 2018-08-14 | End: 2018-08-23

## 2018-08-14 RX ORDER — AMPICILLIN TRIHYDRATE 250 MG
2 CAPSULE ORAL
Qty: 80 | Refills: 0 | OUTPATIENT
Start: 2018-08-14 | End: 2018-08-23

## 2018-08-14 RX ORDER — SENNA PLUS 8.6 MG/1
2 TABLET ORAL
Qty: 0 | Refills: 0 | DISCHARGE
Start: 2018-08-14

## 2018-08-14 RX ORDER — POLYETHYLENE GLYCOL 3350 17 G/17G
17 POWDER, FOR SOLUTION ORAL
Qty: 0 | Refills: 0 | DISCHARGE
Start: 2018-08-14

## 2018-08-14 RX ORDER — AMPICILLIN TRIHYDRATE 250 MG
2 CAPSULE ORAL EVERY 6 HOURS
Qty: 0 | Refills: 0 | Status: DISCONTINUED | OUTPATIENT
Start: 2018-08-14 | End: 2018-08-15

## 2018-08-14 RX ORDER — CEFTRIAXONE 500 MG/1
1 INJECTION, POWDER, FOR SOLUTION INTRAMUSCULAR; INTRAVENOUS
Qty: 10 | Refills: 0
Start: 2018-08-14 | End: 2018-08-23

## 2018-08-14 RX ORDER — DOCUSATE SODIUM 100 MG
1 CAPSULE ORAL
Qty: 0 | Refills: 0 | DISCHARGE
Start: 2018-08-14

## 2018-08-14 RX ORDER — CEFTRIAXONE 500 MG/1
1 INJECTION, POWDER, FOR SOLUTION INTRAMUSCULAR; INTRAVENOUS EVERY 24 HOURS
Qty: 0 | Refills: 0 | Status: DISCONTINUED | OUTPATIENT
Start: 2018-08-14 | End: 2018-08-15

## 2018-08-14 RX ADMIN — ATORVASTATIN CALCIUM 10 MILLIGRAM(S): 80 TABLET, FILM COATED ORAL at 21:53

## 2018-08-14 RX ADMIN — TAMSULOSIN HYDROCHLORIDE 0.4 MILLIGRAM(S): 0.4 CAPSULE ORAL at 21:53

## 2018-08-14 RX ADMIN — DARUNAVIR 800 MILLIGRAM(S): 75 TABLET, FILM COATED ORAL at 11:46

## 2018-08-14 RX ADMIN — CEFTRIAXONE 100 GRAM(S): 500 INJECTION, POWDER, FOR SOLUTION INTRAMUSCULAR; INTRAVENOUS at 11:46

## 2018-08-14 RX ADMIN — FINASTERIDE 5 MILLIGRAM(S): 5 TABLET, FILM COATED ORAL at 11:46

## 2018-08-14 RX ADMIN — AMLODIPINE BESYLATE 5 MILLIGRAM(S): 2.5 TABLET ORAL at 05:29

## 2018-08-14 RX ADMIN — Medication 216 GRAM(S): at 23:52

## 2018-08-14 RX ADMIN — Medication 216 GRAM(S): at 17:34

## 2018-08-14 RX ADMIN — Medication 100 MILLIGRAM(S): at 05:29

## 2018-08-14 RX ADMIN — RITONAVIR 100 MILLIGRAM(S): 100 TABLET, FILM COATED ORAL at 13:09

## 2018-08-14 RX ADMIN — DONEPEZIL HYDROCHLORIDE 10 MILLIGRAM(S): 10 TABLET, FILM COATED ORAL at 21:53

## 2018-08-14 RX ADMIN — Medication 216 GRAM(S): at 12:27

## 2018-08-14 RX ADMIN — SENNA PLUS 2 TABLET(S): 8.6 TABLET ORAL at 21:54

## 2018-08-14 RX ADMIN — EMTRICITABINE AND TENOFOVIR DISOPROXIL FUMARATE 1 TABLET(S): 200; 300 TABLET, FILM COATED ORAL at 11:46

## 2018-08-14 RX ADMIN — Medication 100 MILLIGRAM(S): at 21:53

## 2018-08-14 RX ADMIN — OLANZAPINE 2.5 MILLIGRAM(S): 15 TABLET, FILM COATED ORAL at 11:46

## 2018-08-14 RX ADMIN — ENOXAPARIN SODIUM 40 MILLIGRAM(S): 100 INJECTION SUBCUTANEOUS at 05:30

## 2018-08-14 RX ADMIN — PIPERACILLIN AND TAZOBACTAM 25 GRAM(S): 4; .5 INJECTION, POWDER, LYOPHILIZED, FOR SOLUTION INTRAVENOUS at 05:30

## 2018-08-14 NOTE — DIETITIAN INITIAL EVALUATION ADULT. - PROBLEM SELECTOR PLAN 7
Dementia with minimal/no verbalization, needs help with ADLs  high risk for delirium   c/w donepezil, olanzapine

## 2018-08-14 NOTE — PROGRESS NOTE ADULT - PROBLEM SELECTOR PLAN 1
Likely from  source  Repeat blood Cx  Follow ID sensi  Echo negative  Can switch to ampiccillin + ceftriaxone  If stable and repeat cultures stay negative could limit to 14 day course with surveillance cultures after finishing abx.  Low likelihood of endovascular source

## 2018-08-14 NOTE — DIETITIAN INITIAL EVALUATION ADULT. - ENERGY NEEDS
Height: 72 inches, Weight: 144 pounds  BMI: 19.5 kg/m2 IBW: 178 pounds (+/-10%), %IBW: 81%  Pertinent Info: Per chart, 76 y/o male with HIV, Alzheimer's dementia, admitted with UTI, bacteremia and PNA. No edema, no pressure ulcers noted at this time.

## 2018-08-14 NOTE — PROGRESS NOTE ADULT - ATTENDING COMMENTS
pt for [facility] placement-d/w wife in detail  CBC CMP next week-[facility] BARCLAY to monitor  Surveillance blood cx after finishing abx, removed PICC as well  To follow in ID clinic in 2-3 weeks   case d/w med NP  Will tailor plan for ID issues  per course,results.Will defer to primary team on management of other issues.    Will Follow.  Beeper [phone]-djwo/afterhours/No response-[phone]

## 2018-08-14 NOTE — DIETITIAN INITIAL EVALUATION ADULT. - PERTINENT LABORATORY DATA
Na 142 [135 - 145], K+ 4.0 [3.5 - 5.3], BUN 18 [7 - 23], Cr 1.04 [0.50 - 1.30],  [70 - 99], Ca 9.3 [8.4 - 10.5]

## 2018-08-14 NOTE — PROGRESS NOTE ADULT - SUBJECTIVE AND OBJECTIVE BOX
_________________________________________________________________________________________  ========>>  M E D I C A L   A T T E N D I N G    F O L L O W  U P  N O T E  <<=========  -----------------------------------------------------------------------------------------------------    - Patient seen and examined by me approximately sixty minutes ago.   - In summary,  SCAR GRIFFIN is a 77y year old man who originally presented with fever  - Patient today overall doing ok, comfortable, eating fairly     pt post PICC    ==================>> REVIEW OF SYSTEM <<=================    (limited) pt less interactive today  appears comfortable in bed     ==================>> PHYSICAL EXAM <<=================    GEN: Awake and alert, NAD , comfortable in bed, less interactive   HEENT: NCAT, PERRL, MMM, hearing intact  Neck: supple , no JVD  CVS: S1S2 , regular , No M/R/G appreciated  PULM: CTA B/L,  no W/R/R appreciated  ABD.: soft. non tender, non distended,  bowel sounds present  Extrem: intact pulses , no edema   PSYCH : flat affect       ==================>> MEDICATIONS <<====================    amLODIPine   Tablet 5 milliGRAM(s) Oral daily  ampicillin  IVPB 2 Gram(s) IV Intermittent every 6 hours  atorvastatin 10 milliGRAM(s) Oral at bedtime  cefTRIAXone   IVPB 1 Gram(s) IV Intermittent every 24 hours  darunavir 800 milliGRAM(s) Oral daily  docusate sodium 100 milliGRAM(s) Oral three times a day  donepezil 10 milliGRAM(s) Oral at bedtime  emtricitabine 200 mG/tenofovir 300 mG (TRUVADA) 1 Tablet(s) Oral daily  enoxaparin Injectable 40 milliGRAM(s) SubCutaneous every 24 hours  finasteride 5 milliGRAM(s) Oral daily  OLANZapine 2.5 milliGRAM(s) Oral daily  polyethylene glycol 3350 17 Gram(s) Oral daily  ritonavir  Solution 100 milliGRAM(s) Oral daily  senna 2 Tablet(s) Oral at bedtime  sodium chloride 0.9%. 1000 milliLiter(s) IV Continuous <Continuous>  tamsulosin 0.4 milliGRAM(s) Oral at bedtime    ==================>> VITAL SIGNS <<==================    Vital Signs Last 24 Hrs  T(C): 37 (08-14-18 @ 05:40)  T(F): 98.6 (08-14-18 @ 05:40), Max: 98.9 (08-13-18 @ 22:04)  HR: 73 (08-14-18 @ 05:40) (73 - 85)  BP: 105/68 (08-14-18 @ 05:40)  RR: 17 (08-14-18 @ 05:40) (17 - 18)  SpO2: 98% (08-14-18 @ 05:40) (97% - 99%)       ==================>> LAB AND IMAGING <<==================                        10.9   8.37  )-----------( 204      ( 14 Aug 2018 08:04 )             31.7        142  |  102  |  18  ----------------------------<  109<H>  4.0   |  30  |  1.04    Ca    9.3      14 Aug 2018 07:05    _______________________  C U L T U R E S :    Culture - Blood (collected 09 Aug 2018 15:02)  Source: .Blood Blood-Peripheral  Preliminary Report (10 Aug 2018 16:01):    No growth to date.    Culture - Blood (collected 09 Aug 2018 15:02)  Source: .Blood Blood-Peripheral  Preliminary Report (10 Aug 2018 16:01):    No growth to date.    Culture - Urine (collected 08 Aug 2018 03:03)  Source: .Urine Clean Catch (Midstream)  Final Report (10 Aug 2018 11:42):    >100,000 CFU/ml Escherichia coli  Organism: Escherichia coli (10 Aug 2018 11:42)  Organism: Escherichia coli (10 Aug 2018 11:42)    Sensitivities:      -  Amikacin: S <=8      -  Amoxicillin/Clavulanic Acid: I 16/8      -  Ampicillin: R >16 These ampicillin results predict results for amoxicillin      -  Ampicillin/Sulbactam: R >16/8      -  Aztreonam: S <=4      -  Cefazolin: I 4 For uncomplicated UTI with K. pneumoniae, E. coli, or P. mirablis: JOSUE <=16 is sensitive and JOSUE >=32 is resistant. This also predicts results for oral agents cefaclor, cefdinir, cefpodoxime, cefprozil, cefuroxime axetil, cephalexin and locarbef for uncomplicated UTI. Note that some isolates may be susceptible to these agents while testing resistant to cefazolin.      -  Cefepime: S <=2      -  Cefoxitin: S <=4      -  Ceftriaxone: S <=1 Enterobacter, Citrobacter, and Serratia may develop resistance during prolonged therapy      -  Ciprofloxacin: R >2      -  Ertapenem: S <=0.5      -  Gentamicin: S <=1      -  Imipenem: S <=1      -  Levofloxacin: R >4      -  Meropenem: S <=1      -  Nitrofurantoin: S <=32 Should not be used to treat pyelonephritis      -  Piperacillin/Tazobactam: S <=8      -  Tigecycline: S <=1      -  Tobramycin: S <=2      -  Trimethoprim/Sulfamethoxazole: S <=0.5/9.5      Method Type: JOSUE    Culture - Blood (collected 08 Aug 2018 02:12)  Source: .Blood Blood  Gram Stain (10 Aug 2018 05:51):    Growth in aerobic bottle: Gram Positive Cocci in Clusters    Growth in anaerobic bottle: Gram Positive Cocci in Clusters  Final Report (13 Aug 2018 11:34):    Growth in aerobic and anaerobic bottles: Coag Negative Staphylococcus    Single set isolate, possible contaminant. Contact    Microbiology if susceptibility testing clinically    indicated.    "Due to technical problems, Proteus sp. will Not be reported aspart of    the BCID panel until further notice"    ***Blood Panel PCR results on this specimen are available    approximately 3 hours after the Gram stain result.***    Gram stain, PCR, and/or culture results may not always    correspond due to difference in methodologies.    ************************************************************    This PCR assay was performed using [employer].    The following targets are tested for: Enterococcus,    vancomycin resistant enterococci, Listeria monocytogenes,    coagulase negative staphylococci, S. aureus,    methicillin resistant S. aureus, Streptococcus agalactiae    (Group B), S. pneumoniae, S. pyogenes (Group A),    Acinetobacter baumannii, Enterobacter cloacae, E. coli,    Klebsiella oxytoca, K. pneumoniae, Proteus sp.,    Serratia marcescens, Haemophilus influenzae,    Neisseria meningitidis, Pseudomonas aeruginosa, Candida    albicans, C. glabrata, C krusei, C parapsilosis,    C. tropicalis and the KPC resistance gene.  Organism: Blood Culture PCR (13 Aug 2018 11:34)  Organism: Blood Culture PCR (13 Aug 2018 11:34)    Sensitivities:      -  Coagulase negative Staphylococcus: Detec      -  Pseudomonas aeruginosa: Detec      Method Type: PCR    Culture - Blood (collected 08 Aug 2018 02:12)  Source: .Blood Blood  Gram Stain (09 Aug 2018 18:18):    Growth in anaerobic bottle: Gram Positive Cocci in Pairs and Chains    Growth in aerobic bottle: Gram positive cocci in pairs  Final Report (11 Aug 2018 18:48):    Growth in anaerobic bottle: Enterococcus faecalis    Growth in aerobic bottle: Coag Negative Staphylococcus    ***Blood Panel PCR results on this specimen are available    approximately 3 hours after the Gram stain result.***    Gram stain, PCR, and/or culture results may not always    correspond due to difference in methodologies.    ************************************************************    This PCR assay was performed using [employer].    The following targets are tested for: Enterococcus,    vancomycin resistant enterococci, Listeria monocytogenes,    coagulase negative staphylococci, S. aureus,    methicillin resistant S. aureus, Streptococcus agalactiae    (Group B), S. pneumoniae, S. pyogenes (Group A),    Acinetobacter baumannii, Enterobacter cloacae, E. coli,    Klebsiella oxytoca, K. pneumoniae, Proteus sp.,    Serratia marcescens, Haemophilus influenzae,    Neisseria meningitidis, Pseudomonas aeruginosa, Candida    albicans, C. glabrata, C krusei, C parapsilosis,    C. tropicalis and the KPC resistance gene.    "Due to technical problems, Proteus sp. will Not be reported as part of    the BCID panel until further notice"  Organism: Blood Culture PCR  Enterococcus faecalis  Coag Negative Staphylococcus (11 Aug 2018 18:48)  Organism: Coag Negative Staphylococcus (11 Aug 2018 18:48)    Sensitivities:      -  Ampicillin/Sulbactam: R <=8/4      -  Cefazolin: R 8      -  Clindamycin: R >4      -  Erythromycin: R >4      -  Gentamicin: R >8 Should not be used as monotherapy      -  Oxacillin: R >2      -  Penicillin: R 8      -  RIF- Rifampin: S <=1 Should not be used as monotherapy      -  Tetra/Doxy: S <=1      -  Trimethoprim/Sulfamethoxazole: R >2/38      -  Vancomycin: S 4      Method Type: JOSUE  Organism: Enterococcus faecalis (11 Aug 2018 18:48)    Sensitivities:      -  Ampicillin: S <=2 Predicts results to ampicillin/sulbactam, amoxacillin-clavulanate and  piperacillin-tazobactam.      -  Gentamicin synergy: R      -  Streptomycin synergy: R      -  Vancomycin: S 2      Method Type: JOSUE  Organism: Blood Culture PCR (11 Aug 2018 18:48)    Sensitivities:      -  Enterococcus species: Detec      Method Type: PCR  ___________________________________________________________________________________  ===============>>  A S S E S S M E N T   A N D   P L A N <<===============  ------------------------------------------------------------------------------------------    · Assessment		  77M with PMH of HIV (last VL<30) c/b HIV related dementia, HTN, BPH, recent hospitalization for E coli UTI/bacteremia presenting with fever - with CXR showing patchy opacities and +UA - admitted for further treatment.     Problem/Plan - 1:  ·  Problem: Acute cystitis in pt with BPH with urinary retention history ,  Bacteremia        ( patient had sepsis on admission per review of ER records: having fever and tachycardia; sepsis due to catheter associated UTI given self catheterization history at home); sepsis resolved   ID mgmt appreciated: likely Gu source of bacteremia     continue abx course per ID >> PICC line to complete abx as outpatient       (per Dtr: pt following with  as outpatient and had has appointment in a week or so for urodynamic studies.. )    Problem/Plan - 2:  ·  Problem: HIV  -c/w HAART  -ID consult f/u    Problem/Plan - 3:  ·  Problem:  Essential hypertension  BP stable   Continue Current medications and monitor.     Problem/Plan - 4:  ·  Problem: Alzheimer's disease   c/w donepezil, olanzapine.     -GI/DVT Prophylaxis.  - PT, OOB as able   Dispo plan home  --------------------------------------------  Case discussed with pt, Dtr, NP, Pulm, RN  Education given on findings and plan of care  ___________________________  H. ALANNA De La Cruz.  Pager: [phone]

## 2018-08-14 NOTE — CHART NOTE - NSCHARTNOTEFT_GEN_A_CORE
Upon Nutritional Assessment by the Registered Dietitian your patient was determined to meet criteria / has evidence of the following diagnosis/diagnoses:          [ ]  Mild Protein Calorie Malnutrition        [ ]  Moderate Protein Calorie Malnutrition        [x ] Severe Protein Calorie Malnutrition        [ ] Unspecified Protein Calorie Malnutrition        [ ] Underweight / BMI <19        [ ] Morbid Obesity / BMI > 40      Findings as based on:  [x ] Comprehensive nutrition assessment   [ ] Nutrition Focused Physical Exam  [x ] Other: pt w/ 19lbs (11.6%) unintended wt loss, cachectic appearance, needs total feeding assistance    Nutrition Plan/Recommendations:  Recommend to continue with Ensure Enlive 2 x day with total feeding assistance        PROVIDER Section:     By signing this assessment you are acknowledging and agree with the diagnosis/diagnoses assigned by the Registered Dietitian    Comments:

## 2018-08-14 NOTE — PROGRESS NOTE ADULT - ASSESSMENT
77M with PMH of HIV (last VL<30) c/b HIV related dementia, HTN, BPH, recent hospitalization for E coli UTI/bacteremia presenting with fever. History obtained from pts wife at bedside as pt is baseline non verbal. Per wife, she noted pt to be a little fatigued and lethargic on Monday; and then on Tuesday evening, when wife returned from work, was told pt had been spitting up all day and wife noticed him later to have a coughing fit that appeared painful; she took a temperature and found him to be febrile 101.9   Polymicrobial bacteremia including enterococcus,and CNS  Also E coli in urine Cx  No pseudomonas in blood cx found eventually-Repeat PCR negative  Likely urinary source ? secondary to obstruction  CT with no abscess  Seen by urology-input noted  Wong removed   His HIV is well controlled though he has progressive dementia and has been non- semi verbal     Rec:

## 2018-08-14 NOTE — DIETITIAN INITIAL EVALUATION ADULT. - NS AS NUTRI INTERV FEED ASSISTANCE
Feeding Assistance/Continue with total feeding assistance for optimal po intakes/Menu selection assistance/Meal set -up

## 2018-08-14 NOTE — DIETITIAN INITIAL EVALUATION ADULT. - PT NOT SOURCE
Per chart, pt with Alzheimer's dementia, non verbal, AAO x 0 per RN. Unable to participate in interview no family/care giver at bedside

## 2018-08-14 NOTE — DIETITIAN INITIAL EVALUATION ADULT. - OTHER INFO
Pt seen for LOS, limited subjective information obtained at this time. Per previous RD notes, pt wt was documented as 142 pounds (5/19/2016), 163.5 pounds (10/9/2017), current wt is 144 pounds - suspect pt may have had wt fluctuations. Per RN, pt requires total feeding assistance, has been eating well but none today, 0-100% po intakes documented per flowsheet. Pt seen for LOS, limited subjective information obtained at this time. Per previous RD notes, pt wt was documented as 142 pounds (5/19/2016), 163.5 pounds (10/9/2017), current wt is 144 pounds - suspect pt may have had wt fluctuations. Per RN, pt requires total feeding assistance, has been eating well but none today, 0-100% po intakes documented per flowsheet. No GI distress noted, +BM yesterday per chart.

## 2018-08-15 VITALS
RESPIRATION RATE: 18 BRPM | HEART RATE: 70 BPM | DIASTOLIC BLOOD PRESSURE: 67 MMHG | TEMPERATURE: 98 F | SYSTOLIC BLOOD PRESSURE: 114 MMHG | OXYGEN SATURATION: 99 %

## 2018-08-15 LAB
ANION GAP SERPL CALC-SCNC: 11 MMOL/L — SIGNIFICANT CHANGE UP (ref 5–17)
BUN SERPL-MCNC: 15 MG/DL — SIGNIFICANT CHANGE UP (ref 7–23)
CALCIUM SERPL-MCNC: 9.2 MG/DL — SIGNIFICANT CHANGE UP (ref 8.4–10.5)
CHLORIDE SERPL-SCNC: 102 MMOL/L — SIGNIFICANT CHANGE UP (ref 96–108)
CO2 SERPL-SCNC: 28 MMOL/L — SIGNIFICANT CHANGE UP (ref 22–31)
CREAT SERPL-MCNC: 0.83 MG/DL — SIGNIFICANT CHANGE UP (ref 0.5–1.3)
GLUCOSE SERPL-MCNC: 112 MG/DL — HIGH (ref 70–99)
HCT VFR BLD CALC: 33.1 % — LOW (ref 39–50)
HGB BLD-MCNC: 11 G/DL — LOW (ref 13–17)
MCHC RBC-ENTMCNC: 31.3 PG — SIGNIFICANT CHANGE UP (ref 27–34)
MCHC RBC-ENTMCNC: 33.2 GM/DL — SIGNIFICANT CHANGE UP (ref 32–36)
MCV RBC AUTO: 94 FL — SIGNIFICANT CHANGE UP (ref 80–100)
PLATELET # BLD AUTO: 227 K/UL — SIGNIFICANT CHANGE UP (ref 150–400)
POTASSIUM SERPL-MCNC: 3.7 MMOL/L — SIGNIFICANT CHANGE UP (ref 3.5–5.3)
POTASSIUM SERPL-SCNC: 3.7 MMOL/L — SIGNIFICANT CHANGE UP (ref 3.5–5.3)
RBC # BLD: 3.52 M/UL — LOW (ref 4.2–5.8)
RBC # FLD: 14.4 % — SIGNIFICANT CHANGE UP (ref 10.3–14.5)
SODIUM SERPL-SCNC: 141 MMOL/L — SIGNIFICANT CHANGE UP (ref 135–145)
WBC # BLD: 6.85 K/UL — SIGNIFICANT CHANGE UP (ref 3.8–10.5)
WBC # FLD AUTO: 6.85 K/UL — SIGNIFICANT CHANGE UP (ref 3.8–10.5)

## 2018-08-15 PROCEDURE — 99232 SBSQ HOSP IP/OBS MODERATE 35: CPT

## 2018-08-15 RX ADMIN — FINASTERIDE 5 MILLIGRAM(S): 5 TABLET, FILM COATED ORAL at 11:25

## 2018-08-15 RX ADMIN — POLYETHYLENE GLYCOL 3350 17 GRAM(S): 17 POWDER, FOR SOLUTION ORAL at 11:25

## 2018-08-15 RX ADMIN — AMLODIPINE BESYLATE 5 MILLIGRAM(S): 2.5 TABLET ORAL at 06:12

## 2018-08-15 RX ADMIN — Medication 216 GRAM(S): at 06:11

## 2018-08-15 RX ADMIN — DARUNAVIR 800 MILLIGRAM(S): 75 TABLET, FILM COATED ORAL at 11:25

## 2018-08-15 RX ADMIN — RITONAVIR 100 MILLIGRAM(S): 100 TABLET, FILM COATED ORAL at 12:18

## 2018-08-15 RX ADMIN — CEFTRIAXONE 100 GRAM(S): 500 INJECTION, POWDER, FOR SOLUTION INTRAMUSCULAR; INTRAVENOUS at 10:49

## 2018-08-15 RX ADMIN — EMTRICITABINE AND TENOFOVIR DISOPROXIL FUMARATE 1 TABLET(S): 200; 300 TABLET, FILM COATED ORAL at 11:25

## 2018-08-15 RX ADMIN — Medication 100 MILLIGRAM(S): at 06:12

## 2018-08-15 RX ADMIN — ENOXAPARIN SODIUM 40 MILLIGRAM(S): 100 INJECTION SUBCUTANEOUS at 06:12

## 2018-08-15 RX ADMIN — OLANZAPINE 2.5 MILLIGRAM(S): 15 TABLET, FILM COATED ORAL at 11:25

## 2018-08-15 RX ADMIN — Medication 216 GRAM(S): at 11:24

## 2018-08-15 NOTE — PROGRESS NOTE ADULT - PROBLEM SELECTOR PLAN 4
apppears stable
Continue present mgmt    Discussed with nursing preventative measures to avoid pt removing his Wong
?from HIV v other etiology  Patient more awake today

## 2018-08-15 NOTE — PROGRESS NOTE ADULT - PROVIDER SPECIALTY LIST ADULT
Infectious Disease
Internal Medicine
Pulmonology

## 2018-08-15 NOTE — PROGRESS NOTE ADULT - SUBJECTIVE AND OBJECTIVE BOX
_________________________________________________________________________________________  ========>>  M E D I C A L   A T T E N D I N G    F O L L O W  U P  N O T E  <<=========  -----------------------------------------------------------------------------------------------------    - Patient seen and examined by me approximately sixty minutes ago.   - In summary,  SCAR GRIFFIN is a 77y year old man who originally presented with fever  - Patient today overall doing ok, comfortable, eating well      pt post PICC:: plan per family now changed to rehab, pending placement     ==================>> REVIEW OF SYSTEM <<=================    (limited) pt not very interactive / responsive   appears comfortable sitting in bed     ==================>> PHYSICAL EXAM <<=================    GEN: Awake and alert, NAD , comfortable in bed, not very interactive   HEENT: NCAT, PERRL, MMM, hearing intact  Neck: supple , no JVD  CVS: S1S2 , regular , No M/R/G appreciated  PULM: CTA B/L,  no W/R/R appreciated  ABD.: soft. non tender, non distended,  bowel sounds present  Extrem: intact pulses , no edema   PSYCH : flat affect        ==================>> MEDICATIONS <<====================    amLODIPine   Tablet 5 milliGRAM(s) Oral daily  ampicillin  IVPB 2 Gram(s) IV Intermittent every 6 hours  atorvastatin 10 milliGRAM(s) Oral at bedtime  cefTRIAXone   IVPB 1 Gram(s) IV Intermittent every 24 hours  darunavir 800 milliGRAM(s) Oral daily  docusate sodium 100 milliGRAM(s) Oral three times a day  donepezil 10 milliGRAM(s) Oral at bedtime  emtricitabine 200 mG/tenofovir 300 mG (TRUVADA) 1 Tablet(s) Oral daily  enoxaparin Injectable 40 milliGRAM(s) SubCutaneous every 24 hours  finasteride 5 milliGRAM(s) Oral daily  OLANZapine 2.5 milliGRAM(s) Oral daily  polyethylene glycol 3350 17 Gram(s) Oral daily  ritonavir  Solution 100 milliGRAM(s) Oral daily  senna 2 Tablet(s) Oral at bedtime  sodium chloride 0.9%. 1000 milliLiter(s) IV Continuous <Continuous>  tamsulosin 0.4 milliGRAM(s) Oral at bedtime    ==================>> VITAL SIGNS <<==================    Vital Signs Last 24 Hrs  T(C): 36.7 (08-15-18 @ 06:38)  T(F): 98 (08-15-18 @ 06:38), Max: 98.8 (08-14-18 @ 21:10)  HR: 70 (08-15-18 @ 06:38) (70 - 78)  BP: 114/67 (08-15-18 @ 06:38)  BP(mean): --  RR: 18 (08-15-18 @ 06:38) (17 - 18)  SpO2: 99% (08-15-18 @ 06:38) (98% - 99%)       ==================>> LAB AND IMAGING <<==================                        11.0   6.85  )-----------( 227      ( 15 Aug 2018 07:46 )             33.1        08-15    141  |  102  |  15  ----------------------------<  112<H>  3.7   |  28  |  0.83    Ca    9.2      15 Aug 2018 07:15    ___________________________________________________________________________________  ===============>>  A S S E S S M E N T   A N D   P L A N <<===============  ------------------------------------------------------------------------------------------    · Assessment		  77M with PMH of HIV (last VL<30) c/b HIV related dementia, HTN, BPH, recent hospitalization for E coli UTI/bacteremia presenting with fever - with CXR showing patchy opacities and +UA - admitted for further treatment.     Problem/Plan - 1:  ·  Problem: Acute cystitis in pt with BPH with urinary retention history ,  Bacteremia        ( patient had sepsis on admission per review of ER records: having fever and tachycardia; sepsis due to catheter associated UTI given self catheterization history at home); sepsis resolved   ID mgmt appreciated: likely Gu source of bacteremia     continue abx course per ID >> PICC line to complete abx as outpatient       (per Dtr: pt following with  as outpatient and had has appointment in a week or so for urodynamic studies.. )      straight cath as needed or BID in rehab    Problem/Plan - 2:  ·  Problem: HIV  -c/w HAART  -ID consult f/u    Problem/Plan - 3:  ·  Problem:  Essential hypertension  BP stable   Continue Current medications and monitor.     Problem/Plan - 4:  ·  Problem: Alzheimer's disease   c/w donepezil, olanzapine.     -GI/DVT Prophylaxis.  - PT, OOB as able   Dispo plan now for rehab   --------------------------------------------  Case discussed with pt, NP, RN  Education given on findings and plan of care  ___________________________  H. ALANNA De La Cruz.  Pager: [phone]

## 2018-08-15 NOTE — CHART NOTE - NSCHARTNOTEFT_GEN_A_CORE
Request from Dr. De La Cruz to facilitate patient discharge.  Medication reconciliation reviewed, revised, and resolved with Dr. De La Cruz, who has medically cleared patient for discharge with follow up as advised.  Please refer to discharge note for detailed hospital course.

## 2018-08-15 NOTE — PROGRESS NOTE ADULT - PROBLEM SELECTOR PROBLEM 2
Urinary tract infection without hematuria, site unspecified
Urinary tract infection without hematuria, site unspecified
Benign prostatic hyperplasia with urinary retention

## 2018-08-15 NOTE — PROGRESS NOTE ADULT - PROBLEM SELECTOR PLAN 2
On abx
On abx per ID  Mild hydronephrosis  Await final report of CT a/p  Urology f/u
Urology input noted  will defer to them
Urology input noted  will defer to them
Urology input noted  will defer to them  nelson placed
Urology input noted  will dfer to them  nelson places

## 2018-08-15 NOTE — PROGRESS NOTE ADULT - PROBLEM SELECTOR PROBLEM 3
HIV (human immunodeficiency virus infection)
Pleural effusion
HIV (human immunodeficiency virus infection)

## 2018-08-15 NOTE — PROGRESS NOTE ADULT - SUBJECTIVE AND OBJECTIVE BOX
Patient is a 77y old  Male who presents with a chief complaint of UTI (09 Aug 2018 13:37)    Being followed by ID for abcteremia,UTI    Interval history:more awake  opens eyes and tracks  No verbal response  No acute events      ROS:  Not obtainable      Antimicrobials:    ampicillin  IVPB 2 Gram(s) IV Intermittent every 6 hours  cefTRIAXone   IVPB 1 Gram(s) IV Intermittent every 24 hours  darunavir 800 milliGRAM(s) Oral daily  emtricitabine 200 mG/tenofovir 300 mG (TRUVADA) 1 Tablet(s) Oral daily  ritonavir  Solution 100 milliGRAM(s) Oral daily    Other medications reviewed    Vital Signs Last 24 Hrs  T(C): 36.7 (08-15-18 @ 06:38), Max: 37.1 (08-14-18 @ 21:10)  T(F): 98 (08-15-18 @ 06:38), Max: 98.8 (08-14-18 @ 21:10)  HR: 70 (08-15-18 @ 06:38) (70 - 78)  BP: 114/67 (08-15-18 @ 06:38) (110/70 - 118/71)  BP(mean): --  RR: 18 (08-15-18 @ 06:38) (17 - 18)  SpO2: 99% (08-15-18 @ 06:38) (98% - 99%)    Physical Exam:        HEENT PERRLA   No oral exudate or erythema    Chest Good AE,CTA    CVS RRR S1 S2 WNl No murmur or rub or gallop    Abd soft BS normal No tenderness no masses    IV site no erythema tenderness or discharge    CNS As above     Lab Data:                          11.0   6.85  )-----------( 227      ( 15 Aug 2018 07:46 )             33.1       08-15    141  |  102  |  15  ----------------------------<  112<H>  3.7   |  28  |  0.83    Ca    9.2      15 Aug 2018 07:15

## 2018-08-15 NOTE — PROGRESS NOTE ADULT - PROBLEM SELECTOR PLAN 1
Likely from  source  Repeat blood Cx  Follow ID sensi  Echo negative  continue  ampiccillin + ceftriaxone  If stable and repeat cultures stay negative could limit to 14 day course with surveillance cultures after finishing abx.  Low likelihood of endovascular source

## 2018-08-15 NOTE — PROGRESS NOTE ADULT - PROBLEM SELECTOR PROBLEM 4
Dementia without behavioral disturbance, unspecified dementia type
Dementia
Dementia without behavioral disturbance, unspecified dementia type

## 2018-08-15 NOTE — PROGRESS NOTE ADULT - ASSESSMENT
77M with PMH of HIV (last VL<30) c/b HIV related dementia, HTN, BPH, recent hospitalization for E coli UTI/bacteremia presenting with fever. Polymicrobial bacteremia including enterococcus,and CNS  Also E coli in urine Cx  No pseudomonas in blood cx found eventually-Repeat PCR negative  Likely urinary source ? secondary to obstruction  CT with no abscess  Seen by urology-input noted  Wong removed   His HIV is well controlled though he has progressive dementia and has been non- semi verbal     Rec:

## 2018-08-15 NOTE — PROGRESS NOTE ADULT - ATTENDING COMMENTS
Will tailor plan for ID issues  per course,results.Will defer to primary team on management of other issues.  pt for CARINA-if dced to follow in ID clinic in 2-3 weeks  Will Follow.  Beeper [id number]-qrem/afterhours/No [phone]

## 2018-08-15 NOTE — PROGRESS NOTE ADULT - PROBLEM SELECTOR PLAN 3
on meds
Small, B/L; may be of card etio?  Will continue to monitor  (discussed with pt's wife yesterday)
Stable  Continue ART

## 2018-08-30 ENCOUNTER — APPOINTMENT (OUTPATIENT)
Dept: UROLOGY | Facility: CLINIC | Age: 78
End: 2018-08-30
Payer: MEDICARE

## 2018-08-30 ENCOUNTER — OUTPATIENT (OUTPATIENT)
Dept: OUTPATIENT SERVICES | Facility: HOSPITAL | Age: 78
LOS: 1 days | End: 2018-08-30
Payer: MEDICARE

## 2018-08-30 DIAGNOSIS — R35.0 FREQUENCY OF MICTURITION: ICD-10-CM

## 2018-08-30 PROCEDURE — 51741 ELECTRO-UROFLOWMETRY FIRST: CPT

## 2018-08-30 PROCEDURE — 51728 CYSTOMETROGRAM W/VP: CPT

## 2018-08-30 PROCEDURE — 51728 CYSTOMETROGRAM W/VP: CPT | Mod: 26

## 2018-08-30 PROCEDURE — 51797 INTRAABDOMINAL PRESSURE TEST: CPT | Mod: 26

## 2018-08-30 PROCEDURE — 51784 ANAL/URINARY MUSCLE STUDY: CPT

## 2018-08-30 PROCEDURE — 51784 ANAL/URINARY MUSCLE STUDY: CPT | Mod: 26

## 2018-08-30 PROCEDURE — 51797 INTRAABDOMINAL PRESSURE TEST: CPT

## 2018-08-30 PROCEDURE — 51741 ELECTRO-UROFLOWMETRY FIRST: CPT | Mod: 26

## 2018-09-04 DIAGNOSIS — R33.9 RETENTION OF URINE, UNSPECIFIED: ICD-10-CM

## 2018-09-10 ENCOUNTER — APPOINTMENT (OUTPATIENT)
Dept: UROLOGY | Facility: CLINIC | Age: 78
End: 2018-09-10
Payer: MEDICARE

## 2018-09-10 PROCEDURE — 99214 OFFICE O/P EST MOD 30 MIN: CPT

## 2018-09-18 ENCOUNTER — RX RENEWAL (OUTPATIENT)
Age: 78
End: 2018-09-18

## 2018-09-19 ENCOUNTER — APPOINTMENT (OUTPATIENT)
Dept: INFECTIOUS DISEASE | Facility: CLINIC | Age: 78
End: 2018-09-19
Payer: MEDICARE

## 2018-09-19 VITALS
OXYGEN SATURATION: 100 % | TEMPERATURE: 95.3 F | DIASTOLIC BLOOD PRESSURE: 63 MMHG | SYSTOLIC BLOOD PRESSURE: 116 MMHG | WEIGHT: 144 LBS | BODY MASS INDEX: 19.5 KG/M2 | HEART RATE: 86 BPM | HEIGHT: 72 IN | RESPIRATION RATE: 17 BRPM

## 2018-09-19 DIAGNOSIS — R33.8 OTHER RETENTION OF URINE: ICD-10-CM

## 2018-09-19 PROCEDURE — G0008: CPT

## 2018-09-19 PROCEDURE — 90686 IIV4 VACC NO PRSV 0.5 ML IM: CPT

## 2018-09-19 PROCEDURE — 99214 OFFICE O/P EST MOD 30 MIN: CPT | Mod: 25

## 2018-09-20 ENCOUNTER — RX RENEWAL (OUTPATIENT)
Age: 78
End: 2018-09-20

## 2018-09-25 ENCOUNTER — APPOINTMENT (OUTPATIENT)
Dept: NEUROLOGY | Facility: CLINIC | Age: 78
End: 2018-09-25
Payer: MEDICARE

## 2018-09-25 VITALS — HEART RATE: 76 BPM | SYSTOLIC BLOOD PRESSURE: 120 MMHG | DIASTOLIC BLOOD PRESSURE: 65 MMHG

## 2018-09-25 LAB
ALBUMIN SERPL ELPH-MCNC: 4 G/DL
ALP BLD-CCNC: 219 U/L
ALT SERPL-CCNC: 11 U/L
ANION GAP SERPL CALC-SCNC: 13 MMOL/L
AST SERPL-CCNC: 24 U/L
BASOPHILS # BLD AUTO: 0.01 K/UL
BASOPHILS NFR BLD AUTO: 0.1 %
BILIRUB SERPL-MCNC: 0.3 MG/DL
BUN SERPL-MCNC: 15 MG/DL
CALCIUM SERPL-MCNC: 9.7 MG/DL
CD3 CELLS # BLD: 1651 /UL
CD3 CELLS NFR BLD: 73 %
CD3+CD4+ CELLS # BLD: 275 /UL
CD3+CD4+ CELLS NFR BLD: 12 %
CD3+CD4+ CELLS/CD3+CD8+ CLL SPEC: 0.21 RATIO
CD3+CD8+ CELLS # SPEC: 1329 /UL
CD3+CD8+ CELLS NFR BLD: 59 %
CHLORIDE SERPL-SCNC: 104 MMOL/L
CO2 SERPL-SCNC: 25 MMOL/L
CREAT SERPL-MCNC: 0.86 MG/DL
EOSINOPHIL # BLD AUTO: 0.05 K/UL
EOSINOPHIL NFR BLD AUTO: 0.7 %
GLUCOSE SERPL-MCNC: 127 MG/DL
HCT VFR BLD CALC: 37.8 %
HGB BLD-MCNC: 12.3 G/DL
HIV1 RNA # SERPL NAA+PROBE: NORMAL
HIV1 RNA # SERPL NAA+PROBE: NORMAL COPIES/ML
IMM GRANULOCYTES NFR BLD AUTO: 0.1 %
LYMPHOCYTES # BLD AUTO: 2.32 K/UL
LYMPHOCYTES NFR BLD AUTO: 32.7 %
MAN DIFF?: NORMAL
MCHC RBC-ENTMCNC: 30.6 PG
MCHC RBC-ENTMCNC: 32.5 GM/DL
MCV RBC AUTO: 94 FL
MONOCYTES # BLD AUTO: 0.81 K/UL
MONOCYTES NFR BLD AUTO: 11.4 %
NEUTROPHILS # BLD AUTO: 3.9 K/UL
NEUTROPHILS NFR BLD AUTO: 55 %
PLATELET # BLD AUTO: 203 K/UL
POTASSIUM SERPL-SCNC: 4 MMOL/L
PROT SERPL-MCNC: 8.4 G/DL
RBC # BLD: 4.02 M/UL
RBC # FLD: 14.2 %
SODIUM SERPL-SCNC: 142 MMOL/L
VIRAL LOAD INTERP: NORMAL
VIRAL LOAD LOG: NORMAL LG COP/ML
WBC # FLD AUTO: 7.1 K/UL

## 2018-09-25 PROCEDURE — 99214 OFFICE O/P EST MOD 30 MIN: CPT

## 2018-10-24 PROCEDURE — 80048 BASIC METABOLIC PNL TOTAL CA: CPT

## 2018-10-24 PROCEDURE — 80053 COMPREHEN METABOLIC PANEL: CPT

## 2018-10-24 PROCEDURE — 82947 ASSAY GLUCOSE BLOOD QUANT: CPT

## 2018-10-24 PROCEDURE — 87150 DNA/RNA AMPLIFIED PROBE: CPT

## 2018-10-24 PROCEDURE — 82803 BLOOD GASES ANY COMBINATION: CPT

## 2018-10-24 PROCEDURE — 87086 URINE CULTURE/COLONY COUNT: CPT

## 2018-10-24 PROCEDURE — 99285 EMERGENCY DEPT VISIT HI MDM: CPT | Mod: 25

## 2018-10-24 PROCEDURE — 74177 CT ABD & PELVIS W/CONTRAST: CPT

## 2018-10-24 PROCEDURE — 83605 ASSAY OF LACTIC ACID: CPT

## 2018-10-24 PROCEDURE — C1751: CPT

## 2018-10-24 PROCEDURE — 83735 ASSAY OF MAGNESIUM: CPT

## 2018-10-24 PROCEDURE — 71045 X-RAY EXAM CHEST 1 VIEW: CPT

## 2018-10-24 PROCEDURE — 76770 US EXAM ABDO BACK WALL COMP: CPT

## 2018-10-24 PROCEDURE — 36569 INSJ PICC 5 YR+ W/O IMAGING: CPT

## 2018-10-24 PROCEDURE — 96374 THER/PROPH/DIAG INJ IV PUSH: CPT

## 2018-10-24 PROCEDURE — 97116 GAIT TRAINING THERAPY: CPT

## 2018-10-24 PROCEDURE — 84295 ASSAY OF SERUM SODIUM: CPT

## 2018-10-24 PROCEDURE — 84132 ASSAY OF SERUM POTASSIUM: CPT

## 2018-10-24 PROCEDURE — 85027 COMPLETE CBC AUTOMATED: CPT

## 2018-10-24 PROCEDURE — 82330 ASSAY OF CALCIUM: CPT

## 2018-10-24 PROCEDURE — 96361 HYDRATE IV INFUSION ADD-ON: CPT

## 2018-10-24 PROCEDURE — 93306 TTE W/DOPPLER COMPLETE: CPT

## 2018-10-24 PROCEDURE — 97530 THERAPEUTIC ACTIVITIES: CPT

## 2018-10-24 PROCEDURE — 97162 PT EVAL MOD COMPLEX 30 MIN: CPT

## 2018-10-24 PROCEDURE — 84100 ASSAY OF PHOSPHORUS: CPT

## 2018-10-24 PROCEDURE — 87040 BLOOD CULTURE FOR BACTERIA: CPT

## 2018-10-24 PROCEDURE — 81001 URINALYSIS AUTO W/SCOPE: CPT

## 2018-10-24 PROCEDURE — 85014 HEMATOCRIT: CPT

## 2018-10-24 PROCEDURE — 87186 SC STD MICRODIL/AGAR DIL: CPT

## 2018-10-24 PROCEDURE — 82435 ASSAY OF BLOOD CHLORIDE: CPT

## 2018-12-04 NOTE — ED ADULT NURSE NOTE - NS ED NOTE  TALK SOMEONE YN
Is This A New Presentation, Or A Follow-Up?: Skin Lesions How Severe Is Your Skin Lesion?: severe No Have Your Skin Lesions Been Treated?: not been treated Additional History: USED 5FU TO SCALP CLEARED OFF X ALONG TIME

## 2018-12-13 ENCOUNTER — RX RENEWAL (OUTPATIENT)
Age: 78
End: 2018-12-13

## 2019-01-07 ENCOUNTER — APPOINTMENT (OUTPATIENT)
Dept: UROLOGY | Facility: CLINIC | Age: 79
End: 2019-01-07
Payer: MEDICARE

## 2019-01-07 VITALS
DIASTOLIC BLOOD PRESSURE: 65 MMHG | SYSTOLIC BLOOD PRESSURE: 115 MMHG | RESPIRATION RATE: 16 BRPM | HEART RATE: 88 BPM | TEMPERATURE: 98.5 F

## 2019-01-07 PROCEDURE — 99213 OFFICE O/P EST LOW 20 MIN: CPT

## 2019-01-07 NOTE — PHYSICAL EXAM
[General Appearance - Well Developed] : well developed [General Appearance - Well Nourished] : well nourished [Normal Appearance] : normal appearance [Well Groomed] : well groomed [General Appearance - In No Acute Distress] : no acute distress [Skin Color & Pigmentation] : normal skin color and pigmentation [Skin Turgor] : supple [Heart Rate And Rhythm] : Heart rate and rhythm were normal [Edema] : no peripheral edema [] : no respiratory distress [Respiration, Rhythm And Depth] : normal respiratory rhythm and effort [Exaggerated Use Of Accessory Muscles For Inspiration] : no accessory muscle use

## 2019-01-07 NOTE — ASSESSMENT
[FreeTextEntry1] : \par \par Impression/plan: 77 yo gentleman with HIV, dementia with BPH. \par \par 1. Increase Flomax 0.8 mg daily and Proscar 5 mg daily. \par 2. D/c CIC. \par 3. F/u in 6 months. \par

## 2019-01-07 NOTE — HISTORY OF PRESENT ILLNESS
[FreeTextEntry1] : 79 yo gentleman with HIV, dementia presents with wife for UTI. He has been hospitalized in the past with UR requiring Wong catheter. He is currently on Proscar and Flomax 0.4 mg daily. He is non-communicative, history taken from wife. Creat stable at 0.82 Sept 2018. \par \par CIC residuals have been around 125 ml. Daughter states things have improved since combo Flomax and Proscar. He voids into diaper. \par \par Based on CT, prostate volume 62 cc.\par \par PVR today is 137 ml. \par \par

## 2019-01-13 ENCOUNTER — RX RENEWAL (OUTPATIENT)
Age: 79
End: 2019-01-13

## 2019-01-14 ENCOUNTER — RX RENEWAL (OUTPATIENT)
Age: 79
End: 2019-01-14

## 2019-02-28 ENCOUNTER — APPOINTMENT (OUTPATIENT)
Dept: INFECTIOUS DISEASE | Facility: CLINIC | Age: 79
End: 2019-02-28
Payer: MEDICARE

## 2019-02-28 VITALS
OXYGEN SATURATION: 98 % | SYSTOLIC BLOOD PRESSURE: 109 MMHG | DIASTOLIC BLOOD PRESSURE: 64 MMHG | TEMPERATURE: 96.7 F | HEART RATE: 85 BPM | WEIGHT: 135 LBS | BODY MASS INDEX: 18.31 KG/M2

## 2019-02-28 PROCEDURE — 99214 OFFICE O/P EST MOD 30 MIN: CPT

## 2019-02-28 NOTE — HISTORY OF PRESENT ILLNESS
[FreeTextEntry1] : Here for follow up.\par doing well per wife\par Taking meds regularly\par Does not verbally communicate\par No other complaints

## 2019-02-28 NOTE — PHYSICAL EXAM
[General Appearance - In No Acute Distress] : in no acute distress [General Appearance - Well Nourished] : well nourished [Sclera] : the sclera and conjunctiva were normal [PERRL With Normal Accommodation] : pupils were equal in size, round, reactive to light [Extraocular Movements] : extraocular movements were intact [Outer Ear] : the ears and nose were normal in appearance [Hearing Threshold Finger Rub Not Dyer] : hearing was normal [Examination Of The Oral Cavity] : the lips and gums were normal [Neck Appearance] : the appearance of the neck was normal [Neck Cervical Mass (___cm)] : no neck mass was observed [Thyroid Diffuse Enlargement] : the thyroid was not enlarged [Exaggerated Use Of Accessory Muscles For Inspiration] : no accessory muscle use [Auscultation Breath Sounds / Voice Sounds] : lungs were clear to auscultation bilaterally [Heart Rate And Rhythm] : heart rate was normal and rhythm regular [Murmurs] : no murmurs [Heart Sounds Pericardial Friction Rub] : no pericardial rub [Full Pulse] : the pedal pulses are present [Bowel Sounds] : normal bowel sounds [Abdomen Soft] : soft [Abdomen Tenderness] : non-tender [Abdomen Mass (___ Cm)] : no abdominal mass palpated [Musculoskeletal - Swelling] : no joint swelling [Range of Motion to Joints] : range of motion to joints [Nail Clubbing] : no clubbing  or cyanosis of the fingernails [Skin Color & Pigmentation] : normal skin color and pigmentation [] : no rash [Skin Lesions] : no skin lesions [No Focal Deficits] : no focal deficits [FreeTextEntry1] : minimal response to stimuli and verbal

## 2019-02-28 NOTE — REVIEW OF SYSTEMS
[Fever] : no fever [Chills] : no chills [Body Aches] : no body aches [Difficulty Sleeping] : no difficulty sleeping [Feeling Sick] : not feeling sick [Feeling Tired] : not feeling tired [Normal Appetite] : normal appetite [Recent Weight Gain (___ Lbs)] : no recent weight gain [Eye Pain] : no eye pain [Red Eyes] : eyes not red [Earache] : no earache [Nasal Discharge] : no nasal discharge [Sore Throat] : no sore throat [Hoarseness] : no hoarseness [Chest Pain] : no chest pain [Palpitations] : no palpitations [Leg Claudication] : no intermittent leg claudication [Shortness Of Breath] : no shortness of breath [Wheezing] : no wheezing [Cough] : no cough [SOB on Exertion] : no shortness of breath during exertion [Sputum] : not coughing up ~M sputum [Pleuritic Chest Pain] : no pleuritic chest pain [Abdominal Pain] : no abdominal pain [Vomiting] : no vomiting [Constipation] : no constipation [Diarrhea] : no diarrhea [Dysuria] : no dysuria [Joint Pain] : no joint pain [Joint Swelling] : no joint swelling [Joint Stiffness] : no joint stiffness [Limb Pain] : no limb pain [Skin Lesions] : no skin lesions [As Noted in HPI] : as noted in HPI [Confused] : confusion [Suicidal] : not suicidal [FreeTextEntry2] : all ROS per wife

## 2019-02-28 NOTE — ASSESSMENT
[FreeTextEntry1] : HIV/AIDS\par Continue ART-will check labs.\par CD4 was low but VL was undetectable\par Encouraged adherence\par \par Dementia/tremor\par Following up with neuro\par \par \par UTI\par No current s/s any active infection \par \par \par HTN continue current meds\par \par \par \par \par To follow in HIV clinic in 4-6 m s-wife asked to contact us earlier  if any issues.

## 2019-03-05 LAB
ALBUMIN SERPL ELPH-MCNC: 4.5 G/DL
ALP BLD-CCNC: 221 U/L
ALT SERPL-CCNC: 16 U/L
ANION GAP SERPL CALC-SCNC: 25 MMOL/L
AST SERPL-CCNC: 20 U/L
BASOPHILS # BLD AUTO: 0.01 K/UL
BASOPHILS NFR BLD AUTO: 0.1 %
BILIRUB SERPL-MCNC: 0.3 MG/DL
BUN SERPL-MCNC: 21 MG/DL
CALCIUM SERPL-MCNC: 10 MG/DL
CD3 CELLS # BLD: 2824 /UL
CD3 CELLS NFR BLD: 77 %
CD3+CD4+ CELLS # BLD: 307 /UL
CD3+CD4+ CELLS NFR BLD: 8 %
CD3+CD4+ CELLS/CD3+CD8+ CLL SPEC: 0.12 RATIO
CD3+CD8+ CELLS # SPEC: 2491 /UL
CD3+CD8+ CELLS NFR BLD: 68 %
CHLORIDE SERPL-SCNC: 110 MMOL/L
CHOLEST SERPL-MCNC: 149 MG/DL
CHOLEST/HDLC SERPL: 3 RATIO
CO2 SERPL-SCNC: 18 MMOL/L
CREAT SERPL-MCNC: 0.89 MG/DL
EOSINOPHIL # BLD AUTO: 0.07 K/UL
EOSINOPHIL NFR BLD AUTO: 0.9 %
GLUCOSE SERPL-MCNC: 188 MG/DL
HCT VFR BLD CALC: 42.1 %
HDLC SERPL-MCNC: 49 MG/DL
HGB BLD-MCNC: 13.8 G/DL
HIV1 RNA # SERPL NAA+PROBE: ABNORMAL
HIV1 RNA # SERPL NAA+PROBE: ABNORMAL COPIES/ML
IMM GRANULOCYTES NFR BLD AUTO: 0.1 %
LDLC SERPL CALC-MCNC: 81 MG/DL
LYMPHOCYTES # BLD AUTO: 3.67 K/UL
LYMPHOCYTES NFR BLD AUTO: 49.7 %
MAN DIFF?: NORMAL
MCHC RBC-ENTMCNC: 31.7 PG
MCHC RBC-ENTMCNC: 32.8 GM/DL
MCV RBC AUTO: 96.8 FL
MONOCYTES # BLD AUTO: 0.46 K/UL
MONOCYTES NFR BLD AUTO: 6.2 %
NEUTROPHILS # BLD AUTO: 3.17 K/UL
NEUTROPHILS NFR BLD AUTO: 43 %
PLATELET # BLD AUTO: 208 K/UL
POTASSIUM SERPL-SCNC: 4.2 MMOL/L
PROT SERPL-MCNC: 8.5 G/DL
PSA SERPL-MCNC: 0.24 NG/ML
RBC # BLD: 4.35 M/UL
RBC # FLD: 13.6 %
SODIUM SERPL-SCNC: 153 MMOL/L
T PALLIDUM AB SER QL IA: NEGATIVE
TRIGL SERPL-MCNC: 93 MG/DL
TSH SERPL-ACNC: 0.95 UIU/ML
VIRAL LOAD INTERP: NORMAL
VIRAL LOAD LOG: ABNORMAL LG COP/ML
WBC # FLD AUTO: 7.39 K/UL

## 2019-03-14 ENCOUNTER — RX RENEWAL (OUTPATIENT)
Age: 79
End: 2019-03-14

## 2019-03-19 ENCOUNTER — APPOINTMENT (OUTPATIENT)
Dept: NEUROLOGY | Facility: CLINIC | Age: 79
End: 2019-03-19
Payer: MEDICARE

## 2019-03-19 VITALS
DIASTOLIC BLOOD PRESSURE: 62 MMHG | HEIGHT: 72 IN | WEIGHT: 138 LBS | SYSTOLIC BLOOD PRESSURE: 118 MMHG | HEART RATE: 80 BPM | BODY MASS INDEX: 18.69 KG/M2

## 2019-03-19 DIAGNOSIS — M41.9 SCOLIOSIS, UNSPECIFIED: ICD-10-CM

## 2019-03-19 PROCEDURE — 99214 OFFICE O/P EST MOD 30 MIN: CPT

## 2019-03-19 RX ORDER — ROPINIROLE 2 MG/1
2 TABLET, FILM COATED, EXTENDED RELEASE ORAL DAILY
Qty: 30 | Refills: 0 | Status: DISCONTINUED | COMMUNITY
Start: 2018-09-25 | End: 2019-03-19

## 2019-03-19 NOTE — REASON FOR VISIT
[Follow-Up: _____] : a [unfilled] follow-up visit [Spouse] : spouse [FreeTextEntry1] : Dementia, Parkinsonism, HIV.

## 2019-03-19 NOTE — HISTORY OF PRESENT ILLNESS
[FreeTextEntry1] : HPI-interval Hx [id number]:\par pt has tried Ropinirole, but had diarrhea, medication dc.\par He has been stable otherwise.\par He has developed postural leaning to the L, with R-convex dorsal scoliosis, slower gait, and tends to be more rigid.\par No tremors anymore.\par Eats and sleeps well, weight has been stable, though per wife maybe he lost a few lbs.\par \par ID: stable.\par \par HPI-interval Hx [id number]:\par pt had 2 occurrences of UTI in the last 2 months, with ABX tx. Suffered a bit of a setback since.\par Parkinsonism has worsened a bit, he is slower and more rigid.\par Mental status is stable, he speaks very little, can barely say wife's name. He mostly says "yeah".\par Can walk if assisted to stand, and then proceeds alone, with small shuffling steps, very cautioned. Cannot turn by himself. Per wife, he has sporadic resting tremor, not observed today.\par \par HPI-interval Hx [id number]:\par Pt has had some medical issues lately, PNA and diarrhea, remitted. Has lost some weight, not much, regaining.\par Sleeping and eating well.\par Stopped Olanzapine a few weeks ago when he was sick.\par Has started losing urine more often and spitting.\par Otherwise stable. In particular, no falls, moves well, no worsening of parkinsonism.\par \par HPI-interval Hx [id number]:\par Pt stable. per daughter, he has been calm, well behaved, sleeping well.\par He has very good appetite, and has gained a few pounds since 6 months ago, with the help of a nutritionist.\par HIV stable, well controlled, compliant on meds.\par EEG in Feb 2017 was negative for seizures. LOC event likely syncopal, due to dehydration.\par \par HPI-interval Hx [id number]:\par pt stable, well behaved, sleeps well, well manageable. He dresses and eats by himself.\par Had recent admission to ER for dehydration. EEG done 2/16, pending report, to r/o seizures, unlikely. \par per wife, he is a bit slower in movements and gait, but has no tremors. \par \par HPI-Interval Hx 11/9/2016:\par pt stable, sleeps well, not agitated. Still on Aricept 5mg, not increased bc daughter thought his recent hands pain may be related to the medication. Good appetite, no weight changes per family. \par \par 8/8/2016 HPI-Interval Hx: MRI brain wo/w darrin done, showing diffuse atrophy, with expansion of the ventricles, not NPH but core atrophy, and diffuse periventricular WM disease, reminiscent of gliosis. Doing ok on Zyprexa. \par \par PMH:74yo RH AAM, with hx of HIV since 2002, w/o any infections, currently with 182 CD4, and PJP Px.\par Also, HTN, HLD. \par Former , retired. \par For 3 years, pt has started to show short memory deficits, losing things, slowly progressed to this spring, when he was hospitalized to [facility] for possible TB (r/o). Since then, wife reports a steeper decline. She also reports bizarre behavior. \par He was recently started on Zyprexa 2.5mg for sporadic agitation.\par He sleeps ok at night, at times naps at day.\par Loss of interests. ADL ok, though limited, IADL very limited. \par Recent CTh: No  mass  effect,  acute  hemorrhage,  midline  shift,  or  evidence  of\par acute  territorial  infarct.  Interval  development  of  cerebral  atrophy  since\par 2006,  with  ventriculomegaly  out  of  proportion  to  the  degree  of  cerebral\par volume  loss,  which  may  suggest  normal  pressure  hydrocephalus.\par No gait issues, nor incontinence.

## 2019-03-19 NOTE — DATA REVIEWED
[de-identified] : Gliosis  and  volume  loss  involving  the  anterior  temporal  lobes.\par \par Moderate  dilatation  of  the  right  temporal  and  occipital  horns,  and  mild\par dilatation  of  the  remaining  ventricular  system.  This  may  be  on  the  basis  of\par atrophy.  Normal  pressure  hydrocephalus  is  not  excluded.\par \par No  acute  intracranial  hemorrhage  or  evidence  of  acute  infarct.\par \par Mild  to  moderate  white  matter  microvascular  ischemic  disease.\par \par No  abnormal  parenchymal  or  leptomeningeal  enhancement.\par \par  [de-identified] : CTH: No  mass  effect,  acute  hemorrhage,  midline  shift,  or  evidence  of\par acute  territorial  infarct.  Interval  development  of  cerebral  atrophy  since\par 2006,  with  ventriculomegaly  out  of  proportion  to  the  degree  of  cerebral\par volume  loss,  which  may  suggest  normal  pressure  hydrocephalus.\par

## 2019-03-19 NOTE — ASSESSMENT
[FreeTextEntry1] : Assessment:\par 77yo RH AAM, with HIV and dementia, parkinsonism (bradykinesia, rigidity). MRI brain suggests profound atrophy with has a vascular component, but also degeneration on idiopathic base and possibly inscribed into the HIV pathology. Significant slowing of gait, now increased rigidity at in all limbs. Walking has become slower and more shuffling-needs help to raise from chair. There is diffuse rigidity, still paratonic, but in UE there is some spasticity, and he cannot fully extend the arms. Has developed R-convex kyphoscoliosis.\par Well behaved.\par \par Impression:\par Mixed dementia, HIV-related neurodegeneration ?\par Parkinsonism.\par \par Plan:\par -Would simplify Tx reducing Aricept to 10mg\par -Would encourage PT to prevent contractures and correct dorsal posture if possible.\par RTC in 6-8 months.\par \par A thorough discussion was entertained with the patient/caregiver regarding the use of psychoactive medications, their possible benefits and AE profile, including the risk of cardiovascular complications.\par We discussed the benefits of being active, physically and mentally, and the need to to establish a routine in this respect.\par Driving abilities and firearms possession and use were discussed, in relation to progression of the cognitive decline, and the need to assess them periodically.\par Patient/caregiver understand and agree with the plan.\par

## 2019-03-19 NOTE — PHYSICAL EXAM
[General Appearance - Alert] : alert [General Appearance - In No Acute Distress] : in no acute distress [Person] : oriented to person [Remote Intact] : remote memory intact [Visual Intact] : visual attention was ~T not ~L decreased [Fluency] : fluency intact [Comprehension] : comprehension intact [Reading] : reading intact [Past History] : adequate knowledge of personal past history [Cranial Nerves Optic (II)] : visual acuity intact bilaterally,  visual fields full to confrontation, pupils equal round and reactive to light [Cranial Nerves Oculomotor (III)] : extraocular motion intact [Cranial Nerves Trigeminal (V)] : facial sensation intact symmetrically [Cranial Nerves Facial (VII)] : face symmetrical [Cranial Nerves Vestibulocochlear (VIII)] : hearing was intact bilaterally [Cranial Nerves Glossopharyngeal (IX)] : tongue and palate midline [Cranial Nerves Hypoglossal (XII)] : there was no tongue deviation with protrusion [Cranial Nerves Accessory (XI - Cranial And Spinal)] : head turning and shoulder shrug symmetric [Motor Strength] : muscle strength was normal in all four extremities [Involuntary Movements] : no involuntary movements were seen [No Muscle Atrophy] : normal bulk in all four extremities [Motor Handedness Right-Handed] : the patient is right hand dominant [Sensation Tactile Decrease] : light touch was intact [Balance] : balance was intact [1+] : Ankle jerk left 1+ [Sclera] : the sclera and conjunctiva were normal [PERRL With Normal Accommodation] : pupils were equal in size, round, reactive to light, with normal accommodation [Extraocular Movements] : extraocular movements were intact [Optic Disc Abnormality] : the optic disc were normal in size and color [No JORGE] : no internuclear ophthalmoplegia [No APD] : no afferent pupillary defect [Full Visual Field] : full visual field [Outer Ear] : the ears and nose were normal in appearance [Oropharynx] : the oropharynx was normal [Neck Appearance] : the appearance of the neck was normal [Neck Cervical Mass (___cm)] : no neck mass was observed [Thyroid Diffuse Enlargement] : the thyroid was not enlarged [Jugular Venous Distention Increased] : there was no jugular-venous distention [Thyroid Nodule] : there were no palpable thyroid nodules [Auscultation Breath Sounds / Voice Sounds] : lungs were clear to auscultation bilaterally [Heart Rate And Rhythm] : heart rate was normal and rhythm regular [Heart Sounds] : normal S1 and S2 [Heart Sounds Gallop] : no gallops [Murmurs] : no murmurs [Heart Sounds Pericardial Friction Rub] : no pericardial rub [Arterial Pulses Carotid] : carotid pulses were normal with no bruits [Full Pulse] : the pedal pulses are present [Edema] : there was no peripheral edema [Bowel Sounds] : normal bowel sounds [Abdomen Soft] : soft [Abdomen Tenderness] : non-tender [No CVA Tenderness] : no ~M costovertebral angle tenderness [Abdomen Mass (___ Cm)] : no abdominal mass palpated [No Spinal Tenderness] : no spinal tenderness [Nail Clubbing] : no clubbing  or cyanosis of the fingernails [Musculoskeletal - Swelling] : no joint swelling seen [Motor Tone] : muscle strength and tone were normal [Skin Turgor] : normal skin turgor [Skin Color & Pigmentation] : normal skin color and pigmentation [] : no rash [Place] : disoriented to place [Registration Intact] : recent registration memory impaired [Short Term Intact] : short term memory impaired [Time] : disoriented to time [Span Intact] : the attention span was decreased [Concentration Intact] : a decrease in concentrating ability was observed [Naming Objects] : difficulty naming common objects [Repeating Phrases] : difficulty repeating a phrase [Writing A Sentence] : difficulty writing a sentence [Current Events] : inadequate knowledge of current events [Romberg's Sign] : Romberg's sign was negtive [Vocabulary] : inadequate range of vocabulary [Allodynia] : no ~T allodynia present [Dysesthesia] : no dysesthesia [Past-pointing] : there was no past-pointing [Hyperesthesia] : no hyperesthesia [Tremor] : no tremor present [Dysdiadochokinesia Bilaterally] : not present [Coordination - Dysmetria Impaired Heel-to-Shin Bilateral] : not present [FreeTextEntry6] : Diffuse rigidity in UE>LE, more at wrists, with some cogwheel-increased since last visit.  [FreeTextEntry4] : MS exam limited, severe cognitive impairment. MMSE<10.\par Pt recognizes wife. He speaks in short sentences. Keeps repeating "I have no problem with that, she is doing the work"-refers to his daughter taking care of things. [FreeTextEntry8] : Slow gait, reduced swinging, keeps arms in flexed position. Can walk if assisted to stand, and then proceeds alone, with small shuffling steps, very cautioned. Cannot turn by himself. Slower, shorter steps.\par R-convex dorsal scoliosis and kyphosis.\par Harder to get up from chair and walk. [FreeTextEntry1] : hands pain, at wrist, PIP and DIP joints. Slower, shorter steps.\par R-convex dorsal scoliosis and kyphosis.\par Harder to get up from chair and walk.

## 2019-05-02 ENCOUNTER — RX RENEWAL (OUTPATIENT)
Age: 79
End: 2019-05-02

## 2019-05-31 ENCOUNTER — RX RENEWAL (OUTPATIENT)
Age: 79
End: 2019-05-31

## 2019-07-01 ENCOUNTER — RX RENEWAL (OUTPATIENT)
Age: 79
End: 2019-07-01

## 2019-07-25 ENCOUNTER — APPOINTMENT (OUTPATIENT)
Dept: UROLOGY | Facility: CLINIC | Age: 79
End: 2019-07-25
Payer: MEDICARE

## 2019-07-25 PROCEDURE — 51798 US URINE CAPACITY MEASURE: CPT

## 2019-07-25 PROCEDURE — 99214 OFFICE O/P EST MOD 30 MIN: CPT | Mod: 25

## 2019-07-25 RX ORDER — CHLORHEXIDINE/GLYCERIN/HE-CELL
JELLY (GRAM) TOPICAL
Qty: 2 | Refills: 11 | Status: DISCONTINUED | OUTPATIENT
Start: 2018-08-02 | End: 2019-07-25

## 2019-07-25 NOTE — REVIEW OF SYSTEMS
[Constipation] : constipation [Incontinence] : incontinence [Nocturia] : nocturia [see HPI] : see HPI

## 2019-07-25 NOTE — ASSESSMENT
[FreeTextEntry1] : Impression/Plan:77 yo gentleman with HIV, dementia presents with wife and dtr - doing well on Flomax 0.8 mg and finasteride .He does not do CIC and is [place]idly incontinent requiring depends x 4-5 per day and 1 at night .PMH : hospitalized in the past with UR requiring Wong catheter and CIC for few months by wife.   He is non-communicative, history taken from wife. Fluids : 32  oz of water , ensure and 20  oz of Gatorade , apple juice  8 oz. PVR - 69  ml ( previous 137 ml at last visit in Jan 2019)LAST PSA was 0.24 in Feb 2019\par 1.Continue Flomax 0.8  mg po daily and Finasteride 5  mg po daily.\par  2.Bowel regimen protocol - add Metamucil and stool softeners daily. \par 3.RTO in 6 months.

## 2019-07-25 NOTE — ASSESSMENT
[FreeTextEntry1] : Impression/Plan:79 yo gentleman with HIV, dementia presents with wife and dtr - doing well on Flomax 0.8 mg and finasteride .He does not do CIC and is [place] incontinent requiring depends x 4-5 per day and 1 at night .PMH : hospitalized in the past with UR requiring Wong catheter and CIC for few months by wife.   He is non-communicative, history taken from wife. Fluids : 32  oz of water , ensure and 20  oz of Gatorade , apple juice  8 oz. PVR - 69  ml ( previous 137 ml at last visit in Jan 2019)LAST PSA was 0.24 in Feb 2019\par 1.Continue Flomax 0.8  mg po daily and Finasteride 5  mg po daily.\par  2.Bowel regimen protocol - add Metamucil and stool softeners daily. \par 3.RTO in 6 months.

## 2019-07-25 NOTE — PHYSICAL EXAM
[General Appearance - In No Acute Distress] : no acute distress [Abdomen Soft] : soft [] : no respiratory distress [FreeTextEntry1] : w/c bound ADL assist

## 2019-08-26 ENCOUNTER — RX RENEWAL (OUTPATIENT)
Age: 79
End: 2019-08-26

## 2019-08-29 ENCOUNTER — APPOINTMENT (OUTPATIENT)
Dept: INFECTIOUS DISEASE | Facility: CLINIC | Age: 79
End: 2019-08-29
Payer: MEDICARE

## 2019-08-29 VITALS
DIASTOLIC BLOOD PRESSURE: 62 MMHG | HEIGHT: 72 IN | HEART RATE: 80 BPM | OXYGEN SATURATION: 98 % | SYSTOLIC BLOOD PRESSURE: 108 MMHG

## 2019-08-29 PROCEDURE — 99214 OFFICE O/P EST MOD 30 MIN: CPT

## 2019-08-29 NOTE — ASSESSMENT
[FreeTextEntry1] : HIV/AIDS\par Continue ART-will check labs.\par CD4 was low but VL was undetectable\par Encouraged adherence\par \par Dementia/tremor\par Following up with neuro\par \par \par HTN continue current meds\par \par \par \par \par To follow in HIV clinic in 4-6 m s-wife asked to contact us earlier  if any issues.

## 2019-08-29 NOTE — REVIEW OF SYSTEMS
[Fever] : no fever [Chills] : no chills [Body Aches] : no body aches [Difficulty Sleeping] : no difficulty sleeping [Feeling Sick] : not feeling sick [Feeling Tired] : not feeling tired [Normal Appetite] : normal appetite [Recent Weight Gain (___ Lbs)] : no recent weight gain [Eye Pain] : no eye pain [Earache] : no earache [Red Eyes] : eyes not red [Nasal Discharge] : no nasal discharge [Sore Throat] : no sore throat [Chest Pain] : no chest pain [Hoarseness] : no hoarseness [Palpitations] : no palpitations [Leg Claudication] : no intermittent leg claudication [Shortness Of Breath] : no shortness of breath [Wheezing] : no wheezing [Cough] : no cough [SOB on Exertion] : no shortness of breath during exertion [Sputum] : not coughing up ~M sputum [Pleuritic Chest Pain] : no pleuritic chest pain [Abdominal Pain] : no abdominal pain [Vomiting] : no vomiting [Constipation] : no constipation [Dysuria] : no dysuria [Diarrhea] : no diarrhea [Joint Pain] : no joint pain [Joint Swelling] : no joint swelling [Limb Pain] : no limb pain [Joint Stiffness] : no joint stiffness [Skin Lesions] : no skin lesions [As Noted in HPI] : as noted in HPI [Suicidal] : not suicidal [Confused] : no confusion [FreeTextEntry2] : all ROS per wife

## 2019-08-29 NOTE — PHYSICAL EXAM
[General Appearance - In No Acute Distress] : in no acute distress [General Appearance - Well Nourished] : well nourished [Sclera] : the sclera and conjunctiva were normal [PERRL With Normal Accommodation] : pupils were equal in size, round, reactive to light [Outer Ear] : the ears and nose were normal in appearance [Extraocular Movements] : extraocular movements were intact [Examination Of The Oral Cavity] : the lips and gums were normal [Hearing Threshold Finger Rub Not Storey] : hearing was normal [Neck Appearance] : the appearance of the neck was normal [Neck Cervical Mass (___cm)] : no neck mass was observed [Thyroid Diffuse Enlargement] : the thyroid was not enlarged [Exaggerated Use Of Accessory Muscles For Inspiration] : no accessory muscle use [Auscultation Breath Sounds / Voice Sounds] : lungs were clear to auscultation bilaterally [Heart Rate And Rhythm] : heart rate was normal and rhythm regular [Murmurs] : no murmurs [Heart Sounds Pericardial Friction Rub] : no pericardial rub [Full Pulse] : the pedal pulses are present [Bowel Sounds] : normal bowel sounds [Abdomen Soft] : soft [Abdomen Tenderness] : non-tender [Musculoskeletal - Swelling] : no joint swelling [Abdomen Mass (___ Cm)] : no abdominal mass palpated [Range of Motion to Joints] : range of motion to joints [Nail Clubbing] : no clubbing  or cyanosis of the fingernails [Skin Color & Pigmentation] : normal skin color and pigmentation [] : no rash [Skin Lesions] : no skin lesions [FreeTextEntry1] : minimal response to stimuli and verbal  [No Focal Deficits] : no focal deficits

## 2019-09-05 LAB
ALBUMIN SERPL ELPH-MCNC: 4.2 G/DL
ALP BLD-CCNC: 205 U/L
ALT SERPL-CCNC: 12 U/L
ANION GAP SERPL CALC-SCNC: 12 MMOL/L
AST SERPL-CCNC: 19 U/L
BASOPHILS # BLD AUTO: 0.01 K/UL
BASOPHILS NFR BLD AUTO: 0.2 %
BILIRUB SERPL-MCNC: 0.3 MG/DL
BUN SERPL-MCNC: 12 MG/DL
CALCIUM SERPL-MCNC: 9.3 MG/DL
CD3 CELLS # BLD: 2674 /UL
CD3 CELLS NFR BLD: 78 %
CD3+CD4+ CELLS # BLD: 356 /UL
CD3+CD4+ CELLS NFR BLD: 10 %
CD3+CD4+ CELLS/CD3+CD8+ CLL SPEC: 0.15 RATIO
CD3+CD8+ CELLS # SPEC: 2303 /UL
CD3+CD8+ CELLS NFR BLD: 67 %
CHLORIDE SERPL-SCNC: 101 MMOL/L
CO2 SERPL-SCNC: 28 MMOL/L
CREAT SERPL-MCNC: 0.85 MG/DL
EOSINOPHIL # BLD AUTO: 0.05 K/UL
EOSINOPHIL NFR BLD AUTO: 0.8 %
GLUCOSE SERPL-MCNC: 123 MG/DL
HCT VFR BLD CALC: 40.8 %
HGB BLD-MCNC: 13.6 G/DL
HIV1 RNA # SERPL NAA+PROBE: ABNORMAL
HIV1 RNA # SERPL NAA+PROBE: ABNORMAL COPIES/ML
IMM GRANULOCYTES NFR BLD AUTO: 0.2 %
LYMPHOCYTES # BLD AUTO: 3.69 K/UL
LYMPHOCYTES NFR BLD AUTO: 57.7 %
MAN DIFF?: NORMAL
MCHC RBC-ENTMCNC: 31.1 PG
MCHC RBC-ENTMCNC: 33.3 GM/DL
MCV RBC AUTO: 93.4 FL
MONOCYTES # BLD AUTO: 0.56 K/UL
MONOCYTES NFR BLD AUTO: 8.8 %
NEUTROPHILS # BLD AUTO: 2.07 K/UL
NEUTROPHILS NFR BLD AUTO: 32.3 %
PLATELET # BLD AUTO: 216 K/UL
POTASSIUM SERPL-SCNC: 3.7 MMOL/L
PROT SERPL-MCNC: 8.1 G/DL
RBC # BLD: 4.37 M/UL
RBC # FLD: 13.1 %
SODIUM SERPL-SCNC: 141 MMOL/L
VIRAL LOAD INTERP: NORMAL
VIRAL LOAD LOG: ABNORMAL LG COP/ML
WBC # FLD AUTO: 6.39 K/UL

## 2019-09-24 ENCOUNTER — APPOINTMENT (OUTPATIENT)
Dept: NEUROLOGY | Facility: CLINIC | Age: 79
End: 2019-09-24
Payer: MEDICARE

## 2019-09-24 VITALS — HEART RATE: 81 BPM | SYSTOLIC BLOOD PRESSURE: 115 MMHG | DIASTOLIC BLOOD PRESSURE: 65 MMHG

## 2019-09-24 PROCEDURE — 99214 OFFICE O/P EST MOD 30 MIN: CPT

## 2019-09-24 NOTE — HISTORY OF PRESENT ILLNESS
[FreeTextEntry1] : HPI-interval Hx [id number]:\par Pt has been stable cognitively, but since last visit he has developed more parkinsonism, more bilateral LE stiffness and hesitation, very bradykinetic. He can barely walk assisted, walks in very short steps, shuffling, R>L.\par Appetite and sleep are fine. \par HIV stable.\par \par HPI-interval Hx [id number]:\par pt has tried Ropinirole, but had diarrhea, medication dc.\par He has been stable otherwise.\par He has developed postural leaning to the L, with R-convex dorsal scoliosis, slower gait, and tends to be more rigid.\par No tremors anymore.\par Eats and sleeps well, weight has been stable, though per wife maybe he lost a few lbs.\par \par ID: stable.\par \par HPI-interval Hx [id number]:\par pt had 2 occurrences of UTI in the last 2 months, with ABX tx. Suffered a bit of a setback since.\par Parkinsonism has worsened a bit, he is slower and more rigid.\par Mental status is stable, he speaks very little, can barely say wife's name. He mostly says "yeah".\par Can walk if assisted to stand, and then proceeds alone, with small shuffling steps, very cautioned. Cannot turn by himself. Per wife, he has sporadic resting tremor, not observed today.\par \par HPI-interval Hx [id number]:\par Pt has had some medical issues lately, PNA and diarrhea, remitted. Has lost some weight, not much, regaining.\par Sleeping and eating well.\par Stopped Olanzapine a few weeks ago when he was sick.\par Has started losing urine more often and spitting.\par Otherwise stable. In particular, no falls, moves well, no worsening of parkinsonism.\par \par HPI-interval Hx [id number]:\par Pt stable. per daughter, he has been calm, well behaved, sleeping well.\par He has very good appetite, and has gained a few pounds since 6 months ago, with the help of a nutritionist.\par HIV stable, well controlled, compliant on meds.\par EEG in Feb 2017 was negative for seizures. LOC event likely syncopal, due to dehydration.\par \par HPI-interval Hx [id number]:\par pt stable, well behaved, sleeps well, well manageable. He dresses and eats by himself.\par Had recent admission to ER for dehydration. EEG done 2/16, pending report, to r/o seizures, unlikely. \par per wife, he is a bit slower in movements and gait, but has no tremors. \par \par HPI-Interval Hx 11/9/2016:\par pt stable, sleeps well, not agitated. Still on Aricept 5mg, not increased bc daughter thought his recent hands pain may be related to the medication. Good appetite, no weight changes per family. \par \par 8/8/2016 HPI-Interval Hx: MRI brain wo/w darrin done, showing diffuse atrophy, with expansion of the ventricles, not NPH but core atrophy, and diffuse periventricular WM disease, reminiscent of gliosis. Doing ok on Zyprexa. \par \par PMH:76yo RH AAM, with hx of HIV since 2002, w/o any infections, currently with 182 CD4, and PJP Px.\par Also, HTN, HLD. \par Former , retired. \par For 3 years, pt has started to show short memory deficits, losing things, slowly progressed to this spring, when he was hospitalized to [facility] for possible TB (r/o). Since then, wife reports a steeper decline. She also reports bizarre behavior. \par He was recently started on Zyprexa 2.5mg for sporadic agitation.\par He sleeps ok at night, at times naps at day.\par Loss of interests. ADL ok, though limited, IADL very limited. \par Recent CTh: No  mass  effect,  acute  hemorrhage,  midline  shift,  or  evidence  of\par acute  territorial  infarct.  Interval  development  of  cerebral  atrophy  since\par 2006,  with  ventriculomegaly  out  of  proportion  to  the  degree  of  cerebral\par volume  loss,  which  may  suggest  normal  pressure  hydrocephalus.\par No gait issues, nor incontinence.

## 2019-09-24 NOTE — PHYSICAL EXAM
[General Appearance - Alert] : alert [General Appearance - In No Acute Distress] : in no acute distress [Person] : oriented to person [Remote Intact] : remote memory intact [Visual Intact] : visual attention was ~T not ~L decreased [Fluency] : fluency intact [Comprehension] : comprehension intact [Reading] : reading intact [Past History] : adequate knowledge of personal past history [Cranial Nerves Oculomotor (III)] : extraocular motion intact [Cranial Nerves Optic (II)] : visual acuity intact bilaterally,  visual fields full to confrontation, pupils equal round and reactive to light [Cranial Nerves Trigeminal (V)] : facial sensation intact symmetrically [Cranial Nerves Vestibulocochlear (VIII)] : hearing was intact bilaterally [Cranial Nerves Facial (VII)] : face symmetrical [Cranial Nerves Glossopharyngeal (IX)] : tongue and palate midline [Cranial Nerves Accessory (XI - Cranial And Spinal)] : head turning and shoulder shrug symmetric [Cranial Nerves Hypoglossal (XII)] : there was no tongue deviation with protrusion [Involuntary Movements] : no involuntary movements were seen [Motor Strength] : muscle strength was normal in all four extremities [No Muscle Atrophy] : normal bulk in all four extremities [Motor Handedness Right-Handed] : the patient is right hand dominant [Sensation Tactile Decrease] : light touch was intact [Balance] : balance was intact [1+] : Ankle jerk right 1+ [Sclera] : the sclera and conjunctiva were normal [PERRL With Normal Accommodation] : pupils were equal in size, round, reactive to light, with normal accommodation [Extraocular Movements] : extraocular movements were intact [Optic Disc Abnormality] : the optic disc were normal in size and color [No APD] : no afferent pupillary defect [No JORGE] : no internuclear ophthalmoplegia [Full Visual Field] : full visual field [Outer Ear] : the ears and nose were normal in appearance [Oropharynx] : the oropharynx was normal [Neck Appearance] : the appearance of the neck was normal [Neck Cervical Mass (___cm)] : no neck mass was observed [Jugular Venous Distention Increased] : there was no jugular-venous distention [Thyroid Diffuse Enlargement] : the thyroid was not enlarged [Thyroid Nodule] : there were no palpable thyroid nodules [Auscultation Breath Sounds / Voice Sounds] : lungs were clear to auscultation bilaterally [Heart Rate And Rhythm] : heart rate was normal and rhythm regular [Heart Sounds] : normal S1 and S2 [Heart Sounds Gallop] : no gallops [Murmurs] : no murmurs [Heart Sounds Pericardial Friction Rub] : no pericardial rub [Arterial Pulses Carotid] : carotid pulses were normal with no bruits [Full Pulse] : the pedal pulses are present [Edema] : there was no peripheral edema [Bowel Sounds] : normal bowel sounds [Abdomen Soft] : soft [Abdomen Tenderness] : non-tender [Abdomen Mass (___ Cm)] : no abdominal mass palpated [No CVA Tenderness] : no ~M costovertebral angle tenderness [No Spinal Tenderness] : no spinal tenderness [Nail Clubbing] : no clubbing  or cyanosis of the fingernails [Musculoskeletal - Swelling] : no joint swelling seen [Motor Tone] : muscle strength and tone were normal [Skin Color & Pigmentation] : normal skin color and pigmentation [Skin Turgor] : normal skin turgor [] : no rash [Place] : disoriented to place [Time] : disoriented to time [Short Term Intact] : short term memory impaired [Registration Intact] : recent registration memory impaired [Span Intact] : the attention span was decreased [Concentration Intact] : a decrease in concentrating ability was observed [Naming Objects] : difficulty naming common objects [Repeating Phrases] : difficulty repeating a phrase [Writing A Sentence] : difficulty writing a sentence [Current Events] : inadequate knowledge of current events [Vocabulary] : inadequate range of vocabulary [Romberg's Sign] : Romberg's sign was negtive [Allodynia] : no ~T allodynia present [Dysesthesia] : no dysesthesia [Hyperesthesia] : no hyperesthesia [Past-pointing] : there was no past-pointing [Tremor] : no tremor present [Dysdiadochokinesia Bilaterally] : not present [Coordination - Dysmetria Impaired Heel-to-Shin Bilateral] : not present [FreeTextEntry4] : MS exam limited, severe cognitive impairment. MMSE<10.\par Pt recognizes wife. He speaks in short sentences. Keeps repeating "I have no problem with that, she is doing the work"-refers to his daughter taking care of things. [FreeTextEntry6] : Diffuse rigidity in UE>LE, more at wrists, with some cogwheel-increased since last visit.  [FreeTextEntry8] : Slower gait, reduced swinging, keeps arms in flexed position. Can barely walk if assisted to stand, and then proceeds alone, with small shuffling steps. Cannot turn by himself. Slower, shorter steps, more shuffling. \par R-convex dorsal scoliosis and kyphosis.\par Harder to get up from chair and walk. [FreeTextEntry1] : hands pain, at wrist, PIP and DIP joints. Slower, shorter steps.\par R-convex dorsal scoliosis and kyphosis.\par Harder to get up from chair and walk.

## 2019-09-24 NOTE — DATA REVIEWED
[de-identified] : Gliosis  and  volume  loss  involving  the  anterior  temporal  lobes.\par \par Moderate  dilatation  of  the  right  temporal  and  occipital  horns,  and  mild\par dilatation  of  the  remaining  ventricular  system.  This  may  be  on  the  basis  of\par atrophy.  Normal  pressure  hydrocephalus  is  not  excluded.\par \par No  acute  intracranial  hemorrhage  or  evidence  of  acute  infarct.\par \par Mild  to  moderate  white  matter  microvascular  ischemic  disease.\par \par No  abnormal  parenchymal  or  leptomeningeal  enhancement.\par \par  [de-identified] : CTH: No  mass  effect,  acute  hemorrhage,  midline  shift,  or  evidence  of\par acute  territorial  infarct.  Interval  development  of  cerebral  atrophy  since\par 2006,  with  ventriculomegaly  out  of  proportion  to  the  degree  of  cerebral\par volume  loss,  which  may  suggest  normal  pressure  hydrocephalus.\par

## 2019-09-24 NOTE — ASSESSMENT
[FreeTextEntry1] : Assessment:\par 79yo RH AAM, with HIV and dementia, parkinsonism (bradykinesia, rigidity). MRI brain suggests profound atrophy with has a vascular component, but also degeneration on idiopathic base and possibly inscribed into the HIV pathology. Significant parkinsonism, worsening since last visit. \par Behavior is fine.\par Completely dependent on family.\par \par Impression:\par Mixed dementia, HIV-related neurodegeneration\par Parkinsonism.\par \par Plan:\par -would try low dose Sinemet to help with movements and caregivers\par -rest stays the same\par -encourage to have him walk.\par \par A thorough discussion was entertained with the patient/caregiver regarding the use of psychoactive medications, their possible benefits and AE profile, including the risk of cardiovascular complications.\par We discussed the benefits of being active, physically and mentally, and the need to to establish a routine in this respect.\par Driving abilities and firearms possession and use were discussed, in relation to progression of the cognitive decline, and the need to assess them periodically.\par Patient/caregiver understand and agree with the plan.\par

## 2020-01-25 ENCOUNTER — EMERGENCY (EMERGENCY)
Facility: HOSPITAL | Age: 80
LOS: 1 days | Discharge: HOME | End: 2020-01-25
Attending: EMERGENCY MEDICINE
Payer: MEDICARE

## 2020-01-25 VITALS
TEMPERATURE: 98 F | OXYGEN SATURATION: 98 % | RESPIRATION RATE: 16 BRPM | SYSTOLIC BLOOD PRESSURE: 122 MMHG | DIASTOLIC BLOOD PRESSURE: 65 MMHG | HEART RATE: 82 BPM

## 2020-01-25 VITALS
WEIGHT: 134.92 LBS | TEMPERATURE: 96 F | HEIGHT: 72 IN | DIASTOLIC BLOOD PRESSURE: 58 MMHG | SYSTOLIC BLOOD PRESSURE: 127 MMHG | RESPIRATION RATE: 20 BRPM | OXYGEN SATURATION: 100 % | HEART RATE: 85 BPM

## 2020-01-25 LAB
ALBUMIN SERPL ELPH-MCNC: 3.6 G/DL — SIGNIFICANT CHANGE UP (ref 3.3–5)
ALP SERPL-CCNC: 178 U/L — HIGH (ref 40–120)
ALT FLD-CCNC: 15 U/L — SIGNIFICANT CHANGE UP (ref 10–45)
ANION GAP SERPL CALC-SCNC: 13 MMOL/L — SIGNIFICANT CHANGE UP (ref 5–17)
APPEARANCE UR: ABNORMAL
AST SERPL-CCNC: 18 U/L — SIGNIFICANT CHANGE UP (ref 10–40)
BACTERIA # UR AUTO: ABNORMAL
BASE EXCESS BLDV CALC-SCNC: 3.1 MMOL/L — HIGH (ref -2–2)
BASE EXCESS BLDV CALC-SCNC: 4.3 MMOL/L — HIGH (ref -2–2)
BASOPHILS # BLD AUTO: 0.02 K/UL — SIGNIFICANT CHANGE UP (ref 0–0.2)
BASOPHILS NFR BLD AUTO: 0.2 % — SIGNIFICANT CHANGE UP (ref 0–2)
BILIRUB SERPL-MCNC: 0.3 MG/DL — SIGNIFICANT CHANGE UP (ref 0.2–1.2)
BILIRUB UR-MCNC: NEGATIVE — SIGNIFICANT CHANGE UP
BUN SERPL-MCNC: 12 MG/DL — SIGNIFICANT CHANGE UP (ref 7–23)
CA-I SERPL-SCNC: 1.25 MMOL/L — SIGNIFICANT CHANGE UP (ref 1.12–1.3)
CA-I SERPL-SCNC: 1.27 MMOL/L — SIGNIFICANT CHANGE UP (ref 1.12–1.3)
CALCIUM SERPL-MCNC: 9.3 MG/DL — SIGNIFICANT CHANGE UP (ref 8.4–10.5)
CHLORIDE BLDV-SCNC: 104 MMOL/L — SIGNIFICANT CHANGE UP (ref 96–108)
CHLORIDE BLDV-SCNC: 107 MMOL/L — SIGNIFICANT CHANGE UP (ref 96–108)
CHLORIDE SERPL-SCNC: 102 MMOL/L — SIGNIFICANT CHANGE UP (ref 96–108)
CO2 BLDV-SCNC: 32 MMOL/L — HIGH (ref 22–30)
CO2 BLDV-SCNC: 32 MMOL/L — HIGH (ref 22–30)
CO2 SERPL-SCNC: 25 MMOL/L — SIGNIFICANT CHANGE UP (ref 22–31)
COLOR SPEC: SIGNIFICANT CHANGE UP
CREAT SERPL-MCNC: 0.64 MG/DL — SIGNIFICANT CHANGE UP (ref 0.5–1.3)
DIFF PNL FLD: ABNORMAL
EOSINOPHIL # BLD AUTO: 0.06 K/UL — SIGNIFICANT CHANGE UP (ref 0–0.5)
EOSINOPHIL NFR BLD AUTO: 0.7 % — SIGNIFICANT CHANGE UP (ref 0–6)
EPI CELLS # UR: 0 — SIGNIFICANT CHANGE UP
FLU A RESULT: SIGNIFICANT CHANGE UP
FLU A RESULT: SIGNIFICANT CHANGE UP
FLUAV AG NPH QL: SIGNIFICANT CHANGE UP
FLUBV AG NPH QL: SIGNIFICANT CHANGE UP
GAS PNL BLDV: 143 MMOL/L — SIGNIFICANT CHANGE UP (ref 135–145)
GAS PNL BLDV: 146 MMOL/L — HIGH (ref 135–145)
GAS PNL BLDV: SIGNIFICANT CHANGE UP
GLUCOSE BLDV-MCNC: 107 MG/DL — HIGH (ref 70–99)
GLUCOSE BLDV-MCNC: 122 MG/DL — HIGH (ref 70–99)
GLUCOSE SERPL-MCNC: 126 MG/DL — HIGH (ref 70–99)
GLUCOSE UR QL: NEGATIVE — SIGNIFICANT CHANGE UP
HCO3 BLDV-SCNC: 30 MMOL/L — HIGH (ref 21–29)
HCO3 BLDV-SCNC: 30 MMOL/L — HIGH (ref 21–29)
HCT VFR BLD CALC: 39 % — SIGNIFICANT CHANGE UP (ref 39–50)
HCT VFR BLDA CALC: 39 % — SIGNIFICANT CHANGE UP (ref 39–50)
HCT VFR BLDA CALC: 42 % — SIGNIFICANT CHANGE UP (ref 39–50)
HGB BLD CALC-MCNC: 12.8 G/DL — LOW (ref 13–17)
HGB BLD CALC-MCNC: 13.7 G/DL — SIGNIFICANT CHANGE UP (ref 13–17)
HGB BLD-MCNC: 12.8 G/DL — LOW (ref 13–17)
HYALINE CASTS # UR AUTO: 0 /LPF — SIGNIFICANT CHANGE UP (ref 0–7)
IMM GRANULOCYTES NFR BLD AUTO: 0.5 % — SIGNIFICANT CHANGE UP (ref 0–1.5)
KETONES UR-MCNC: NEGATIVE — SIGNIFICANT CHANGE UP
LACTATE BLDV-MCNC: 2 MMOL/L — SIGNIFICANT CHANGE UP (ref 0.7–2)
LACTATE BLDV-MCNC: 2.8 MMOL/L — HIGH (ref 0.7–2)
LEUKOCYTE ESTERASE UR-ACNC: ABNORMAL
LYMPHOCYTES # BLD AUTO: 2.81 K/UL — SIGNIFICANT CHANGE UP (ref 1–3.3)
LYMPHOCYTES # BLD AUTO: 33.6 % — SIGNIFICANT CHANGE UP (ref 13–44)
MCHC RBC-ENTMCNC: 31.1 PG — SIGNIFICANT CHANGE UP (ref 27–34)
MCHC RBC-ENTMCNC: 32.8 GM/DL — SIGNIFICANT CHANGE UP (ref 32–36)
MCV RBC AUTO: 94.9 FL — SIGNIFICANT CHANGE UP (ref 80–100)
MONOCYTES # BLD AUTO: 0.52 K/UL — SIGNIFICANT CHANGE UP (ref 0–0.9)
MONOCYTES NFR BLD AUTO: 6.2 % — SIGNIFICANT CHANGE UP (ref 2–14)
NEUTROPHILS # BLD AUTO: 4.92 K/UL — SIGNIFICANT CHANGE UP (ref 1.8–7.4)
NEUTROPHILS NFR BLD AUTO: 58.8 % — SIGNIFICANT CHANGE UP (ref 43–77)
NITRITE UR-MCNC: POSITIVE
NRBC # BLD: 0 /100 WBCS — SIGNIFICANT CHANGE UP (ref 0–0)
PCO2 BLDV: 54 MMHG — HIGH (ref 35–50)
PCO2 BLDV: 59 MMHG — HIGH (ref 35–50)
PH BLDV: 7.33 — LOW (ref 7.35–7.45)
PH BLDV: 7.37 — SIGNIFICANT CHANGE UP (ref 7.35–7.45)
PH UR: 6 — SIGNIFICANT CHANGE UP (ref 5–8)
PLATELET # BLD AUTO: 209 K/UL — SIGNIFICANT CHANGE UP (ref 150–400)
PO2 BLDV: 31 MMHG — SIGNIFICANT CHANGE UP (ref 25–45)
PO2 BLDV: 41 MMHG — SIGNIFICANT CHANGE UP (ref 25–45)
POTASSIUM BLDV-SCNC: 3.6 MMOL/L — SIGNIFICANT CHANGE UP (ref 3.5–5.3)
POTASSIUM BLDV-SCNC: 3.9 MMOL/L — SIGNIFICANT CHANGE UP (ref 3.5–5.3)
POTASSIUM SERPL-MCNC: 3.7 MMOL/L — SIGNIFICANT CHANGE UP (ref 3.5–5.3)
POTASSIUM SERPL-SCNC: 3.7 MMOL/L — SIGNIFICANT CHANGE UP (ref 3.5–5.3)
PROT SERPL-MCNC: 8.6 G/DL — HIGH (ref 6–8.3)
PROT UR-MCNC: SIGNIFICANT CHANGE UP
RBC # BLD: 4.11 M/UL — LOW (ref 4.2–5.8)
RBC # FLD: 12.9 % — SIGNIFICANT CHANGE UP (ref 10.3–14.5)
RBC CASTS # UR COMP ASSIST: 5 /HPF — HIGH (ref 0–4)
RSV RESULT: SIGNIFICANT CHANGE UP
RSV RNA RESP QL NAA+PROBE: SIGNIFICANT CHANGE UP
SAO2 % BLDV: 52 % — LOW (ref 67–88)
SAO2 % BLDV: 69 % — SIGNIFICANT CHANGE UP (ref 67–88)
SODIUM SERPL-SCNC: 140 MMOL/L — SIGNIFICANT CHANGE UP (ref 135–145)
SP GR SPEC: 1.01 — SIGNIFICANT CHANGE UP (ref 1.01–1.02)
TROPONIN T, HIGH SENSITIVITY RESULT: 19 NG/L — SIGNIFICANT CHANGE UP (ref 0–51)
UROBILINOGEN FLD QL: NEGATIVE — SIGNIFICANT CHANGE UP
WBC # BLD: 8.37 K/UL — SIGNIFICANT CHANGE UP (ref 3.8–10.5)
WBC # FLD AUTO: 8.37 K/UL — SIGNIFICANT CHANGE UP (ref 3.8–10.5)
WBC UR QL: ABNORMAL

## 2020-01-25 PROCEDURE — 71045 X-RAY EXAM CHEST 1 VIEW: CPT | Mod: 26

## 2020-01-25 PROCEDURE — 93005 ELECTROCARDIOGRAM TRACING: CPT | Mod: XU

## 2020-01-25 PROCEDURE — 87186 SC STD MICRODIL/AGAR DIL: CPT

## 2020-01-25 PROCEDURE — 83880 ASSAY OF NATRIURETIC PEPTIDE: CPT

## 2020-01-25 PROCEDURE — 71275 CT ANGIOGRAPHY CHEST: CPT

## 2020-01-25 PROCEDURE — 82947 ASSAY GLUCOSE BLOOD QUANT: CPT

## 2020-01-25 PROCEDURE — 82435 ASSAY OF BLOOD CHLORIDE: CPT

## 2020-01-25 PROCEDURE — 84295 ASSAY OF SERUM SODIUM: CPT

## 2020-01-25 PROCEDURE — 84484 ASSAY OF TROPONIN QUANT: CPT

## 2020-01-25 PROCEDURE — 71275 CT ANGIOGRAPHY CHEST: CPT | Mod: 26

## 2020-01-25 PROCEDURE — 83605 ASSAY OF LACTIC ACID: CPT

## 2020-01-25 PROCEDURE — 51701 INSERT BLADDER CATHETER: CPT

## 2020-01-25 PROCEDURE — 82330 ASSAY OF CALCIUM: CPT

## 2020-01-25 PROCEDURE — 80053 COMPREHEN METABOLIC PANEL: CPT

## 2020-01-25 PROCEDURE — 87086 URINE CULTURE/COLONY COUNT: CPT

## 2020-01-25 PROCEDURE — 85027 COMPLETE CBC AUTOMATED: CPT

## 2020-01-25 PROCEDURE — 85014 HEMATOCRIT: CPT

## 2020-01-25 PROCEDURE — 71045 X-RAY EXAM CHEST 1 VIEW: CPT

## 2020-01-25 PROCEDURE — 87631 RESP VIRUS 3-5 TARGETS: CPT

## 2020-01-25 PROCEDURE — 85379 FIBRIN DEGRADATION QUANT: CPT

## 2020-01-25 PROCEDURE — 99285 EMERGENCY DEPT VISIT HI MDM: CPT | Mod: GC

## 2020-01-25 PROCEDURE — 93010 ELECTROCARDIOGRAM REPORT: CPT | Mod: GC

## 2020-01-25 PROCEDURE — 81001 URINALYSIS AUTO W/SCOPE: CPT

## 2020-01-25 PROCEDURE — 82803 BLOOD GASES ANY COMBINATION: CPT

## 2020-01-25 PROCEDURE — 99284 EMERGENCY DEPT VISIT MOD MDM: CPT | Mod: 25

## 2020-01-25 PROCEDURE — 87040 BLOOD CULTURE FOR BACTERIA: CPT

## 2020-01-25 PROCEDURE — 96374 THER/PROPH/DIAG INJ IV PUSH: CPT | Mod: XU

## 2020-01-25 PROCEDURE — 84132 ASSAY OF SERUM POTASSIUM: CPT

## 2020-01-25 RX ORDER — SODIUM CHLORIDE 9 MG/ML
500 INJECTION INTRAMUSCULAR; INTRAVENOUS; SUBCUTANEOUS ONCE
Refills: 0 | Status: COMPLETED | OUTPATIENT
Start: 2020-01-25 | End: 2020-01-25

## 2020-01-25 RX ORDER — CEFTRIAXONE 500 MG/1
1000 INJECTION, POWDER, FOR SOLUTION INTRAMUSCULAR; INTRAVENOUS ONCE
Refills: 0 | Status: COMPLETED | OUTPATIENT
Start: 2020-01-25 | End: 2020-01-25

## 2020-01-25 RX ORDER — CEPHALEXIN 500 MG
500 CAPSULE ORAL ONCE
Refills: 0 | Status: DISCONTINUED | OUTPATIENT
Start: 2020-01-25 | End: 2020-01-25

## 2020-01-25 RX ORDER — CEPHALEXIN 500 MG
1 CAPSULE ORAL
Qty: 14 | Refills: 0
Start: 2020-01-25 | End: 2020-01-31

## 2020-01-25 RX ADMIN — CEFTRIAXONE 100 MILLIGRAM(S): 500 INJECTION, POWDER, FOR SOLUTION INTRAMUSCULAR; INTRAVENOUS at 15:23

## 2020-01-25 RX ADMIN — SODIUM CHLORIDE 500 MILLILITER(S): 9 INJECTION INTRAMUSCULAR; INTRAVENOUS; SUBCUTANEOUS at 12:00

## 2020-01-25 NOTE — ED ADULT NURSE REASSESSMENT NOTE - NS ED NURSE REASSESS COMMENT FT1
Patient is resting comfortably in bed. Family is at bedside. Awaiting dispo. Safety and comfort provided.

## 2020-01-25 NOTE — ED PROVIDER NOTE - NSFOLLOWUPINSTRUCTIONS_ED_ALL_ED_FT
Please take the antibiotic that you were prescribed, cephalexin (Keflex), one tablet, two times per day, for 7 days. Please take as indicated on your prescription with respect to the warnings. Please drink plenty of fluids and get lots of rest.     Please follow up with a primary care provider as soon as possible. If you do not have a primary care doctor, you can make an appointment with one at the following location.   [facility] [place]  Phone: [phone]  Fax: [phone]  Address: [address] [place] [address], [facility] [address], [place] [address]    Additionally, you may use the following web address to find a [facility] physician close to you: [email or web address]     Please return to the emergency department if you experience worsening symptoms, or if you develop headache, neck stiffness, fever/chills, changes in vision, chest pain, shortness of breath, difficulty breathing, palpitations, lightheadedness, weakness, dizziness, numbness, tingling, abdominal pain, nausea, vomiting, diarrhea, changes in your urine, blood in the urine, painful urination, syncope (passing out), or for any other concerns.

## 2020-01-25 NOTE — ED ADULT NURSE REASSESSMENT NOTE - NS ED NURSE REASSESS COMMENT FT1
Patient's IV is not patent, Dr. Hedrick notified and made aware. Requested for ultrasound guided IV.

## 2020-01-25 NOTE — ED PROVIDER NOTE - ATTENDING CONTRIBUTION TO CARE
Nemes - 80yo M, non-verbal, w/ dementia, accompanied by wife and daughter, w/ PMH of HIV (last VL<30 and CD4 356), HTN, BPH, previous hospitalization for E coli UTI/bacteremia, presenting from home w/ chief complaint of nasal congestion w/ yellow/green nasal discharge x 1 week, one episode of fever 2 days ago (Tmax 101 F), w intermittent dry cough. Family concerned about possible SOB and audible wheezing. VS wnl, well appearing, lungs clear, cardiac wnl, no JVD, no pedal edema. Abdomen soft/NT, no CVAT. Likely viral, will get labs, CXR, BNP/trops/D-dimer. Reevaluate

## 2020-01-25 NOTE — ED PROVIDER NOTE - RESPIRATORY, MLM
Breath sounds clear and equal bilaterally. Normal work of breathing, no tachypnea. No wheezes, rhonchi, or rales.

## 2020-01-25 NOTE — ED ADULT NURSE NOTE - NSIMPLEMENTINTERV_GEN_ALL_ED
Implemented All Fall Risk Interventions:  [place] to call system. Call bell, personal items and telephone within reach. Instruct patient to call for assistance. Room bathroom lighting operational. Non-slip footwear when patient is off stretcher. Physically safe environment: no spills, clutter or unnecessary equipment. Stretcher in lowest position, wheels locked, appropriate side rails in place. Provide visual cue, wrist band, yellow gown, etc. Monitor gait and stability. Monitor for mental status changes and reorient to person, place, and time. Review medications for side effects contributing to fall risk. Reinforce activity limits and safety measures with patient and family.

## 2020-01-25 NOTE — ED PROVIDER NOTE - ENMT, MLM
Airway patent, Nares w/ green/yellow mucus BL. Mouth with normal mucosa. Throat has no vesicles, no oropharyngeal exudates and uvula is midline.

## 2020-01-25 NOTE — ED PROVIDER NOTE - CLINICAL SUMMARY MEDICAL DECISION MAKING FREE TEXT BOX
78 yo non-verbal M, w/ PMH of HIV (last viral load <30, CD4 356), previous UTI c/b bacteremia, presenting from home d/t nasal congestion w/ green/yellow mucus x 1 week, intermittent dry cough, and one episode of fever 2 days ago, which self-resolved. Patient otherwise behaving at baseline. Appears in no acute distress in room, w/ normal vital signs. Lungs CTAB. Likely viral illness, but given HIV history, poor historian, and recorded fever 2 days ago, will perform septic workup including catheterized UA and culture, blood culture, lactate, labs, cxr. Reassess.

## 2020-01-25 NOTE — ED PROVIDER NOTE - OBJECTIVE STATEMENT
80 yo non-verbal M w/ dementia, accompanied by wife and daughter, w/ PMH of HIV (last VL<30 and CD4 356) c/b HIV related dementia, Parkinson's Disease?, HTN, BPH, previous hospitalization for E coli UTI/bacteremia, presenting from home w/ chief complaint of nasal congestion w/ yellow/green nasal discharge x 1 week, one episode of fever 2 days ago (Tmax 101 F), which has since resolved, and mild, intermittent dry cough. Patient has otherwise been behaving at his baseline, eating and drinking normally, having normal urine and bowel movements, and family denies any other complaints.

## 2020-01-25 NOTE — ED PROVIDER NOTE - CARE PLAN
Principal Discharge DX:	Upper respiratory tract infection, unspecified type Principal Discharge DX:	Upper respiratory tract infection, unspecified type  Secondary Diagnosis:	UTI (urinary tract infection)

## 2020-01-25 NOTE — ED ADULT NURSE REASSESSMENT NOTE - NS ED NURSE REASSESS COMMENT FT1
2 RNs at bedside maintaining sterile technique for straight catheterization. 300 mls of clear yellow urine obtained. Will send to lab.

## 2020-01-25 NOTE — ED ADULT NURSE NOTE - OBJECTIVE STATEMENT
80 yo male presents to the ED via waiting room from home complaining of SOB and wheezing x 9 days. Patient is nonverbal due to Alzheimer's, family is at bedside stating that they've noticed patient has been taking deep breaths "and making a noise" to take a deep breathe in after coughing episodes. Patient states he has been exhibiting cold like symptoms such as a runny nose, coughing with green mucus although is nonproductive at this time and intermittent. Per family, states patient has had a fever once of 101 oral. Administered tylenol with relief. PMH of HIV+, Alzheimer's, Parkinsons, and HTN. Patient appears well, no use of accessory muscles noted to breathe, and is resting comfortably in bed. 78 yo male presents to the ED via waiting room from home complaining of SOB and wheezing x 9 days. Patient is nonverbal due to Alzheimer's, family is at bedside stating that they've noticed patient has been taking deep breaths "and making a noise" to take a deep breathe in after coughing episodes. Patient family states he has been exhibiting cold like symptoms such as a runny nose, coughing with green mucus although is nonproductive at this time and intermittent. Per family, states patient has had a fever once of 101 oral. Administered tylenol with relief. Primarily bed bound, Has home health aide 5 days a week 8 hours a day, on soft mechanical diet at home and crushes pils. PMH of HIV+, Alzheimer's, Parkinsons, and HTN. Patient appears well, no use of accessory muscles noted to breathe, and is resting comfortably in bed.

## 2020-01-27 NOTE — ED POST DISCHARGE NOTE - OTHER COMMUNICATION
1/27/2020: Prelim ucx as above. Pt discharged on Keflex for UTI. Will await final sensitivities to determine need for contact vs appropriate care given. - Dyllan Sy PA-C

## 2020-01-27 NOTE — ED POST DISCHARGE NOTE - ADDITIONAL DOCUMENTATION
Patient discharged on keflex.  Culture pansensitive to cephalosporins. Appropriate care provided.  -Lalo Martin PA-C

## 2020-01-30 LAB
CULTURE RESULTS: SIGNIFICANT CHANGE UP
SPECIMEN SOURCE: SIGNIFICANT CHANGE UP

## 2020-02-06 ENCOUNTER — APPOINTMENT (OUTPATIENT)
Dept: UROLOGY | Facility: CLINIC | Age: 80
End: 2020-02-06
Payer: MEDICARE

## 2020-02-06 VITALS
SYSTOLIC BLOOD PRESSURE: 141 MMHG | HEIGHT: 72 IN | DIASTOLIC BLOOD PRESSURE: 69 MMHG | RESPIRATION RATE: 16 BRPM | WEIGHT: 138 LBS | BODY MASS INDEX: 18.69 KG/M2 | HEART RATE: 90 BPM

## 2020-02-06 PROCEDURE — 99213 OFFICE O/P EST LOW 20 MIN: CPT

## 2020-02-06 NOTE — ASSESSMENT
[FreeTextEntry1] : \par \par \par Impression/Plan: 80 yo gentleman with HIV, dementia presents with wife and dtr - doing well on Flomax 0.8 mg and finasteride.He does not do CIC and is Floridly incontinent requiring depends x 4-5 per day and 1 at night.PMH :Recent ED visit for Respiratory ss and treated with Keflex for UTI . PMH of UR with [facility] in the past. He is non-communicative, history taken from wife and dtr . Fluids : 32 oz of water , ensure and 20 oz of Gatorade , apple juice 8 oz.LAST PSA was 0.24 in Feb 2019 He has been on Flomax 0.8 mg po daily and Finasteride 5 mg po daily.\par PVR 83  ml .\par 1.Continue Flomax 0.8  mg po daily.\par 2.Continue Finasteride 5  mg po daily.\par 3.RTO in 6 months.

## 2020-02-06 NOTE — HISTORY OF PRESENT ILLNESS
[FreeTextEntry1] : 78 yo gentleman with HIV, dementia presents with wife and dtr - doing well on Flomax 0.8 mg and finasteride.He does not do CIC and is Floridly incontinent requiring depends x 4-5 per day and 1 at night.PMH :Recent ED visit for Respiratory ss and treated with Keflex for UTI . PMH of UR with Wong in the past. He is non-communicative, history taken from wife and dtr . Fluids : 32 oz of water , ensure and 20 oz of Gatorade , apple juice 8 oz.LAST PSA was 0.24 in Feb 2019 He has been on Flomax 0.8 mg po daily and Finasteride 5 mg po daily.\par \par PVR 83  ml .

## 2020-02-27 ENCOUNTER — APPOINTMENT (OUTPATIENT)
Dept: INFECTIOUS DISEASE | Facility: CLINIC | Age: 80
End: 2020-02-27
Payer: MEDICARE

## 2020-02-27 VITALS — DIASTOLIC BLOOD PRESSURE: 87 MMHG | HEART RATE: 86 BPM | SYSTOLIC BLOOD PRESSURE: 130 MMHG

## 2020-02-27 DIAGNOSIS — R05 COUGH: ICD-10-CM

## 2020-02-27 DIAGNOSIS — Z01.00 ENCOUNTER FOR EXAMINATION OF EYES AND VISION W/OUT ABNORMAL FINDINGS: ICD-10-CM

## 2020-02-27 LAB
ALBUMIN SERPL ELPH-MCNC: 3.9 G/DL
ALP BLD-CCNC: 185 U/L
ALT SERPL-CCNC: 9 U/L
ANION GAP SERPL CALC-SCNC: 12 MMOL/L
AST SERPL-CCNC: 15 U/L
BASOPHILS # BLD AUTO: 0.02 K/UL
BASOPHILS NFR BLD AUTO: 0.3 %
BILIRUB SERPL-MCNC: 0.3 MG/DL
BUN SERPL-MCNC: 14 MG/DL
CALCIUM SERPL-MCNC: 9.3 MG/DL
CD3 CELLS # BLD: 1756 /UL
CD3 CELLS NFR BLD: 76 %
CD3+CD4+ CELLS # BLD: 304 /UL
CD3+CD4+ CELLS NFR BLD: 13 %
CD3+CD4+ CELLS/CD3+CD8+ CLL SPEC: 0.21 RATIO
CD3+CD8+ CELLS # SPEC: 1436 /UL
CD3+CD8+ CELLS NFR BLD: 62 %
CHLORIDE SERPL-SCNC: 103 MMOL/L
CO2 SERPL-SCNC: 27 MMOL/L
CREAT SERPL-MCNC: 0.82 MG/DL
EOSINOPHIL # BLD AUTO: 0.02 K/UL
EOSINOPHIL NFR BLD AUTO: 0.3 %
GLUCOSE SERPL-MCNC: 154 MG/DL
HCT VFR BLD CALC: 37.9 %
HGB BLD-MCNC: 12.9 G/DL
IMM GRANULOCYTES NFR BLD AUTO: 0.3 %
LYMPHOCYTES # BLD AUTO: 2.71 K/UL
LYMPHOCYTES NFR BLD AUTO: 43.4 %
MAN DIFF?: NORMAL
MCHC RBC-ENTMCNC: 32.2 PG
MCHC RBC-ENTMCNC: 34 GM/DL
MCV RBC AUTO: 94.5 FL
MONOCYTES # BLD AUTO: 0.6 K/UL
MONOCYTES NFR BLD AUTO: 9.6 %
NEUTROPHILS # BLD AUTO: 2.88 K/UL
NEUTROPHILS NFR BLD AUTO: 46.1 %
PLATELET # BLD AUTO: 264 K/UL
POTASSIUM SERPL-SCNC: 4 MMOL/L
PROT SERPL-MCNC: 8 G/DL
RBC # BLD: 4.01 M/UL
RBC # FLD: 13.7 %
SODIUM SERPL-SCNC: 143 MMOL/L
WBC # FLD AUTO: 6.25 K/UL

## 2020-02-27 PROCEDURE — 99214 OFFICE O/P EST MOD 30 MIN: CPT

## 2020-02-27 NOTE — DATA REVIEWED
[FreeTextEntry1] : < from: CT Angio Chest w/ IV Cont (01.25.20 @ 13:52) >  Lungs, Pleura, and Airways: There is no pulmonary embolus. Right apical 3 mm nodule on image 46 is stable. New tiny focus of groundglass opacity in the right upper lobe may reflect focal atelectasis or airway inflammation. No dominant consolidations identified.  Visualized Abdomen: Unremarkable.  Bones and soft tissues: Unremarkable.  IMPRESSION:  No pulmonary embolus.    < end of copied text >   Culture - Urine (01.25.20 @ 17:04)   -  Tobramycin: S <=4   -  Trimethoprim/Sulfamethoxazole: S <=2/38   -  Imipenem: S <=1   -  Levofloxacin: R >4   -  Meropenem: S <=1   -  Gentamicin: S <=4   -  Nitrofurantoin: S <=32 Should not be used to treat pyelonephritis   -  Piperacillin/Tazobactam: S <=16   -  Tigecycline: S <=2   -  Ampicillin: R >16 These ampicillin results predict results for amoxicillin   -  Ampicillin/Sulbactam: R >16/8 Enterobacter, Citrobacter, and Serratia may develop resistance during prolonged therapy (3-4 days)   -  Amikacin: S <=16   -  Ciprofloxacin: R >2   -  Ceftriaxone: S <=1 Enterobacter, Citrobacter, and Serratia may develop resistance during prolonged therapy   -  Cefoxitin: S <=8   -  Aztreonam: S <=4   -  Cefepime: S <=4   -  Cefazolin: S 16 (MIC_CL_COM_ENTERIC_CEFAZU) For uncomplicated UTI with K. pneumoniae, E. coli, or P. mirablis: JOSUE <=16 is sensitive and JOSUE >=32 is resistant. This also predicts results for oral agents cefaclor, cefdinir, cefpodoxime, cefprozil, cefuroxime axetil, cephalexin and locarbef for uncomplicated UTI. Note that some isolates may be susceptible to these agents while testing resistant to cefazolin.   Specimen Source: .Urine Catheterized   Culture Results:  >100,000 CFU/ml Escherichia coli   Organism Identification: Escherichia coli   Organism: Escherichia coli   Method Type: JOSUE  Culture - Blood (01.25.20 @ 12:20)   Specimen Source: .Blood Blood-Peripheral   Culture Results:  No growth at 5 days.

## 2020-02-27 NOTE — PHYSICAL EXAM
[General Appearance - Well Nourished] : well nourished [General Appearance - In No Acute Distress] : in no acute distress [Sclera] : the sclera and conjunctiva were normal [PERRL With Normal Accommodation] : pupils were equal in size, round, reactive to light [Outer Ear] : the ears and nose were normal in appearance [Extraocular Movements] : extraocular movements were intact [Hearing Threshold Finger Rub Not [place]] : hearing was normal [Examination Of The Oral Cavity] : the lips and gums were normal [Neck Appearance] : the appearance of the neck was normal [Neck Cervical Mass (___cm)] : no neck mass was observed [Thyroid Diffuse Enlargement] : the thyroid was not enlarged [Exaggerated Use Of Accessory Muscles For Inspiration] : no accessory muscle use [Heart Rate And Rhythm] : heart rate was normal and rhythm regular [Murmurs] : no murmurs [Auscultation Breath Sounds / Voice Sounds] : lungs were clear to auscultation bilaterally [Heart Sounds Pericardial Friction Rub] : no pericardial rub [Full Pulse] : the pedal pulses are present [Abdomen Soft] : soft [Bowel Sounds] : normal bowel sounds [Abdomen Tenderness] : non-tender [Abdomen Mass (___ Cm)] : no abdominal mass palpated [Musculoskeletal - Swelling] : no joint swelling [Nail Clubbing] : no clubbing  or cyanosis of the fingernails [Range of Motion to Joints] : range of motion to joints [] : no rash [Skin Color & Pigmentation] : normal skin color and pigmentation [Skin Lesions] : no skin lesions [No Focal Deficits] : no focal deficits [FreeTextEntry1] : minimal response to stimuli and verbal

## 2020-02-27 NOTE — ASSESSMENT
[FreeTextEntry1] : \par \par Cough\par No s/s LRTI\par URI\par have advised symptomatic care\par If no improvement in 5-7 adys may need CXR vs empiric abx\par explained to wofe\par asked to contact us if occurs\par \par \par Urinary incontinence,UTI\par s/p RX\par Cxa s above\par Advised about s/s UTI-asked wife to contact us if occur\par \par HIV/AIDS\par Continue ART as detailed above -will check labs.\par CD4 was low but VL was undetectable\par Encouraged adherence-pt is taking emds regularly per wife \par \par Dementia/tremor\par Parkinson \par Following up with neuro\par \par \par HTN continue current meds\par will defer to primary \par \par \par \par \par To follow in HIV clinic in 4-6 m s-wife asked to contact us earlier  if any issues.

## 2020-02-27 NOTE — REVIEW OF SYSTEMS
[Fever] : no fever [Difficulty Sleeping] : no difficulty sleeping [Chills] : no chills [Body Aches] : no body aches [Feeling Tired] : not feeling tired [Feeling Sick] : not feeling sick [Recent Weight Gain (___ Lbs)] : no recent weight gain [Normal Appetite] : normal appetite [Eye Pain] : no eye pain [Red Eyes] : eyes not red [Earache] : no earache [Nasal Discharge] : no nasal discharge [Hoarseness] : no hoarseness [Sore Throat] : no sore throat [Leg Claudication] : no intermittent leg claudication [Chest Pain] : no chest pain [Palpitations] : no palpitations [Wheezing] : no wheezing [Shortness Of Breath] : no shortness of breath [Cough] : no cough [Sputum] : not coughing up ~M sputum [Pleuritic Chest Pain] : no pleuritic chest pain [SOB on Exertion] : no shortness of breath during exertion [Abdominal Pain] : no abdominal pain [Vomiting] : no vomiting [Diarrhea] : no diarrhea [Constipation] : no constipation [Joint Pain] : no joint pain [Dysuria] : no dysuria [Joint Swelling] : no joint swelling [Limb Pain] : no limb pain [Skin Lesions] : no skin lesions [Joint Stiffness] : no joint stiffness [As Noted in HPI] : as noted in HPI [Confused] : no confusion [Suicidal] : not suicidal [FreeTextEntry2] : all ROS per wife

## 2020-02-27 NOTE — HISTORY OF PRESENT ILLNESS
[FreeTextEntry1] : Here for follow up.\par recently was in ED last month because of cough\par He was diagnosed with UTI and given keflex course\par Unable to volunteer sympyoms\par Got better\par For alst week or so again has had some rhinorrhea and coughing\par No fevers\par eating well\par No change in food habits

## 2020-03-03 LAB
HIV1 RNA # SERPL NAA+PROBE: NORMAL
HIV1 RNA # SERPL NAA+PROBE: NORMAL COPIES/ML
VIRAL LOAD INTERP: NORMAL
VIRAL LOAD LOG: NORMAL LG COP/ML

## 2020-03-08 ENCOUNTER — FORM ENCOUNTER (OUTPATIENT)
Age: 80
End: 2020-03-08

## 2020-03-09 ENCOUNTER — APPOINTMENT (OUTPATIENT)
Dept: RADIOLOGY | Facility: IMAGING CENTER | Age: 80
End: 2020-03-09
Payer: MEDICARE

## 2020-03-09 ENCOUNTER — OUTPATIENT (OUTPATIENT)
Dept: OUTPATIENT SERVICES | Facility: HOSPITAL | Age: 80
LOS: 1 days | End: 2020-03-09
Payer: MEDICARE

## 2020-03-09 DIAGNOSIS — B20 HUMAN IMMUNODEFICIENCY VIRUS [HIV] DISEASE: ICD-10-CM

## 2020-03-09 PROCEDURE — 71046 X-RAY EXAM CHEST 2 VIEWS: CPT | Mod: 26

## 2020-03-09 PROCEDURE — 71046 X-RAY EXAM CHEST 2 VIEWS: CPT

## 2020-04-27 ENCOUNTER — RX RENEWAL (OUTPATIENT)
Age: 80
End: 2020-04-27

## 2020-05-06 ENCOUNTER — APPOINTMENT (OUTPATIENT)
Dept: NEUROLOGY | Facility: CLINIC | Age: 80
End: 2020-05-06
Payer: MEDICARE

## 2020-05-06 DIAGNOSIS — R29.898 OTHER SYMPTOMS AND SIGNS INVOLVING THE MUSCULOSKELETAL SYSTEM: ICD-10-CM

## 2020-05-06 DIAGNOSIS — R45.1 RESTLESSNESS AND AGITATION: ICD-10-CM

## 2020-05-06 PROCEDURE — 99214 OFFICE O/P EST MOD 30 MIN: CPT | Mod: 95

## 2020-05-06 NOTE — DATA REVIEWED
[de-identified] : Gliosis  and  volume  loss  involving  the  anterior  temporal  lobes.\par \par Moderate  dilatation  of  the  right  temporal  and  occipital  horns,  and  mild\par dilatation  of  the  remaining  ventricular  system.  This  may  be  on  the  basis  of\par atrophy.  Normal  pressure  hydrocephalus  is  not  excluded.\par \par No  acute  intracranial  hemorrhage  or  evidence  of  acute  infarct.\par \par Mild  to  moderate  white  matter  microvascular  ischemic  disease.\par \par No  abnormal  parenchymal  or  leptomeningeal  enhancement.\par \par  [de-identified] : CTH: No  mass  effect,  acute  hemorrhage,  midline  shift,  or  evidence  of\par acute  territorial  infarct.  Interval  development  of  cerebral  atrophy  since\par 2006,  with  ventriculomegaly  out  of  proportion  to  the  degree  of  cerebral\par volume  loss,  which  may  suggest  normal  pressure  hydrocephalus.\par

## 2020-05-06 NOTE — REASON FOR VISIT
[Follow-Up: _____] : a [unfilled] follow-up visit [Spouse] : spouse [Other: _____] : [unfilled] [FreeTextEntry1] : Dementia, Parkinsonism, HIV.

## 2020-05-06 NOTE — ASSESSMENT
[FreeTextEntry1] : Assessment:\par 78yo RH AAM, with HIV and dementia, parkinsonism (bradykinesia, rigidity). MRI brain suggests profound atrophy with has a vascular component, but also degeneration on idiopathic base and possibly inscribed into the HIV pathology. Significant parkinsonism, worsening since last visit. \par Behavior is fine.\par Completely dependent on family.\par \par Recent increase of bradykinesia and LUE weakness now in triple flexion (2 months).\par \par Impression:\par Mixed dementia, HIV-related neurodegeneration\par Parkinsonism\par Possible R MCA stroke 2 months ago.\par \par Plan:\par -would try to slowly increase Sinemet to help with movements and caregivers\par -will monitor VS, appetite and night jerks\par -rest stays the same\par -encourage to have him walk\par -get CT head soon to make sure there was not stroke or ICH.\par \par A thorough discussion was entertained with the patient/caregiver regarding the use of psychoactive medications, their possible benefits and AE profile, including the risk of cardiovascular complications.\par We discussed the benefits of being active, physically and mentally, and the need to to establish a routine in this respect.\par Driving abilities and firearms possession and use were discussed, in relation to progression of the cognitive decline, and the need to assess them periodically.\par Patient/caregiver understand and agree with the plan.\par

## 2020-05-06 NOTE — HISTORY OF PRESENT ILLNESS
[Home] : at home, [unfilled] , at the time of the visit. [Other Location: e.g. Home (Enter Location, City,State)___] : at [unfilled] [Spouse] : spouse [Other:____] : [unfilled] [FreeTextEntry2] : Wife [FreeTextEntry1] : HPI-interval Hx [id number] VISIT.\par Pt had been less motile recently, and per wife his LUE contracted for 2 months now, and we can see it is in triple flexion. Her can stand but needs support constantly. He tends to be hunched fwd at all times. \par Started on low dose Sinemet, well tolerated. \par Has some myoclonus at night, not waking up.\par Appetite and sleep are ok.\par \par He was at [facility] ER in Jan 2020:\par Chief Complaint: Sob wheezing cough congestion.\par Impression:\par Principal Discharge Dx Upper respiratory tract infection, unspecified type.\par Secondary Discharge Dx UTI (urinary tract infection).\par Medical Decision Making:\par - Physician E/M Selection [id number] Comprehensive\par - The following orders were submitted: Labs, Imaging Studies\par - Clinical Summary (MDM): Summarize the clinical encounter 80 yo non-verbal M,\par w/ PMH of HIV (last viral load <30, CD4 356), previous UTI c/b bacteremia,\par presenting from home d/t nasal congestion w/ green/yellow mucus x 1 week,\par intermittent dry cough, and one episode of fever 2 days ago, which\par self-resolved. Patient otherwise behaving at baseline. Appears in no acute\par distress in room, w/ normal vital signs. Lungs CTAB. Likely viral illness, but\par given HIV history, poor historian, and recorded fever 2 days ago, will perform\par septic workup including catheterized UA and culture, blood culture, lactate,\par labs, cxr. Reassess.\par - Follow-up Instructions (will be supplied to the patient only if discharged) \par Please take the antibiotic that you were prescribed, cephalexin (Keflex), one\par tablet, two times per day, for 7 days. Please take as indicated on your\par prescription with respect to the warnings. Please drink plenty of fluids and\par get lots of rest.\par DDimer 1028. CXR clear.\par \par HPI-interval Hx [id number]:\par Pt has been stable cognitively, but since last visit he has developed more parkinsonism, more bilateral LE stiffness and hesitation, very bradykinetic. He can barely walk assisted, walks in very short steps, shuffling, R>L.\par Appetite and sleep are fine. \par HIV stable.\par \par HPI-interval Hx [id number]:\par pt has tried Ropinirole, but had diarrhea, medication dc.\par He has been stable otherwise.\par He has developed postural leaning to the L, with R-convex dorsal scoliosis, slower gait, and tends to be more rigid.\par No tremors anymore.\par Eats and sleeps well, weight has been stable, though per wife maybe he lost a few lbs.\par \par ID: stable.\par \par HPI-interval Hx [id number]:\par pt had 2 occurrences of UTI in the last 2 months, with ABX tx. Suffered a bit of a setback since.\par Parkinsonism has worsened a bit, he is slower and more rigid.\par Mental status is stable, he speaks very little, can barely say wife's name. He mostly says "yeah".\par Can walk if assisted to stand, and then proceeds alone, with small shuffling steps, very cautioned. Cannot turn by himself. Per wife, he has sporadic resting tremor, not observed today.\par \par HPI-interval Hx [id number]:\par Pt has had some medical issues lately, PNA and diarrhea, remitted. Has lost some weight, not much, regaining.\par Sleeping and eating well.\par Stopped Olanzapine a few weeks ago when he was sick.\par Has started losing urine more often and spitting.\par Otherwise stable. In particular, no falls, moves well, no worsening of parkinsonism.\par \par HPI-interval Hx [id number]:\par Pt stable. per daughter, he has been calm, well behaved, sleeping well.\par He has very good appetite, and has gained a few pounds since 6 months ago, with the help of a nutritionist.\par HIV stable, well controlled, compliant on meds.\par EEG in Feb 2017 was negative for seizures. LOC event likely syncopal, due to dehydration.\par \par HPI-interval Hx [id number]:\par pt stable, well behaved, sleeps well, well manageable. He dresses and eats by himself.\par Had recent admission to ER for dehydration. EEG done 2/16, pending report, to r/o seizures, unlikely. \par per wife, he is a bit slower in movements and gait, but has no tremors. \par \par HPI-Interval Hx 11/9/2016:\par pt stable, sleeps well, not agitated. Still on Aricept 5mg, not increased bc daughter thought his recent hands pain may be related to the medication. Good appetite, no weight changes per family. \par \par 8/8/2016 HPI-Interval Hx: MRI brain wo/w darrin done, showing diffuse atrophy, with expansion of the ventricles, not NPH but core atrophy, and diffuse periventricular WM disease, reminiscent of gliosis. Doing ok on Zyprexa. \par \par PMH:76yo RH AAM, with hx of HIV since 2002, w/o any infections, currently with 182 CD4, and PJP Px.\par Also, HTN, HLD. \par Former , retired. \par For 3 years, pt has started to show short memory deficits, losing things, slowly progressed to this spring, when he was hospitalized to [facility] for possible TB (r/o). Since then, wife reports a steeper decline. She also reports bizarre behavior. \par He was recently started on Zyprexa 2.5mg for sporadic agitation.\par He sleeps ok at night, at times naps at day.\par Loss of interests. ADL ok, though limited, IADL very limited. \par Recent CTh: No  mass  effect,  acute  hemorrhage,  midline  shift,  or  evidence  of\par acute  territorial  infarct.  Interval  development  of  cerebral  atrophy  since\par 2006,  with  ventriculomegaly  out  of  proportion  to  the  degree  of  cerebral\par volume  loss,  which  may  suggest  normal  pressure  hydrocephalus.\par No gait issues, nor incontinence.

## 2020-05-06 NOTE — PHYSICAL EXAM
[General Appearance - Alert] : alert [Involuntary Movements] : no involuntary movements were seen [Motor Handedness Right-Handed] : the patient is right hand dominant [Sensation Tactile Decrease] : light touch was intact [General Appearance - In No Acute Distress] : in no acute distress [Cranial Nerves Oculomotor (III)] : extraocular motion intact [Cranial Nerves Facial (VII)] : face symmetrical [Extraocular Movements] : extraocular movements were intact [Outer Ear] : the ears and nose were normal in appearance [Neck Appearance] : the appearance of the neck was normal [Edema] : there was no peripheral edema [Skin Color & Pigmentation] : normal skin color and pigmentation [] : no rash [FreeTextEntry1] : EXAM LIMITED BY TEB SETTINGS.\par Pt with severe dementia, unable to talk. [Person] : disoriented to person [Place] : disoriented to place [Time] : disoriented to time [Short Term Intact] : short term memory impaired [Remote Intact] : remote memory impaired [Registration Intact] : recent registration memory impaired [Span Intact] : the attention span was decreased [Concentration Intact] : a decrease in concentrating ability was observed [Visual Intact] : visual attention was ~T ~L decreased [Naming Objects] : difficulty naming common objects [Repeating Phrases] : difficulty repeating a phrase [Writing A Sentence] : difficulty writing a sentence [Fluency] : fluency not intact [Comprehension] : comprehension not intact [Reading] : difficulty reading [Current Events] : inadequate knowledge of current events [Past History] : inadequate knowledge of personal past history [Vocabulary] : inadequate range of vocabulary [Paresis Pronator Drift Right-Sided] : no pronator drift on the right [Paresis Pronator Drift Left-Sided] : no pronator drift on the left [Motor Strength Upper Extremities Bilaterally] : strength was normal in both upper extremities [Motor Strength Lower Extremities Bilaterally] : strength was normal in both lower extremities [Limited Balance] : balance was intact [Past-pointing] : there was no past-pointing [Tremor] : no tremor present [Dysdiadochokinesia Bilaterally] : not present [Coordination - Dysmetria Impaired Finger-to-Nose Bilateral] : not present [Coordination - Dysmetria Impaired Heel-to-Shin Bilateral] : not present [Plantar Reflex Right Only] : normal on the right [Plantar Reflex Left Only] : normal on the left [FreeTextEntry4] : Exam very limited. Cannot talk, cannot obeys any commands. \par Complies with minimal exam instructions, e.g. standing with support.  [FreeTextEntry9] : Unable to stand, LUE in triple flexion. Able to stand only if sustained by 2 people, leans fwd.

## 2020-05-13 ENCOUNTER — APPOINTMENT (OUTPATIENT)
Dept: CT IMAGING | Facility: IMAGING CENTER | Age: 80
End: 2020-05-13
Payer: MEDICARE

## 2020-05-13 ENCOUNTER — OUTPATIENT (OUTPATIENT)
Dept: OUTPATIENT SERVICES | Facility: HOSPITAL | Age: 80
LOS: 1 days | End: 2020-05-13
Payer: MEDICARE

## 2020-05-13 DIAGNOSIS — R29.898 OTHER SYMPTOMS AND SIGNS INVOLVING THE MUSCULOSKELETAL SYSTEM: ICD-10-CM

## 2020-05-13 DIAGNOSIS — R45.1 RESTLESSNESS AND AGITATION: ICD-10-CM

## 2020-05-13 PROCEDURE — 70450 CT HEAD/BRAIN W/O DYE: CPT

## 2020-05-13 PROCEDURE — 70450 CT HEAD/BRAIN W/O DYE: CPT | Mod: 26

## 2020-08-25 ENCOUNTER — TRANSCRIPTION ENCOUNTER (OUTPATIENT)
Age: 80
End: 2020-08-25

## 2020-08-25 ENCOUNTER — APPOINTMENT (OUTPATIENT)
Dept: UROLOGY | Facility: CLINIC | Age: 80
End: 2020-08-25
Payer: MEDICARE

## 2020-08-25 PROCEDURE — 99213 OFFICE O/P EST LOW 20 MIN: CPT | Mod: 95

## 2020-08-25 NOTE — HISTORY OF PRESENT ILLNESS
[Home] : at home, [unfilled] , at the time of the visit. [Medical Office: ([facility])___] : at the medical office located in  [Verbal consent obtained from patient] : the patient, [unfilled] [FreeTextEntry1] : 80 yo gentleman with HIV, dementia presents with wife and dtr - doing well on Flomax 0.8 mg and finasteride. He does not do CIC and is [place] incontinent requiring depends x 4-5 per day and 1 at night. Recently diagnosed with PD, on med tx. He is non-communicative, history taken from wife and dtr. \par \par PVR - 83 ml (last visit). \par

## 2020-08-25 NOTE — ASSESSMENT
[FreeTextEntry1] : \par \par Impression/plan: 78 yo gentleman with HIV, dementia presents with wife and dtr - doing well on Flomax 0.8 mg and finasteride. He does not do CIC and is [place] incontinent requiring depends x 4-5 per day and 1 at night. Recently diagnosed with PD, on med tx. He is non-communicative, history taken from wife and dtr. \par \par 1. Cont. Flomax and Proscar. \par 2. F/u in 6 months.

## 2020-08-25 NOTE — PHYSICAL EXAM
[General Appearance - Well Developed] : well developed [General Appearance - Well Nourished] : well nourished [Normal Appearance] : normal appearance [Well Groomed] : well groomed [General Appearance - In No Acute Distress] : no acute distress [] : no respiratory distress [Exaggerated Use Of Accessory Muscles For Inspiration] : no accessory muscle use [Respiration, Rhythm And Depth] : normal respiratory rhythm and effort

## 2020-09-14 ENCOUNTER — APPOINTMENT (OUTPATIENT)
Dept: UROLOGY | Facility: CLINIC | Age: 80
End: 2020-09-14

## 2020-09-16 ENCOUNTER — FORM ENCOUNTER (OUTPATIENT)
Age: 80
End: 2020-09-16

## 2020-09-17 ENCOUNTER — APPOINTMENT (OUTPATIENT)
Dept: INFECTIOUS DISEASE | Facility: CLINIC | Age: 80
End: 2020-09-17
Payer: MEDICARE

## 2020-09-17 VITALS
HEIGHT: 72 IN | DIASTOLIC BLOOD PRESSURE: 62 MMHG | HEART RATE: 73 BPM | TEMPERATURE: 96.2 F | OXYGEN SATURATION: 100 % | SYSTOLIC BLOOD PRESSURE: 107 MMHG

## 2020-09-17 DIAGNOSIS — Z01.20 ENCOUNTER FOR DENTAL EXAMINATION AND CLEANING W/OUT ABNORMAL FINDINGS: ICD-10-CM

## 2020-09-17 DIAGNOSIS — Z23 ENCOUNTER FOR IMMUNIZATION: ICD-10-CM

## 2020-09-17 PROCEDURE — 99214 OFFICE O/P EST MOD 30 MIN: CPT | Mod: 25

## 2020-09-17 PROCEDURE — G0008: CPT

## 2020-09-17 PROCEDURE — 90688 IIV4 VACCINE SPLT 0.5 ML IM: CPT

## 2020-09-17 NOTE — ASSESSMENT
[FreeTextEntry1] : HIV/AIDS\par Continue ART as detailed above -will check labs.\par CD4 was low but VL was undetectable\par Encouraged adherence-pt is taking emds regularly per wife \par \par Dementia/tremor\par Parkinson \par Following up with neuro\par getting home bound\par given his status reasonable to do tele visits-they are tring to arrange home MD resendiz\par Will defer to primary on other plan\par \par \par HTN continue current meds\par will defer to primary \par \par \par \par \par To follow in HIV clinic possibly via tele health  in 4-6 m s-wife asked to contact us earlier  if any issues.

## 2020-09-17 NOTE — PHYSICAL EXAM
[General Appearance - In No Acute Distress] : in no acute distress [General Appearance - Well Nourished] : well nourished [Sclera] : the sclera and conjunctiva were normal [PERRL With Normal Accommodation] : pupils were equal in size, round, reactive to light [Extraocular Movements] : extraocular movements were intact [Outer Ear] : the ears and nose were normal in appearance [Hearing Threshold Finger Rub Not Sullivan] : hearing was normal [Examination Of The Oral Cavity] : the lips and gums were normal [Neck Appearance] : the appearance of the neck was normal [Neck Cervical Mass (___cm)] : no neck mass was observed [Thyroid Diffuse Enlargement] : the thyroid was not enlarged [Exaggerated Use Of Accessory Muscles For Inspiration] : no accessory muscle use [Auscultation Breath Sounds / Voice Sounds] : lungs were clear to auscultation bilaterally [Heart Rate And Rhythm] : heart rate was normal and rhythm regular [Murmurs] : no murmurs [Heart Sounds Pericardial Friction Rub] : no pericardial rub [Full Pulse] : the pedal pulses are present [Bowel Sounds] : normal bowel sounds [Abdomen Soft] : soft [Abdomen Tenderness] : non-tender [Abdomen Mass (___ Cm)] : no abdominal mass palpated [Musculoskeletal - Swelling] : no joint swelling [Range of Motion to Joints] : range of motion to joints [Nail Clubbing] : no clubbing  or cyanosis of the fingernails [Skin Color & Pigmentation] : normal skin color and pigmentation [] : no rash [Skin Lesions] : no skin lesions [No Focal Deficits] : no focal deficits [FreeTextEntry1] : minimal response to stimuli and verbal

## 2020-09-17 NOTE — REVIEW OF SYSTEMS
[Fever] : no fever [Chills] : no chills [Body Aches] : no body aches [Difficulty Sleeping] : no difficulty sleeping [Feeling Sick] : not feeling sick [Feeling Tired] : not feeling tired [Normal Appetite] : normal appetite [Recent Weight Gain (___ Lbs)] : no recent weight gain [Eye Pain] : no eye pain [Red Eyes] : eyes not red [Earache] : no earache [Nasal Discharge] : no nasal discharge [Sore Throat] : no sore throat [Hoarseness] : no hoarseness [Chest Pain] : no chest pain [Palpitations] : no palpitations [Leg Claudication] : no intermittent leg claudication [Shortness Of Breath] : no shortness of breath [Wheezing] : no wheezing [Cough] : no cough [SOB on Exertion] : no shortness of breath during exertion [Sputum] : not coughing up ~M sputum [Pleuritic Chest Pain] : no pleuritic chest pain [Abdominal Pain] : no abdominal pain [Vomiting] : no vomiting [Constipation] : no constipation [Diarrhea] : no diarrhea [Dysuria] : no dysuria [Joint Pain] : no joint pain [Joint Swelling] : no joint swelling [Joint Stiffness] : no joint stiffness [Limb Pain] : no limb pain [Skin Lesions] : no skin lesions [As Noted in HPI] : as noted in HPI [Confused] : no confusion [Suicidal] : not suicidal [FreeTextEntry2] : all ROS per wife

## 2020-09-17 NOTE — HISTORY OF PRESENT ILLNESS
[FreeTextEntry1] : Here for follow up.\par wife and daughter accompanying\par He has been started on carbidopa-has become rigid and at home most times\par No verbal communication\par taking meds\par wife requesting tele visits from now on-theya re also trying to arrange home MD visits

## 2020-09-24 ENCOUNTER — RX RENEWAL (OUTPATIENT)
Age: 80
End: 2020-09-24

## 2020-09-29 LAB
ALBUMIN SERPL ELPH-MCNC: 4.5 G/DL
ALP BLD-CCNC: 152 U/L
ALT SERPL-CCNC: 17 U/L
ANION GAP SERPL CALC-SCNC: 15 MMOL/L
AST SERPL-CCNC: 19 U/L
BASOPHILS # BLD AUTO: 0.02 K/UL
BASOPHILS NFR BLD AUTO: 0.3 %
BILIRUB SERPL-MCNC: 0.3 MG/DL
BUN SERPL-MCNC: 18 MG/DL
CALCIUM SERPL-MCNC: 9.3 MG/DL
CD3 CELLS # BLD: 1334 /UL
CD3 CELLS NFR BLD: 66 %
CD3+CD4+ CELLS # BLD: 186 /UL
CD3+CD4+ CELLS NFR BLD: 9 %
CD3+CD4+ CELLS/CD3+CD8+ CLL SPEC: 0.17 RATIO
CD3+CD8+ CELLS # SPEC: 1118 /UL
CD3+CD8+ CELLS NFR BLD: 55 %
CHLORIDE SERPL-SCNC: 102 MMOL/L
CO2 SERPL-SCNC: 26 MMOL/L
CREAT SERPL-MCNC: 0.7 MG/DL
EOSINOPHIL # BLD AUTO: 0.02 K/UL
EOSINOPHIL NFR BLD AUTO: 0.3 %
GLUCOSE SERPL-MCNC: 111 MG/DL
HCT VFR BLD CALC: 44.5 %
HGB BLD-MCNC: 13.9 G/DL
HIV1 RNA # SERPL NAA+PROBE: NORMAL
HIV1 RNA # SERPL NAA+PROBE: NORMAL COPIES/ML
IMM GRANULOCYTES NFR BLD AUTO: 0.3 %
LYMPHOCYTES # BLD AUTO: 2.12 K/UL
LYMPHOCYTES NFR BLD AUTO: 28.3 %
MAN DIFF?: NORMAL
MCHC RBC-ENTMCNC: 31.2 GM/DL
MCHC RBC-ENTMCNC: 31.3 PG
MCV RBC AUTO: 100.2 FL
MONOCYTES # BLD AUTO: 0.63 K/UL
MONOCYTES NFR BLD AUTO: 8.4 %
NEUTROPHILS # BLD AUTO: 4.67 K/UL
NEUTROPHILS NFR BLD AUTO: 62.4 %
PLATELET # BLD AUTO: 228 K/UL
POTASSIUM SERPL-SCNC: 4.5 MMOL/L
PROT SERPL-MCNC: 8.1 G/DL
RBC # BLD: 4.44 M/UL
RBC # FLD: 14.3 %
SODIUM SERPL-SCNC: 143 MMOL/L
VIRAL LOAD INTERP: NORMAL
VIRAL LOAD LOG: NORMAL LG COP/ML
WBC # FLD AUTO: 7.48 K/UL

## 2020-11-04 DIAGNOSIS — G47.00 INSOMNIA, UNSPECIFIED: ICD-10-CM

## 2020-11-04 RX ORDER — LEVOFLOXACIN 500 MG/1
500 TABLET, FILM COATED ORAL DAILY
Qty: 5 | Refills: 0 | Status: DISCONTINUED | COMMUNITY
Start: 2020-03-02 | End: 2020-11-04

## 2020-11-23 ENCOUNTER — APPOINTMENT (OUTPATIENT)
Dept: NEUROLOGY | Facility: CLINIC | Age: 80
End: 2020-11-23
Payer: MEDICARE

## 2020-11-23 DIAGNOSIS — M62.838 OTHER MUSCLE SPASM: ICD-10-CM

## 2020-11-23 PROCEDURE — 99214 OFFICE O/P EST MOD 30 MIN: CPT | Mod: 95

## 2020-11-23 NOTE — DATA REVIEWED
[de-identified] : Gliosis  and  volume  loss  involving  the  anterior  temporal  lobes.\par \par Moderate  dilatation  of  the  right  temporal  and  occipital  horns,  and  mild\par dilatation  of  the  remaining  ventricular  system.  This  may  be  on  the  basis  of\par atrophy.  Normal  pressure  hydrocephalus  is  not  excluded.\par \par No  acute  intracranial  hemorrhage  or  evidence  of  acute  infarct.\par \par Mild  to  moderate  white  matter  microvascular  ischemic  disease.\par \par No  abnormal  parenchymal  or  leptomeningeal  enhancement.\par \par  [de-identified] : CTH: No  mass  effect,  acute  hemorrhage,  midline  shift,  or  evidence  of\par acute  territorial  infarct.  Interval  development  of  cerebral  atrophy  since\par 2006,  with  ventriculomegaly  out  of  proportion  to  the  degree  of  cerebral\par volume  loss,  which  may  suggest  normal  pressure  hydrocephalus.\par

## 2020-11-23 NOTE — ASSESSMENT
[FreeTextEntry1] : Assessment:\par 81yo RH AAM, with HIV and dementia, parkinsonism (bradykinesia, rigidity). MRI brain suggests profound atrophy with has a vascular component, but also degeneration on idiopathic base and possibly inscribed into the HIV pathology. Significant parkinsonism, worsening since last visit. \par Unable to speak. \par Bedridden. \par Triple flexion in UE and LE, clenching hands and grabbing bed sheets.\par \par \par Impression:\par Mixed dementia, HIV-related neurodegeneration\par Parkinsonism\par Possible R MCA stroke.\par \par Plan:\par -would try Baclofen 5mg BID and increase as needed\par -can consider BoTox for hands as well if Baclofen does not work\par -continue with rest of meds and consider increasing Baclofen and LD in 2 weeks.\par \par \par A thorough discussion was entertained with the patient/caregiver regarding the use of psychoactive medications, their possible benefits and AE profile, including the risk of cardiovascular complications.\par We discussed the benefits of being active, physically and mentally, and the need to to establish a routine in this respect.\par Driving abilities and firearms possession and use were discussed, in relation to progression of the cognitive decline, and the need to assess them periodically.\par Patient/caregiver understand and agree with the plan.\par

## 2020-11-23 NOTE — HISTORY OF PRESENT ILLNESS
[Home] : at home, [unfilled] , at the time of the visit. [Medical Office: ([facility])___] : at the medical office located in  [Spouse] : spouse [Other:____] : [unfilled] [FreeTextEntry1] : HPI-interval Hx [id number] VISIT.\par Pt at home with wife and HHA.\par \par Continues to decline, but today he is more awake. Non verbal. Clenches hands and grabs bed sheets, chewing them\par Appetite and sleep ok.\par At times he would start moaning at night to get his wife's attention.\par \par HPI-interval Hx [id number] VISIT.\par Pt had been less motile recently, and per wife his LUE contracted for 2 months now, and we can see it is in triple flexion. Her can stand but needs support constantly. He tends to be hunched fwd at all times. \par Started on low dose Sinemet, well tolerated. \par Has some myoclonus at night, not waking up.\par Appetite and sleep are ok.\par \par He was at [facility] ER in Jan 2020:\par Chief Complaint: Sob wheezing cough congestion.\par Impression:\par Principal Discharge Dx Upper respiratory tract infection, unspecified type.\par Secondary Discharge Dx UTI (urinary tract infection).\par Medical Decision Making:\par - Physician E/M Selection [id number] Comprehensive\par - The following orders were submitted: Labs, Imaging Studies\par - Clinical Summary (MDM): Summarize the clinical encounter 78 yo non-verbal M,\par w/ PMH of HIV (last viral load <30, CD4 356), previous UTI c/b bacteremia,\par presenting from home d/t nasal congestion w/ green/yellow mucus x 1 week,\par intermittent dry cough, and one episode of fever 2 days ago, which\par self-resolved. Patient otherwise behaving at baseline. Appears in no acute\par distress in room, w/ normal vital signs. Lungs CTAB. Likely viral illness, but\par given HIV history, poor historian, and recorded fever 2 days ago, will perform\par septic workup including catheterized UA and culture, blood culture, lactate,\par labs, cxr. Reassess.\par - Follow-up Instructions (will be supplied to the patient only if discharged) \par Please take the antibiotic that you were prescribed, cephalexin (Keflex), one\par tablet, two times per day, for 7 days. Please take as indicated on your\par prescription with respect to the warnings. Please drink plenty of fluids and\par get lots of rest.\par DDimer 1028. CXR clear.\par \par HPI-interval Hx [id number]:\par Pt has been stable cognitively, but since last visit he has developed more parkinsonism, more bilateral LE stiffness and hesitation, very bradykinetic. He can barely walk assisted, walks in very short steps, shuffling, R>L.\par Appetite and sleep are fine. \par HIV stable.\par \par HPI-interval Hx [id number]:\par pt has tried Ropinirole, but had diarrhea, medication dc.\par He has been stable otherwise.\par He has developed postural leaning to the L, with R-convex dorsal scoliosis, slower gait, and tends to be more rigid.\par No tremors anymore.\par Eats and sleeps well, weight has been stable, though per wife maybe he lost a few lbs.\par \par ID: stable.\par \par HPI-interval Hx [id number]:\par pt had 2 occurrences of UTI in the last 2 months, with ABX tx. Suffered a bit of a setback since.\par Parkinsonism has worsened a bit, he is slower and more rigid.\par Mental status is stable, he speaks very little, can barely say wife's name. He mostly says "yeah".\par Can walk if assisted to stand, and then proceeds alone, with small shuffling steps, very cautioned. Cannot turn by himself. Per wife, he has sporadic resting tremor, not observed today.\par \par HPI-interval Hx [id number]:\par Pt has had some medical issues lately, PNA and diarrhea, remitted. Has lost some weight, not much, regaining.\par Sleeping and eating well.\par Stopped Olanzapine a few weeks ago when he was sick.\par Has started losing urine more often and spitting.\par Otherwise stable. In particular, no falls, moves well, no worsening of parkinsonism.\par \par HPI-interval Hx [id number]:\par Pt stable. per daughter, he has been calm, well behaved, sleeping well.\par He has very good appetite, and has gained a few pounds since 6 months ago, with the help of a nutritionist.\par HIV stable, well controlled, compliant on meds.\par EEG in Feb 2017 was negative for seizures. LOC event likely syncopal, due to dehydration.\par \par HPI-interval Hx [id number]:\par pt stable, well behaved, sleeps well, well manageable. He dresses and eats by himself.\par Had recent admission to ER for dehydration. EEG done 2/16, pending report, to r/o seizures, unlikely. \par per wife, he is a bit slower in movements and gait, but has no tremors. \par \par HPI-Interval Hx 11/9/2016:\par pt stable, sleeps well, not agitated. Still on Aricept 5mg, not increased bc daughter thought his recent hands pain may be related to the medication. Good appetite, no weight changes per family. \par \par 8/8/2016 HPI-Interval Hx: MRI brain wo/w darrin done, showing diffuse atrophy, with expansion of the ventricles, not NPH but core atrophy, and diffuse periventricular WM disease, reminiscent of gliosis. Doing ok on Zyprexa. \par \par PMH:76yo RH AAM, with hx of HIV since 2002, w/o any infections, currently with 182 CD4, and PJP Px.\par Also, HTN, HLD. \par Former , retired. \par For 3 years, pt has started to show short memory deficits, losing things, slowly progressed to this spring, when he was hospitalized to [facility] for possible TB (r/o). Since then, wife reports a steeper decline. She also reports bizarre behavior. \par He was recently started on Zyprexa 2.5mg for sporadic agitation.\par He sleeps ok at night, at times naps at day.\par Loss of interests. ADL ok, though limited, IADL very limited. \par Recent CTh: No  mass  effect,  acute  hemorrhage,  midline  shift,  or  evidence  of\par acute  territorial  infarct.  Interval  development  of  cerebral  atrophy  since\par 2006,  with  ventriculomegaly  out  of  proportion  to  the  degree  of  cerebral\par volume  loss,  which  may  suggest  normal  pressure  hydrocephalus.\par No gait issues, nor incontinence. [FreeTextEntry2] : Wife

## 2020-11-25 LAB
CD3 CELLS # BLD: 1892 /UL
CD3 CELLS NFR BLD: 74 %
CD3+CD4+ CELLS # BLD: 303 /UL
CD3+CD4+ CELLS NFR BLD: 12 %
CD3+CD4+ CELLS/CD3+CD8+ CLL SPEC: 0.19 RATIO
CD3+CD8+ CELLS # SPEC: 1556 /UL
CD3+CD8+ CELLS NFR BLD: 61 %

## 2020-11-30 ENCOUNTER — NON-APPOINTMENT (OUTPATIENT)
Age: 80
End: 2020-11-30

## 2020-12-16 ENCOUNTER — RX RENEWAL (OUTPATIENT)
Age: 80
End: 2020-12-16

## 2020-12-22 ENCOUNTER — APPOINTMENT (OUTPATIENT)
Dept: UROLOGY | Facility: CLINIC | Age: 80
End: 2020-12-22
Payer: MEDICARE

## 2020-12-22 ENCOUNTER — APPOINTMENT (OUTPATIENT)
Dept: UROLOGY | Facility: CLINIC | Age: 80
End: 2020-12-22

## 2020-12-22 ENCOUNTER — OUTPATIENT (OUTPATIENT)
Dept: OUTPATIENT SERVICES | Facility: HOSPITAL | Age: 80
LOS: 1 days | End: 2020-12-22
Payer: MEDICARE

## 2020-12-22 DIAGNOSIS — N39.490 OVERFLOW INCONTINENCE: ICD-10-CM

## 2020-12-22 DIAGNOSIS — R82.90 UNSPECIFIED ABNORMAL FINDINGS IN URINE: ICD-10-CM

## 2020-12-22 DIAGNOSIS — R33.9 RETENTION OF URINE, UNSPECIFIED: ICD-10-CM

## 2020-12-22 PROCEDURE — 99214 OFFICE O/P EST MOD 30 MIN: CPT | Mod: 25

## 2020-12-22 PROCEDURE — 51701 INSERT BLADDER CATHETER: CPT

## 2020-12-22 RX ORDER — SULFAMETHOXAZOLE AND TRIMETHOPRIM 200; 40 MG/5ML; MG/5ML
200-40 SUSPENSION ORAL
Qty: 1 | Refills: 0 | Status: COMPLETED | COMMUNITY
Start: 2020-12-22 | End: 2021-01-01

## 2020-12-23 ENCOUNTER — APPOINTMENT (OUTPATIENT)
Dept: UROLOGY | Facility: CLINIC | Age: 80
End: 2020-12-23

## 2020-12-23 DIAGNOSIS — R35.0 FREQUENCY OF MICTURITION: ICD-10-CM

## 2020-12-23 LAB
APPEARANCE: ABNORMAL
BACTERIA: ABNORMAL
BILIRUBIN URINE: NEGATIVE
BLOOD URINE: ABNORMAL
COLOR: YELLOW
GLUCOSE QUALITATIVE U: NEGATIVE
HYALINE CASTS: 0 /LPF
KETONES URINE: NEGATIVE
LEUKOCYTE ESTERASE URINE: ABNORMAL
MICROSCOPIC-UA: NORMAL
NITRITE URINE: POSITIVE
PH URINE: 6
PROTEIN URINE: NORMAL
RED BLOOD CELLS URINE: 10 /HPF
SPECIFIC GRAVITY URINE: 1.02
SQUAMOUS EPITHELIAL CELLS: 0 /HPF
UROBILINOGEN URINE: NORMAL
WHITE BLOOD CELLS URINE: 38 /HPF

## 2020-12-28 LAB — BACTERIA UR CULT: ABNORMAL

## 2021-01-06 NOTE — ED PROVIDER NOTE - CARE PLAN
Normal for race
Principal Discharge DX:	UTI (urinary tract infection)  Secondary Diagnosis:	Fever  Secondary Diagnosis:	Pneumonia

## 2021-01-14 ENCOUNTER — NON-APPOINTMENT (OUTPATIENT)
Age: 81
End: 2021-01-14

## 2021-01-19 ENCOUNTER — RX RENEWAL (OUTPATIENT)
Age: 81
End: 2021-01-19

## 2021-01-19 ENCOUNTER — NON-APPOINTMENT (OUTPATIENT)
Age: 81
End: 2021-01-19

## 2021-01-20 ENCOUNTER — NON-APPOINTMENT (OUTPATIENT)
Age: 81
End: 2021-01-20

## 2021-02-11 ENCOUNTER — NON-APPOINTMENT (OUTPATIENT)
Age: 81
End: 2021-02-11

## 2021-02-24 ENCOUNTER — APPOINTMENT (OUTPATIENT)
Dept: INFECTIOUS DISEASE | Facility: CLINIC | Age: 81
End: 2021-02-24
Payer: MEDICARE

## 2021-02-24 ENCOUNTER — NON-APPOINTMENT (OUTPATIENT)
Age: 81
End: 2021-02-24

## 2021-02-24 ENCOUNTER — FORM ENCOUNTER (OUTPATIENT)
Age: 81
End: 2021-02-24

## 2021-02-24 LAB
25(OH)D3 SERPL-MCNC: 20.9 NG/ML
ALBUMIN SERPL ELPH-MCNC: 3.5 G/DL
ALP BLD-CCNC: 184 U/L
ALT SERPL-CCNC: 7 U/L
ANION GAP SERPL CALC-SCNC: 15 MMOL/L
AST SERPL-CCNC: 16 U/L
BASOPHILS # BLD AUTO: 0.01 K/UL
BASOPHILS NFR BLD AUTO: 0.2 %
BILIRUB SERPL-MCNC: 0.2 MG/DL
BUN SERPL-MCNC: 15 MG/DL
CALCIUM SERPL-MCNC: 9.3 MG/DL
CD3 CELLS # BLD: 2367 /UL
CD3 CELLS NFR BLD: 74 %
CD3+CD4+ CELLS # BLD: 352 /UL
CD3+CD4+ CELLS NFR BLD: 11 %
CD3+CD4+ CELLS/CD3+CD8+ CLL SPEC: 0.18 RATIO
CD3+CD8+ CELLS # SPEC: 1980 /UL
CD3+CD8+ CELLS NFR BLD: 62 %
CHLORIDE SERPL-SCNC: 105 MMOL/L
CHOLEST SERPL-MCNC: 126 MG/DL
CO2 SERPL-SCNC: 23 MMOL/L
CREAT SERPL-MCNC: 0.9 MG/DL
EOSINOPHIL # BLD AUTO: 0.03 K/UL
EOSINOPHIL NFR BLD AUTO: 0.5 %
ESTIMATED AVERAGE GLUCOSE: 114 MG/DL
GLUCOSE SERPL-MCNC: 122 MG/DL
HBA1C MFR BLD HPLC: 5.6 %
HCT VFR BLD CALC: 37.2 %
HCV AB SER QL: NONREACTIVE
HCV S/CO RATIO: 0.08 S/CO
HDLC SERPL-MCNC: 42 MG/DL
HGB BLD-MCNC: 12.5 G/DL
HIV1 RNA # SERPL NAA+PROBE: ABNORMAL
HIV1 RNA # SERPL NAA+PROBE: ABNORMAL COPIES/ML
IMM GRANULOCYTES NFR BLD AUTO: 0.2 %
LDLC SERPL CALC-MCNC: 65 MG/DL
LYMPHOCYTES # BLD AUTO: 3.28 K/UL
LYMPHOCYTES NFR BLD AUTO: 50.9 %
M TB IFN-G BLD-IMP: NEGATIVE
MAN DIFF?: NORMAL
MCHC RBC-ENTMCNC: 32.6 PG
MCHC RBC-ENTMCNC: 33.6 GM/DL
MCV RBC AUTO: 96.9 FL
MONOCYTES # BLD AUTO: 0.57 K/UL
MONOCYTES NFR BLD AUTO: 8.8 %
NEUTROPHILS # BLD AUTO: 2.55 K/UL
NEUTROPHILS NFR BLD AUTO: 39.4 %
NONHDLC SERPL-MCNC: 83 MG/DL
PLATELET # BLD AUTO: 203 K/UL
POTASSIUM SERPL-SCNC: 4.3 MMOL/L
PROT SERPL-MCNC: 7 G/DL
PSA SERPL-MCNC: 0.19 NG/ML
QUANTIFERON TB PLUS MITOGEN MINUS NIL: 8.93 IU/ML
QUANTIFERON TB PLUS NIL: 0.09 IU/ML
QUANTIFERON TB PLUS TB1 MINUS NIL: 0.01 IU/ML
QUANTIFERON TB PLUS TB2 MINUS NIL: 0.02 IU/ML
RBC # BLD: 3.84 M/UL
RBC # FLD: 13.7 %
SODIUM SERPL-SCNC: 143 MMOL/L
T PALLIDUM AB SER QL IA: NEGATIVE
TRIGL SERPL-MCNC: 93 MG/DL
VIRAL LOAD INTERP: NORMAL
VIRAL LOAD LOG: ABNORMAL LG COP/ML
WBC # FLD AUTO: 6.45 K/UL

## 2021-02-24 PROCEDURE — 99442: CPT | Mod: 95

## 2021-02-24 NOTE — ASSESSMENT
[FreeTextEntry1] : HIV/AIDS\par Continue ART as detailed above -will check labs.\par CD4 was low but VL was undetectable\par Encouraged adherence-pt is taking meds regularly per wife \par \par Dementia/tremor\par Parkinson \par Following up with neuro\par Will defer to primary on other plan\par \par \par HTN continue current meds\par will defer to primary \par \par \par \par \par To follow in HIV clinic possibly via tele health  in 4-6 m s-wife asked to contact us earlier  if any issues.She prefers tele visits as transporting the patient is becoming very difficult and expensive-explained I am ok with her and offered help

## 2021-02-24 NOTE — REASON FOR VISIT
[Home] : at home, [unfilled] , at the time of the visit. [Medical Office: ([facility])___] : at the medical office located in  [Spouse] : spouse [Verbal consent obtained from patient] : the patient, [unfilled] [Follow-Up: _____] : a [unfilled] follow-up visit [FreeTextEntry3] : wife

## 2021-02-24 NOTE — REVIEW OF SYSTEMS
[As Noted in HPI] : as noted in HPI [Fever] : no fever [Chills] : no chills [Sore Throat] : no sore throat [Chest Pain] : no chest pain [Shortness Of Breath] : no shortness of breath [Cough] : no cough [Sputum] : not coughing up ~M sputum [Abdominal Pain] : no abdominal pain [Vomiting] : no vomiting [Dysuria] : no dysuria [Skin Lesions] : no skin lesions [Confused] : no confusion

## 2021-02-24 NOTE — HISTORY OF PRESENT ILLNESS
[FreeTextEntry1] : telephonic visit with pt spouse\par pt is non verbal\par taking meds regularly\par Has recent nasal congestion\par Also episode of UTI\par Patient being taken care of by spouse and aide.\par No other complaints

## 2021-03-25 ENCOUNTER — RX RENEWAL (OUTPATIENT)
Age: 81
End: 2021-03-25

## 2021-04-19 ENCOUNTER — TRANSCRIPTION ENCOUNTER (OUTPATIENT)
Age: 81
End: 2021-04-19

## 2021-04-19 ENCOUNTER — NON-APPOINTMENT (OUTPATIENT)
Age: 81
End: 2021-04-19

## 2021-05-23 ENCOUNTER — RX RENEWAL (OUTPATIENT)
Age: 81
End: 2021-05-23

## 2021-06-15 ENCOUNTER — NON-APPOINTMENT (OUTPATIENT)
Age: 81
End: 2021-06-15

## 2021-06-15 DIAGNOSIS — Z87.440 PERSONAL HISTORY OF URINARY (TRACT) INFECTIONS: ICD-10-CM

## 2021-06-15 LAB
APPEARANCE: ABNORMAL
BACTERIA UR CULT: NORMAL
BACTERIA: ABNORMAL
BILIRUBIN URINE: NEGATIVE
BLOOD URINE: NEGATIVE
COLOR: YELLOW
GLUCOSE QUALITATIVE U: NEGATIVE
HYALINE CASTS: 0 /LPF
KETONES URINE: NEGATIVE
LEUKOCYTE ESTERASE URINE: ABNORMAL
MICROSCOPIC-UA: NORMAL
NITRITE URINE: POSITIVE
PH URINE: 8
PROTEIN URINE: NORMAL
RED BLOOD CELLS URINE: 3 /HPF
SPECIFIC GRAVITY URINE: 1.02
SQUAMOUS EPITHELIAL CELLS: 1 /HPF
TRIPLE PHOSPHATE CRYSTALS: ABNORMAL
UROBILINOGEN URINE: NORMAL
WHITE BLOOD CELLS URINE: 59 /HPF

## 2021-06-18 ENCOUNTER — INPATIENT (INPATIENT)
Facility: HOSPITAL | Age: 81
LOS: 6 days | Discharge: NURSING, REHAB OR LONG-TERM CARE | DRG: 378 | End: 2021-06-25
Attending: INTERNAL MEDICINE | Admitting: HOSPITALIST
Payer: MEDICARE

## 2021-06-18 VITALS
DIASTOLIC BLOOD PRESSURE: 73 MMHG | WEIGHT: 130.07 LBS | SYSTOLIC BLOOD PRESSURE: 126 MMHG | HEIGHT: 72 IN | HEART RATE: 83 BPM | OXYGEN SATURATION: 98 %

## 2021-06-18 PROCEDURE — 99285 EMERGENCY DEPT VISIT HI MDM: CPT | Mod: GC

## 2021-06-18 PROCEDURE — 93010 ELECTROCARDIOGRAM REPORT: CPT

## 2021-06-18 NOTE — ED ADULT TRIAGE NOTE - CHIEF COMPLAINT QUOTE
As per EMS, pt had episode of bright red blood per rectum. EMS denies blood thinning medications. Pt is nonverbal

## 2021-06-19 DIAGNOSIS — K92.2 GASTROINTESTINAL HEMORRHAGE, UNSPECIFIED: ICD-10-CM

## 2021-06-19 DIAGNOSIS — I10 ESSENTIAL (PRIMARY) HYPERTENSION: ICD-10-CM

## 2021-06-19 DIAGNOSIS — G30.8 OTHER ALZHEIMER'S DISEASE: ICD-10-CM

## 2021-06-19 DIAGNOSIS — B20 HUMAN IMMUNODEFICIENCY VIRUS [HIV] DISEASE: ICD-10-CM

## 2021-06-19 LAB
ALBUMIN SERPL ELPH-MCNC: 3.8 G/DL — SIGNIFICANT CHANGE UP (ref 3.3–5)
ALP SERPL-CCNC: 218 U/L — HIGH (ref 40–120)
ALT FLD-CCNC: 5 U/L — LOW (ref 10–45)
ANION GAP SERPL CALC-SCNC: 15 MMOL/L — SIGNIFICANT CHANGE UP (ref 5–17)
APTT BLD: 33.4 SEC — SIGNIFICANT CHANGE UP (ref 27.5–35.5)
AST SERPL-CCNC: 16 U/L — SIGNIFICANT CHANGE UP (ref 10–40)
BASOPHILS # BLD AUTO: 0.01 K/UL — SIGNIFICANT CHANGE UP (ref 0–0.2)
BASOPHILS NFR BLD AUTO: 0.1 % — SIGNIFICANT CHANGE UP (ref 0–2)
BILIRUB SERPL-MCNC: 0.2 MG/DL — SIGNIFICANT CHANGE UP (ref 0.2–1.2)
BLD GP AB SCN SERPL QL: NEGATIVE — SIGNIFICANT CHANGE UP
BUN SERPL-MCNC: 18 MG/DL — SIGNIFICANT CHANGE UP (ref 7–23)
CALCIUM SERPL-MCNC: 9.2 MG/DL — SIGNIFICANT CHANGE UP (ref 8.4–10.5)
CHLORIDE SERPL-SCNC: 102 MMOL/L — SIGNIFICANT CHANGE UP (ref 96–108)
CO2 SERPL-SCNC: 21 MMOL/L — LOW (ref 22–31)
CREAT SERPL-MCNC: 1.06 MG/DL — SIGNIFICANT CHANGE UP (ref 0.5–1.3)
EOSINOPHIL # BLD AUTO: 0 K/UL — SIGNIFICANT CHANGE UP (ref 0–0.5)
EOSINOPHIL NFR BLD AUTO: 0 % — SIGNIFICANT CHANGE UP (ref 0–6)
GLUCOSE SERPL-MCNC: 139 MG/DL — HIGH (ref 70–99)
HCT VFR BLD CALC: 35.3 % — LOW (ref 39–50)
HCT VFR BLD CALC: 38.6 % — LOW (ref 39–50)
HGB BLD-MCNC: 12.2 G/DL — LOW (ref 13–17)
HGB BLD-MCNC: 12.8 G/DL — LOW (ref 13–17)
IMM GRANULOCYTES NFR BLD AUTO: 0.2 % — SIGNIFICANT CHANGE UP (ref 0–1.5)
INR BLD: 1.03 RATIO — SIGNIFICANT CHANGE UP (ref 0.88–1.16)
LYMPHOCYTES # BLD AUTO: 0.97 K/UL — LOW (ref 1–3.3)
LYMPHOCYTES # BLD AUTO: 8 % — LOW (ref 13–44)
MCHC RBC-ENTMCNC: 31.2 PG — SIGNIFICANT CHANGE UP (ref 27–34)
MCHC RBC-ENTMCNC: 31.9 PG — SIGNIFICANT CHANGE UP (ref 27–34)
MCHC RBC-ENTMCNC: 33.2 GM/DL — SIGNIFICANT CHANGE UP (ref 32–36)
MCHC RBC-ENTMCNC: 34.6 GM/DL — SIGNIFICANT CHANGE UP (ref 32–36)
MCV RBC AUTO: 92.4 FL — SIGNIFICANT CHANGE UP (ref 80–100)
MCV RBC AUTO: 94.1 FL — SIGNIFICANT CHANGE UP (ref 80–100)
MONOCYTES # BLD AUTO: 0.8 K/UL — SIGNIFICANT CHANGE UP (ref 0–0.9)
MONOCYTES NFR BLD AUTO: 6.6 % — SIGNIFICANT CHANGE UP (ref 2–14)
NEUTROPHILS # BLD AUTO: 10.29 K/UL — HIGH (ref 1.8–7.4)
NEUTROPHILS NFR BLD AUTO: 85.1 % — HIGH (ref 43–77)
NRBC # BLD: 0 /100 WBCS — SIGNIFICANT CHANGE UP (ref 0–0)
NRBC # BLD: 0 /100 WBCS — SIGNIFICANT CHANGE UP (ref 0–0)
PLATELET # BLD AUTO: 214 K/UL — SIGNIFICANT CHANGE UP (ref 150–400)
PLATELET # BLD AUTO: 223 K/UL — SIGNIFICANT CHANGE UP (ref 150–400)
POTASSIUM SERPL-MCNC: 4.2 MMOL/L — SIGNIFICANT CHANGE UP (ref 3.5–5.3)
POTASSIUM SERPL-SCNC: 4.2 MMOL/L — SIGNIFICANT CHANGE UP (ref 3.5–5.3)
PROT SERPL-MCNC: 8.5 G/DL — HIGH (ref 6–8.3)
PROTHROM AB SERPL-ACNC: 12.3 SEC — SIGNIFICANT CHANGE UP (ref 10.6–13.6)
RBC # BLD: 3.82 M/UL — LOW (ref 4.2–5.8)
RBC # BLD: 4.1 M/UL — LOW (ref 4.2–5.8)
RBC # FLD: 13.3 % — SIGNIFICANT CHANGE UP (ref 10.3–14.5)
RBC # FLD: 13.3 % — SIGNIFICANT CHANGE UP (ref 10.3–14.5)
RH IG SCN BLD-IMP: POSITIVE — SIGNIFICANT CHANGE UP
RH IG SCN BLD-IMP: POSITIVE — SIGNIFICANT CHANGE UP
SARS-COV-2 RNA SPEC QL NAA+PROBE: SIGNIFICANT CHANGE UP
SODIUM SERPL-SCNC: 138 MMOL/L — SIGNIFICANT CHANGE UP (ref 135–145)
WBC # BLD: 11.19 K/UL — HIGH (ref 3.8–10.5)
WBC # BLD: 12.1 K/UL — HIGH (ref 3.8–10.5)
WBC # FLD AUTO: 11.19 K/UL — HIGH (ref 3.8–10.5)
WBC # FLD AUTO: 12.1 K/UL — HIGH (ref 3.8–10.5)

## 2021-06-19 RX ORDER — FINASTERIDE 5 MG/1
5 TABLET, FILM COATED ORAL DAILY
Refills: 0 | Status: DISCONTINUED | OUTPATIENT
Start: 2021-06-19 | End: 2021-06-25

## 2021-06-19 RX ORDER — DARUNAVIR 75 MG/1
800 TABLET, FILM COATED ORAL DAILY
Refills: 0 | Status: DISCONTINUED | OUTPATIENT
Start: 2021-06-19 | End: 2021-06-25

## 2021-06-19 RX ORDER — OLANZAPINE 15 MG/1
1 TABLET, FILM COATED ORAL
Qty: 0 | Refills: 0 | DISCHARGE

## 2021-06-19 RX ORDER — RITONAVIR 100 MG/1
100 TABLET, FILM COATED ORAL EVERY 24 HOURS
Refills: 0 | Status: DISCONTINUED | OUTPATIENT
Start: 2021-06-19 | End: 2021-06-25

## 2021-06-19 RX ORDER — CARBIDOPA AND LEVODOPA 25; 100 MG/1; MG/1
1 TABLET ORAL THREE TIMES A DAY
Refills: 0 | Status: DISCONTINUED | OUTPATIENT
Start: 2021-06-19 | End: 2021-06-25

## 2021-06-19 RX ORDER — TAMSULOSIN HYDROCHLORIDE 0.4 MG/1
1 CAPSULE ORAL
Qty: 0 | Refills: 0 | DISCHARGE

## 2021-06-19 RX ORDER — FLUTICASONE PROPIONATE 50 MCG
1 SPRAY, SUSPENSION NASAL
Refills: 0 | Status: DISCONTINUED | OUTPATIENT
Start: 2021-06-19 | End: 2021-06-25

## 2021-06-19 RX ORDER — ACETAMINOPHEN 500 MG
650 TABLET ORAL EVERY 6 HOURS
Refills: 0 | Status: DISCONTINUED | OUTPATIENT
Start: 2021-06-19 | End: 2021-06-25

## 2021-06-19 RX ORDER — LANOLIN ALCOHOL/MO/W.PET/CERES
5 CREAM (GRAM) TOPICAL AT BEDTIME
Refills: 0 | Status: DISCONTINUED | OUTPATIENT
Start: 2021-06-19 | End: 2021-06-25

## 2021-06-19 RX ORDER — AMLODIPINE BESYLATE 2.5 MG/1
5 TABLET ORAL DAILY
Refills: 0 | Status: DISCONTINUED | OUTPATIENT
Start: 2021-06-19 | End: 2021-06-25

## 2021-06-19 RX ORDER — ACETAMINOPHEN 500 MG
1000 TABLET ORAL ONCE
Refills: 0 | Status: COMPLETED | OUTPATIENT
Start: 2021-06-19 | End: 2021-06-19

## 2021-06-19 RX ORDER — DONEPEZIL HYDROCHLORIDE 10 MG/1
10 TABLET, FILM COATED ORAL AT BEDTIME
Refills: 0 | Status: DISCONTINUED | OUTPATIENT
Start: 2021-06-19 | End: 2021-06-25

## 2021-06-19 RX ORDER — ATORVASTATIN CALCIUM 80 MG/1
10 TABLET, FILM COATED ORAL AT BEDTIME
Refills: 0 | Status: DISCONTINUED | OUTPATIENT
Start: 2021-06-19 | End: 2021-06-25

## 2021-06-19 RX ORDER — TAMSULOSIN HYDROCHLORIDE 0.4 MG/1
0.8 CAPSULE ORAL AT BEDTIME
Refills: 0 | Status: DISCONTINUED | OUTPATIENT
Start: 2021-06-19 | End: 2021-06-25

## 2021-06-19 RX ORDER — EMTRICITABINE AND TENOFOVIR DISOPROXIL FUMARATE 200; 300 MG/1; MG/1
1 TABLET, FILM COATED ORAL DAILY
Refills: 0 | Status: DISCONTINUED | OUTPATIENT
Start: 2021-06-19 | End: 2021-06-25

## 2021-06-19 RX ADMIN — CARBIDOPA AND LEVODOPA 1 TABLET(S): 25; 100 TABLET ORAL at 14:10

## 2021-06-19 RX ADMIN — Medication 650 MILLIGRAM(S): at 14:32

## 2021-06-19 RX ADMIN — Medication 400 MILLIGRAM(S): at 05:39

## 2021-06-19 RX ADMIN — Medication 650 MILLIGRAM(S): at 15:02

## 2021-06-19 RX ADMIN — Medication 1000 MILLIGRAM(S): at 06:09

## 2021-06-19 RX ADMIN — ATORVASTATIN CALCIUM 10 MILLIGRAM(S): 80 TABLET, FILM COATED ORAL at 21:52

## 2021-06-19 RX ADMIN — DONEPEZIL HYDROCHLORIDE 10 MILLIGRAM(S): 10 TABLET, FILM COATED ORAL at 21:53

## 2021-06-19 RX ADMIN — CARBIDOPA AND LEVODOPA 1 TABLET(S): 25; 100 TABLET ORAL at 21:52

## 2021-06-19 RX ADMIN — TAMSULOSIN HYDROCHLORIDE 0.8 MILLIGRAM(S): 0.4 CAPSULE ORAL at 21:51

## 2021-06-19 RX ADMIN — Medication 5 MILLIGRAM(S): at 21:52

## 2021-06-19 NOTE — H&P ADULT - HISTORY OF PRESENT ILLNESS
81yo man PMH parkinson's disease, dementia, HTN, HLD, BPH, HIV, not on a/c, nonverbal at baseline presents with acute onset of BRBPR today. Pt's wife states that she was changing his diaper and noted a large clot and had persistent bleeding from rectum. No change in behavior was noted. No fevers or vomiting. History essentially from wife at bedside as patient is nonverbal at baseline

## 2021-06-19 NOTE — H&P ADULT - NSHPREVIEWOFSYSTEMS_GEN_ALL_CORE
per wife:    Gen: no loss of wt no loss of appetite, patient eats well  Resp: No cough no sputum production  CVS: no orthopnea  GI: no nausea, vomiting or diarrhea see above HPI   : no hematuria  Endo: no polyuria no excessive sweating  Heme: No petechiae no easy bruising  Msk: No joint pain no swelling  Skin: No rash no itching

## 2021-06-19 NOTE — H&P ADULT - NSICDXPASTMEDICALHX_GEN_ALL_CORE_FT
PAST MEDICAL HISTORY:  Alzheimer's disease of other onset     HIV (human immunodeficiency virus infection)     HTN (hypertension)

## 2021-06-19 NOTE — ED PROVIDER NOTE - PHYSICAL EXAMINATION
General: elderly man in no acute distress  Head: normocephalic, atraumatic  Eyes: PERRL  Mouth: dry mucous membranes  Neck: supple neck  CV: normal rate and rhythm, peripheral pulses 2+ bilateral UE and LE  Respiratory: clear to auscultation bilaterally  Abdomen: soft, nontender, nondistended  Rectal: active output blood tinged fluid  MSK: no joint deformities  Neuro: awake but no verbal and does not follow commands. bilateral arms are contracted  Skin: no rash noted

## 2021-06-19 NOTE — ED PROVIDER NOTE - CLINICAL SUMMARY MEDICAL DECISION MAKING FREE TEXT BOX
79yo man presents with BRBPR with active discharge from rectum concerning for bleeding but hemodynamically stable on exam and no tenderness to palpation of abdomen. Will check h/h and will require admission for GI evaluation.

## 2021-06-19 NOTE — ED ADULT NURSE NOTE - OBJECTIVE STATEMENT
80 y.o M presents to the ED via EMS accompanied by spouse for rectal bleeding. Patient is awake and alert but nonverbal at baseline. Patient has a hx of dementia, parkinson's and is bed bound. As per patient's spouse, "I noticed he had a lot of blood coming out when I was changing his depends." Spouse reports that this has never happened before and states the patient does not take anticoagulants. Patient displays no nonverbal s/s of pain or discomfort. Additionally, spouse reports that the patient is currently being treated for a UTI with bactrim which he is due to finish tomorrow.

## 2021-06-19 NOTE — ED PROVIDER NOTE - ATTENDING CONTRIBUTION TO CARE
I, Dante Wong, performed a history and physical exam of the patient and discussed their management with the resident and /or advanced care provider. I reviewed the resident and /or ACP's note and agree with the documented findings and plan of care. I was present and available for all procedures.  Patient with stable vitals in the ED and chief complaint of lower GI Bleed.  Will evaluate H/H and provide pRBC as indicated.  Patient likely requires admission for colonoscopy.

## 2021-06-19 NOTE — H&P ADULT - ASSESSMENT
80 year old male with  PMH parkinson's disease, dementia, HTN, HLD, BPH, HIV, not on a/c, nonverbal at baseline presents with acute onset of BRBPR today now admitted for work up and monitoring

## 2021-06-19 NOTE — ED PROVIDER NOTE - OBJECTIVE STATEMENT
81yo man PMH parkinson's disease, dementia, HTN, HLD, BPH, HIV, not on a/c, nonverbal at baseline presents with acute onset of BRBPR today. Pt's wife states that she was changing his diaper and noted a large clot and had persistent bleeding from rectum. No change in behavior was noted. No fevers or vomiting.

## 2021-06-19 NOTE — CONSULT NOTE ADULT - ASSESSMENT
80 year old male with PKD and hematochezia       Hematochezia   Supportive care for now   Hydrocortisone supp ID   Monitor CBC   If recurrent epsidoe of bleeding suggest CT of the abd/Pelvis rule out colitis , large mass etc.     HIV   Continue all meds     Will follow closely     I was physically present for the key portions of the evaluation and management (E/M) service provided.  The patient was personally seen and examined at bedside.  I have edited the note as appropriate.   Thank you for your consultation and allowing  me to participate in the care of your patients. If you have further questions please contact me at [phone].     Ti Castrejon M.D.       _________________________________________________________________________________________________  [facility]  [address] [place] [id number]  Office: [phone]    Get Kumar PA-C    ___________________________________________________________________________________________________

## 2021-06-19 NOTE — H&P ADULT - NSHPPHYSICALEXAM_GEN_ALL_CORE
PHYSICAL EXAM: vital signs noted on Sunrise  in no apparent distress  asthenic  HEENT: JEROME EOMI  Neck: Supple, no JVD, no thyromegaly  Lungs: no wheeze, no crackles  CVS: S1 S2 no M/R/G  Abdomen: no tenderness, no organomegaly, BS present  Neuro: Alert no focal weakness, patient not cooperative  Skin: warm, dry  Ext: no cyanosis or clubbing, no edema  Msk: no joint swelling or deformities  Back: no CVA tenderness, no kyphosis/scoliosis

## 2021-06-19 NOTE — H&P ADULT - PROBLEM SELECTOR PLAN 1
hemoglobin stable x 2  will continue to monitor  GI evaluation  advance diet to mechanical soft regular for now as hemoglobin stable

## 2021-06-19 NOTE — ED PROVIDER NOTE - PROGRESS NOTE DETAILS
Sandrita Gomez, resident MD: called Dr. Gonzales's answering service. awaiting callback Sandrita Gomez, resident MD: unable to reach Dr. Gonzales or covering physician via answering service. pt was admitted to hospitalist.

## 2021-06-20 DIAGNOSIS — D72.829 ELEVATED WHITE BLOOD CELL COUNT, UNSPECIFIED: ICD-10-CM

## 2021-06-20 LAB
ANION GAP SERPL CALC-SCNC: 12 MMOL/L — SIGNIFICANT CHANGE UP (ref 5–17)
BUN SERPL-MCNC: 15 MG/DL — SIGNIFICANT CHANGE UP (ref 7–23)
CALCIUM SERPL-MCNC: 9.1 MG/DL — SIGNIFICANT CHANGE UP (ref 8.4–10.5)
CHLORIDE SERPL-SCNC: 104 MMOL/L — SIGNIFICANT CHANGE UP (ref 96–108)
CO2 SERPL-SCNC: 23 MMOL/L — SIGNIFICANT CHANGE UP (ref 22–31)
COVID-19 SPIKE DOMAIN AB INTERP: NEGATIVE — SIGNIFICANT CHANGE UP
COVID-19 SPIKE DOMAIN ANTIBODY RESULT: 0.4 U/ML — SIGNIFICANT CHANGE UP
CREAT SERPL-MCNC: 0.71 MG/DL — SIGNIFICANT CHANGE UP (ref 0.5–1.3)
GLUCOSE SERPL-MCNC: 111 MG/DL — HIGH (ref 70–99)
HCT VFR BLD CALC: 36.4 % — LOW (ref 39–50)
HGB BLD-MCNC: 12.2 G/DL — LOW (ref 13–17)
MCHC RBC-ENTMCNC: 31.9 PG — SIGNIFICANT CHANGE UP (ref 27–34)
MCHC RBC-ENTMCNC: 33.5 GM/DL — SIGNIFICANT CHANGE UP (ref 32–36)
MCV RBC AUTO: 95.3 FL — SIGNIFICANT CHANGE UP (ref 80–100)
NRBC # BLD: 0 /100 WBCS — SIGNIFICANT CHANGE UP (ref 0–0)
PLATELET # BLD AUTO: 224 K/UL — SIGNIFICANT CHANGE UP (ref 150–400)
POTASSIUM SERPL-MCNC: 3.6 MMOL/L — SIGNIFICANT CHANGE UP (ref 3.5–5.3)
POTASSIUM SERPL-SCNC: 3.6 MMOL/L — SIGNIFICANT CHANGE UP (ref 3.5–5.3)
RBC # BLD: 3.82 M/UL — LOW (ref 4.2–5.8)
RBC # FLD: 13.8 % — SIGNIFICANT CHANGE UP (ref 10.3–14.5)
SARS-COV-2 IGG+IGM SERPL QL IA: 0.4 U/ML — SIGNIFICANT CHANGE UP
SARS-COV-2 IGG+IGM SERPL QL IA: NEGATIVE — SIGNIFICANT CHANGE UP
SODIUM SERPL-SCNC: 139 MMOL/L — SIGNIFICANT CHANGE UP (ref 135–145)
TSH SERPL-MCNC: 1.3 UIU/ML — SIGNIFICANT CHANGE UP (ref 0.27–4.2)
WBC # BLD: 15.42 K/UL — HIGH (ref 3.8–10.5)
WBC # FLD AUTO: 15.42 K/UL — HIGH (ref 3.8–10.5)

## 2021-06-20 PROCEDURE — 71260 CT THORAX DX C+: CPT | Mod: 26

## 2021-06-20 PROCEDURE — 74177 CT ABD & PELVIS W/CONTRAST: CPT | Mod: 26

## 2021-06-20 RX ORDER — HYDROCORTISONE 1 %
1 OINTMENT (GRAM) TOPICAL DAILY
Refills: 0 | Status: DISCONTINUED | OUTPATIENT
Start: 2021-06-20 | End: 2021-06-25

## 2021-06-20 RX ADMIN — Medication 650 MILLIGRAM(S): at 21:38

## 2021-06-20 RX ADMIN — FINASTERIDE 5 MILLIGRAM(S): 5 TABLET, FILM COATED ORAL at 12:45

## 2021-06-20 RX ADMIN — TAMSULOSIN HYDROCHLORIDE 0.8 MILLIGRAM(S): 0.4 CAPSULE ORAL at 21:38

## 2021-06-20 RX ADMIN — CARBIDOPA AND LEVODOPA 1 TABLET(S): 25; 100 TABLET ORAL at 17:21

## 2021-06-20 RX ADMIN — Medication 650 MILLIGRAM(S): at 22:30

## 2021-06-20 RX ADMIN — CARBIDOPA AND LEVODOPA 1 TABLET(S): 25; 100 TABLET ORAL at 21:38

## 2021-06-20 RX ADMIN — Medication 5 MILLIGRAM(S): at 21:37

## 2021-06-20 RX ADMIN — RITONAVIR 100 MILLIGRAM(S): 100 TABLET, FILM COATED ORAL at 05:58

## 2021-06-20 RX ADMIN — EMTRICITABINE AND TENOFOVIR DISOPROXIL FUMARATE 1 TABLET(S): 200; 300 TABLET, FILM COATED ORAL at 12:44

## 2021-06-20 RX ADMIN — DARUNAVIR 800 MILLIGRAM(S): 75 TABLET, FILM COATED ORAL at 12:44

## 2021-06-20 RX ADMIN — CARBIDOPA AND LEVODOPA 1 TABLET(S): 25; 100 TABLET ORAL at 05:58

## 2021-06-20 RX ADMIN — DONEPEZIL HYDROCHLORIDE 10 MILLIGRAM(S): 10 TABLET, FILM COATED ORAL at 21:38

## 2021-06-20 RX ADMIN — AMLODIPINE BESYLATE 5 MILLIGRAM(S): 2.5 TABLET ORAL at 05:58

## 2021-06-20 RX ADMIN — ATORVASTATIN CALCIUM 10 MILLIGRAM(S): 80 TABLET, FILM COATED ORAL at 21:37

## 2021-06-20 RX ADMIN — Medication 1 SPRAY(S): at 21:38

## 2021-06-20 NOTE — PROGRESS NOTE ADULT - SUBJECTIVE AND OBJECTIVE BOX
Subjective:  No further episodes of blood   ___________________________________________________________________________________________  Allergies    No Known Allergies    Intolerances      MEDICATIONS  (STANDING):  amLODIPine   Tablet 5 milliGRAM(s) Oral daily  atorvastatin 10 milliGRAM(s) Oral at bedtime  carbidopa/levodopa  25/100 1 Tablet(s) Oral three times a day  darunavir 800 milliGRAM(s) Oral daily  donepezil 10 milliGRAM(s) Oral at bedtime  emtricitabine 200 mG/tenofovir alafenamide 25 mG (DESCOVY) Tablet 1 Tablet(s) Oral daily  finasteride 5 milliGRAM(s) Oral daily  fluticasone propionate 50 MICROgram(s)/spray Nasal Spray 1 Spray(s) Both Nostrils <User Schedule>  melatonin 5 milliGRAM(s) Oral at bedtime  ritonavir Tablet 100 milliGRAM(s) Oral every 24 hours  tamsulosin 0.8 milliGRAM(s) Oral at bedtime    MEDICATIONS  (PRN):  acetaminophen    Suspension .. 650 milliGRAM(s) Oral every 6 hours PRN Moderate Pain (4 - 6)      PAST MEDICAL & SURGICAL HISTORY:  HIV (human immunodeficiency virus infection)    HTN (hypertension)    Alzheimer&#x27;s disease of other onset    No significant past surgical history      FAMILY HISTORY:  No pertinent family history in first degree relatives      Social History: No hsitory of : Tobacco use, IVDA, EToH  ______________________________________________________________________________________    PHYSICAL EXAM    Daily Height in cm: 182.88 (18 Jun 2021 23:29)    Daily   BMI: 17.6 (06-18 @ 23:29)  Change in Weight:  Vital Signs Last 24 Hrs  T(C): 37.7 (19 Jun 2021 16:05), Max: 37.7 (19 Jun 2021 16:05)  T(F): 99.8 (19 Jun 2021 16:05), Max: 99.8 (19 Jun 2021 16:05)  HR: 108 (19 Jun 2021 16:05) (80 - 108)  BP: 130/68 (19 Jun 2021 16:05) (119/59 - 130/68)  BP(mean): --  RR: 18 (19 Jun 2021 16:05) (16 - 18)  SpO2: 98% (19 Jun 2021 16:05) (97% - 100%)    General:  Well developed, well nourished, alert and active, no pallor, NAD.  HEENT:    Normal appearance of conjunctiva, ears, nose, lips, oropharynx, and oral mucosa, anicteric.  Neck:  No masses, no asymmetry.  Lymph Nodes:  No lymphadenopathy.   Cardiovascular:  RRR normal S1/S2, no murmur.  Respiratory:  CTA B/L, normal respiratory effort.   Abdominal:   soft, no masses or tenderness, normoactive BS, NT/ND, no HSM.  Extremities:   No clubbing or cyanosis, normal capillary refill, no edema.   Skin:   No rash, jaundice, lesions, eczema.   Musculoskeletal:  No joint swelling, erythema or tenderness.   Neuro: No focal deficits.   Other:   _______________________________________________________________________________________________  Lab Results:                          12.2   11.19 )-----------( 214      ( 19 Jun 2021 05:25 )             35.3     06-19    138  |  102  |  18  ----------------------------<  139<H>  4.2   |  21<L>  |  1.06    Ca    9.2      19 Jun 2021 01:12    TPro  8.5<H>  /  Alb  3.8  /  TBili  0.2  /  DBili  x   /  AST  16  /  ALT  5<L>  /  AlkPhos  218<H>  06-19    LIVER FUNCTIONS - ( 19 Jun 2021 01:12 )  Alb: 3.8 g/dL / Pro: 8.5 g/dL / ALK PHOS: 218 U/L / ALT: 5 U/L / AST: 16 U/L / GGT: x           PT/INR - ( 19 Jun 2021 01:12 )   PT: 12.3 sec;   INR: 1.03 ratio         PTT - ( 19 Jun 2021 01:12 )  PTT:33.4 sec        Stool Results:          RADIOLOGY RESULTS:    SURGICAL PATHOLOGY:

## 2021-06-20 NOTE — PROGRESS NOTE ADULT - SUBJECTIVE AND OBJECTIVE BOX
Patient is a 80y old  Male who presents with a chief complaint of BRBPR (20 Jun 2021 10:38)      DATE OF SERVICE: 06-20-21 @ 12:11    SUBJECTIVE / OVERNIGHT EVENTS: overnight events noted    ROS: not available   no events per RN        MEDICATIONS  (STANDING):  amLODIPine   Tablet 5 milliGRAM(s) Oral daily  atorvastatin 10 milliGRAM(s) Oral at bedtime  carbidopa/levodopa  25/100 1 Tablet(s) Oral three times a day  darunavir 800 milliGRAM(s) Oral daily  donepezil 10 milliGRAM(s) Oral at bedtime  emtricitabine 200 mG/tenofovir alafenamide 25 mG (DESCOVY) Tablet 1 Tablet(s) Oral daily  finasteride 5 milliGRAM(s) Oral daily  fluticasone propionate 50 MICROgram(s)/spray Nasal Spray 1 Spray(s) Both Nostrils <User Schedule>  melatonin 5 milliGRAM(s) Oral at bedtime  ritonavir Tablet 100 milliGRAM(s) Oral every 24 hours  tamsulosin 0.8 milliGRAM(s) Oral at bedtime    MEDICATIONS  (PRN):  acetaminophen    Suspension .. 650 milliGRAM(s) Oral every 6 hours PRN Moderate Pain (4 - 6)        CAPILLARY BLOOD GLUCOSE        I&O's Summary    19 Jun 2021 07:01  -  20 Jun 2021 07:00  --------------------------------------------------------  IN: 100 mL / OUT: 0 mL / NET: 100 mL        Vital Signs Last 24 Hrs  T(C): 36.4 (20 Jun 2021 08:40), Max: 37.7 (19 Jun 2021 16:05)  T(F): 97.6 (20 Jun 2021 08:40), Max: 99.8 (19 Jun 2021 16:05)  HR: 82 (20 Jun 2021 08:40) (82 - 108)  BP: 101/64 (20 Jun 2021 08:40) (101/64 - 152/75)  BP(mean): --  RR: 18 (20 Jun 2021 08:40) (18 - 18)  SpO2: 98% (20 Jun 2021 08:40) (98% - 98%)    PHYSICAL EXAM:  GENERAL: in no apparent distress  HEAD:  Atraumatic, Normocephalic  EYES: EOMI, PERRLA, sclera clear  NECK: Supple, No JVD  CHEST/LUNG: clear  HEART: S1 S2; no murmurs   ABDOMEN: Soft, Nontender  EXTREMITIES:    no edema  NEUROLOGY: alert nonverbal  SKIN: No rashes or lesions    LABS:                        12.2   15.42 )-----------( 224      ( 20 Jun 2021 09:12 )             36.4     06-20    139  |  104  |  15  ----------------------------<  111<H>  3.6   |  23  |  0.71    Ca    9.1      20 Jun 2021 09:12    TPro  8.5<H>  /  Alb  3.8  /  TBili  0.2  /  DBili  x   /  AST  16  /  ALT  5<L>  /  AlkPhos  218<H>  06-19    PT/INR - ( 19 Jun 2021 01:12 )   PT: 12.3 sec;   INR: 1.03 ratio         PTT - ( 19 Jun 2021 01:12 )  PTT:33.4 sec            All consultant(s) notes reviewed and care discussed with other providers        Contact Number, Dr Guerrero [phone]

## 2021-06-20 NOTE — PROGRESS NOTE ADULT - PROBLEM SELECTOR PLAN 2
unclear etiology  with temp 99.5  no CXR obtained in Emergency Department  will order CT chest/abdomen/pelvis r/o malignancy or infection

## 2021-06-21 ENCOUNTER — NON-APPOINTMENT (OUTPATIENT)
Age: 81
End: 2021-06-21

## 2021-06-21 LAB
APPEARANCE UR: CLEAR — SIGNIFICANT CHANGE UP
BACTERIA # UR AUTO: NEGATIVE — SIGNIFICANT CHANGE UP
BILIRUB UR-MCNC: NEGATIVE — SIGNIFICANT CHANGE UP
COLOR SPEC: COLORLESS — SIGNIFICANT CHANGE UP
DIFF PNL FLD: ABNORMAL
EPI CELLS # UR: 3 /HPF — SIGNIFICANT CHANGE UP
GLUCOSE UR QL: NEGATIVE — SIGNIFICANT CHANGE UP
HYALINE CASTS # UR AUTO: 4 /LPF — HIGH (ref 0–2)
KETONES UR-MCNC: NEGATIVE — SIGNIFICANT CHANGE UP
LEUKOCYTE ESTERASE UR-ACNC: ABNORMAL
NITRITE UR-MCNC: NEGATIVE — SIGNIFICANT CHANGE UP
PH UR: 6.5 — SIGNIFICANT CHANGE UP (ref 5–8)
PROT UR-MCNC: NEGATIVE — SIGNIFICANT CHANGE UP
RBC CASTS # UR COMP ASSIST: 1 /HPF — SIGNIFICANT CHANGE UP (ref 0–4)
SP GR SPEC: 1.01 — LOW (ref 1.01–1.02)
UROBILINOGEN FLD QL: NEGATIVE — SIGNIFICANT CHANGE UP
WBC UR QL: 3 /HPF — SIGNIFICANT CHANGE UP (ref 0–5)

## 2021-06-21 PROCEDURE — 99222 1ST HOSP IP/OBS MODERATE 55: CPT

## 2021-06-21 PROCEDURE — 99232 SBSQ HOSP IP/OBS MODERATE 35: CPT

## 2021-06-21 RX ORDER — PIPERACILLIN AND TAZOBACTAM 4; .5 G/20ML; G/20ML
3.38 INJECTION, POWDER, LYOPHILIZED, FOR SOLUTION INTRAVENOUS EVERY 8 HOURS
Refills: 0 | Status: DISCONTINUED | OUTPATIENT
Start: 2021-06-21 | End: 2021-06-23

## 2021-06-21 RX ORDER — PIPERACILLIN AND TAZOBACTAM 4; .5 G/20ML; G/20ML
3.38 INJECTION, POWDER, LYOPHILIZED, FOR SOLUTION INTRAVENOUS ONCE
Refills: 0 | Status: COMPLETED | OUTPATIENT
Start: 2021-06-21 | End: 2021-06-21

## 2021-06-21 RX ADMIN — DONEPEZIL HYDROCHLORIDE 10 MILLIGRAM(S): 10 TABLET, FILM COATED ORAL at 21:08

## 2021-06-21 RX ADMIN — PIPERACILLIN AND TAZOBACTAM 200 GRAM(S): 4; .5 INJECTION, POWDER, LYOPHILIZED, FOR SOLUTION INTRAVENOUS at 17:29

## 2021-06-21 RX ADMIN — CARBIDOPA AND LEVODOPA 1 TABLET(S): 25; 100 TABLET ORAL at 05:52

## 2021-06-21 RX ADMIN — EMTRICITABINE AND TENOFOVIR DISOPROXIL FUMARATE 1 TABLET(S): 200; 300 TABLET, FILM COATED ORAL at 12:19

## 2021-06-21 RX ADMIN — ATORVASTATIN CALCIUM 10 MILLIGRAM(S): 80 TABLET, FILM COATED ORAL at 21:07

## 2021-06-21 RX ADMIN — Medication 1 SUPPOSITORY(S): at 12:19

## 2021-06-21 RX ADMIN — RITONAVIR 100 MILLIGRAM(S): 100 TABLET, FILM COATED ORAL at 05:52

## 2021-06-21 RX ADMIN — DARUNAVIR 800 MILLIGRAM(S): 75 TABLET, FILM COATED ORAL at 12:19

## 2021-06-21 RX ADMIN — CARBIDOPA AND LEVODOPA 1 TABLET(S): 25; 100 TABLET ORAL at 14:48

## 2021-06-21 RX ADMIN — FINASTERIDE 5 MILLIGRAM(S): 5 TABLET, FILM COATED ORAL at 12:19

## 2021-06-21 RX ADMIN — Medication 5 MILLIGRAM(S): at 21:07

## 2021-06-21 RX ADMIN — TAMSULOSIN HYDROCHLORIDE 0.8 MILLIGRAM(S): 0.4 CAPSULE ORAL at 21:07

## 2021-06-21 RX ADMIN — AMLODIPINE BESYLATE 5 MILLIGRAM(S): 2.5 TABLET ORAL at 05:52

## 2021-06-21 RX ADMIN — CARBIDOPA AND LEVODOPA 1 TABLET(S): 25; 100 TABLET ORAL at 21:08

## 2021-06-21 RX ADMIN — Medication 1 SPRAY(S): at 21:08

## 2021-06-21 NOTE — PROGRESS NOTE ADULT - SUBJECTIVE AND OBJECTIVE BOX
Chief Complaint:  Patient is a 80y old  Male who presents with a chief complaint of BRBPR (20 Jun 2021 12:11)      Interval Events:   no reported bleeding  hgb stable      Hospital Medications:  acetaminophen    Suspension .. 650 milliGRAM(s) Oral every 6 hours PRN  amLODIPine   Tablet 5 milliGRAM(s) Oral daily  atorvastatin 10 milliGRAM(s) Oral at bedtime  carbidopa/levodopa  25/100 1 Tablet(s) Oral three times a day  darunavir 800 milliGRAM(s) Oral daily  donepezil 10 milliGRAM(s) Oral at bedtime  emtricitabine 200 mG/tenofovir alafenamide 25 mG (DESCOVY) Tablet 1 Tablet(s) Oral daily  finasteride 5 milliGRAM(s) Oral daily  fluticasone propionate 50 MICROgram(s)/spray Nasal Spray 1 Spray(s) Both Nostrils <User Schedule>  hydrocortisone hemorrhoidal Suppository 1 Suppository(s) Rectal daily  melatonin 5 milliGRAM(s) Oral at bedtime  ritonavir Tablet 100 milliGRAM(s) Oral every 24 hours  tamsulosin 0.8 milliGRAM(s) Oral at bedtime        PHYSICAL EXAM:   Vital Signs:  Vital Signs Last 24 Hrs  T(C): 36.5 (21 Jun 2021 08:20), Max: 36.5 (21 Jun 2021 08:20)  T(F): 97.7 (21 Jun 2021 08:20), Max: 97.7 (21 Jun 2021 08:20)  HR: 72 (21 Jun 2021 08:20) (72 - 85)  BP: 148/74 (21 Jun 2021 08:20) (108/41 - 148/74)  BP(mean): --  RR: 18 (21 Jun 2021 08:20) (16 - 18)  SpO2: 98% (21 Jun 2021 08:20) (97% - 98%)  Daily     Daily     GENERAL:  Appears stated age,  no distress  HEENT:   sclera -anicteric  CHEST:   no increased effort, breath sounds clear  HEART:  Regular rhythm, S1, S2,   ABDOMEN:  Soft, non-tender, non-distended, normoactive bowel sounds,    EXTREMITIES:  no cyanosis, clubbing or edema  SKIN:  No rash/no jaundice   NEURO:  Alert, oriented    LABS:                        12.2   15.42 )-----------( 224      ( 20 Jun 2021 09:12 )             36.4       06-20    139  |  104  |  15  ----------------------------<  111<H>  3.6   |  23  |  0.71    Ca    9.1      20 Jun 2021 09:12                                    12.2   15.42 )-----------( 224      ( 20 Jun 2021 09:12 )             36.4                         12.2   11.19 )-----------( 214      ( 19 Jun 2021 05:25 )             35.3                         12.8   12.10 )-----------( 223      ( 19 Jun 2021 01:12 )             38.6     Imaging:             Chief Complaint:  Patient is a 80y old  Male who presents with a chief complaint of BRBPR (20 Jun 2021 12:11)      Interval Events:   no reported bleeding  hgb stable  spoke with daughter Courtney at bedside, will manage conservatively  rectal exam shows mucoid stool       Hospital Medications:  acetaminophen    Suspension .. 650 milliGRAM(s) Oral every 6 hours PRN  amLODIPine   Tablet 5 milliGRAM(s) Oral daily  atorvastatin 10 milliGRAM(s) Oral at bedtime  carbidopa/levodopa  25/100 1 Tablet(s) Oral three times a day  darunavir 800 milliGRAM(s) Oral daily  donepezil 10 milliGRAM(s) Oral at bedtime  emtricitabine 200 mG/tenofovir alafenamide 25 mG (DESCOVY) Tablet 1 Tablet(s) Oral daily  finasteride 5 milliGRAM(s) Oral daily  fluticasone propionate 50 MICROgram(s)/spray Nasal Spray 1 Spray(s) Both Nostrils <User Schedule>  hydrocortisone hemorrhoidal Suppository 1 Suppository(s) Rectal daily  melatonin 5 milliGRAM(s) Oral at bedtime  ritonavir Tablet 100 milliGRAM(s) Oral every 24 hours  tamsulosin 0.8 milliGRAM(s) Oral at bedtime        PHYSICAL EXAM:   Vital Signs:  Vital Signs Last 24 Hrs  T(C): 36.5 (21 Jun 2021 08:20), Max: 36.5 (21 Jun 2021 08:20)  T(F): 97.7 (21 Jun 2021 08:20), Max: 97.7 (21 Jun 2021 08:20)  HR: 72 (21 Jun 2021 08:20) (72 - 85)  BP: 148/74 (21 Jun 2021 08:20) (108/41 - 148/74)  BP(mean): --  RR: 18 (21 Jun 2021 08:20) (16 - 18)  SpO2: 98% (21 Jun 2021 08:20) (97% - 98%)  Daily     Daily     GENERAL:  Appears stated age,  no distress  HEENT:   sclera -anicteric  CHEST:   no increased effort, breath sounds clear  HEART:  Regular rhythm, S1, S2,   ABDOMEN:  Soft, non-tender, non-distended, normoactive bowel sounds,    EXTREMITIES:  no cyanosis, clubbing or edema  SKIN:  No rash/no jaundice   NEURO:  Alert, oriented    LABS:                        12.2   15.42 )-----------( 224      ( 20 Jun 2021 09:12 )             36.4       06-20    139  |  104  |  15  ----------------------------<  111<H>  3.6   |  23  |  0.71    Ca    9.1      20 Jun 2021 09:12                                    12.2   15.42 )-----------( 224      ( 20 Jun 2021 09:12 )             36.4                         12.2   11.19 )-----------( 214      ( 19 Jun 2021 05:25 )             35.3                         12.8   12.10 )-----------( 223      ( 19 Jun 2021 01:12 )             38.6     Imaging:

## 2021-06-21 NOTE — CONSULT NOTE ADULT - ASSESSMENT
79yo man PMH parkinson's disease, dementia, HTN, HLD, BPH, HIV, not on a/c, nonverbal at baseline presents with acute onset of BRBPR today  ? GI ?  source  Ct as above  pt also with HIV on ART-despite his severe dementia on ART(wife administers) and controlled(2/15/21 Viral load undetectable  T cell 352)  Now also with leucocytosis    Rec:    A) Leucocytosis  ? reactive  ? UTI  ? Proctocolitis  Rec:  1) Check UA, urione cx and bloodCX  2) Monitor clinically  3) Dtart  79yo man PMH parkinson's disease, dementia, HTN, HLD, BPH, HIV, not on a/c, nonverbal at baseline presents with acute onset of BRBPR today  ? GI ?  source  Ct as above  pt also with HIV on ART-despite his severe dementia on ART(wife administers) and controlled(2/15/21 Viral load undetectable  T cell 352)  Now also with leucocytosis    Rec:    A) Leucocytosis  ? reactive  ? UTI  ? Proctocolitis  Rec:  1) Check UA, urine cx and blood CX  2) Monitor clinically  3) start empiric zosyn pending above  4) GI on case  consider urology eval      B) ? GI vs  bleed  monitor Hb  GI on case  ?  eval given above   will defer to primary    C) HIV  well controlled despite pt being non verbal and severely demented at baseline  continue descovy,prezista and norvir  Last CD4 352 and VL<50 2/21  can recheck     Will tailor plan for ID issues  per course,results.Will defer to primary team on management of other issues.  Assessment, plan and recommendations as detailed above were discussed with the medical/primary  team.  Will Follow.  Beeper [phone] [facility] [id number].   Wknd/afterhours/No response-[phone] or Fellow on call

## 2021-06-21 NOTE — PROGRESS NOTE ADULT - SUBJECTIVE AND OBJECTIVE BOX
Patient is a 80y old  Male who presents with a chief complaint of BRBPR (2021 15:57)      DATE OF SERVICE: 21 @ 18:05    SUBJECTIVE / OVERNIGHT EVENTS: overnight events noted    ROS:  not available           MEDICATIONS  (STANDING):  amLODIPine   Tablet 5 milliGRAM(s) Oral daily  atorvastatin 10 milliGRAM(s) Oral at bedtime  carbidopa/levodopa  25/100 1 Tablet(s) Oral three times a day  darunavir 800 milliGRAM(s) Oral daily  donepezil 10 milliGRAM(s) Oral at bedtime  emtricitabine 200 mG/tenofovir alafenamide 25 mG (DESCOVY) Tablet 1 Tablet(s) Oral daily  finasteride 5 milliGRAM(s) Oral daily  fluticasone propionate 50 MICROgram(s)/spray Nasal Spray 1 Spray(s) Both Nostrils <User Schedule>  hydrocortisone hemorrhoidal Suppository 1 Suppository(s) Rectal daily  melatonin 5 milliGRAM(s) Oral at bedtime  piperacillin/tazobactam IVPB.. 3.375 Gram(s) IV Intermittent every 8 hours  ritonavir Tablet 100 milliGRAM(s) Oral every 24 hours  tamsulosin 0.8 milliGRAM(s) Oral at bedtime    MEDICATIONS  (PRN):  acetaminophen    Suspension .. 650 milliGRAM(s) Oral every 6 hours PRN Moderate Pain (4 - 6)        CAPILLARY BLOOD GLUCOSE        I&O's Summary    2021 07:01  -  2021 18:05  --------------------------------------------------------  IN: 380 mL / OUT: 250 mL / NET: 130 mL        Vital Signs Last 24 Hrs  T(C): 36.7 (2021 16:37), Max: 36.7 (2021 16:37)  T(F): 98 (2021 16:37), Max: 98 (2021 16:37)  HR: 77 (2021 16:37) (72 - 82)  BP: 121/66 (2021 16:37) (108/41 - 148/74)  BP(mean): --  RR: 18 (2021 16:37) (16 - 18)  SpO2: 97% (2021 16:37) (97% - 98%)    PHYSICAL EXAM:  GENERAL: in no apparent distress  HEAD:  Atraumatic, Normocephalic  EYES: EOMI, PERRLA, sclera clear  NECK: Supple, No JVD  CHEST/LUNG: clear  HEART: S1 S2; no murmurs   ABDOMEN: Soft, Nontender  EXTREMITIES:    no edema  NEUROLOGY: alert nonverbal  SKIN: No rashes or lesions    LABS:                        12.2   15.42 )-----------( 224      ( 2021 09:12 )             36.4     06-20    139  |  104  |  15  ----------------------------<  111<H>  3.6   |  23  |  0.71    Ca    9.1      2021 09:12            Urinalysis Basic - ( 2021 17:24 )    Color: Colorless / Appearance: Clear / S.007 / pH: x  Gluc: x / Ketone: Negative  / Bili: Negative / Urobili: Negative   Blood: x / Protein: Negative / Nitrite: Negative   Leuk Esterase: Large / RBC: 1 /hpf / WBC 3 /HPF   Sq Epi: x / Non Sq Epi: 3 /hpf / Bacteria: Negative          All consultant(s) notes reviewed and care discussed with other providers        Contact Number, Dr Guerrero [phone]

## 2021-06-21 NOTE — PROGRESS NOTE ADULT - PROBLEM SELECTOR PLAN 2
CT chest/abdomen/pelvis r/o malignancy or infection confirms colitis and possibly UTI  continue piperacillin/tazobactam

## 2021-06-22 LAB
4/8 RATIO: 0.21 RATIO — LOW (ref 0.86–4.14)
ABS CD8: 1434 /UL — HIGH (ref 90–775)
ANION GAP SERPL CALC-SCNC: 20 MMOL/L — HIGH (ref 5–17)
BUN SERPL-MCNC: 12 MG/DL — SIGNIFICANT CHANGE UP (ref 7–23)
CALCIUM SERPL-MCNC: 8.1 MG/DL — LOW (ref 8.4–10.5)
CD3 BLASTS SPEC-ACNC: 1759 /UL — SIGNIFICANT CHANGE UP (ref 396–2024)
CD3 BLASTS SPEC-ACNC: 71 % — SIGNIFICANT CHANGE UP (ref 58–84)
CD4 %: 12 % — LOW (ref 30–56)
CD8 %: 58 % — HIGH (ref 11–43)
CHLORIDE SERPL-SCNC: 89 MMOL/L — LOW (ref 96–108)
CO2 SERPL-SCNC: 22 MMOL/L — SIGNIFICANT CHANGE UP (ref 22–31)
CREAT SERPL-MCNC: 0.65 MG/DL — SIGNIFICANT CHANGE UP (ref 0.5–1.3)
CULTURE RESULTS: SIGNIFICANT CHANGE UP
GLUCOSE BLDC GLUCOMTR-MCNC: 119 MG/DL — HIGH (ref 70–99)
GLUCOSE SERPL-MCNC: 621 MG/DL — CRITICAL HIGH (ref 70–99)
HCT VFR BLD CALC: 34.2 % — LOW (ref 39–50)
HGB BLD-MCNC: 11.5 G/DL — LOW (ref 13–17)
HIV-1 VIRAL LOAD RESULT: ABNORMAL
HIV1 RNA # SERPL NAA+PROBE: SIGNIFICANT CHANGE UP
HIV1 RNA SER-IMP: SIGNIFICANT CHANGE UP
HIV1 RNA SERPL NAA+PROBE-ACNC: ABNORMAL
HIV1 RNA SERPL NAA+PROBE-LOG#: ABNORMAL LG COP/ML
MCHC RBC-ENTMCNC: 31.6 PG — SIGNIFICANT CHANGE UP (ref 27–34)
MCHC RBC-ENTMCNC: 33.6 GM/DL — SIGNIFICANT CHANGE UP (ref 32–36)
MCV RBC AUTO: 94 FL — SIGNIFICANT CHANGE UP (ref 80–100)
NRBC # BLD: 0 /100 WBCS — SIGNIFICANT CHANGE UP (ref 0–0)
PLATELET # BLD AUTO: 204 K/UL — SIGNIFICANT CHANGE UP (ref 150–400)
POTASSIUM SERPL-MCNC: 3.1 MMOL/L — LOW (ref 3.5–5.3)
POTASSIUM SERPL-SCNC: 3.1 MMOL/L — LOW (ref 3.5–5.3)
RBC # BLD: 3.64 M/UL — LOW (ref 4.2–5.8)
RBC # FLD: 13.4 % — SIGNIFICANT CHANGE UP (ref 10.3–14.5)
SODIUM SERPL-SCNC: 131 MMOL/L — LOW (ref 135–145)
SPECIMEN SOURCE: SIGNIFICANT CHANGE UP
T-CELL CD4 SUBSET PNL BLD: 306 /UL — LOW (ref 325–1251)
WBC # BLD: 7.69 K/UL — SIGNIFICANT CHANGE UP (ref 3.8–10.5)
WBC # FLD AUTO: 7.69 K/UL — SIGNIFICANT CHANGE UP (ref 3.8–10.5)

## 2021-06-22 PROCEDURE — 99232 SBSQ HOSP IP/OBS MODERATE 35: CPT

## 2021-06-22 RX ORDER — MESALAMINE 400 MG
1000 TABLET, DELAYED RELEASE (ENTERIC COATED) ORAL AT BEDTIME
Refills: 0 | Status: DISCONTINUED | OUTPATIENT
Start: 2021-06-22 | End: 2021-06-25

## 2021-06-22 RX ADMIN — EMTRICITABINE AND TENOFOVIR DISOPROXIL FUMARATE 1 TABLET(S): 200; 300 TABLET, FILM COATED ORAL at 12:57

## 2021-06-22 RX ADMIN — DARUNAVIR 800 MILLIGRAM(S): 75 TABLET, FILM COATED ORAL at 12:57

## 2021-06-22 RX ADMIN — CARBIDOPA AND LEVODOPA 1 TABLET(S): 25; 100 TABLET ORAL at 15:03

## 2021-06-22 RX ADMIN — TAMSULOSIN HYDROCHLORIDE 0.8 MILLIGRAM(S): 0.4 CAPSULE ORAL at 21:10

## 2021-06-22 RX ADMIN — FINASTERIDE 5 MILLIGRAM(S): 5 TABLET, FILM COATED ORAL at 12:57

## 2021-06-22 RX ADMIN — DONEPEZIL HYDROCHLORIDE 10 MILLIGRAM(S): 10 TABLET, FILM COATED ORAL at 21:10

## 2021-06-22 RX ADMIN — PIPERACILLIN AND TAZOBACTAM 25 GRAM(S): 4; .5 INJECTION, POWDER, LYOPHILIZED, FOR SOLUTION INTRAVENOUS at 17:44

## 2021-06-22 RX ADMIN — CARBIDOPA AND LEVODOPA 1 TABLET(S): 25; 100 TABLET ORAL at 05:11

## 2021-06-22 RX ADMIN — PIPERACILLIN AND TAZOBACTAM 25 GRAM(S): 4; .5 INJECTION, POWDER, LYOPHILIZED, FOR SOLUTION INTRAVENOUS at 11:19

## 2021-06-22 RX ADMIN — RITONAVIR 100 MILLIGRAM(S): 100 TABLET, FILM COATED ORAL at 05:10

## 2021-06-22 RX ADMIN — Medication 1 SPRAY(S): at 21:10

## 2021-06-22 RX ADMIN — CARBIDOPA AND LEVODOPA 1 TABLET(S): 25; 100 TABLET ORAL at 21:10

## 2021-06-22 RX ADMIN — AMLODIPINE BESYLATE 5 MILLIGRAM(S): 2.5 TABLET ORAL at 05:10

## 2021-06-22 RX ADMIN — Medication 1000 MILLIGRAM(S): at 21:15

## 2021-06-22 RX ADMIN — Medication 5 MILLIGRAM(S): at 21:09

## 2021-06-22 RX ADMIN — Medication 1 SUPPOSITORY(S): at 12:58

## 2021-06-22 RX ADMIN — PIPERACILLIN AND TAZOBACTAM 25 GRAM(S): 4; .5 INJECTION, POWDER, LYOPHILIZED, FOR SOLUTION INTRAVENOUS at 02:00

## 2021-06-22 RX ADMIN — ATORVASTATIN CALCIUM 10 MILLIGRAM(S): 80 TABLET, FILM COATED ORAL at 21:09

## 2021-06-22 NOTE — DIETITIAN INITIAL EVALUATION ADULT. - PERTINENT MEDS FT
MEDICATIONS  (STANDING):  amLODIPine   Tablet 5 milliGRAM(s) Oral daily  atorvastatin 10 milliGRAM(s) Oral at bedtime  carbidopa/levodopa  25/100 1 Tablet(s) Oral three times a day  darunavir 800 milliGRAM(s) Oral daily  donepezil 10 milliGRAM(s) Oral at bedtime  emtricitabine 200 mG/tenofovir alafenamide 25 mG (DESCOVY) Tablet 1 Tablet(s) Oral daily  finasteride 5 milliGRAM(s) Oral daily  fluticasone propionate 50 MICROgram(s)/spray Nasal Spray 1 Spray(s) Both Nostrils <User Schedule>  hydrocortisone hemorrhoidal Suppository 1 Suppository(s) Rectal daily  melatonin 5 milliGRAM(s) Oral at bedtime  piperacillin/tazobactam IVPB.. 3.375 Gram(s) IV Intermittent every 8 hours  ritonavir Tablet 100 milliGRAM(s) Oral every 24 hours  tamsulosin 0.8 milliGRAM(s) Oral at bedtime    MEDICATIONS  (PRN):  acetaminophen    Suspension .. 650 milliGRAM(s) Oral every 6 hours PRN Moderate Pain (4 - 6)

## 2021-06-22 NOTE — DIETITIAN INITIAL EVALUATION ADULT. - PHYSCIAL ASSESSMENT
Skin: no pressure injuries   Pt unable to provide consent to nutrition focused physical exam, RN made aware, limited nutrition focused physical exam completed at this time underweight

## 2021-06-22 NOTE — DIETITIAN INITIAL EVALUATION ADULT. - OTHER INFO
As per spouse, pt does not get weighed very often since he is bedbound, estimated his wt to be around 135 pounds maybe about 18-24months ago. Noted per Althea HIE: March 2019: 138 pounds, February 2020: 138 pounds, January 2021: 130 pounds and current dosing wt of about 130 pounds noted. Pt seems to have had progressive wt loss likely due to comorbidities (5.7% involuntary wt loss over 1 year when comparing 2020 to 2021 weights).

## 2021-06-22 NOTE — PROGRESS NOTE ADULT - SUBJECTIVE AND OBJECTIVE BOX
Patient is a 80y old  Male who presents with a chief complaint of BRBPR; noted extensive past medical history. 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 3.375 Gram(s) IV Intermittent every 8 hours  ritonavir Tablet 100 milliGRAM(s) Oral every 24 hours    Other medications reviewed    Vital Signs Last 24 Hrs  T(C): 36.4 (06-22-21 @ 07:33), Max: 36.7 (06-21-21 @ 16:37)  T(F): 97.5 (06-22-21 @ 07:33), Max: 98 (06-21-21 @ 16:37)  HR: 97 (06-22-21 @ 07:33) (67 - 97)  BP: 143/72 (06-22-21 @ 07:33) (121/66 - 149/74)  BP(mean): --  RR: 18 (06-22-21 @ 07:33) (18 - 18)  SpO2: 98% (06-22-21 @ 07:33) (95% - 98%)    Physical Exam:        HEENT PERRLA   No oral exudate or erythema poor oral hygiene    Chest Good AE,CTA    CVS RRR S1 S2 WNl No murmur or rub or gallop    Abd soft BS normal No tenderness no masses    IV site no erythema tenderness or discharge    CNS as above     Lab Data:                          11.5   7.69  )-----------( 204      ( 22 Jun 2021 05:26 )             34.2     WBC Count: 7.69 (06-22-21 @ 05:26)  WBC Count: 15.42 (06-20-21 @ 09:12)  WBC Count: 11.19 (06-19-21 @ 05:25)  WBC Count: 12.10 (06-19-21 @ 01:12)    06-22    131<L>  |  89<L>  |  12  ----------------------------<  621<HH>  3.1<L>   |  22  |  0.65    Ca    8.1<L>      22 Jun 2021 05:25              blood cx pendiong  urine cx pending    Urinalysis + Microscopic Examination (06.21.21 @ 17:24)   Blood, Urine: Trace   Color: Colorless   Glucose Qualitative, Urine: Negative   Urine Appearance: Clear   Specific Gravity: 1.007   Urobilinogen: Negative   Protein, Urine: Negative   pH Urine: 6.5   Leukocyte Esterase Concentration: Large   Nitrite: Negative   Ketone - Urine: Negative   Bilirubin: Negative   Red Blood Cell - Urine: 1 /hpf   White Blood Cell - Urine: 3 /HPF   Epithelial Cells: 3 /hpf   Hyaline Casts: 4 /lpf   Bacteria: Negative     < from: CT Chest w/ IV Cont (06.20.21 @ 16:59) >    IMPRESSION:    Proctocolitis.    A 7 mm groundglass nodule in the right apex (3:24) is unchanged from 1/25/2020, but new since 2018. Differential includes slow-growing malignancy. Recommend one-year CT follow-up.    A small loculated right pleural effusion is increased since 2020.    Trabeculated urinary bladder with a diverticulum at the right dome and new 1.1 cm polypoid thickening at the left base posteriorly, raising concern for possible underlying lesion. Consider cystoscopy for definitive characterization.        < end of copied text >  ABS CD4: 306 /uL (06.22.21 @ 05:26)

## 2021-06-22 NOTE — CONSULT NOTE ADULT - ASSESSMENT
80 year old man with BPH, self catheterizing + incontinent, with possible bladder mass  - patient will need outpatient cystoscopy   - Please obtain post void residual after next void   - can follow up with Dr. Gates [phone]

## 2021-06-22 NOTE — DIETITIAN INITIAL EVALUATION ADULT. - FACTORS AFF FOOD INTAKE
as per RN, pt has been eating well in house, able to finish most meals; spouse reports pt does not typically take pureed foods, per RN, pt has been willing to consume it in house, no swallowing issues reported by family at this time; noted last BM 6/21- no blood noted

## 2021-06-22 NOTE — DIETITIAN INITIAL EVALUATION ADULT. - REASON INDICATOR FOR ASSESSMENT
Pt seen for BMI <19.  Source: spouse at [phone] Pt seen for BMI <19.  Source: spouse at [phone]; pt nonverbal; RN

## 2021-06-22 NOTE — DIETITIAN INITIAL EVALUATION ADULT. - EDUCATION DIETARY MODIFICATIONS
Reinforced importance of adequate intake, emphasis on protein intake./(R) reinforced previously met goal/verbalization

## 2021-06-22 NOTE — DIETITIAN INITIAL EVALUATION ADULT. - ORAL INTAKE PTA/DIET HISTORY
As per spouse, pt c good appetite & intake at home, was eating 3 meals daily, chopped up food, drinking regular liquids. Diet recall obtained, pt was consuming a variety of foods & was receiving Ensure Plus x 1 daily. Spouse reports pt c NKFA. States no micronutrient coverage at home.

## 2021-06-22 NOTE — DIETITIAN INITIAL EVALUATION ADULT. - ADD RECOMMEND
1. Recommend Ensure Enlive x 2 daily (350kcal, 20g protein per 8 ounces). 2. Continue to provide assistance and encouragement c all meals and snacks. 3. RD to provide food preferences.

## 2021-06-22 NOTE — CONSULT NOTE ADULT - SUBJECTIVE AND OBJECTIVE BOX
HPI:  Patient is a 80y Male who presented with    80 year old male with  PMH parkinson's disease, dementia, HTN, HLD, BPH, HIV, not on a/c, admitted for proctocolitis,  referred for finding on CT scan of possible bladder mass.   Patient has history of BPH and is floridly incontinent.   He denies fever or chills, N/V, hematuria or dysuria.           PAST MEDICAL & SURGICAL HISTORY:  HIV (human immunodeficiency virus infection)    HTN (hypertension)    Alzheimer&#x27;s disease of other onset    No significant past surgical history      MEDICATIONS  (STANDING):  amLODIPine   Tablet 5 milliGRAM(s) Oral daily  atorvastatin 10 milliGRAM(s) Oral at bedtime  carbidopa/levodopa  25/100 1 Tablet(s) Oral three times a day  darunavir 800 milliGRAM(s) Oral daily  donepezil 10 milliGRAM(s) Oral at bedtime  emtricitabine 200 mG/tenofovir alafenamide 25 mG (DESCOVY) Tablet 1 Tablet(s) Oral daily  finasteride 5 milliGRAM(s) Oral daily  fluticasone propionate 50 MICROgram(s)/spray Nasal Spray 1 Spray(s) Both Nostrils <User Schedule>  hydrocortisone hemorrhoidal Suppository 1 Suppository(s) Rectal daily  melatonin 5 milliGRAM(s) Oral at bedtime  mesalamine Suppository 1000 milliGRAM(s) Rectal at bedtime  piperacillin/tazobactam IVPB.. 3.375 Gram(s) IV Intermittent every 8 hours  ritonavir Tablet 100 milliGRAM(s) Oral every 24 hours  tamsulosin 0.8 milliGRAM(s) Oral at bedtime    MEDICATIONS  (PRN):  acetaminophen    Suspension .. 650 milliGRAM(s) Oral every 6 hours PRN Moderate Pain (4 - 6)    FAMILY HISTORY:  No pertinent family history in first degree relatives      Allergies    No Known Allergies    Intolerances      SOCIAL HISTORY:   Tobacco hx:    REVIEW OF SYSTEMS: Pertinent positives and negatives as stated in HPI, otherwise negative    Vital signs  T(C): 36.7, Max: 36.7 ( @ 16:33)  HR: 80  BP: 114/71  SpO2: 96%    Output    UOP    Physical Exam  Gen: NAD  Abd: Soft, NT, ND  Back: no CVAT  MSK: No edema present    LABS:           @ 05:26    WBC 7.69  / Hct 34.2  / SCr --        @ 05:25    WBC --    / Hct --    / SCr 0.65         131<L>  |  89<L>  |  12  ----------------------------<  621<HH>  3.1<L>   |  22  |  0.65    Ca    8.1<L>      2021 05:25        Urinalysis Basic - ( 2021 17:24 )    Color: Colorless / Appearance: Clear / S.007 / pH: x  Gluc: x / Ketone: Negative  / Bili: Negative / Urobili: Negative   Blood: x / Protein: Negative / Nitrite: Negative   Leuk Esterase: Large / RBC: 1 /hpf / WBC 3 /HPF   Sq Epi: x / Non Sq Epi: 3 /hpf / Bacteria: Negative        Urine Cx:   Culture - Urine (21 @ 19:22)    Specimen Source: .Urine Catheterized    Culture Results:   <10,000 CFU/mL Normal Urogenital Monae        < from: CT Abdomen and Pelvis w/ Oral Cont and w/ IV Cont (21 @ 16:59) >    IMPRESSION:    Proctocolitis.    A 7 mm groundglass nodule in the right apex (3:24) is unchanged from 2020, but new since 2018. Differential includes slow-growing malignancy. Recommend one-year CT follow-up.    A small loculated right pleural effusion is increased since 2020.    Trabeculated urinary bladder with a diverticulum at the right dome and new 1.1 cm polypoid thickening at the left base posteriorly, raising concern for possible underlying lesion. Consider cystoscopy for definitive characterization.    < end of copied text >          RADIOLOGY:  < from: CT Abdomen and Pelvis w/ Oral Cont and w/ IV Cont (21 @ 16:59) >    IMPRESSION:    Proctocolitis.    A 7 mm groundglass nodule in the right apex (3:24) is unchanged from 2020, but new since 2018. Differential includes slow-growing malignancy. Recommend one-year CT follow-up.    A small loculated right pleural effusion is increased since 2020.    Trabeculated urinary bladder with a diverticulum at the right dome and new 1.1 cm polypoid thickening at the left base posteriorly, raising concern for possible underlying lesion. Consider cystoscopy for definitive characterization.    < end of copied text >    
Patient is a 80y old  Male who presents with a chief complaint of BRBPR (19 Jun 2021 12:01)     .     HPI:  HPI:  81yo man PMH parkinson's disease, dementia, HTN, HLD, BPH, HIV, not on a/c, nonverbal at baseline presents with acute onset of BRBPR today. Pt's wife states that she was changing his diaper and noted a large clot and had persistent bleeding from rectum. No change in behavior was noted. No fevers or vomiting. History essentially from wife at bedside as patient is nonverbal at baseline (19 Jun 2021 12:01)            REVIEW OF SYSTEMS  Constitutional:   No fever, no fatigue, no pallor, no night sweats, no weight loss.  HEENT:   No eye pain, no vision changes, no icterus, no mouth ulcers.  Respiratory:   No shortness of breath, no cough, no respiratory distress.   Cardiovascular:   No chest pain, no palpitations.   Gastrointestinal: No abdominal pain, no nausea, no vomiting , no diahrrea, no constipation, + hematochezia,no melena.  Skin:   No rashes, no jaundice, no eczema.   Musculoskeletal:   No joint pain, no swelling, no myalgia.   Neurologic:   No headache, no seizure, no weakness.   Genitourinary:   No dysuria, no decreased urine output.  Psychiatric:  No depression, no anxiety,   Endocrine:   No thyroid disease, no diabetes.  Heme/Lymphatic:   No anemia, no blood transfusions, no lymph node enlargement, no bleeding, no bruising.  ___________________________________________________________________________________________  Allergies    No Known Allergies    Intolerances      MEDICATIONS  (STANDING):  amLODIPine   Tablet 5 milliGRAM(s) Oral daily  atorvastatin 10 milliGRAM(s) Oral at bedtime  carbidopa/levodopa  25/100 1 Tablet(s) Oral three times a day  darunavir 800 milliGRAM(s) Oral daily  donepezil 10 milliGRAM(s) Oral at bedtime  emtricitabine 200 mG/tenofovir alafenamide 25 mG (DESCOVY) Tablet 1 Tablet(s) Oral daily  finasteride 5 milliGRAM(s) Oral daily  fluticasone propionate 50 MICROgram(s)/spray Nasal Spray 1 Spray(s) Both Nostrils <User Schedule>  melatonin 5 milliGRAM(s) Oral at bedtime  ritonavir Tablet 100 milliGRAM(s) Oral every 24 hours  tamsulosin 0.8 milliGRAM(s) Oral at bedtime    MEDICATIONS  (PRN):  acetaminophen    Suspension .. 650 milliGRAM(s) Oral every 6 hours PRN Moderate Pain (4 - 6)      PAST MEDICAL & SURGICAL HISTORY:  HIV (human immunodeficiency virus infection)    HTN (hypertension)    Alzheimer&#x27;s disease of other onset    No significant past surgical history      FAMILY HISTORY:  No pertinent family history in first degree relatives      Social History: No hsitory of : Tobacco use, IVDA, EToH  ______________________________________________________________________________________    PHYSICAL EXAM    Daily Height in cm: 182.88 (18 Jun 2021 23:29)    Daily   BMI: 17.6 (06-18 @ 23:29)  Change in Weight:  Vital Signs Last 24 Hrs  T(C): 37.7 (19 Jun 2021 16:05), Max: 37.7 (19 Jun 2021 16:05)  T(F): 99.8 (19 Jun 2021 16:05), Max: 99.8 (19 Jun 2021 16:05)  HR: 108 (19 Jun 2021 16:05) (80 - 108)  BP: 130/68 (19 Jun 2021 16:05) (119/59 - 130/68)  BP(mean): --  RR: 18 (19 Jun 2021 16:05) (16 - 18)  SpO2: 98% (19 Jun 2021 16:05) (97% - 100%)    General:  Well developed, well nourished, alert and active, no pallor, NAD.  HEENT:    Normal appearance of conjunctiva, ears, nose, lips, oropharynx, and oral mucosa, anicteric.  Neck:  No masses, no asymmetry.  Lymph Nodes:  No lymphadenopathy.   Cardiovascular:  RRR normal S1/S2, no murmur.  Respiratory:  CTA B/L, normal respiratory effort.   Abdominal:   soft, no masses or tenderness, normoactive BS, NT/ND, no HSM.  Extremities:   No clubbing or cyanosis, normal capillary refill, no edema.   Skin:   No rash, jaundice, lesions, eczema.   Musculoskeletal:  No joint swelling, erythema or tenderness.   Neuro: No focal deficits.   Other:   _______________________________________________________________________________________________  Lab Results:                          12.2   11.19 )-----------( 214      ( 19 Jun 2021 05:25 )             35.3     06-19    138  |  102  |  18  ----------------------------<  139<H>  4.2   |  21<L>  |  1.06    Ca    9.2      19 Jun 2021 01:12    TPro  8.5<H>  /  Alb  3.8  /  TBili  0.2  /  DBili  x   /  AST  16  /  ALT  5<L>  /  AlkPhos  218<H>  06-19    LIVER FUNCTIONS - ( 19 Jun 2021 01:12 )  Alb: 3.8 g/dL / Pro: 8.5 g/dL / ALK PHOS: 218 U/L / ALT: 5 U/L / AST: 16 U/L / GGT: x           PT/INR - ( 19 Jun 2021 01:12 )   PT: 12.3 sec;   INR: 1.03 ratio         PTT - ( 19 Jun 2021 01:12 )  PTT:33.4 sec        Stool Results:          RADIOLOGY RESULTS:    SURGICAL PATHOLOGY:     
Patient is a 80y old  Male who presents with a chief complaint of BRBPR (21 Jun 2021 13:16)    From HPI" HPI:  81yo man PMH parkinson's disease, dementia, HTN, HLD, BPH, HIV, not on a/c, nonverbal at baseline presents with acute onset of BRBPR today. Pt's wife states that she was changing his diaper and noted a large clot and had persistent bleeding from rectum. No change in behavior was noted. No fevers or vomiting. History essentially from wife at bedside as patient is nonverbal at baseline (19 Jun 2021 12:01)  "    Above verified-agree with above unless noted below.  Noted to have leucocytosis    ID consulted     PAST MEDICAL & SURGICAL HISTORY:  HIV (human immunodeficiency virus infection)    HTN (hypertension)    Alzheimer&#x27;s disease of other onset    No significant past surgical history        Social history:     no active smoking or ETOH    FAMILY HISTORY:  No pertinent family history in first degree relatives    - Family history of medical problems in all first degree relatives reviewed.None of these were found to be related to patients current illness.    REVIEW OF SYSTEMS  pt non verbal   unable to provide ROS  Per wife no fevers chills  No coughing  other as per HPI  Allergies    No Known Allergies    Intolerances        Antimicrobials:    darunavir 800 milliGRAM(s) Oral daily  emtricitabine 200 mG/tenofovir alafenamide 25 mG (DESCOVY) Tablet 1 Tablet(s) Oral daily  ritonavir Tablet 100 milliGRAM(s) Oral every 24 hours      Prior Antimicrobials:  MEDICATIONS  (STANDING):  darunavir   800 milliGRAM(s) Oral (06-21-21 @ 12:19)   800 milliGRAM(s) Oral (06-20-21 @ 12:44)    emtricitabine 200 mG/tenofovir alafenamide 25 mG (DESCOVY) Tablet   1 Tablet(s) Oral (06-21-21 @ 12:19)   1 Tablet(s) Oral (06-20-21 @ 12:44)    ritonavir Tablet   100 milliGRAM(s) Oral (06-21-21 @ 05:52)   100 milliGRAM(s) Oral (06-20-21 @ 05:58)      Other medications reviewed  MEDICATIONS  (STANDING):  amLODIPine   Tablet 5 milliGRAM(s) Oral daily  atorvastatin 10 milliGRAM(s) Oral at bedtime  carbidopa/levodopa  25/100 1 Tablet(s) Oral three times a day  darunavir 800 milliGRAM(s) Oral daily  donepezil 10 milliGRAM(s) Oral at bedtime  emtricitabine 200 mG/tenofovir alafenamide 25 mG (DESCOVY) Tablet 1 Tablet(s) Oral daily  finasteride 5 milliGRAM(s) Oral daily  fluticasone propionate 50 MICROgram(s)/spray Nasal Spray 1 Spray(s) Both Nostrils <User Schedule>  hydrocortisone hemorrhoidal Suppository 1 Suppository(s) Rectal daily  melatonin 5 milliGRAM(s) Oral at bedtime  ritonavir Tablet 100 milliGRAM(s) Oral every 24 hours  tamsulosin 0.8 milliGRAM(s) Oral at bedtime        Vital Signs Last 24 Hrs  T(C): 36.5 (21 Jun 2021 08:20), Max: 36.5 (21 Jun 2021 08:20)  T(F): 97.7 (21 Jun 2021 08:20), Max: 97.7 (21 Jun 2021 08:20)  HR: 72 (21 Jun 2021 08:20) (72 - 85)  BP: 148/74 (21 Jun 2021 08:20) (108/41 - 148/74)  BP(mean): --  RR: 18 (21 Jun 2021 08:20) (16 - 18)  SpO2: 98% (21 Jun 2021 08:20) (97% - 98%)    PHYSICAL EXAM:Pleasant patient in no acute distress.      Constitutional:non verbal  cachectic    Eyes:PERRL NO discharge or conjunctival injection    ENMT:limited oral exam     Neck:Supple,No LN,no JVD      Respiratory:Good air entry bilaterally,CTA    Cardiovascular:S1 S2 wnl, No murmurs,rub or gallops    Gastrointestinal:Soft BS(+) no tenderness no masses ,No rebound or guarding    Genitourinary:No CVA tendereness   No penile discharge  catheter  no scrotal,epidimal tenderness         Extremities:No cyanosis,clubbing or edema.    Vascular:peripheral pulses felt    Neurological:non verbal  minimal response to tactile stimuli             Musculoskeletal:No joint swelling or LOM              Labs:                            12.2   15.42 )-----------( 224      ( 20 Jun 2021 09:12 )             36.4       WBC Count: 15.42 (06-20-21 @ 09:12)  WBC Count: 11.19 (06-19-21 @ 05:25)  WBC Count: 12.10 (06-19-21 @ 01:12)    06-20    139  |  104  |  15  ----------------------------<  111<H>  3.6   |  23  |  0.71    Ca    9.1      20 Jun 2021 09:12      < from: CT Chest w/ IV Cont (06.20.21 @ 16:59) >    IMPRESSION:    Proctocolitis.    A 7 mm groundglass nodule in the right apex (3:24) is unchanged from 1/25/2020, but new since 2018. Differential includes slow-growing malignancy. Recommend one-year CT follow-up.    A small loculated right pleural effusion is increased since 2020.    Trabeculated urinary bladder with a diverticulum at the right dome and new 1.1 cm polypoid thickening at the left base posteriorly, raising concern for possible underlying lesion. Consider cystoscopy for definitive characterization.          < end of copied text >  < from: Xray Chest 2 Views PA/Lat (03.09.20 @ 18:30) >    IMPRESSION:   Clear lungs.          < end of copied text >                Imaging studies(films) were independently reviewed.Findings as detailed in report above

## 2021-06-22 NOTE — PROGRESS NOTE ADULT - SUBJECTIVE AND OBJECTIVE BOX
Patient is a 80y old  Male who presents with a chief complaint of BRBPR (2021 14:08)      DATE OF SERVICE: 21 @ 14:33    SUBJECTIVE / OVERNIGHT EVENTS: overnight events noted    ROS:  not available           MEDICATIONS  (STANDING):  amLODIPine   Tablet 5 milliGRAM(s) Oral daily  atorvastatin 10 milliGRAM(s) Oral at bedtime  carbidopa/levodopa  25/100 1 Tablet(s) Oral three times a day  darunavir 800 milliGRAM(s) Oral daily  donepezil 10 milliGRAM(s) Oral at bedtime  emtricitabine 200 mG/tenofovir alafenamide 25 mG (DESCOVY) Tablet 1 Tablet(s) Oral daily  finasteride 5 milliGRAM(s) Oral daily  fluticasone propionate 50 MICROgram(s)/spray Nasal Spray 1 Spray(s) Both Nostrils <User Schedule>  hydrocortisone hemorrhoidal Suppository 1 Suppository(s) Rectal daily  melatonin 5 milliGRAM(s) Oral at bedtime  mesalamine Suppository 1000 milliGRAM(s) Rectal at bedtime  piperacillin/tazobactam IVPB.. 3.375 Gram(s) IV Intermittent every 8 hours  ritonavir Tablet 100 milliGRAM(s) Oral every 24 hours  tamsulosin 0.8 milliGRAM(s) Oral at bedtime    MEDICATIONS  (PRN):  acetaminophen    Suspension .. 650 milliGRAM(s) Oral every 6 hours PRN Moderate Pain (4 - 6)        CAPILLARY BLOOD GLUCOSE      POCT Blood Glucose.: 119 mg/dL (2021 06:10)    I&O's Summary    2021 07:  -  2021 07:00  --------------------------------------------------------  IN: 480 mL / OUT: 250 mL / NET: 230 mL    2021 07:  -  2021 14:33  --------------------------------------------------------  IN: 220 mL / OUT: 0 mL / NET: 220 mL        Vital Signs Last 24 Hrs  T(C): 36.4 (2021 07:33), Max: 36.7 (2021 16:37)  T(F): 97.5 (2021 07:33), Max: 98 (2021 16:37)  HR: 97 (2021 07:33) (67 - 97)  BP: 143/72 (2021 07:33) (121/66 - 149/74)  BP(mean): --  RR: 18 (2021 07:33) (18 - 18)  SpO2: 98% (2021 07:33) (95% - 98%)    PHYSICAL EXAM:  GENERAL: in no apparent distress  NECK: Supple, No JVD  CHEST/LUNG: clear  HEART: S1 S2; no murmurs   ABDOMEN: Soft, Nontender  EXTREMITIES:    no edema  NEUROLOGY: alert nonverbal  a little more lethargic today  SKIN: No rashes or lesions    LABS:                        11.5   7.69  )-----------( 204      ( 2021 05:26 )             34.2     06-22    131<L>  |  89<L>  |  12  ----------------------------<  621<HH>  3.1<L>   |  22  |  0.65    Ca    8.1<L>      2021 05:25            Urinalysis Basic - ( 2021 17:24 )    Color: Colorless / Appearance: Clear / S.007 / pH: x  Gluc: x / Ketone: Negative  / Bili: Negative / Urobili: Negative   Blood: x / Protein: Negative / Nitrite: Negative   Leuk Esterase: Large / RBC: 1 /hpf / WBC 3 /HPF   Sq Epi: x / Non Sq Epi: 3 /hpf / Bacteria: Negative          All consultant(s) notes reviewed and care discussed with other providers        Contact Number, Dr Guerrero [phone]

## 2021-06-22 NOTE — DIETITIAN INITIAL EVALUATION ADULT. - PERTINENT LABORATORY DATA
06-22 @ 05:25: Na 131<L>, BUN 12, Cr 0.65, <HH>, K+ 3.1<L>, Phos --, Mg --, Alk Phos --, ALT/SGPT --, AST/SGOT --, HbA1c --    CAPILLARY BLOOD GLUCOSE      POCT Blood Glucose.: 119 mg/dL (22 Jun 2021 06:10)

## 2021-06-23 LAB
ANION GAP SERPL CALC-SCNC: 13 MMOL/L — SIGNIFICANT CHANGE UP (ref 5–17)
BUN SERPL-MCNC: 15 MG/DL — SIGNIFICANT CHANGE UP (ref 7–23)
CALCIUM SERPL-MCNC: 9 MG/DL — SIGNIFICANT CHANGE UP (ref 8.4–10.5)
CHLORIDE SERPL-SCNC: 105 MMOL/L — SIGNIFICANT CHANGE UP (ref 96–108)
CO2 SERPL-SCNC: 25 MMOL/L — SIGNIFICANT CHANGE UP (ref 22–31)
CREAT SERPL-MCNC: 0.69 MG/DL — SIGNIFICANT CHANGE UP (ref 0.5–1.3)
GLUCOSE SERPL-MCNC: 187 MG/DL — HIGH (ref 70–99)
POTASSIUM SERPL-MCNC: 3.5 MMOL/L — SIGNIFICANT CHANGE UP (ref 3.5–5.3)
POTASSIUM SERPL-SCNC: 3.5 MMOL/L — SIGNIFICANT CHANGE UP (ref 3.5–5.3)
SODIUM SERPL-SCNC: 143 MMOL/L — SIGNIFICANT CHANGE UP (ref 135–145)

## 2021-06-23 PROCEDURE — 99232 SBSQ HOSP IP/OBS MODERATE 35: CPT

## 2021-06-23 RX ORDER — AMPICILLIN SODIUM AND SULBACTAM SODIUM 250; 125 MG/ML; MG/ML
1.5 INJECTION, POWDER, FOR SUSPENSION INTRAMUSCULAR; INTRAVENOUS ONCE
Refills: 0 | Status: COMPLETED | OUTPATIENT
Start: 2021-06-23 | End: 2021-06-23

## 2021-06-23 RX ORDER — AMPICILLIN SODIUM AND SULBACTAM SODIUM 250; 125 MG/ML; MG/ML
INJECTION, POWDER, FOR SUSPENSION INTRAMUSCULAR; INTRAVENOUS
Refills: 0 | Status: DISCONTINUED | OUTPATIENT
Start: 2021-06-23 | End: 2021-06-25

## 2021-06-23 RX ORDER — AMPICILLIN SODIUM AND SULBACTAM SODIUM 250; 125 MG/ML; MG/ML
1.5 INJECTION, POWDER, FOR SUSPENSION INTRAMUSCULAR; INTRAVENOUS EVERY 8 HOURS
Refills: 0 | Status: DISCONTINUED | OUTPATIENT
Start: 2021-06-23 | End: 2021-06-25

## 2021-06-23 RX ADMIN — CARBIDOPA AND LEVODOPA 1 TABLET(S): 25; 100 TABLET ORAL at 22:50

## 2021-06-23 RX ADMIN — TAMSULOSIN HYDROCHLORIDE 0.8 MILLIGRAM(S): 0.4 CAPSULE ORAL at 22:49

## 2021-06-23 RX ADMIN — Medication 1 SPRAY(S): at 22:51

## 2021-06-23 RX ADMIN — FINASTERIDE 5 MILLIGRAM(S): 5 TABLET, FILM COATED ORAL at 12:46

## 2021-06-23 RX ADMIN — ATORVASTATIN CALCIUM 10 MILLIGRAM(S): 80 TABLET, FILM COATED ORAL at 22:50

## 2021-06-23 RX ADMIN — DONEPEZIL HYDROCHLORIDE 10 MILLIGRAM(S): 10 TABLET, FILM COATED ORAL at 22:50

## 2021-06-23 RX ADMIN — EMTRICITABINE AND TENOFOVIR DISOPROXIL FUMARATE 1 TABLET(S): 200; 300 TABLET, FILM COATED ORAL at 12:46

## 2021-06-23 RX ADMIN — Medication 5 MILLIGRAM(S): at 22:49

## 2021-06-23 RX ADMIN — AMLODIPINE BESYLATE 5 MILLIGRAM(S): 2.5 TABLET ORAL at 05:45

## 2021-06-23 RX ADMIN — AMPICILLIN SODIUM AND SULBACTAM SODIUM 100 GRAM(S): 250; 125 INJECTION, POWDER, FOR SUSPENSION INTRAMUSCULAR; INTRAVENOUS at 22:55

## 2021-06-23 RX ADMIN — AMPICILLIN SODIUM AND SULBACTAM SODIUM 100 GRAM(S): 250; 125 INJECTION, POWDER, FOR SUSPENSION INTRAMUSCULAR; INTRAVENOUS at 18:01

## 2021-06-23 RX ADMIN — RITONAVIR 100 MILLIGRAM(S): 100 TABLET, FILM COATED ORAL at 05:44

## 2021-06-23 RX ADMIN — Medication 1000 MILLIGRAM(S): at 23:07

## 2021-06-23 RX ADMIN — CARBIDOPA AND LEVODOPA 1 TABLET(S): 25; 100 TABLET ORAL at 14:37

## 2021-06-23 RX ADMIN — PIPERACILLIN AND TAZOBACTAM 25 GRAM(S): 4; .5 INJECTION, POWDER, LYOPHILIZED, FOR SOLUTION INTRAVENOUS at 02:04

## 2021-06-23 RX ADMIN — Medication 1 SUPPOSITORY(S): at 12:46

## 2021-06-23 RX ADMIN — PIPERACILLIN AND TAZOBACTAM 25 GRAM(S): 4; .5 INJECTION, POWDER, LYOPHILIZED, FOR SOLUTION INTRAVENOUS at 10:55

## 2021-06-23 RX ADMIN — DARUNAVIR 800 MILLIGRAM(S): 75 TABLET, FILM COATED ORAL at 12:46

## 2021-06-23 RX ADMIN — CARBIDOPA AND LEVODOPA 1 TABLET(S): 25; 100 TABLET ORAL at 05:45

## 2021-06-23 NOTE — PROGRESS NOTE ADULT - SUBJECTIVE AND OBJECTIVE BOX
Patient is a 80y old  Male who presents with a chief complaint of BRBPR (23 Jun 2021 15:45)    Being followed by ID for proctitis, HIV    Interval history:  No acute events      ROS:  No ROS obtainable     Antimicrobials:    darunavir 800 milliGRAM(s) Oral daily  emtricitabine 200 mG/tenofovir alafenamide 25 mG (DESCOVY) Tablet 1 Tablet(s) Oral daily  piperacillin/tazobactam IVPB.. 3.375 Gram(s) IV Intermittent every 8 hours  ritonavir Tablet 100 milliGRAM(s) Oral every 24 hours    Other medications reviewed    Vital Signs Last 24 Hrs  T(C): 36.7 (06-23-21 @ 15:47), Max: 37 (06-22-21 @ 23:42)  T(F): 98.1 (06-23-21 @ 15:47), Max: 98.6 (06-22-21 @ 23:42)  HR: 81 (06-23-21 @ 15:47) (64 - 81)  BP: 129/69 (06-23-21 @ 15:47) (101/66 - 129/69)  BP(mean): --  RR: 18 (06-23-21 @ 15:47) (18 - 18)  SpO2: 97% (06-23-21 @ 15:47) (96% - 100%)    Physical Exam:        HEENT PERRLA   No oral exudate or erythema poor oral hygiene    Chest Good AE,CTA    CVS RRR S1 S2 WNl No murmur or rub or gallop    Abd soft BS normal No tenderness no masses    IV site no erythema tenderness or discharge    CNS as above     Lab Data:                          11.5   7.69  )-----------( 204      ( 22 Jun 2021 05:26 )             34.2       06-23    143  |  105  |  15  ----------------------------<  187<H>  3.5   |  25  |  0.69    Ca    9.0      23 Jun 2021 13:34          Culture - Blood (collected 21 Jun 2021 19:22)  Source: .Blood Blood-Peripheral  Preliminary Report (22 Jun 2021 20:01):    No growth to date.    Culture - Blood (collected 21 Jun 2021 19:22)  Source: .Blood Blood-Peripheral  Preliminary Report (22 Jun 2021 20:01):    No growth to date.    Culture - Urine (collected 21 Jun 2021 19:22)  Source: .Urine Catheterized  Final Report (22 Jun 2021 14:32):    <10,000 CFU/mL Normal Urogenital Monae      < from: CT Chest w/ IV Cont (06.20.21 @ 16:59) >    IMPRESSION:    Proctocolitis.    A 7 mm groundglass nodule in the right apex (3:24) is unchanged from 1/25/2020, but new since 2018. Differential includes slow-growing malignancy. Recommend one-year CT follow-up.    A small loculated right pleural effusion is increased since 2020.    Trabeculated urinary bladder with a diverticulum at the right dome and new 1.1 cm polypoid thickening at the left base posteriorly, raising concern for possible underlying lesion. Consider cystoscopy for definitive characterization.      < end of copied text >

## 2021-06-23 NOTE — PROGRESS NOTE ADULT - SUBJECTIVE AND OBJECTIVE BOX
Chief Complaint:  Patient is a 80y old  Male who presents with a chief complaint of BRBPR (2021 15:18)      Interval Events:     no further bleeding    Hospital Medications:  acetaminophen    Suspension .. 650 milliGRAM(s) Oral every 6 hours PRN  amLODIPine   Tablet 5 milliGRAM(s) Oral daily  atorvastatin 10 milliGRAM(s) Oral at bedtime  carbidopa/levodopa  25/100 1 Tablet(s) Oral three times a day  darunavir 800 milliGRAM(s) Oral daily  donepezil 10 milliGRAM(s) Oral at bedtime  emtricitabine 200 mG/tenofovir alafenamide 25 mG (DESCOVY) Tablet 1 Tablet(s) Oral daily  finasteride 5 milliGRAM(s) Oral daily  fluticasone propionate 50 MICROgram(s)/spray Nasal Spray 1 Spray(s) Both Nostrils <User Schedule>  hydrocortisone hemorrhoidal Suppository 1 Suppository(s) Rectal daily  melatonin 5 milliGRAM(s) Oral at bedtime  mesalamine Suppository 1000 milliGRAM(s) Rectal at bedtime  piperacillin/tazobactam IVPB.. 3.375 Gram(s) IV Intermittent every 8 hours  ritonavir Tablet 100 milliGRAM(s) Oral every 24 hours  tamsulosin 0.8 milliGRAM(s) Oral at bedtime        PHYSICAL EXAM:   Vital Signs:  Vital Signs Last 24 Hrs  T(C): 36.4 (2021 07:51), Max: 37 (2021 23:42)  T(F): 97.5 (2021 07:51), Max: 98.6 (2021 23:42)  HR: 64 (2021 07:51) (64 - 80)  BP: 101/66 (2021 07:51) (101/66 - 119/68)  BP(mean): --  RR: 18 (2021 07:51) (18 - 18)  SpO2: 100% (2021 07:51) (96% - 100%)  Daily     Daily Weight in k.2 (2021 07:51)    GENERAL:  Appears stated age,  no distress  HEENT:   sclera -anicteric  CHEST:   no increased effort, breath sounds clear  HEART:  Regular rhythm, S1, S2,   ABDOMEN:  Soft, non-tender, non-distended, normoactive bowel sounds,    EXTREMITIES:  no cyanosis, clubbing or edema  SKIN:  No rash/no jaundice   NEURO:  Alert, oriented    LABS:                        11.5   7.69  )-----------(       ( 2021 05:26 )             34.2       06-23    143  |  105  |  15  ----------------------------<  187<H>  3.5   |  25  |  0.69    Ca    9.0      2021 13:34          Urinalysis Basic - ( 2021 17:24 )    Color: Colorless / Appearance: Clear / S.007 / pH: x  Gluc: x / Ketone: Negative  / Bili: Negative / Urobili: Negative   Blood: x / Protein: Negative / Nitrite: Negative   Leuk Esterase: Large / RBC: 1 /hpf / WBC 3 /HPF   Sq Epi: x / Non Sq Epi: 3 /hpf / Bacteria: Negative                              11.5   7.69  )-----------(       ( 2021 05:26 )             34.2     Imaging:

## 2021-06-23 NOTE — PROGRESS NOTE ADULT - SUBJECTIVE AND OBJECTIVE BOX
Patient is a 80y old  Male who presents with a chief complaint of BRBPR (2021 17:26)      DATE OF SERVICE: 21 @ 15:18    SUBJECTIVE / OVERNIGHT EVENTS: overnight events noted    ROS:  not available         MEDICATIONS  (STANDING):  amLODIPine   Tablet 5 milliGRAM(s) Oral daily  atorvastatin 10 milliGRAM(s) Oral at bedtime  carbidopa/levodopa  25/100 1 Tablet(s) Oral three times a day  darunavir 800 milliGRAM(s) Oral daily  donepezil 10 milliGRAM(s) Oral at bedtime  emtricitabine 200 mG/tenofovir alafenamide 25 mG (DESCOVY) Tablet 1 Tablet(s) Oral daily  finasteride 5 milliGRAM(s) Oral daily  fluticasone propionate 50 MICROgram(s)/spray Nasal Spray 1 Spray(s) Both Nostrils <User Schedule>  hydrocortisone hemorrhoidal Suppository 1 Suppository(s) Rectal daily  melatonin 5 milliGRAM(s) Oral at bedtime  mesalamine Suppository 1000 milliGRAM(s) Rectal at bedtime  piperacillin/tazobactam IVPB.. 3.375 Gram(s) IV Intermittent every 8 hours  ritonavir Tablet 100 milliGRAM(s) Oral every 24 hours  tamsulosin 0.8 milliGRAM(s) Oral at bedtime    MEDICATIONS  (PRN):  acetaminophen    Suspension .. 650 milliGRAM(s) Oral every 6 hours PRN Moderate Pain (4 - 6)        CAPILLARY BLOOD GLUCOSE        I&O's Summary    2021 07:01  -  2021 07:00  --------------------------------------------------------  IN: 420 mL / OUT: 0 mL / NET: 420 mL        Vital Signs Last 24 Hrs  T(C): 36.4 (2021 07:51), Max: 37 (2021 23:42)  T(F): 97.5 (2021 07:51), Max: 98.6 (2021 23:42)  HR: 64 (2021 07:51) (64 - 80)  BP: 101/66 (2021 07:51) (101/66 - 119/68)  BP(mean): --  RR: 18 (2021 07:51) (18 - 18)  SpO2: 100% (2021 07:51) (96% - 100%)    PHYSICAL EXAM:  CHEST/LUNG: clear  HEART: S1 S2; no murmurs   ABDOMEN: Soft, Nontender  EXTREMITIES:    no edema  NEUROLOGY: alert nonverbal  alert at baseline  SKIN: No rashes or lesions    LABS:                        11.5   7.69  )-----------( 204      ( 2021 05:26 )             34.2     06-23    143  |  105  |  15  ----------------------------<  187<H>  3.5   |  25  |  0.69    Ca    9.0      2021 13:34            Urinalysis Basic - ( 2021 17:24 )    Color: Colorless / Appearance: Clear / S.007 / pH: x  Gluc: x / Ketone: Negative  / Bili: Negative / Urobili: Negative   Blood: x / Protein: Negative / Nitrite: Negative   Leuk Esterase: Large / RBC: 1 /hpf / WBC 3 /HPF   Sq Epi: x / Non Sq Epi: 3 /hpf / Bacteria: Negative          All consultant(s) notes reviewed and care discussed with other providers        Contact Number, Dr Guerrero [phone]

## 2021-06-24 PROCEDURE — 99232 SBSQ HOSP IP/OBS MODERATE 35: CPT

## 2021-06-24 RX ADMIN — DARUNAVIR 800 MILLIGRAM(S): 75 TABLET, FILM COATED ORAL at 12:17

## 2021-06-24 RX ADMIN — AMPICILLIN SODIUM AND SULBACTAM SODIUM 100 GRAM(S): 250; 125 INJECTION, POWDER, FOR SUSPENSION INTRAMUSCULAR; INTRAVENOUS at 13:13

## 2021-06-24 RX ADMIN — Medication 5 MILLIGRAM(S): at 21:37

## 2021-06-24 RX ADMIN — AMLODIPINE BESYLATE 5 MILLIGRAM(S): 2.5 TABLET ORAL at 12:17

## 2021-06-24 RX ADMIN — TAMSULOSIN HYDROCHLORIDE 0.8 MILLIGRAM(S): 0.4 CAPSULE ORAL at 21:37

## 2021-06-24 RX ADMIN — ATORVASTATIN CALCIUM 10 MILLIGRAM(S): 80 TABLET, FILM COATED ORAL at 21:38

## 2021-06-24 RX ADMIN — CARBIDOPA AND LEVODOPA 1 TABLET(S): 25; 100 TABLET ORAL at 21:38

## 2021-06-24 RX ADMIN — Medication 1 SUPPOSITORY(S): at 16:28

## 2021-06-24 RX ADMIN — Medication 1 SPRAY(S): at 21:38

## 2021-06-24 RX ADMIN — RITONAVIR 100 MILLIGRAM(S): 100 TABLET, FILM COATED ORAL at 16:27

## 2021-06-24 RX ADMIN — AMPICILLIN SODIUM AND SULBACTAM SODIUM 100 GRAM(S): 250; 125 INJECTION, POWDER, FOR SUSPENSION INTRAMUSCULAR; INTRAVENOUS at 05:45

## 2021-06-24 RX ADMIN — EMTRICITABINE AND TENOFOVIR DISOPROXIL FUMARATE 1 TABLET(S): 200; 300 TABLET, FILM COATED ORAL at 12:17

## 2021-06-24 RX ADMIN — FINASTERIDE 5 MILLIGRAM(S): 5 TABLET, FILM COATED ORAL at 12:17

## 2021-06-24 RX ADMIN — Medication 1000 MILLIGRAM(S): at 21:48

## 2021-06-24 RX ADMIN — AMPICILLIN SODIUM AND SULBACTAM SODIUM 100 GRAM(S): 250; 125 INJECTION, POWDER, FOR SUSPENSION INTRAMUSCULAR; INTRAVENOUS at 21:35

## 2021-06-24 RX ADMIN — CARBIDOPA AND LEVODOPA 1 TABLET(S): 25; 100 TABLET ORAL at 13:14

## 2021-06-24 RX ADMIN — DONEPEZIL HYDROCHLORIDE 10 MILLIGRAM(S): 10 TABLET, FILM COATED ORAL at 21:37

## 2021-06-24 NOTE — PROGRESS NOTE ADULT - SUBJECTIVE AND OBJECTIVE BOX
Patient is a 80y old  Male who presents with a chief complaint of BRBPR (24 Jun 2021 12:15)    Being followed by ID for HIV, proctitis    Interval history:  No acute events      ROS:  No ROS obtainable    Antimicrobials:    ampicillin/sulbactam  IVPB      ampicillin/sulbactam  IVPB 1.5 Gram(s) IV Intermittent every 8 hours  darunavir 800 milliGRAM(s) Oral daily  emtricitabine 200 mG/tenofovir alafenamide 25 mG (DESCOVY) Tablet 1 Tablet(s) Oral daily  ritonavir Tablet 100 milliGRAM(s) Oral every 24 hours    Other medications reviewed    Vital Signs Last 24 Hrs  T(C): 36.5 (06-24-21 @ 10:24), Max: 36.7 (06-23-21 @ 15:47)  T(F): 97.7 (06-24-21 @ 10:24), Max: 98.1 (06-23-21 @ 15:47)  HR: 65 (06-24-21 @ 10:24) (65 - 81)  BP: 146/83 (06-24-21 @ 10:24) (105/57 - 150/79)  BP(mean): --  RR: 18 (06-24-21 @ 10:24) (16 - 18)  SpO2: 98% (06-24-21 @ 10:24) (94% - 100%)    Physical Exam:      HEENT PERRLA   No oral exudate or erythema poor oral hygiene    Chest Good AE,CTA    CVS RRR S1 S2 WNl No murmur or rub or gallop    Abd soft BS normal No tenderness no masses    IV site no erythema tenderness or discharge    CNS as above         Lab Data:      WBC Count: 7.69 (06-22-21 @ 05:26)  WBC Count: 15.42 (06-20-21 @ 09:12)  WBC Count: 11.19 (06-19-21 @ 05:25)  WBC Count: 12.10 (06-19-21 @ 01:12)    06-23    143  |  105  |  15  ----------------------------<  187<H>  3.5   |  25  |  0.69    Ca    9.0      23 Jun 2021 13:34          Culture - Blood (collected 21 Jun 2021 19:22)  Source: .Blood Blood-Peripheral  Preliminary Report (22 Jun 2021 20:01):    No growth to date.    Culture - Blood (collected 21 Jun 2021 19:22)  Source: .Blood Blood-Peripheral  Preliminary Report (22 Jun 2021 20:01):    No growth to date.    Culture - Urine (collected 21 Jun 2021 19:22)  Source: .Urine Catheterized  Final Report (22 Jun 2021 14:32):    <10,000 CFU/mL Normal Urogenital Monae        < from: CT Chest w/ IV Cont (06.20.21 @ 16:59) >    IMPRESSION:    Proctocolitis.    A 7 mm groundglass nodule in the right apex (3:24) is unchanged from 1/25/2020, but new since 2018. Differential includes slow-growing malignancy. Recommend one-year CT follow-up.    A small loculated right pleural effusion is increased since 2020.    Trabeculated urinary bladder with a diverticulum at the right dome and new 1.1 cm polypoid thickening at the left base posteriorly, raising concern for possible underlying lesion. Consider cystoscopy for definitive characterization.        < end of copied text >

## 2021-06-24 NOTE — PROGRESS NOTE ADULT - SUBJECTIVE AND OBJECTIVE BOX
Chief Complaint:  Patient is a 80y old  Male who presents with a chief complaint of BRBPR (24 Jun 2021 14:19)      Interval Events:   no events    Hospital Medications:  acetaminophen    Suspension .. 650 milliGRAM(s) Oral every 6 hours PRN  amLODIPine   Tablet 5 milliGRAM(s) Oral daily  ampicillin/sulbactam  IVPB      ampicillin/sulbactam  IVPB 1.5 Gram(s) IV Intermittent every 8 hours  atorvastatin 10 milliGRAM(s) Oral at bedtime  carbidopa/levodopa  25/100 1 Tablet(s) Oral three times a day  darunavir 800 milliGRAM(s) Oral daily  donepezil 10 milliGRAM(s) Oral at bedtime  emtricitabine 200 mG/tenofovir alafenamide 25 mG (DESCOVY) Tablet 1 Tablet(s) Oral daily  finasteride 5 milliGRAM(s) Oral daily  fluticasone propionate 50 MICROgram(s)/spray Nasal Spray 1 Spray(s) Both Nostrils <User Schedule>  hydrocortisone hemorrhoidal Suppository 1 Suppository(s) Rectal daily  melatonin 5 milliGRAM(s) Oral at bedtime  mesalamine Suppository 1000 milliGRAM(s) Rectal at bedtime  ritonavir Tablet 100 milliGRAM(s) Oral every 24 hours  tamsulosin 0.8 milliGRAM(s) Oral at bedtime        PHYSICAL EXAM:   Vital Signs:  Vital Signs Last 24 Hrs  T(C): 36.5 (24 Jun 2021 10:24), Max: 36.7 (23 Jun 2021 15:47)  T(F): 97.7 (24 Jun 2021 10:24), Max: 98.1 (23 Jun 2021 15:47)  HR: 65 (24 Jun 2021 10:24) (65 - 81)  BP: 146/83 (24 Jun 2021 10:24) (105/57 - 150/79)  BP(mean): --  RR: 18 (24 Jun 2021 10:24) (16 - 18)  SpO2: 98% (24 Jun 2021 10:24) (94% - 100%)  Daily     Daily     GENERAL:  Appears stated age,  no distress  HEENT:   sclera -anicteric  CHEST:   no increased effort, breath sounds clear  HEART:  Regular rhythm, S1, S2,   ABDOMEN:  Soft, non-tender, non-distended, normoactive bowel sounds,    EXTREMITIES:  no cyanosis, clubbing or edema  SKIN:  No rash/no jaundice   NEURO:  Alert, not oriented    LABS:      06-23    143  |  105  |  15  ----------------------------<  187<H>  3.5   |  25  |  0.69    Ca    9.0      23 Jun 2021 13:34                                    11.5   7.69  )-----------( 204      ( 22 Jun 2021 05:26 )             34.2     Imaging:

## 2021-06-24 NOTE — PROGRESS NOTE ADULT - SUBJECTIVE AND OBJECTIVE BOX
Patient is a 80y old  Male who presents with a chief complaint of BRBPR (23 Jun 2021 15:58)      DATE OF SERVICE: 06-24-21 @ 12:16    SUBJECTIVE / OVERNIGHT EVENTS: overnight events noted    ROS:  not available       MEDICATIONS  (STANDING):  amLODIPine   Tablet 5 milliGRAM(s) Oral daily  ampicillin/sulbactam  IVPB      ampicillin/sulbactam  IVPB 1.5 Gram(s) IV Intermittent every 8 hours  atorvastatin 10 milliGRAM(s) Oral at bedtime  carbidopa/levodopa  25/100 1 Tablet(s) Oral three times a day  darunavir 800 milliGRAM(s) Oral daily  donepezil 10 milliGRAM(s) Oral at bedtime  emtricitabine 200 mG/tenofovir alafenamide 25 mG (DESCOVY) Tablet 1 Tablet(s) Oral daily  finasteride 5 milliGRAM(s) Oral daily  fluticasone propionate 50 MICROgram(s)/spray Nasal Spray 1 Spray(s) Both Nostrils <User Schedule>  hydrocortisone hemorrhoidal Suppository 1 Suppository(s) Rectal daily  melatonin 5 milliGRAM(s) Oral at bedtime  mesalamine Suppository 1000 milliGRAM(s) Rectal at bedtime  ritonavir Tablet 100 milliGRAM(s) Oral every 24 hours  tamsulosin 0.8 milliGRAM(s) Oral at bedtime    MEDICATIONS  (PRN):  acetaminophen    Suspension .. 650 milliGRAM(s) Oral every 6 hours PRN Moderate Pain (4 - 6)        CAPILLARY BLOOD GLUCOSE        I&O's Summary    23 Jun 2021 07:01  -  24 Jun 2021 07:00  --------------------------------------------------------  IN: 510 mL / OUT: 0 mL / NET: 510 mL        Vital Signs Last 24 Hrs  T(C): 36.5 (24 Jun 2021 10:24), Max: 36.7 (23 Jun 2021 15:47)  T(F): 97.7 (24 Jun 2021 10:24), Max: 98.1 (23 Jun 2021 15:47)  HR: 65 (24 Jun 2021 10:24) (65 - 81)  BP: 146/83 (24 Jun 2021 10:24) (105/57 - 150/79)  BP(mean): --  RR: 18 (24 Jun 2021 10:24) (16 - 18)  SpO2: 98% (24 Jun 2021 10:24) (94% - 100%)    PHYSICAL EXAM:  CHEST/LUNG: clear  HEART: S1 S2; no murmurs   ABDOMEN: Soft, Nontender  EXTREMITIES:    no edema  NEUROLOGY: alert nonverbal  alert at baseline  SKIN: No rashes or lesions    LABS:    06-23    143  |  105  |  15  ----------------------------<  187<H>  3.5   |  25  |  0.69    Ca    9.0      23 Jun 2021 13:34                  All consultant(s) notes reviewed and care discussed with other providers        Contact Number, Dr Guerrero [phone]

## 2021-06-25 ENCOUNTER — TRANSCRIPTION ENCOUNTER (OUTPATIENT)
Age: 81
End: 2021-06-25

## 2021-06-25 VITALS
HEART RATE: 66 BPM | TEMPERATURE: 98 F | DIASTOLIC BLOOD PRESSURE: 75 MMHG | OXYGEN SATURATION: 98 % | SYSTOLIC BLOOD PRESSURE: 116 MMHG | RESPIRATION RATE: 18 BRPM

## 2021-06-25 PROCEDURE — 85610 PROTHROMBIN TIME: CPT

## 2021-06-25 PROCEDURE — 87040 BLOOD CULTURE FOR BACTERIA: CPT

## 2021-06-25 PROCEDURE — 71260 CT THORAX DX C+: CPT

## 2021-06-25 PROCEDURE — 74177 CT ABD & PELVIS W/CONTRAST: CPT

## 2021-06-25 PROCEDURE — 80053 COMPREHEN METABOLIC PANEL: CPT

## 2021-06-25 PROCEDURE — 86360 T CELL ABSOLUTE COUNT/RATIO: CPT

## 2021-06-25 PROCEDURE — 85027 COMPLETE CBC AUTOMATED: CPT

## 2021-06-25 PROCEDURE — 99285 EMERGENCY DEPT VISIT HI MDM: CPT | Mod: 25

## 2021-06-25 PROCEDURE — 85025 COMPLETE CBC W/AUTO DIFF WBC: CPT

## 2021-06-25 PROCEDURE — 82962 GLUCOSE BLOOD TEST: CPT

## 2021-06-25 PROCEDURE — 84443 ASSAY THYROID STIM HORMONE: CPT

## 2021-06-25 PROCEDURE — 81001 URINALYSIS AUTO W/SCOPE: CPT

## 2021-06-25 PROCEDURE — 85730 THROMBOPLASTIN TIME PARTIAL: CPT

## 2021-06-25 PROCEDURE — 86901 BLOOD TYPING SEROLOGIC RH(D): CPT

## 2021-06-25 PROCEDURE — 86900 BLOOD TYPING SEROLOGIC ABO: CPT

## 2021-06-25 PROCEDURE — 80048 BASIC METABOLIC PNL TOTAL CA: CPT

## 2021-06-25 PROCEDURE — 86850 RBC ANTIBODY SCREEN: CPT

## 2021-06-25 PROCEDURE — 87086 URINE CULTURE/COLONY COUNT: CPT

## 2021-06-25 PROCEDURE — 87536 HIV-1 QUANT&REVRSE TRNSCRPJ: CPT

## 2021-06-25 PROCEDURE — 87635 SARS-COV-2 COVID-19 AMP PRB: CPT

## 2021-06-25 PROCEDURE — 86359 T CELLS TOTAL COUNT: CPT

## 2021-06-25 PROCEDURE — 86769 SARS-COV-2 COVID-19 ANTIBODY: CPT

## 2021-06-25 PROCEDURE — 94640 AIRWAY INHALATION TREATMENT: CPT

## 2021-06-25 RX ORDER — AZTREONAM 2 G
1 VIAL (EA) INJECTION
Qty: 0 | Refills: 0 | DISCHARGE

## 2021-06-25 RX ORDER — HYDROCORTISONE 1 %
1 OINTMENT (GRAM) TOPICAL
Qty: 2 | Refills: 0
Start: 2021-06-25 | End: 2021-06-26

## 2021-06-25 RX ORDER — MESALAMINE 400 MG
1 TABLET, DELAYED RELEASE (ENTERIC COATED) ORAL
Qty: 30 | Refills: 0
Start: 2021-06-25 | End: 2021-07-24

## 2021-06-25 RX ADMIN — CARBIDOPA AND LEVODOPA 1 TABLET(S): 25; 100 TABLET ORAL at 05:53

## 2021-06-25 RX ADMIN — EMTRICITABINE AND TENOFOVIR DISOPROXIL FUMARATE 1 TABLET(S): 200; 300 TABLET, FILM COATED ORAL at 12:52

## 2021-06-25 RX ADMIN — DARUNAVIR 800 MILLIGRAM(S): 75 TABLET, FILM COATED ORAL at 12:52

## 2021-06-25 RX ADMIN — CARBIDOPA AND LEVODOPA 1 TABLET(S): 25; 100 TABLET ORAL at 13:46

## 2021-06-25 RX ADMIN — AMPICILLIN SODIUM AND SULBACTAM SODIUM 100 GRAM(S): 250; 125 INJECTION, POWDER, FOR SUSPENSION INTRAMUSCULAR; INTRAVENOUS at 13:46

## 2021-06-25 RX ADMIN — RITONAVIR 100 MILLIGRAM(S): 100 TABLET, FILM COATED ORAL at 05:53

## 2021-06-25 RX ADMIN — AMPICILLIN SODIUM AND SULBACTAM SODIUM 100 GRAM(S): 250; 125 INJECTION, POWDER, FOR SUSPENSION INTRAMUSCULAR; INTRAVENOUS at 06:00

## 2021-06-25 RX ADMIN — Medication 1 SUPPOSITORY(S): at 12:52

## 2021-06-25 RX ADMIN — AMLODIPINE BESYLATE 5 MILLIGRAM(S): 2.5 TABLET ORAL at 05:54

## 2021-06-25 RX ADMIN — FINASTERIDE 5 MILLIGRAM(S): 5 TABLET, FILM COATED ORAL at 12:52

## 2021-06-25 NOTE — PROGRESS NOTE ADULT - ASSESSMENT
80 year old male with  PMH parkinson's disease, dementia, HTN, HLD, BPH, HIV, not on a/c, nonverbal at baseline presents with acute onset of BRBPR today now admitted for work up and monitoring
80 year old male with PKD and hematochezia       Hematochezia   Supportive care for now   Hydrocortisone supp WA   Monitor CBC   If recurrent epsidoe of bleeding suggest CT of the abd/Pelvis rule out colitis , large mass etc.     HIV   Continue all meds     Will follow closely     I was physically present for the key portions of the evaluation and management (E/M) service provided.  The patient was personally seen and examined at bedside.  I have edited the note as appropriate.   Thank you for your consultation and allowing  me to participate in the care of your patients. If you have further questions please contact me at [phone].   Advanced care planning was discussed with patient and family.  Advanced care planning forms were reviewed and discussed.  Risks, benefits and alternatives of gastroenterologic procedures were discussed in detail and all questions were answered.    Ti Castrejon M.D.       _________________________________________________________________________________________________  [facility]  [address] [facility] [place] [address]  Office: [phone]    Get Kumar PA-C    ___________________________________________________________________________________________________    
80 year old male with Parkinsons/dementia/HIV /rectal bleeding    Hematochezia   Supportive care for now   Hydrocortisone supp SD   Monitor CBC   hgb stable  no plans for endoscopic procedures given HD stability, no acute drop in Hgb  advance diet as tolerated        _________________________________________________________________________________________________  [facility]  [address] [place] [address]  Office: [phone]    Get Kumar PA-C    ___________________________________________________________________________________________________    
80 year old male with Parkinsons/dementia/HIV /rectal bleeding, +proctitis on imaging    Hematochezia   Supportive care for now   Can start Canasa suppository (ordered)  Monitor CBC   hgb stable  no plans for endoscopic procedures given HD stability, no acute drop in Hgb  advance diet as tolerated        _________________________________________________________________________________________________  [facility]  [address] [facility] [address] [facility] [place] [address]  Office: [phone]    Get Kumar PA-C    ___________________________________________________________________________________________________    
81yo man PMH parkinson's disease, dementia, HTN, HLD, BPH, HIV, not on a/c, nonverbal at baseline presents with acute onset of BRBPR today  ? GI ?  source  Ct as above  pt also with HIV on ART-despite his severe dementia on ART(wife administers) and controlled(2/15/21 Viral load undetectable  T cell 352)  Now also with leucocytosis    Rec:    A) Leucocytosis  ? reactive  ? Proctocolitis  resolving   Cx negative  change oral augmentin solution on DC   7 day course   Urology eval noted-for cysto as OP      B) ? GI vs  bleed  monitor Hb  also Ct chest with nodule   GI on case   on case  will defer to them on plan  given his age and comorbidities may not be candidate for aggressive management   will defer to primary    C) HIV  well controlled despite pt being non verbal and severely demented at baseline  continue descovy,prezista and norvir  CD4 306  VL undetectable     Will defer to primary team on management of other issues.  Assessment, plan and recommendations as detailed above were discussed with the medical/primary  team.  Case also d/w wife  I will be away till 7/6/21  My colleagues in ID service will cover,follow and assume care of ID issues as needed .  Please call [phone] if any issues.  
79yo man PMH parkinson's disease, dementia, HTN, HLD, BPH, HIV, not on a/c, nonverbal at baseline presents with acute onset of BRBPR today  ? GI ?  source  Ct as above  pt also with HIV on ART-despite his severe dementia on ART(wife administers) and controlled(2/15/21 Viral load undetectable  T cell 352)  Now also with leucocytosis    Rec:    A) Leucocytosis  ? reactive  ? UTI  ? Proctocolitis  resolving   Rec:  1) follow urine cx and blood CX  2) Monitor clinically  3) continue  empiric zosyn pending above  4) GI on case  consider urology eval though if tumor given his current status unclear if will be a candidate for aggressibve Rx      B) ? GI vs  bleed  monitor Hb  GI on case  ?  eval given above   will defer to primary    C) HIV  well controlled despite pt being non verbal and severely demented at baseline  continue descovy,prezista and norvir  CD4 306  follow VL    Will tailor plan for ID issues  per course,results.Will defer to primary team on management of other issues.  Assessment, plan and recommendations as detailed above were discussed with the medical/primary  team.  Will Follow.  Beeper [id number] [place] [id number].   Wknd/afterhours/No response-[phone] or Fellow on call
80 year old male with Parkinsons/dementia/HIV /rectal bleeding, +proctitis on imaging    Hematochezia   Supportive care for now   Can start Canasa suppository (ordered)  Monitor CBC   hgb stable  no plans for endoscopic procedures given HD stability, no acute drop in Hgb  advance diet as tolerated        _________________________________________________________________________________________________  [facility]  [address] [facility] [place] [address]  Office: [phone]    Get Kumar PA-C    ___________________________________________________________________________________________________    
80 year old male with Parkinsons/dementia/HIV /rectal bleeding, +proctitis on imaging    Hematochezia   Supportive care for now   Can start Canasa suppository (ordered)  Monitor CBC   hgb stable  no plans for endoscopic procedures given HD stability, no acute drop in Hgb  advance diet as tolerated        _________________________________________________________________________________________________  [facility]  [address] [facility] [place] [address]  Office: [phone]    Get Kumar PA-C    ___________________________________________________________________________________________________    
80 year old male with Parkinsons/dementia/HIV /rectal bleeding, +proctitis on imaging    Hematochezia   Supportive care for now   Can start Canasa suppository (ordered)  Monitor CBC   hgb stable  no plans for endoscopic procedures given HD stability, no acute drop in Hgb  advance diet as tolerated        _________________________________________________________________________________________________  [facility]  [address] [facility] [place] [address]  Office: [phone]    Get Kumar PA-C    ___________________________________________________________________________________________________    
81yo man PMH parkinson's disease, dementia, HTN, HLD, BPH, HIV, not on a/c, nonverbal at baseline presents with acute onset of BRBPR today  ? GI ?  source  Ct as above  pt also with HIV on ART-despite his severe dementia on ART(wife administers) and controlled(2/15/21 Viral load undetectable  T cell 352)  Now also with leucocytosis    Rec:    A) Leucocytosis  ? reactive  ? Proctocolitis  resolving   Cx negative  Can change abx to unasyn-if stable could soon de-escalate to oral augmentin solution  7 day course   Urology eval noted      B) ? GI vs  bleed  monitor Hb  also Ct chest with nodule   GI on case   on case  will defer to them on plan  given his age and comorbidities may not be candidate for aggressive management   will defer to primary    C) HIV  well controlled despite pt being non verbal and severely demented at baseline  continue descovy,prezista and norvir  CD4 306  VL undetectable     Will tailor plan for ID issues  per course,results.Will defer to primary team on management of other issues.  Assessment, plan and recommendations as detailed above were discussed with the medical/primary  team.  Will Follow.  Beeper [id number] [facility] [id number].   Wknd/afterhours/No response-[phone] or Fellow on call
80 year old male with  PMH parkinson's disease, dementia, HTN, HLD, BPH, HIV, not on a/c, nonverbal at baseline presents with acute onset of BRBPR today now admitted for work up and monitoring

## 2021-06-25 NOTE — DISCHARGE NOTE PROVIDER - PROVIDER TOKENS
PROVIDER:[TOKEN:[[id number]]],PROVIDER:[TOKEN:[[id number]]],PROVIDER:[TOKEN:[[id number]]]

## 2021-06-25 NOTE — PROGRESS NOTE ADULT - PROBLEM SELECTOR PLAN 4
supportive care  continue Aricept  fall precautions

## 2021-06-25 NOTE — DISCHARGE NOTE PROVIDER - NSDCMRMEDTOKEN_GEN_ALL_CORE_FT
amLODIPine 5 mg oral tablet: 1 tab(s) orally once a day  amoxicillin-clavulanate 400 mg-57 mg/5 mL oral liquid: 10 milliliter(s) orally every 12 hours   atorvastatin 10 mg oral tablet: 1 tab(s) orally once a day (at bedtime)  carbidopa-levodopa 25 mg-100 mg oral tablet: 1 tab(s) orally 3 times a day  Descovy 200 mg-25 mg oral tablet: 1 tab(s) orally once a day  donepezil 10 mg oral tablet: 1 tab(s) orally once a day (at bedtime)  finasteride 5 mg oral tablet: 1 tab(s) orally once a day  hydrocortisone 25 mg rectal suppository: 1 suppository(ies) rectal once a day  Melatonin 5 mg oral tablet: 1 tab(s) orally 2 times a day (dinner and bedtime)  mesalamine 1000 mg rectal suppository: 1 suppository(ies) rectal once a day (at bedtime)  Prezista 800 mg oral tablet: 1 tab(s) orally once a day  Qnasl 80 mcg/inh nasal spray: 1 spray(s) nasal once a day, As Needed  ritonavir 100 mg oral tablet: 1 tab(s) orally once a day  tamsulosin 0.4 mg oral capsule: 2 cap(s) orally once a day

## 2021-06-25 NOTE — PROGRESS NOTE ADULT - NSICDXPILOT_GEN_ALL_CORE
[place]

## 2021-06-25 NOTE — DISCHARGE NOTE PROVIDER - CARE PROVIDER_API CALL
Rao Gates)  Urology  [address] [place] [address]  Phone: [phone]  Fax: [phone]  Follow Up Time:     Carissa Marrero)  Gastroenterology; Internal Medicine  [address] [place] [address]  Phone: [phone]  Fax: [phone]  Follow Up Time:     Roshan Liz)  Infectious Disease; Internal Medicine  [address] [place] [address]  Phone: [phone]  Fax: [phone]  Follow Up Time:

## 2021-06-25 NOTE — PROGRESS NOTE ADULT - PROBLEM SELECTOR PLAN 1
urology input noted  wife at bedside states does not really want any invasive intervention at this time
hemoglobin stable at ~ 12  will continue to monitor  GI evaluation appreciated   advance diet to mechanical soft regular for now as hemoglobin stable
hemoglobin stable at ~ 12  CT chest/abdomen/pelvis positive for proctocolitis and possible cystitis  urology input for ? bladder mass  will continue piperacillin/tazobactam  ID help appreciated
hemoglobin stable at ~ 12  CT chest/abdomen/pelvis positive for proctocolitis and possible cystitis  urology input noted  wife at bedside states does not really want any invasive intervention at this time
hemoglobin stable at ~ 12  CT chest/abdomen/pelvis positive for proctocolitis and possible cystitis  will continue piperacillin/tazobactam  ID help appreciated
hemoglobin stable  GI help appreciated  no invasive testing at this time

## 2021-06-25 NOTE — DISCHARGE NOTE PROVIDER - HOSPITAL COURSE
80 year old male with  PMH parkinson's disease, dementia, HTN, HLD, BPH, HIV, not on a/c, nonverbal at baseline presents with acute onset of BRBPR today now admitted for work up and monitoring    ·   GI bleed.  Plan: pt with Hematochezia . c/w supportive care for now, started on  Canasa suppository    hgb stable  no plans for endoscopic procedures given HD stability, no acute drop in Hgb     urology input noted  wife at bedside states does not really want any invasive intervention at this time.     ·  Problem: Leukocytosis.  Plan: change to po Augmentin on discharge  continue piperacillin/tazobactam while inpatient.       ·  HTN (hypertension).  Plan: continue home meds with hold parameters   acceptable for now.       ·  Alzheimer's disease of other onset.  Plan: supportive care  continue Aricept  fall precautions.     ·   HIV (human immunodeficiency virus infection).  Plan: continue meds  management per ID.   Bladder mass: on CT :Trabeculated urinary bladder with a diverticulum at the right dome and new 1.1 cm polypoid thickening at the left base posteriorly, raising concern for possible underlying lesion. Consider cystoscopy for definitive characterization.   Pt to f/u with Urology, Dr. Gates for cystoscopy as outpt     Pt medically stable for discharge and to follow up with ID, GI, Urology

## 2021-06-25 NOTE — PROGRESS NOTE ADULT - NUTRITIONAL ASSESSMENT
This patient has been assessed with a concern for Malnutrition and has been determined to have a diagnosis/diagnoses of Mild protein-calorie malnutrition and Underweight (BMI < 19).    This patient is being managed with:   Diet Mechanical Soft-  Entered: Jun 19 2021 11:50AM    
This patient has been assessed with a concern for Malnutrition and has been determined to have a diagnosis/diagnoses of Mild protein-calorie malnutrition and Underweight (BMI < 19).    This patient is being managed with:   Diet Mechanical Soft-  Supplement Feeding Modality:  Oral  Ensure Enlive Cans or Servings Per Day:  2       Frequency:  Daily  Entered: Jun 22 2021  5:24PM    

## 2021-06-25 NOTE — PROGRESS NOTE ADULT - PROBLEM SELECTOR PLAN 5
continue meds  asymptomatic
continue meds  asymptomatic
continue meds  management per ID
continue meds  management per ID
continue meds  asymptomatic  CD4 > 306  management per ID
continue meds  asymptomatic  CD4 > 306  management per ID

## 2021-06-25 NOTE — PROGRESS NOTE ADULT - NSPROGADDITIONALINFOA_GEN_ALL_CORE
discussed with daughter at bedside in detail
patient diagnosed with mild protein calorie malnutrition
outpatient follow up urology for bladder mass  patient's wife however has stated to me that she is unlikely to pursue any aggressive intervention at this time
dc planning

## 2021-06-25 NOTE — DISCHARGE NOTE PROVIDER - NSDCFUSCHEDAPPT_GEN_ALL_CORE_FT
SCAR GRIFFIN ; 07/13/2021 ; [facility] [address] [facility]  SCAR GRIFFIN ; 08/24/2021 ; [facility]  Comm SCAR Escalante ; 08/30/2021 ; [facility] [address] [place] [address]

## 2021-06-25 NOTE — PROGRESS NOTE ADULT - PROBLEM SELECTOR PROBLEM 4
Alzheimer's disease of other onset

## 2021-06-25 NOTE — DISCHARGE NOTE PROVIDER - DETAILS OF MALNUTRITION DIAGNOSIS/DIAGNOSES
This patient has been assessed with a concern for Malnutrition and was treated during this hospitalization for the following Nutrition diagnosis/diagnoses:     -  06/22/2021: Mild protein-calorie malnutrition   -  06/22/2021: Underweight (BMI < 19)

## 2021-06-25 NOTE — PROGRESS NOTE ADULT - PROBLEM SELECTOR PLAN 3
continue home meds with hold parameters   acceptable for now

## 2021-06-25 NOTE — PROGRESS NOTE ADULT - PROVIDER SPECIALTY LIST ADULT
Gastroenterology
Infectious Disease
Gastroenterology
Infectious Disease
Infectious Disease
Internal Medicine

## 2021-06-25 NOTE — DISCHARGE NOTE PROVIDER - CARE PROVIDERS DIRECT ADDRESSES
,DirectAddress_Unknown,[email or web address]

## 2021-06-25 NOTE — DISCHARGE NOTE PROVIDER - NSDCCPCAREPLAN_GEN_ALL_CORE_FT
PRINCIPAL DISCHARGE DIAGNOSIS  Diagnosis: GI bleed  Assessment and Plan of Treatment: Found to have protocolitis and evaluated by GI team.  No plans for procedure as he is stable with his blood counts.      SECONDARY DISCHARGE DIAGNOSES  Diagnosis: Alzheimer's disease of other onset  Assessment and Plan of Treatment: continue with supportive car e    Diagnosis: HIV (human immunodeficiency virus infection)  Assessment and Plan of Treatment: Please continue antivirals as before. Please follow up with yout Infectious disease doctor after dicharge    Diagnosis: HTN (hypertension)  Assessment and Plan of Treatment: Low salt diet  Activity as tolerated.  Take all medication as prescribed.  Follow up with your medical doctor for routine blood pressure monitoring at your next visit.  Notify your doctor if you have any of the following symptoms:   Dizziness, Lightheadedness, Blurry vision, Headache, Chest pain, Shortness of breath      Diagnosis: Bladder mass  Assessment and Plan of Treatment: patient will need outpatient cystoscopy    - can follow up with Dr. Gates [phone]      Diagnosis: Parkinsons  Assessment and Plan of Treatment: continue with medications as prescribe, and follow up with your Neurologist     PRINCIPAL DISCHARGE DIAGNOSIS  Diagnosis: GI bleed  Assessment and Plan of Treatment: Found to have protocolitis and evaluated by GI team.  No plans for procedure as he is stable with his blood counts. Follow up with GI after discharge.      SECONDARY DISCHARGE DIAGNOSES  Diagnosis: Alzheimer's disease of other onset  Assessment and Plan of Treatment: continue with supportive car e    Diagnosis: HIV (human immunodeficiency virus infection)  Assessment and Plan of Treatment: Please continue antivirals as before. Please follow up with yout Infectious disease doctor after dicharge    Diagnosis: HTN (hypertension)  Assessment and Plan of Treatment: Low salt diet  Activity as tolerated.  Take all medication as prescribed.  Follow up with your medical doctor for routine blood pressure monitoring at your next visit.  Notify your doctor if you have any of the following symptoms:   Dizziness, Lightheadedness, Blurry vision, Headache, Chest pain, Shortness of breath      Diagnosis: Bladder mass  Assessment and Plan of Treatment: patient will need outpatient cystoscopy    - can follow up with Dr. Gates [phone]      Diagnosis: Parkinsons  Assessment and Plan of Treatment: continue with medications as prescribe, and follow up with your Neurologist

## 2021-06-25 NOTE — DISCHARGE NOTE NURSING/CASE MANAGEMENT/SOCIAL WORK - PATIENT PORTAL LINK FT
You can access the FollowMyHealth Patient Portal offered by [facility] by registering at the following website: [email or web address]. By joining [facility]’s FollowMyHealth portal, you will also be able to view your health information using other applications (apps) compatible with our system.

## 2021-06-25 NOTE — PROGRESS NOTE ADULT - SUBJECTIVE AND OBJECTIVE BOX
Patient is a 80y old  Male who presents with a chief complaint of BRBPR (25 Jun 2021 13:53)      DATE OF SERVICE: 06-25-21 @ 14:35    SUBJECTIVE / OVERNIGHT EVENTS: overnight events noted    ROS:  not available  no events noted        MEDICATIONS  (STANDING):  amLODIPine   Tablet 5 milliGRAM(s) Oral daily  ampicillin/sulbactam  IVPB      ampicillin/sulbactam  IVPB 1.5 Gram(s) IV Intermittent every 8 hours  atorvastatin 10 milliGRAM(s) Oral at bedtime  carbidopa/levodopa  25/100 1 Tablet(s) Oral three times a day  darunavir 800 milliGRAM(s) Oral daily  donepezil 10 milliGRAM(s) Oral at bedtime  emtricitabine 200 mG/tenofovir alafenamide 25 mG (DESCOVY) Tablet 1 Tablet(s) Oral daily  finasteride 5 milliGRAM(s) Oral daily  fluticasone propionate 50 MICROgram(s)/spray Nasal Spray 1 Spray(s) Both Nostrils <User Schedule>  hydrocortisone hemorrhoidal Suppository 1 Suppository(s) Rectal daily  melatonin 5 milliGRAM(s) Oral at bedtime  mesalamine Suppository 1000 milliGRAM(s) Rectal at bedtime  ritonavir Tablet 100 milliGRAM(s) Oral every 24 hours  tamsulosin 0.8 milliGRAM(s) Oral at bedtime    MEDICATIONS  (PRN):  acetaminophen    Suspension .. 650 milliGRAM(s) Oral every 6 hours PRN Moderate Pain (4 - 6)        CAPILLARY BLOOD GLUCOSE        I&O's Summary    24 Jun 2021 07:01  -  25 Jun 2021 07:00  --------------------------------------------------------  IN: 390 mL / OUT: 450 mL / NET: -60 mL    25 Jun 2021 07:01  -  25 Jun 2021 14:35  --------------------------------------------------------  IN: 300 mL / OUT: 0 mL / NET: 300 mL        Vital Signs Last 24 Hrs  T(C): 36.4 (25 Jun 2021 08:12), Max: 36.8 (25 Jun 2021 00:20)  T(F): 97.5 (25 Jun 2021 08:12), Max: 98.3 (25 Jun 2021 00:20)  HR: 57 (25 Jun 2021 08:12) (57 - 73)  BP: 123/71 (25 Jun 2021 08:12) (117/62 - 165/81)  BP(mean): --  RR: 18 (25 Jun 2021 08:12) (18 - 18)  SpO2: 97% (25 Jun 2021 08:12) (95% - 99%)    PHYSICAL EXAM:  CHEST/LUNG: clear  HEART: S1 S2; no murmurs   ABDOMEN: Soft, Nontender  EXTREMITIES:    no edema  NEUROLOGY: alert nonverbal  alert at baseline  SKIN: No rashes or lesions    LABS:                      All consultant(s) notes reviewed and care discussed with other providers        Contact Number, Dr Guerrero [phone]

## 2021-06-25 NOTE — DISCHARGE NOTE NURSING/CASE MANAGEMENT/SOCIAL WORK - NSDCVIVACCINE_GEN_ALL_CORE_FT
influenza, injectable, quadrivalent, preservative free; 12-Oct-2017 14:38; Rocio Ott (RN); [employer]; [id number]; IntraMuscular; Deltoid Left.; 0.5 milliLiter(s); VIS (VIS Published: 07-Aug-2015, VIS Presented: 12-Oct-2017);

## 2021-06-25 NOTE — PROGRESS NOTE ADULT - SUBJECTIVE AND OBJECTIVE BOX
Chief Complaint:  Patient is a 80y old  Male who presents with a chief complaint of BRBPR (25 Jun 2021 12:32)      Interval Events:   no events    Hospital Medications:  acetaminophen    Suspension .. 650 milliGRAM(s) Oral every 6 hours PRN  amLODIPine   Tablet 5 milliGRAM(s) Oral daily  ampicillin/sulbactam  IVPB      ampicillin/sulbactam  IVPB 1.5 Gram(s) IV Intermittent every 8 hours  atorvastatin 10 milliGRAM(s) Oral at bedtime  carbidopa/levodopa  25/100 1 Tablet(s) Oral three times a day  darunavir 800 milliGRAM(s) Oral daily  donepezil 10 milliGRAM(s) Oral at bedtime  emtricitabine 200 mG/tenofovir alafenamide 25 mG (DESCOVY) Tablet 1 Tablet(s) Oral daily  finasteride 5 milliGRAM(s) Oral daily  fluticasone propionate 50 MICROgram(s)/spray Nasal Spray 1 Spray(s) Both Nostrils <User Schedule>  hydrocortisone hemorrhoidal Suppository 1 Suppository(s) Rectal daily  melatonin 5 milliGRAM(s) Oral at bedtime  mesalamine Suppository 1000 milliGRAM(s) Rectal at bedtime  ritonavir Tablet 100 milliGRAM(s) Oral every 24 hours  tamsulosin 0.8 milliGRAM(s) Oral at bedtime        PHYSICAL EXAM:   Vital Signs:  Vital Signs Last 24 Hrs  T(C): 36.4 (25 Jun 2021 08:12), Max: 36.8 (25 Jun 2021 00:20)  T(F): 97.5 (25 Jun 2021 08:12), Max: 98.3 (25 Jun 2021 00:20)  HR: 57 (25 Jun 2021 08:12) (57 - 73)  BP: 123/71 (25 Jun 2021 08:12) (117/62 - 165/81)  BP(mean): --  RR: 18 (25 Jun 2021 08:12) (18 - 18)  SpO2: 97% (25 Jun 2021 08:12) (95% - 99%)  Daily     Daily     GENERAL:  Appears stated age,  no distress  HEENT:   sclera -anicteric  CHEST:   no increased effort, breath sounds clear  HEART:  Regular rhythm, S1, S2,   ABDOMEN:  Soft, non-tender, non-distended, normoactive bowel sounds,    EXTREMITIES:  no cyanosis, clubbing or edema  SKIN:  No rash/no jaundice   NEURO:  Alert, oriented    LABS:                      Imaging:

## 2021-07-23 ENCOUNTER — RX RENEWAL (OUTPATIENT)
Age: 81
End: 2021-07-23

## 2021-07-29 ENCOUNTER — APPOINTMENT (OUTPATIENT)
Dept: UROLOGY | Facility: CLINIC | Age: 81
End: 2021-07-29
Payer: MEDICARE

## 2021-07-29 ENCOUNTER — OUTPATIENT (OUTPATIENT)
Dept: OUTPATIENT SERVICES | Facility: HOSPITAL | Age: 81
LOS: 1 days | End: 2021-07-29
Payer: MEDICARE

## 2021-07-29 ENCOUNTER — LABORATORY RESULT (OUTPATIENT)
Age: 81
End: 2021-07-29

## 2021-07-29 VITALS — SYSTOLIC BLOOD PRESSURE: 150 MMHG | DIASTOLIC BLOOD PRESSURE: 73 MMHG | HEART RATE: 101 BPM

## 2021-07-29 DIAGNOSIS — N40.0 BENIGN PROSTATIC HYPERPLASIA WITHOUT LOWER URINARY TRACT SYMPTOMS: ICD-10-CM

## 2021-07-29 DIAGNOSIS — R33.9 RETENTION OF URINE, UNSPECIFIED: ICD-10-CM

## 2021-07-29 DIAGNOSIS — N39.41 URGE INCONTINENCE: ICD-10-CM

## 2021-07-29 DIAGNOSIS — G20 PARKINSON'S DISEASE: ICD-10-CM

## 2021-07-29 DIAGNOSIS — R35.0 FREQUENCY OF MICTURITION: ICD-10-CM

## 2021-07-29 DIAGNOSIS — N40.0 BENIGN PROSTATIC HYPERPLASIA WITHOUT LOWER URINARY TRACT SYMPMS: ICD-10-CM

## 2021-07-29 PROCEDURE — 52000 CYSTOURETHROSCOPY: CPT

## 2021-07-30 ENCOUNTER — LABORATORY RESULT (OUTPATIENT)
Age: 81
End: 2021-07-30

## 2021-07-30 LAB
APPEARANCE: ABNORMAL
BILIRUBIN URINE: NEGATIVE
BLOOD URINE: ABNORMAL
COLOR: YELLOW
GLUCOSE QUALITATIVE U: NEGATIVE
KETONES URINE: NEGATIVE
LEUKOCYTE ESTERASE URINE: ABNORMAL
NITRITE URINE: POSITIVE
PH URINE: 6.5
PROTEIN URINE: NORMAL
SPECIFIC GRAVITY URINE: 1.02
UROBILINOGEN URINE: NORMAL

## 2021-08-30 ENCOUNTER — APPOINTMENT (OUTPATIENT)
Dept: NEUROLOGY | Facility: CLINIC | Age: 81
End: 2021-08-30
Payer: MEDICARE

## 2021-08-30 VITALS — DIASTOLIC BLOOD PRESSURE: 73 MMHG | HEART RATE: 77 BPM | HEIGHT: 72 IN | SYSTOLIC BLOOD PRESSURE: 126 MMHG

## 2021-08-30 PROCEDURE — 99214 OFFICE O/P EST MOD 30 MIN: CPT

## 2021-08-30 RX ORDER — OLANZAPINE 2.5 MG/1
2.5 TABLET, FILM COATED ORAL AT BEDTIME
Qty: 30 | Refills: 1 | Status: DISCONTINUED | COMMUNITY
Start: 2020-11-04 | End: 2021-08-30

## 2021-08-30 NOTE — DATA REVIEWED
[de-identified] : Gliosis  and  volume  loss  involving  the  anterior  temporal  lobes.\par \par Moderate  dilatation  of  the  right  temporal  and  occipital  horns,  and  mild\par dilatation  of  the  remaining  ventricular  system.  This  may  be  on  the  basis  of\par atrophy.  Normal  pressure  hydrocephalus  is  not  excluded.\par \par No  acute  intracranial  hemorrhage  or  evidence  of  acute  infarct.\par \par Mild  to  moderate  white  matter  microvascular  ischemic  disease.\par \par No  abnormal  parenchymal  or  leptomeningeal  enhancement.\par \par  [de-identified] : CTH: No  mass  effect,  acute  hemorrhage,  midline  shift,  or  evidence  of\par acute  territorial  infarct.  Interval  development  of  cerebral  atrophy  since\par 2006,  with  ventriculomegaly  out  of  proportion  to  the  degree  of  cerebral\par volume  loss,  which  may  suggest  normal  pressure  hydrocephalus.\par

## 2021-08-30 NOTE — ASSESSMENT
[FreeTextEntry1] : Assessment:\par 79yo RH AAM, with HIV and dementia, parkinsonism (bradykinesia, rigidity). MRI brain suggests profound atrophy with has a vascular component, but also degeneration on idiopathic base and possibly inscribed into the HIV pathology. Significant parkinsonism, worsening since last visit. Diffuse spasticity as well in 4x, L>R.\par Unable to speak. \par Triple flexion in UE and LE, clenching hands and grabbing bed sheets.\par \par Impression:\par -Mixed dementia, HIV-related neurodegeneration\par -Parkinsonism\par \par Plan:\par -would continue with Baclofen 5mg BID and increase as needed, plus PT at home for motility\par -can consider BoTox for hands as well if Baclofen does not work\par -continue with rest of meds.\par \par A thorough discussion was entertained with the patient/caregiver regarding the use of psychoactive medications, their possible benefits and AE profile, including the risk of cardiovascular complications.\par We discussed the benefits of being active, physically and mentally, and the need to to establish a routine in this respect.\par Driving abilities and firearms possession and use were discussed, in relation to progression of the cognitive decline, and the need to assess them periodically.\par Patient/caregiver understand and agree with the plan.\par

## 2021-08-30 NOTE — PHYSICAL EXAM
[General Appearance - Alert] : alert [General Appearance - In No Acute Distress] : in no acute distress [Cranial Nerves Oculomotor (III)] : extraocular motion intact [Cranial Nerves Facial (VII)] : face symmetrical [Involuntary Movements] : no involuntary movements were seen [Motor Handedness Right-Handed] : the patient is right hand dominant [Sensation Tactile Decrease] : light touch was intact [Extraocular Movements] : extraocular movements were intact [Outer Ear] : the ears and nose were normal in appearance [Neck Appearance] : the appearance of the neck was normal [Arterial Pulses Carotid] : carotid pulses were normal with no bruits [Edema] : there was no peripheral edema [Bowel Sounds] : normal bowel sounds [Abdomen Tenderness] : non-tender [Abdomen Mass (___ Cm)] : no abdominal mass palpated [No CVA Tenderness] : no ~M costovertebral angle tenderness [No Spinal Tenderness] : no spinal tenderness [Skin Color & Pigmentation] : normal skin color and pigmentation [] : no rash [Person] : disoriented to person [Place] : disoriented to place [Time] : disoriented to time [Short Term Intact] : short term memory impaired [Remote Intact] : remote memory impaired [Registration Intact] : recent registration memory impaired [Span Intact] : the attention span was decreased [Concentration Intact] : a decrease in concentrating ability was observed [Visual Intact] : visual attention was ~T ~L decreased [Naming Objects] : difficulty naming common objects [Repeating Phrases] : difficulty repeating a phrase [Writing A Sentence] : difficulty writing a sentence [Fluency] : fluency not intact [Comprehension] : comprehension not intact [Reading] : difficulty reading [Current Events] : inadequate knowledge of current events [Past History] : inadequate knowledge of personal past history [Vocabulary] : inadequate range of vocabulary [Paresis Pronator Drift Right-Sided] : no pronator drift on the right [Paresis Pronator Drift Left-Sided] : no pronator drift on the left [Motor Strength Upper Extremities Bilaterally] : strength was normal in both upper extremities [Motor Strength Lower Extremities Bilaterally] : strength was normal in both lower extremities [Limited Balance] : balance was intact [Past-pointing] : there was no past-pointing [Tremor] : no tremor present [Dysdiadochokinesia Bilaterally] : not present [Coordination - Dysmetria Impaired Finger-to-Nose Bilateral] : not present [Coordination - Dysmetria Impaired Heel-to-Shin Bilateral] : not present [Plantar Reflex Right Only] : normal on the right [Plantar Reflex Left Only] : normal on the left [FreeTextEntry4] : Exam very limited. Cannot talk, cannot obey any commands. \par Alert, looks around, but unable to focus on examiner.\par Hands clenched, grabbing bed sheets and chewing them. \par UE and LE are in flexion, hard to extend.  [FreeTextEntry6] : Diffuse spastic tone in 4x, L>R [FreeTextEntry9] : Unable to stand, LUE in triple flexion.

## 2021-08-30 NOTE — HISTORY OF PRESENT ILLNESS
[FreeTextEntry1] : HPI-Interval Hx [id number]:\par Pt here with wife for follow-up.\par Rectal bleeding in June, seen at [facility], no lesions found.\par He has been stable all along.\par Stopped Baclofen and stopped PT for now.\par Overall stable, sleeps well and appetite is stable. \par \par \par HPI-interval Hx [id number] VISIT.\par Pt at home with wife and HHA.\par \par Continues to decline, but today he is more awake. Non verbal. Clenches hands and grabs bed sheets, chewing them\par Appetite and sleep ok.\par At times he would start moaning at night to get his wife's attention.\par \par HPI-interval Hx [id number] VISIT.\par Pt had been less motile recently, and per wife his LUE contracted for 2 months now, and we can see it is in triple flexion. Her can stand but needs support constantly. He tends to be hunched fwd at all times. \par Started on low dose Sinemet, well tolerated. \par Has some myoclonus at night, not waking up.\par Appetite and sleep are ok.\par \par He was at [facility] ER in Jan 2020:\par Chief Complaint: Sob wheezing cough congestion.\par Impression:\par Principal Discharge Dx Upper respiratory tract infection, unspecified type.\par Secondary Discharge Dx UTI (urinary tract infection).\par Medical Decision Making:\par - Physician E/M Selection [id number] Comprehensive\par - The following orders were submitted: Labs, Imaging Studies\par - Clinical Summary (MDM): Summarize the clinical encounter 78 yo non-verbal M,\par w/ PMH of HIV (last viral load <30, CD4 356), previous UTI c/b bacteremia,\par presenting from home d/t nasal congestion w/ green/yellow mucus x 1 week,\par intermittent dry cough, and one episode of fever 2 days ago, which\par self-resolved. Patient otherwise behaving at baseline. Appears in no acute\par distress in room, w/ normal vital signs. Lungs CTAB. Likely viral illness, but\par given HIV history, poor historian, and recorded fever 2 days ago, will perform\par septic workup including catheterized UA and culture, blood culture, lactate,\par labs, cxr. Reassess.\par - Follow-up Instructions (will be supplied to the patient only if discharged) \par Please take the antibiotic that you were prescribed, cephalexin (Keflex), one\par tablet, two times per day, for 7 days. Please take as indicated on your\par prescription with respect to the warnings. Please drink plenty of fluids and\par get lots of rest.\par DDimer 1028. CXR clear.\par \par HPI-interval Hx [id number]:\par Pt has been stable cognitively, but since last visit he has developed more parkinsonism, more bilateral LE stiffness and hesitation, very bradykinetic. He can barely walk assisted, walks in very short steps, shuffling, R>L.\par Appetite and sleep are fine. \par HIV stable.\par \par HPI-interval Hx [id number]:\par pt has tried Ropinirole, but had diarrhea, medication dc.\par He has been stable otherwise.\par He has developed postural leaning to the L, with R-convex dorsal scoliosis, slower gait, and tends to be more rigid.\par No tremors anymore.\par Eats and sleeps well, weight has been stable, though per wife maybe he lost a few lbs.\par \par ID: stable.\par \par HPI-interval Hx [id number]:\par pt had 2 occurrences of UTI in the last 2 months, with ABX tx. Suffered a bit of a setback since.\par Parkinsonism has worsened a bit, he is slower and more rigid.\par Mental status is stable, he speaks very little, can barely say wife's name. He mostly says "yeah".\par Can walk if assisted to stand, and then proceeds alone, with small shuffling steps, very cautioned. Cannot turn by himself. Per wife, he has sporadic resting tremor, not observed today.\par \par HPI-interval Hx [id number]:\par Pt has had some medical issues lately, PNA and diarrhea, remitted. Has lost some weight, not much, regaining.\par Sleeping and eating well.\par Stopped Olanzapine a few weeks ago when he was sick.\par Has started losing urine more often and spitting.\par Otherwise stable. In particular, no falls, moves well, no worsening of parkinsonism.\par \par HPI-interval Hx [id number]:\par Pt stable. per daughter, he has been calm, well behaved, sleeping well.\par He has very good appetite, and has gained a few pounds since 6 months ago, with the help of a nutritionist.\par HIV stable, well controlled, compliant on meds.\par EEG in Feb 2017 was negative for seizures. LOC event likely syncopal, due to dehydration.\par \par HPI-interval Hx [id number]:\par pt stable, well behaved, sleeps well, well manageable. He dresses and eats by himself.\par Had recent admission to ER for dehydration. EEG done 2/16, pending report, to r/o seizures, unlikely. \par per wife, he is a bit slower in movements and gait, but has no tremors. \par \par HPI-Interval Hx 11/9/2016:\par pt stable, sleeps well, not agitated. Still on Aricept 5mg, not increased bc daughter thought his recent hands pain may be related to the medication. Good appetite, no weight changes per family. \par \par 8/8/2016 HPI-Interval Hx: MRI brain wo/w darrin done, showing diffuse atrophy, with expansion of the ventricles, not NPH but core atrophy, and diffuse periventricular WM disease, reminiscent of gliosis. Doing ok on Zyprexa. \par \par PMH:74yo RH AAM, with hx of HIV since 2002, w/o any infections, currently with 182 CD4, and PJP Px.\par Also, HTN, HLD. \par Former , retired. \par For 3 years, pt has started to show short memory deficits, losing things, slowly progressed to this spring, when he was hospitalized to [facility] for possible TB (r/o). Since then, wife reports a steeper decline. She also reports bizarre behavior. \par He was recently started on Zyprexa 2.5mg for sporadic agitation.\par He sleeps ok at night, at times naps at day.\par Loss of interests. ADL ok, though limited, IADL very limited. \par Recent CTh: No  mass  effect,  acute  hemorrhage,  midline  shift,  or  evidence  of\par acute  territorial  infarct.  Interval  development  of  cerebral  atrophy  since\par 2006,  with  ventriculomegaly  out  of  proportion  to  the  degree  of  cerebral\par volume  loss,  which  may  suggest  normal  pressure  hydrocephalus.\par No gait issues, nor incontinence.

## 2021-09-23 ENCOUNTER — NON-APPOINTMENT (OUTPATIENT)
Age: 81
End: 2021-09-23

## 2021-10-11 ENCOUNTER — APPOINTMENT (OUTPATIENT)
Dept: NEUROLOGY | Facility: CLINIC | Age: 81
End: 2021-10-11

## 2021-10-18 ENCOUNTER — RX RENEWAL (OUTPATIENT)
Age: 81
End: 2021-10-18

## 2021-10-22 NOTE — DIETITIAN INITIAL EVALUATION ADULT. - PROBLEM/PLAN-2
Monitor: The patient's CKD is unchanged.  Evaluation: Reviewed recent labs/diagnostic tests with the patient.  Assessment/Treatment:  Continue current treatment/monitoring regimen.  avoid salty foods.  Condition will be reassessed per progress note   DISPLAY PLAN FREE TEXT

## 2021-10-27 NOTE — ED ADULT NURSE NOTE - NS ED NURSE LEVEL OF CONSCIOUSNESS MENTAL STATUS
Attending Physician:   I conducted the telephonic visit with Dr. Alicia, the A/I fellow. I agree with her findings and concur with her assessment and plan. Pt who developed pruritus, hives and hypotension hours after Whipple procedure.  His clinical picture does meet the criteria for anaphylaxis. Based on timing it is unlikely that the intra-operative medications triggered this possible reaction, however, he did receive Dexamethasone during the procedure, which may have blunted his response. Based on the timing, dilaudid (hydromorphone) seems to be the most likely culprit. Allergic reactions to narcotics are extremely rare, however.  It is fortunate that he can tolerate fentanyl.      Mr. Gatica should follow up in our office for skin testing to dilaudid and possibly the intra-operative medications. He should be off of the antihistamines for 5 days prior to the testing. We will also communicate with the anesthesiologist to see if the phenylephrine, epinephrine and dexamethasone given during the procedure are standard, or if they were as a result of the patient's condition. Responds to pain/Awake

## 2021-10-28 NOTE — DIETITIAN INITIAL EVALUATION ADULT. - REASON
Discharge order received from Dr. Michelle Rock. most of muscle loss likely related to age and pt is bedbound/wheelchair bound

## 2021-11-17 ENCOUNTER — RX RENEWAL (OUTPATIENT)
Age: 81
End: 2021-11-17

## 2021-11-22 ENCOUNTER — NON-APPOINTMENT (OUTPATIENT)
Age: 81
End: 2021-11-22

## 2021-11-30 LAB
ALBUMIN SERPL ELPH-MCNC: 3.4 G/DL
ALP BLD-CCNC: 162 U/L
ALT SERPL-CCNC: <5 U/L
ANION GAP SERPL CALC-SCNC: 12 MMOL/L
AST SERPL-CCNC: 9 U/L
BASOPHILS # BLD AUTO: 0.02 K/UL
BASOPHILS NFR BLD AUTO: 0.3 %
BILIRUB SERPL-MCNC: 0.2 MG/DL
BUN SERPL-MCNC: 18 MG/DL
CALCIUM SERPL-MCNC: 8.8 MG/DL
CD3 CELLS # BLD: 2033 /UL
CD3 CELLS NFR BLD: 75 %
CD3+CD4+ CELLS # BLD: 359 /UL
CD3+CD4+ CELLS NFR BLD: 13 %
CD3+CD4+ CELLS/CD3+CD8+ CLL SPEC: 0.22 RATIO
CD3+CD8+ CELLS # SPEC: 1655 /UL
CD3+CD8+ CELLS NFR BLD: 61 %
CHLORIDE SERPL-SCNC: 113 MMOL/L
CO2 SERPL-SCNC: 28 MMOL/L
CREAT SERPL-MCNC: 0.57 MG/DL
EOSINOPHIL # BLD AUTO: 0.06 K/UL
EOSINOPHIL NFR BLD AUTO: 0.8 %
GLUCOSE SERPL-MCNC: 129 MG/DL
HCT VFR BLD CALC: 38.6 %
HGB BLD-MCNC: 12.6 G/DL
HIV1 RNA # SERPL NAA+PROBE: NORMAL
HIV1 RNA # SERPL NAA+PROBE: NORMAL COPIES/ML
IMM GRANULOCYTES NFR BLD AUTO: 0.3 %
LYMPHOCYTES # BLD AUTO: 3.06 K/UL
LYMPHOCYTES NFR BLD AUTO: 41.2 %
MAN DIFF?: NORMAL
MCHC RBC-ENTMCNC: 31.4 PG
MCHC RBC-ENTMCNC: 32.6 GM/DL
MCV RBC AUTO: 96.3 FL
MONOCYTES # BLD AUTO: 0.55 K/UL
MONOCYTES NFR BLD AUTO: 7.4 %
NEUTROPHILS # BLD AUTO: 3.71 K/UL
NEUTROPHILS NFR BLD AUTO: 50 %
PLATELET # BLD AUTO: 207 K/UL
POTASSIUM SERPL-SCNC: 3.4 MMOL/L
PROT SERPL-MCNC: 7.5 G/DL
RBC # BLD: 4.01 M/UL
RBC # FLD: 14 %
SODIUM SERPL-SCNC: 153 MMOL/L
VIRAL LOAD INTERP: NORMAL
VIRAL LOAD LOG: NORMAL LG COP/ML
WBC # FLD AUTO: 7.42 K/UL

## 2021-12-06 ENCOUNTER — NON-APPOINTMENT (OUTPATIENT)
Age: 81
End: 2021-12-06

## 2021-12-08 ENCOUNTER — NON-APPOINTMENT (OUTPATIENT)
Age: 81
End: 2021-12-08

## 2021-12-15 ENCOUNTER — FORM ENCOUNTER (OUTPATIENT)
Age: 81
End: 2021-12-15

## 2021-12-15 ENCOUNTER — APPOINTMENT (OUTPATIENT)
Dept: INFECTIOUS DISEASE | Facility: CLINIC | Age: 81
End: 2021-12-15

## 2021-12-16 ENCOUNTER — APPOINTMENT (OUTPATIENT)
Dept: INFECTIOUS DISEASE | Facility: CLINIC | Age: 81
End: 2021-12-16

## 2021-12-16 ENCOUNTER — APPOINTMENT (OUTPATIENT)
Dept: INFECTIOUS DISEASE | Facility: CLINIC | Age: 81
End: 2021-12-16
Payer: MEDICARE

## 2021-12-16 PROCEDURE — 99442: CPT | Mod: 95

## 2021-12-16 NOTE — ASSESSMENT
[FreeTextEntry1] : HIV\par stable\par continue ART\par wife encouraged\par \par Lethargy\par Explained unable toa sses on phone\par encouraged to take to ER if persists or worsens\par will need to be worked up\par at risk for aspiration given severe cognitive decline\par \par Wife verbalized understanding and said will do so\par

## 2021-12-16 NOTE — REASON FOR VISIT
[Home] : at home, [unfilled] , at the time of the visit. [Medical Office: ([facility])___] : at the medical office located in  [Spouse] : spouse [Follow-Up: _____] : a [unfilled] follow-up visit [FreeTextEntry3] : wife

## 2021-12-16 NOTE — HISTORY OF PRESENT ILLNESS
[FreeTextEntry1] : patient wife doing telephonic visit\par Patient unable to verbalize(has been thatw ay for years)\par Pt has been taking meds\par but per wife alst 2-3 days more lethargic and at times has cough\par She has been monitoring temp and saturations\par No fever\par Sats ok\par slight decrease in appetite\par No sputum\par Other than that pt has been normal\par Had labs done earlier

## 2022-01-26 ENCOUNTER — NON-APPOINTMENT (OUTPATIENT)
Age: 82
End: 2022-01-26

## 2022-01-27 ENCOUNTER — NON-APPOINTMENT (OUTPATIENT)
Age: 82
End: 2022-01-27

## 2022-02-04 ENCOUNTER — NON-APPOINTMENT (OUTPATIENT)
Age: 82
End: 2022-02-04

## 2022-02-07 ENCOUNTER — NON-APPOINTMENT (OUTPATIENT)
Age: 82
End: 2022-02-07

## 2022-02-14 ENCOUNTER — TRANSCRIPTION ENCOUNTER (OUTPATIENT)
Age: 82
End: 2022-02-14

## 2022-02-15 ENCOUNTER — TRANSCRIPTION ENCOUNTER (OUTPATIENT)
Age: 82
End: 2022-02-15

## 2022-02-22 ENCOUNTER — NON-APPOINTMENT (OUTPATIENT)
Age: 82
End: 2022-02-22

## 2022-02-28 ENCOUNTER — TRANSCRIPTION ENCOUNTER (OUTPATIENT)
Age: 82
End: 2022-02-28

## 2022-02-28 RX ORDER — CARBIDOPA AND LEVODOPA 25; 100 MG/1; MG/1
25-100 TABLET ORAL
Qty: 270 | Refills: 1 | Status: DISCONTINUED | COMMUNITY
Start: 2019-09-24 | End: 2022-02-28

## 2022-03-02 ENCOUNTER — RX RENEWAL (OUTPATIENT)
Age: 82
End: 2022-03-02

## 2022-03-10 ENCOUNTER — TRANSCRIPTION ENCOUNTER (OUTPATIENT)
Age: 82
End: 2022-03-10

## 2022-03-10 ENCOUNTER — RX RENEWAL (OUTPATIENT)
Age: 82
End: 2022-03-10

## 2022-03-10 ENCOUNTER — NON-APPOINTMENT (OUTPATIENT)
Age: 82
End: 2022-03-10

## 2022-04-26 ENCOUNTER — APPOINTMENT (OUTPATIENT)
Dept: NEUROLOGY | Facility: CLINIC | Age: 82
End: 2022-04-26
Payer: MEDICARE

## 2022-04-26 DIAGNOSIS — R25.2 CRAMP AND SPASM: ICD-10-CM

## 2022-04-26 PROCEDURE — 99214 OFFICE O/P EST MOD 30 MIN: CPT | Mod: 95

## 2022-04-26 NOTE — ASSESSMENT
[FreeTextEntry1] : Assessment:\par 80yo RH AAM, with HIV and dementia, parkinsonism (bradykinesia, rigidity). MRI brain suggests profound atrophy with has a vascular component, but also degeneration on idiopathic base and possibly inscribed into the HIV pathology. Significant parkinsonism, worsening since last visit. Diffuse spasticity as well in 4x, L>R.\par Unable to speak. \par Triple flexion in UE and LE, clenching hands and grabbing bed sheets.\par \par Overall stable conditions, with improvement of muscle tone s/p Baclofen.\par \par Impression:\par -Mixed dementia, HIV-related neurodegeneration\par -Parkinsonism\par -muscle spasticity\par \par Plan:\par -would continue with Baclofen 5mg up to TID, plus PT at home for motility\par -can consider BoTox for hands as well if Baclofen does not work\par -continue with rest of meds.\par \par A thorough discussion was entertained with the patient/caregiver regarding the use of psychoactive medications, their possible benefits and AE profile, including the risk of cardiovascular complications.\par We discussed the benefits of being active, physically and mentally, and the need to to establish a routine in this respect.\par Driving abilities and firearms possession and use were discussed, in relation to progression of the cognitive decline, and the need to assess them periodically.\par Patient/caregiver understand and agree with the plan.\par

## 2022-04-26 NOTE — HISTORY OF PRESENT ILLNESS
[Home] : at home, [unfilled] , at the time of the visit. [Medical Office: ([facility])___] : at the medical office located in  [FreeTextEntry1] : NO COVID.\par COVID VACCINE FULL.\par \par \par HPI-Interval Hx [id number]:\par Pt at home with wife.\par Overall he has been stable.\par Sleep cycle and appetite are maintained.\par She has started to use Baclofen, with softening of his tone overall.\par No wounds.\par \par \par HPI-Interval Hx [id number]:\par Pt here with wife for follow-up.\par Rectal bleeding in June, seen at [facility], no lesions found.\par He has been stable all along.\par Stopped Baclofen and stopped PT for now.\par Overall stable, sleeps well and appetite is stable. \par \par \par HPI-interval Hx [id number] VISIT.\par Pt at home with wife and HHA.\par \par Continues to decline, but today he is more awake. Non verbal. Clenches hands and grabs bed sheets, chewing them\par Appetite and sleep ok.\par At times he would start moaning at night to get his wife's attention.\par \par HPI-interval Hx [id number] VISIT.\par Pt had been less motile recently, and per wife his LUE contracted for 2 months now, and we can see it is in triple flexion. Her can stand but needs support constantly. He tends to be hunched fwd at all times. \par Started on low dose Sinemet, well tolerated. \par Has some myoclonus at night, not waking up.\par Appetite and sleep are ok.\par \par He was at [facility] ER in Jan 2020:\par Chief Complaint: Sob wheezing cough congestion.\par Impression:\par Principal Discharge Dx Upper respiratory tract infection, unspecified type.\par Secondary Discharge Dx UTI (urinary tract infection).\par Medical Decision Making:\par - Physician E/M Selection [id number] Comprehensive\par - The following orders were submitted: Labs, Imaging Studies\par - Clinical Summary (MDM): Summarize the clinical encounter 80 yo non-verbal M,\par w/ PMH of HIV (last viral load <30, CD4 356), previous UTI c/b bacteremia,\par presenting from home d/t nasal congestion w/ green/yellow mucus x 1 week,\par intermittent dry cough, and one episode of fever 2 days ago, which\par self-resolved. Patient otherwise behaving at baseline. Appears in no acute\par distress in room, w/ normal vital signs. Lungs CTAB. Likely viral illness, but\par given HIV history, poor historian, and recorded fever 2 days ago, will perform\par septic workup including catheterized UA and culture, blood culture, lactate,\par labs, cxr. Reassess.\par - Follow-up Instructions (will be supplied to the patient only if discharged) \par Please take the antibiotic that you were prescribed, cephalexin (Keflex), one\par tablet, two times per day, for 7 days. Please take as indicated on your\par prescription with respect to the warnings. Please drink plenty of fluids and\par get lots of rest.\par DDimer [id number]. CXR clear.\par \par HPI-interval Hx [id number]:\par Pt has been stable cognitively, but since last visit he has developed more parkinsonism, more bilateral LE stiffness and hesitation, very bradykinetic. He can barely walk assisted, walks in very short steps, shuffling, R>L.\par Appetite and sleep are fine. \par HIV stable.\par \par HPI-interval Hx [id number]:\par pt has tried Ropinirole, but had diarrhea, medication dc.\par He has been stable otherwise.\par He has developed postural leaning to the L, with R-convex dorsal scoliosis, slower gait, and tends to be more rigid.\par No tremors anymore.\par Eats and sleeps well, weight has been stable, though per wife maybe he lost a few lbs.\par \par ID: stable.\par \par HPI-interval Hx [id number]:\par pt had 2 occurrences of UTI in the last 2 months, with ABX tx. Suffered a bit of a setback since.\par Parkinsonism has worsened a bit, he is slower and more rigid.\par Mental status is stable, he speaks very little, can barely say wife's name. He mostly says "yeah".\par Can walk if assisted to stand, and then proceeds alone, with small shuffling steps, very cautioned. Cannot turn by himself. Per wife, he has sporadic resting tremor, not observed today.\par \par HPI-interval Hx [id number]:\par Pt has had some medical issues lately, PNA and diarrhea, remitted. Has lost some weight, not much, regaining.\par Sleeping and eating well.\par Stopped Olanzapine a few weeks ago when he was sick.\par Has started losing urine more often and spitting.\par Otherwise stable. In particular, no falls, moves well, no worsening of parkinsonism.\par \par HPI-interval Hx [id number]:\par Pt stable. per daughter, he has been calm, well behaved, sleeping well.\par He has very good appetite, and has gained a few pounds since 6 months ago, with the help of a nutritionist.\par HIV stable, well controlled, compliant on meds.\par EEG in Feb 2017 was negative for seizures. LOC event likely syncopal, due to dehydration.\par \par HPI-interval Hx [id number]:\par pt stable, well behaved, sleeps well, well manageable. He dresses and eats by himself.\par Had recent admission to ER for dehydration. EEG done 2/16, pending report, to r/o seizures, unlikely. \par per wife, he is a bit slower in movements and gait, but has no tremors. \par \par HPI-Interval Hx 11/9/2016:\par pt stable, sleeps well, not agitated. Still on Aricept 5mg, not increased bc daughter thought his recent hands pain may be related to the medication. Good appetite, no weight changes per family. \par \par 8/8/2016 HPI-Interval Hx: MRI brain wo/w darrin done, showing diffuse atrophy, with expansion of the ventricles, not NPH but core atrophy, and diffuse periventricular WM disease, reminiscent of gliosis. Doing ok on Zyprexa. \par \par PMH:74yo RH AAM, with hx of HIV since 2002, w/o any infections, currently with 182 CD4, and PJP Px.\par Also, HTN, HLD. \par Former , retired. \par For 3 years, pt has started to show short memory deficits, losing things, slowly progressed to this spring, when he was hospitalized to [facility] for possible TB (r/o). Since then, wife reports a steeper decline. She also reports bizarre behavior. \par He was recently started on Zyprexa 2.5mg for sporadic agitation.\par He sleeps ok at night, at times naps at day.\par Loss of interests. ADL ok, though limited, IADL very limited. \par Recent CTh: No  mass  effect,  acute  hemorrhage,  midline  shift,  or  evidence  of\par acute  territorial  infarct.  Interval  development  of  cerebral  atrophy  since\par 2006,  with  ventriculomegaly  out  of  proportion  to  the  degree  of  cerebral\par volume  loss,  which  may  suggest  normal  pressure  hydrocephalus.\par No gait issues, nor incontinence.

## 2022-04-26 NOTE — PHYSICAL EXAM
[General Appearance - Alert] : alert [General Appearance - In No Acute Distress] : in no acute distress [Cranial Nerves Oculomotor (III)] : extraocular motion intact [Cranial Nerves Facial (VII)] : face symmetrical [Involuntary Movements] : no involuntary movements were seen [Motor Handedness Right-Handed] : the patient is right hand dominant [Sensation Tactile Decrease] : light touch was intact [Extraocular Movements] : extraocular movements were intact [Outer Ear] : the ears and nose were normal in appearance [Neck Appearance] : the appearance of the neck was normal [Arterial Pulses Carotid] : carotid pulses were normal with no bruits [Edema] : there was no peripheral edema [Bowel Sounds] : normal bowel sounds [Abdomen Tenderness] : non-tender [Abdomen Mass (___ Cm)] : no abdominal mass palpated [No CVA Tenderness] : no ~M costovertebral angle tenderness [No Spinal Tenderness] : no spinal tenderness [Skin Color & Pigmentation] : normal skin color and pigmentation [] : no rash [FreeTextEntry1] : Exam stable, limited by TEB setting. [Person] : disoriented to person [Place] : disoriented to place [Time] : disoriented to time [Short Term Intact] : short term memory impaired [Remote Intact] : remote memory impaired [Registration Intact] : recent registration memory impaired [Span Intact] : the attention span was decreased [Concentration Intact] : a decrease in concentrating ability was observed [Visual Intact] : visual attention was ~T ~L decreased [Naming Objects] : difficulty naming common objects [Repeating Phrases] : difficulty repeating a phrase [Writing A Sentence] : difficulty writing a sentence [Fluency] : fluency not intact [Comprehension] : comprehension not intact [Reading] : difficulty reading [Current Events] : inadequate knowledge of current events [Past History] : inadequate knowledge of personal past history [Vocabulary] : inadequate range of vocabulary [Paresis Pronator Drift Right-Sided] : no pronator drift on the right [Paresis Pronator Drift Left-Sided] : no pronator drift on the left [Motor Strength Upper Extremities Bilaterally] : strength was normal in both upper extremities [Motor Strength Lower Extremities Bilaterally] : strength was normal in both lower extremities [Limited Balance] : balance was intact [Past-pointing] : there was no past-pointing [Tremor] : no tremor present [Dysdiadochokinesia Bilaterally] : not present [Coordination - Dysmetria Impaired Finger-to-Nose Bilateral] : not present [Coordination - Dysmetria Impaired Heel-to-Shin Bilateral] : not present [Plantar Reflex Right Only] : normal on the right [Plantar Reflex Left Only] : normal on the left [FreeTextEntry4] : Exam very limited. Cannot talk, cannot obey any commands. \par Alert, looks around, but unable to focus on examiner.\par Hands clenched, grabbing bed sheets and chewing them. \par UE and LE are in flexion, hard to extend.  [FreeTextEntry6] : Diffuse spastic tone in 4x, L>R [FreeTextEntry9] : Unable to stand, LUE in triple flexion.

## 2022-04-26 NOTE — DATA REVIEWED
[de-identified] : Gliosis  and  volume  loss  involving  the  anterior  temporal  lobes.\par \par Moderate  dilatation  of  the  right  temporal  and  occipital  horns,  and  mild\par dilatation  of  the  remaining  ventricular  system.  This  may  be  on  the  basis  of\par atrophy.  Normal  pressure  hydrocephalus  is  not  excluded.\par \par No  acute  intracranial  hemorrhage  or  evidence  of  acute  infarct.\par \par Mild  to  moderate  white  matter  microvascular  ischemic  disease.\par \par No  abnormal  parenchymal  or  leptomeningeal  enhancement.\par \par  [de-identified] : CTH: No  mass  effect,  acute  hemorrhage,  midline  shift,  or  evidence  of\par acute  territorial  infarct.  Interval  development  of  cerebral  atrophy  since\par 2006,  with  ventriculomegaly  out  of  proportion  to  the  degree  of  cerebral\par volume  loss,  which  may  suggest  normal  pressure  hydrocephalus.\par

## 2022-05-02 ENCOUNTER — RX RENEWAL (OUTPATIENT)
Age: 82
End: 2022-05-02

## 2022-06-01 ENCOUNTER — NON-APPOINTMENT (OUTPATIENT)
Age: 82
End: 2022-06-01

## 2022-06-03 ENCOUNTER — LABORATORY RESULT (OUTPATIENT)
Age: 82
End: 2022-06-03

## 2022-06-06 DIAGNOSIS — E55.9 VITAMIN D DEFICIENCY, UNSPECIFIED: ICD-10-CM

## 2022-06-20 ENCOUNTER — APPOINTMENT (OUTPATIENT)
Dept: INFECTIOUS DISEASE | Facility: CLINIC | Age: 82
End: 2022-06-20

## 2022-06-29 ENCOUNTER — FORM ENCOUNTER (OUTPATIENT)
Age: 82
End: 2022-06-29

## 2022-06-30 ENCOUNTER — APPOINTMENT (OUTPATIENT)
Dept: INFECTIOUS DISEASE | Facility: CLINIC | Age: 82
End: 2022-06-30

## 2022-06-30 ENCOUNTER — OUTPATIENT (OUTPATIENT)
Dept: OUTPATIENT SERVICES | Facility: HOSPITAL | Age: 82
LOS: 1 days | End: 2022-06-30
Payer: COMMERCIAL

## 2022-06-30 PROCEDURE — ZZZZZ: CPT

## 2022-06-30 PROCEDURE — G0463: CPT

## 2022-07-01 ENCOUNTER — NON-APPOINTMENT (OUTPATIENT)
Age: 82
End: 2022-07-01

## 2022-07-05 DIAGNOSIS — B20 HUMAN IMMUNODEFICIENCY VIRUS [HIV] DISEASE: ICD-10-CM

## 2022-07-24 ENCOUNTER — TRANSCRIPTION ENCOUNTER (OUTPATIENT)
Age: 82
End: 2022-07-24

## 2022-07-24 ENCOUNTER — NON-APPOINTMENT (OUTPATIENT)
Age: 82
End: 2022-07-24

## 2022-07-25 ENCOUNTER — RX RENEWAL (OUTPATIENT)
Age: 82
End: 2022-07-25

## 2022-07-25 RX ORDER — RAMELTEON 8 MG/1
8 TABLET ORAL
Qty: 30 | Refills: 2 | Status: DISCONTINUED | COMMUNITY
Start: 2022-07-25 | End: 2022-07-25

## 2022-07-26 ENCOUNTER — TRANSCRIPTION ENCOUNTER (OUTPATIENT)
Age: 82
End: 2022-07-26

## 2022-09-02 NOTE — ED ADULT NURSE NOTE - OBJECTIVE STATEMENT
Anesthesia Post Evaluation    Patient: Teresa Cazares    Procedure(s) Performed: Procedure(s) (LRB):  COLONOSCOPY (N/A)    Final Anesthesia Type: general      Patient location during evaluation: GI PACU  Patient participation: Yes- Able to Participate  Level of consciousness: awake and alert  Post-procedure vital signs: reviewed and stable  Pain management: adequate  Airway patency: patent    PONV status at discharge: No PONV  Anesthetic complications: no      Cardiovascular status: hemodynamically stable  Respiratory status: unassisted and spontaneous ventilation  Hydration status: euvolemic  Follow-up not needed.          Vitals Value Taken Time   /58 09/02/22 1300   Temp 36.7 °C (98 °F) 09/02/22 1245   Pulse 58 09/02/22 1304   Resp 19 09/02/22 1304   SpO2 100 % 09/02/22 1304   Vitals shown include unvalidated device data.      Event Time   Out of Recovery 13:10:00         Pain/Mike Score: Mike Score: 10 (9/2/2022  1:00 PM)        
per wife at bedside pt was at [facility] and collapse while waking did not hit the floor pt was ease down by family member.

## 2022-09-20 ENCOUNTER — RX RENEWAL (OUTPATIENT)
Age: 82
End: 2022-09-20

## 2022-10-17 NOTE — PROGRESS NOTE ADULT - PROBLEM/PLAN-2
Airway  Performed by: Allen Odonnell  Authorized by: Tre Pollock MD     Final Airway Type:  Endotracheal airway  Final Endotracheal Airway*:  ETT  ETT Size (mm)*:  7.0  Cuff*:  Regular  Technique Used for Successful ETT Placement:  Direct laryngoscopy  Devices/Methods Used in Placement*:  Mask  Intubation Procedure*:  Preoxygenation, ETCO2, Atraumatic, Dentition Unchanged, Pharynx Clear and Cricoid Pressure  Insertion Site:  Oral  Blade Type*:  MAC  Blade Size*:  3  Measured from*:  Teeth  Secured at (cm)*:  21  Placement Verified by: auscultation, capnometry and equal breath sounds    Glottic View*:  1 - full view of glottis  Attempts*:  1  Number of Other Approaches Attempted:  0   Patient Identified, Procedure confirmed, Emergency equipment available and Safety protocols followed  Location:  OR  Urgency:  Elective  Difficult Airway: No    Indications for Airway Management:  Anesthesia  Sedation Level:  Anesthetized  Mask Difficulty Assessment:  1 - vent by mask  Performed By:  ROLO  AA:  Allen Odonnell  Start Time: 10/17/2022 7:38 AM    
DISPLAY PLAN FREE TEXT

## 2022-10-21 ENCOUNTER — RX RENEWAL (OUTPATIENT)
Age: 82
End: 2022-10-21

## 2022-10-24 ENCOUNTER — RX RENEWAL (OUTPATIENT)
Age: 82
End: 2022-10-24

## 2022-10-31 ENCOUNTER — RX RENEWAL (OUTPATIENT)
Age: 82
End: 2022-10-31

## 2022-11-04 NOTE — DIETITIAN INITIAL EVALUATION ADULT. - PROBLEM SELECTOR PLAN 2
Name: Neeraj Vance  YOB: 1964  Gender: female  MRN: [id number]    CC:   Chief Complaint   Patient presents with    Shoulder Pain     Left shoulder recheck        HPI: Patient presents with left shoulder pain. We previously saw her for status post left greater tuberosity fracture on 2021. She had been doing really well until 1 week ago she was helping her mother who fell and turned on her arm. She notes that this increased pain. She does note interference with sleep. Notes decreased range of motion. Allergies   Allergen Reactions    Sulfa Antibiotics Itching and Rash     Past Medical History:   Diagnosis Date    GERD (gastroesophageal reflux disease)     controlled with omeprazole daily    Hematuria, microscopic     Hypertension     controlled with lisinopril    Upper respiratory infection     cold. Approx. 10/2018     Past Surgical History:   Procedure Laterality Date    COLONOSCOPY N/A 2019    COLONOSCOPY performed by Vik Sepulveda MD at [address] [place] [address] FLX W/REMOVAL [address] [place] [address]  2019         GYN           LAP,TUBAL CAUTERY  1994     Family History   Problem Relation Age of Onset    Breast Cancer Other 39        Maternal cousin    Hypertension Mother     Osteoarthritis Mother     Cancer Father 72        pancreatic cancer    Lung Disease Father     Hypertension Sister     Hypertension Brother     Breast Cancer Maternal Aunt         52's     Social History     Socioeconomic History    Marital status:      Spouse name: Not on file    Number of children: Not on file    Years of education: Not on file    Highest education level: Not on file   Occupational History    Not on file   Tobacco Use    Smoking status: Every Day     Packs/day: 0.50     Years: 22.00     Pack years: 11.00     Types: Cigarettes     Start date: 1990    Smokeless tobacco: Never   Substance and Sexual Activity    Alcohol use:  Yes     Alcohol/week: 2.0 standard drinks    Drug use: No    Sexual activity: Not on file     Comment: tubal   Other Topics Concern    Not on file   Social History Narrative    Abuse: Feels safe at home, no history of physical abuse, no history of sexual abuse       Social Determinants of Health     Financial Resource Strain: Not on file   Food Insecurity: Not on file   Transportation Needs: Not on file   Physical Activity: Not on file   Stress: Not on file   Social Connections: Not on file   Intimate Partner Violence: Not on file   Housing Stability: Not on file        No flowsheet data found. Review of Systems  Non-contributory    PE left shoulder: Forward flexion 130, abduction 90, external rotation 35. Tender palpate anterior shoulder near the biceps tendon. Negative tenderness at the [facility] joint. Distal motor function, sensation, pulses intact. Xray: Grashey, outlet and axillary radiographs of the left shoulder were obtained and reviewed today. Previous proximal humerus fracture is healed. No evidence of displacement. A/Plan:     ICD-10-CM    1. Left shoulder pain, unspecified chronicity  M25.512 XR SHOULDER LEFT (MIN 2 VIEWS)     triamcinolone acetonide (KENALOG-40) injection 80 mg     DRAIN/INJECT LARGE JOINT/BURSA           I discussed with the patient exacerbation of symptoms secondary from acute injury. She is showing some signs of early adhesive capsulitis. We discussed intra-articular glenohumeral injection which the patient is amenable to. I would not recommend an MRI at this time as I do not feel that exam change treatment and we discussed nonsurgical treatment. Procedure note: After discussion of risks and benefits including, but not limited to pain, infection, steroid flare, increased blood sugar, fat necrosis, skin discoloration, and injury to blood vessels or nerves, the patient verbally consented to proceed with a glenohumeral joint injection.   They understand that we are using this is an alternative continue home meds with hold parameters   acceptable for now method of treatment and the decision to not proceed with elective major surgery. We have discussed this decision in detail. The affected left shoulder was sterilely prepped in standard fashion and injected with 2 cc of Kenalog (40mg/ml), 2 cc of 1% Lidocaine, and 2 cc of 0.5% Marcaine into the joint space. The patient tolerated the injection well. Return in about 6 weeks (around 12/15/2022).         Amari Hudson  11/04/22

## 2022-11-16 ENCOUNTER — RX RENEWAL (OUTPATIENT)
Age: 82
End: 2022-11-16

## 2022-11-16 NOTE — CHART NOTE - NSCHARTNOTESELECT_GEN_ALL_CORE
Transfer Note/Communication Methotrexate Counseling:  Patient counseled regarding adverse effects of methotrexate including but not limited to nausea, vomiting, abnormalities in liver function tests. Patients may develop mouth sores, rash, diarrhea, and abnormalities in blood counts. The patient understands that monitoring is required including LFT's and blood counts.  There is a rare possibility of scarring of the liver and lung problems that can occur when taking methotrexate. Persistent nausea, loss of appetite, pale stools, dark urine, cough, and shortness of breath should be reported immediately. Patient advised to discontinue methotrexate treatment at least three months before attempting to become pregnant.  I discussed the need for folate supplements while taking methotrexate.  These supplements can decrease side effects during methotrexate treatment. The patient verbalized understanding of the proper use and possible adverse effects of methotrexate.  All of the patient's questions and concerns were addressed.

## 2022-11-25 ENCOUNTER — NON-APPOINTMENT (OUTPATIENT)
Age: 82
End: 2022-11-25

## 2022-11-28 ENCOUNTER — NON-APPOINTMENT (OUTPATIENT)
Age: 82
End: 2022-11-28

## 2022-11-30 ENCOUNTER — NON-APPOINTMENT (OUTPATIENT)
Age: 82
End: 2022-11-30

## 2022-12-01 ENCOUNTER — NON-APPOINTMENT (OUTPATIENT)
Age: 82
End: 2022-12-01

## 2022-12-07 ENCOUNTER — APPOINTMENT (OUTPATIENT)
Dept: INFECTIOUS DISEASE | Facility: CLINIC | Age: 82
End: 2022-12-07

## 2022-12-12 ENCOUNTER — TRANSCRIPTION ENCOUNTER (OUTPATIENT)
Age: 82
End: 2022-12-12

## 2022-12-12 ENCOUNTER — RX RENEWAL (OUTPATIENT)
Age: 82
End: 2022-12-12

## 2022-12-13 ENCOUNTER — TRANSCRIPTION ENCOUNTER (OUTPATIENT)
Age: 82
End: 2022-12-13

## 2022-12-14 ENCOUNTER — NON-APPOINTMENT (OUTPATIENT)
Age: 82
End: 2022-12-14

## 2022-12-20 ENCOUNTER — NON-APPOINTMENT (OUTPATIENT)
Age: 82
End: 2022-12-20

## 2022-12-29 ENCOUNTER — NON-APPOINTMENT (OUTPATIENT)
Age: 82
End: 2022-12-29

## 2022-12-29 ENCOUNTER — TRANSCRIPTION ENCOUNTER (OUTPATIENT)
Age: 82
End: 2022-12-29

## 2023-01-03 ENCOUNTER — FORM ENCOUNTER (OUTPATIENT)
Age: 83
End: 2023-01-03

## 2023-01-04 ENCOUNTER — NON-APPOINTMENT (OUTPATIENT)
Age: 83
End: 2023-01-04

## 2023-01-04 ENCOUNTER — APPOINTMENT (OUTPATIENT)
Dept: INFECTIOUS DISEASE | Facility: CLINIC | Age: 83
End: 2023-01-04
Payer: MEDICARE

## 2023-01-04 DIAGNOSIS — G20 PARKINSON'S DISEASE: ICD-10-CM

## 2023-01-04 PROCEDURE — 98968 PH1 ASSMT&MGMT NQHP 21-30: CPT | Mod: CR

## 2023-01-11 ENCOUNTER — NON-APPOINTMENT (OUTPATIENT)
Age: 83
End: 2023-01-11

## 2023-01-11 ENCOUNTER — APPOINTMENT (OUTPATIENT)
Dept: INFECTIOUS DISEASE | Facility: CLINIC | Age: 83
End: 2023-01-11
Payer: MEDICARE

## 2023-01-11 DIAGNOSIS — F03.90 UNSPECIFIED DEMENTIA W/OUT BEHAVIORAL DISTURBANCE: ICD-10-CM

## 2023-01-11 PROCEDURE — 99442: CPT | Mod: 95

## 2023-01-11 RX ORDER — BECLOMETHASONE DIPROPIONATE 80 UG/1
80 AEROSOL, METERED NASAL
Qty: 10.6 | Refills: 6 | Status: ACTIVE | COMMUNITY
Start: 2019-05-02 | End: 1900-01-01

## 2023-01-11 RX ORDER — TAMSULOSIN HYDROCHLORIDE 0.4 MG/1
0.4 CAPSULE ORAL DAILY
Qty: 180 | Refills: 2 | Status: ACTIVE | COMMUNITY
Start: 2018-08-02 | End: 1900-01-01

## 2023-01-11 RX ORDER — FINASTERIDE 5 MG/1
5 TABLET, FILM COATED ORAL DAILY
Qty: 90 | Refills: 2 | Status: ACTIVE | COMMUNITY
Start: 2018-08-02 | End: 1900-01-01

## 2023-01-11 RX ORDER — BACLOFEN 10 MG/1
10 TABLET ORAL
Qty: 180 | Refills: 2 | Status: ACTIVE | COMMUNITY
Start: 2020-11-23 | End: 1900-01-01

## 2023-01-11 RX ORDER — SULFAMETHOXAZOLE AND TRIMETHOPRIM 800; 160 MG/1; MG/1
800-160 TABLET ORAL TWICE DAILY
Qty: 10 | Refills: 0 | Status: COMPLETED | COMMUNITY
Start: 2021-06-15 | End: 2023-01-11

## 2023-01-11 RX ORDER — UBIDECARENONE/VIT E ACET 100MG-5
50 MCG CAPSULE ORAL
Qty: 30 | Refills: 4 | Status: COMPLETED | COMMUNITY
Start: 2022-06-06 | End: 2023-01-11

## 2023-01-12 ENCOUNTER — NON-APPOINTMENT (OUTPATIENT)
Age: 83
End: 2023-01-12

## 2023-01-17 ENCOUNTER — NON-APPOINTMENT (OUTPATIENT)
Age: 83
End: 2023-01-17

## 2023-01-18 ENCOUNTER — NON-APPOINTMENT (OUTPATIENT)
Age: 83
End: 2023-01-18

## 2023-01-20 ENCOUNTER — NON-APPOINTMENT (OUTPATIENT)
Age: 83
End: 2023-01-20

## 2023-01-23 ENCOUNTER — NON-APPOINTMENT (OUTPATIENT)
Age: 83
End: 2023-01-23

## 2023-02-06 ENCOUNTER — NON-APPOINTMENT (OUTPATIENT)
Age: 83
End: 2023-02-06

## 2023-02-22 ENCOUNTER — APPOINTMENT (OUTPATIENT)
Dept: INFECTIOUS DISEASE | Facility: CLINIC | Age: 83
End: 2023-02-22
Payer: MEDICARE

## 2023-02-22 DIAGNOSIS — E87.0 HYPEROSMOLALITY AND HYPERNATREMIA: ICD-10-CM

## 2023-02-22 DIAGNOSIS — Z23 ENCOUNTER FOR IMMUNIZATION: ICD-10-CM

## 2023-02-22 DIAGNOSIS — B20 HUMAN IMMUNODEFICIENCY VIRUS [HIV] DISEASE: ICD-10-CM

## 2023-02-22 PROCEDURE — 99443: CPT | Mod: 95

## 2023-02-23 ENCOUNTER — NON-APPOINTMENT (OUTPATIENT)
Age: 83
End: 2023-02-23

## 2023-02-24 ENCOUNTER — NON-APPOINTMENT (OUTPATIENT)
Age: 83
End: 2023-02-24

## 2023-03-10 ENCOUNTER — RX RENEWAL (OUTPATIENT)
Age: 83
End: 2023-03-10

## 2023-03-10 RX ORDER — CHLORHEXIDINE GLUCONATE 4 %
5 LIQUID (ML) TOPICAL TWICE DAILY
Qty: 180 | Refills: 3 | Status: ACTIVE | COMMUNITY
Start: 2020-11-04 | End: 1900-01-01

## 2023-03-16 ENCOUNTER — TRANSCRIPTION ENCOUNTER (OUTPATIENT)
Age: 83
End: 2023-03-16

## 2023-03-16 ENCOUNTER — NON-APPOINTMENT (OUTPATIENT)
Age: 83
End: 2023-03-16

## 2023-03-20 DIAGNOSIS — K59.00 CONSTIPATION, UNSPECIFIED: ICD-10-CM

## 2023-03-20 RX ORDER — SENNOSIDES 8.6 MG TABLETS 8.6 MG/1
8.6 TABLET ORAL
Qty: 60 | Refills: 4 | Status: ACTIVE | COMMUNITY
Start: 2023-03-20 | End: 1900-01-01

## 2023-03-21 ENCOUNTER — NON-APPOINTMENT (OUTPATIENT)
Age: 83
End: 2023-03-21

## 2023-05-09 NOTE — PROGRESS NOTE ADULT - ASSESSMENT
76 yo Male w/ HIV c/b HIV related Dementia, HTN, BPH, presenting w/ c/o of fevers and lethargy x 4 days, admitted w/ sepsis 2/2 E. coli UTI. Incidentally discovered to have R sided effusion, w/ loculation. Skyrizi Counseling: I discussed with the patient the risks of risankizumab-rzaa including but not limited to immunosuppression, and serious infections.  The patient understands that monitoring is required including a PPD at baseline and must alert us or the primary physician if symptoms of infection or other concerning signs are noted.

## 2023-06-16 RX ORDER — ATORVASTATIN CALCIUM 10 MG/1
10 TABLET, FILM COATED ORAL DAILY
Qty: 90 | Refills: 2 | Status: ACTIVE | COMMUNITY
Start: 2017-08-01 | End: 1900-01-01

## 2023-07-10 ENCOUNTER — NON-APPOINTMENT (OUTPATIENT)
Age: 83
End: 2023-07-10

## 2023-07-12 RX ORDER — EMTRICITABINE AND TENOFOVIR ALAFENAMIDE 200; 25 MG/1; MG/1
200-25 TABLET ORAL
Qty: 90 | Refills: 2 | Status: ACTIVE | COMMUNITY
Start: 2018-09-19 | End: 1900-01-01

## 2023-07-18 NOTE — ED ADULT NURSE NOTE - EXTENSIONS OF SELF_ADULT
None Albendazole Pregnancy And Lactation Text: This medication is Pregnancy Category C and it isn't known if it is safe during pregnancy. It is also excreted in breast milk.

## 2023-07-25 ENCOUNTER — INPATIENT (INPATIENT)
Facility: HOSPITAL | Age: 83
LOS: 7 days | Discharge: NURSING, REHAB OR LONG-TERM CARE | DRG: 727 | End: 2023-08-02
Attending: INTERNAL MEDICINE | Admitting: STUDENT IN AN ORGANIZED HEALTH CARE EDUCATION/TRAINING PROGRAM
Payer: MEDICARE

## 2023-07-25 ENCOUNTER — NON-APPOINTMENT (OUTPATIENT)
Age: 83
End: 2023-07-25

## 2023-07-25 VITALS
WEIGHT: 179.9 LBS | OXYGEN SATURATION: 100 % | TEMPERATURE: 98 F | RESPIRATION RATE: 22 BRPM | DIASTOLIC BLOOD PRESSURE: 81 MMHG | HEIGHT: 72 IN | HEART RATE: 119 BPM | SYSTOLIC BLOOD PRESSURE: 127 MMHG

## 2023-07-25 LAB
ALBUMIN SERPL ELPH-MCNC: 2.7 G/DL — LOW (ref 3.3–5)
ALP SERPL-CCNC: 97 U/L — SIGNIFICANT CHANGE UP (ref 40–120)
ALT FLD-CCNC: 8 U/L — LOW (ref 10–45)
ANION GAP SERPL CALC-SCNC: 14 MMOL/L — SIGNIFICANT CHANGE UP (ref 5–17)
APPEARANCE UR: ABNORMAL
APTT BLD: 33.8 SEC — SIGNIFICANT CHANGE UP (ref 24.5–35.6)
AST SERPL-CCNC: 22 U/L — SIGNIFICANT CHANGE UP (ref 10–40)
BACTERIA # UR AUTO: ABNORMAL
BASE EXCESS BLDV CALC-SCNC: 1.9 MMOL/L — SIGNIFICANT CHANGE UP (ref -2–3)
BASOPHILS # BLD AUTO: 0.02 K/UL — SIGNIFICANT CHANGE UP (ref 0–0.2)
BASOPHILS NFR BLD AUTO: 0.2 % — SIGNIFICANT CHANGE UP (ref 0–2)
BILIRUB SERPL-MCNC: 0.4 MG/DL — SIGNIFICANT CHANGE UP (ref 0.2–1.2)
BILIRUB UR-MCNC: ABNORMAL
BLD GP AB SCN SERPL QL: NEGATIVE — SIGNIFICANT CHANGE UP
BUN SERPL-MCNC: 20 MG/DL — SIGNIFICANT CHANGE UP (ref 7–23)
CA-I SERPL-SCNC: 1.16 MMOL/L — SIGNIFICANT CHANGE UP (ref 1.15–1.33)
CALCIUM SERPL-MCNC: 8.5 MG/DL — SIGNIFICANT CHANGE UP (ref 8.4–10.5)
CHLORIDE BLDV-SCNC: 109 MMOL/L — HIGH (ref 96–108)
CHLORIDE SERPL-SCNC: 104 MMOL/L — SIGNIFICANT CHANGE UP (ref 96–108)
CO2 BLDV-SCNC: 30 MMOL/L — HIGH (ref 22–26)
CO2 SERPL-SCNC: 24 MMOL/L — SIGNIFICANT CHANGE UP (ref 22–31)
COLOR SPEC: ABNORMAL
CREAT SERPL-MCNC: 0.52 MG/DL — SIGNIFICANT CHANGE UP (ref 0.5–1.3)
DIFF PNL FLD: ABNORMAL
EGFR: 101 ML/MIN/1.73M2 — SIGNIFICANT CHANGE UP
EOSINOPHIL # BLD AUTO: 0.05 K/UL — SIGNIFICANT CHANGE UP (ref 0–0.5)
EOSINOPHIL NFR BLD AUTO: 0.4 % — SIGNIFICANT CHANGE UP (ref 0–6)
EPI CELLS # UR: 0 — SIGNIFICANT CHANGE UP
GAS PNL BLDV: 140 MMOL/L — SIGNIFICANT CHANGE UP (ref 136–145)
GAS PNL BLDV: SIGNIFICANT CHANGE UP
GLUCOSE BLDV-MCNC: 171 MG/DL — HIGH (ref 70–99)
GLUCOSE SERPL-MCNC: 179 MG/DL — HIGH (ref 70–99)
GLUCOSE UR QL: ABNORMAL
HCO3 BLDV-SCNC: 28 MMOL/L — SIGNIFICANT CHANGE UP (ref 22–29)
HCT VFR BLD CALC: 35.4 % — LOW (ref 39–50)
HCT VFR BLDA CALC: 33 % — LOW (ref 39–51)
HGB BLD CALC-MCNC: 11 G/DL — LOW (ref 12.6–17.4)
HGB BLD-MCNC: 11.2 G/DL — LOW (ref 13–17)
HYALINE CASTS # UR AUTO: 2 /LPF — SIGNIFICANT CHANGE UP (ref 0–7)
IMM GRANULOCYTES NFR BLD AUTO: 0.5 % — SIGNIFICANT CHANGE UP (ref 0–0.9)
INR BLD: 0.94 RATIO — SIGNIFICANT CHANGE UP (ref 0.85–1.18)
KETONES UR-MCNC: ABNORMAL
LACTATE BLDV-MCNC: 2.8 MMOL/L — HIGH (ref 0.5–2)
LEUKOCYTE ESTERASE UR-ACNC: ABNORMAL
LYMPHOCYTES # BLD AUTO: 1.31 K/UL — SIGNIFICANT CHANGE UP (ref 1–3.3)
LYMPHOCYTES # BLD AUTO: 11.5 % — LOW (ref 13–44)
MCHC RBC-ENTMCNC: 30.4 PG — SIGNIFICANT CHANGE UP (ref 27–34)
MCHC RBC-ENTMCNC: 31.6 GM/DL — LOW (ref 32–36)
MCV RBC AUTO: 95.9 FL — SIGNIFICANT CHANGE UP (ref 80–100)
MONOCYTES # BLD AUTO: 0.66 K/UL — SIGNIFICANT CHANGE UP (ref 0–0.9)
MONOCYTES NFR BLD AUTO: 5.8 % — SIGNIFICANT CHANGE UP (ref 2–14)
NEUTROPHILS # BLD AUTO: 9.33 K/UL — HIGH (ref 1.8–7.4)
NEUTROPHILS NFR BLD AUTO: 81.6 % — HIGH (ref 43–77)
NITRITE UR-MCNC: POSITIVE
NRBC # BLD: 0 /100 WBCS — SIGNIFICANT CHANGE UP (ref 0–0)
PCO2 BLDV: 51 MMHG — SIGNIFICANT CHANGE UP (ref 42–55)
PH BLDV: 7.35 — SIGNIFICANT CHANGE UP (ref 7.32–7.43)
PH UR: >8.9 (ref 5–8)
PLATELET # BLD AUTO: 260 K/UL — SIGNIFICANT CHANGE UP (ref 150–400)
PO2 BLDV: 25 MMHG — SIGNIFICANT CHANGE UP (ref 25–45)
POTASSIUM BLDV-SCNC: 4.8 MMOL/L — SIGNIFICANT CHANGE UP (ref 3.5–5.1)
POTASSIUM SERPL-MCNC: 4.9 MMOL/L — SIGNIFICANT CHANGE UP (ref 3.5–5.3)
POTASSIUM SERPL-SCNC: 4.9 MMOL/L — SIGNIFICANT CHANGE UP (ref 3.5–5.3)
PROT SERPL-MCNC: 7.9 G/DL — SIGNIFICANT CHANGE UP (ref 6–8.3)
PROT UR-MCNC: ABNORMAL
PROTHROM AB SERPL-ACNC: 10.9 SEC — SIGNIFICANT CHANGE UP (ref 9.5–13)
RBC # BLD: 3.69 M/UL — LOW (ref 4.2–5.8)
RBC # FLD: 14.6 % — HIGH (ref 10.3–14.5)
RBC CASTS # UR COMP ASSIST: >50 /HPF — HIGH (ref 0–4)
RH IG SCN BLD-IMP: POSITIVE — SIGNIFICANT CHANGE UP
SAO2 % BLDV: 34.4 % — LOW (ref 67–88)
SODIUM SERPL-SCNC: 142 MMOL/L — SIGNIFICANT CHANGE UP (ref 135–145)
SP GR SPEC: 1.01 — SIGNIFICANT CHANGE UP (ref 1.01–1.03)
UROBILINOGEN FLD QL: ABNORMAL
WBC # BLD: 11.43 K/UL — HIGH (ref 3.8–10.5)
WBC # FLD AUTO: 11.43 K/UL — HIGH (ref 3.8–10.5)
WBC UR QL: >50 /HPF — HIGH (ref 0–5)

## 2023-07-25 PROCEDURE — 99285 EMERGENCY DEPT VISIT HI MDM: CPT | Mod: GC

## 2023-07-25 PROCEDURE — 74177 CT ABD & PELVIS W/CONTRAST: CPT | Mod: 26,MA

## 2023-07-25 RX ORDER — CEFTRIAXONE 500 MG/1
1000 INJECTION, POWDER, FOR SOLUTION INTRAMUSCULAR; INTRAVENOUS ONCE
Refills: 0 | Status: COMPLETED | OUTPATIENT
Start: 2023-07-25 | End: 2023-07-25

## 2023-07-25 RX ORDER — SODIUM CHLORIDE 9 MG/ML
1000 INJECTION INTRAMUSCULAR; INTRAVENOUS; SUBCUTANEOUS ONCE
Refills: 0 | Status: COMPLETED | OUTPATIENT
Start: 2023-07-25 | End: 2023-07-25

## 2023-07-25 RX ADMIN — CEFTRIAXONE 100 MILLIGRAM(S): 500 INJECTION, POWDER, FOR SOLUTION INTRAMUSCULAR; INTRAVENOUS at 21:27

## 2023-07-25 RX ADMIN — SODIUM CHLORIDE 1000 MILLILITER(S): 9 INJECTION INTRAMUSCULAR; INTRAVENOUS; SUBCUTANEOUS at 18:32

## 2023-07-25 NOTE — ED PROVIDER NOTE - CLINICAL SUMMARY MEDICAL DECISION MAKING FREE TEXT BOX
Differential is not limited to UTI, pyelonephritis, bladder cancer, glomerulonephritis, coagulopathy.  We will obtain basic labs, CT abdomen and pelvis, coags and type and screen.  Dispo pending labs imaging and reassessment. Differential is not limited to UTI, pyelonephritis, bladder cancer, glomerulonephritis, coagulopathy.  We will obtain basic labs, CT abdomen and pelvis, coags and type and screen.  Dispo pending labs imaging and reassessment.    pettet attending- see attending attestation for my mdm

## 2023-07-25 NOTE — ED PROVIDER NOTE - ATTENDING CONTRIBUTION TO CARE
I, Christophe Villalobos, performed a history and physical exam of the patient and discussed their management with the resident and/or advanced care provider. I reviewed the resident and/or advanced care provider's note and agree with the documented findings and plan of care. I was present and available for all procedures.    82-year-old male history of Parkinson disease, dementia, hypertension, hyperlipidemia, BPH, HIV not on AC presenting with a chief complaint hematuria.  Patient is nonverbal at baseline.  Patient is accompanied by wife.  Per wife patient has been having bloody urine.  Patient wife was away and was notified when she returned home.  States symptoms have been going on for the past day.  Wife states the blood started off bright red but now is a maroon color.  Per the wife this happened to the patient about 3 years ago and patient was diagnosed with urinary tract infection.  ROS is limited secondary to patient's nonverbal status.    fatigued appearing but spouse reports at baseline nv status and in NAD, head normal appearing atraumatic, trachea midline, no respiratory distress, lungs cta bilaterally, rrr no murmurs, soft Slightly grimacing with suprapubic palpation otherwise nontender throughout rest of the abdomen no CVA tenderness palpation no rebound tenderness, ND abdomen, no visible extremity deformities, skin warm and dry, normal affect and mood, no leg swelling, calf ttp or jvd    Patient presenting with hematuria otherwise at baseline per wife no recent trauma falls otherwise will evaluate with possible hematuria for UTI similar to past we will also obtain CT scan abdomen pelvis with IV contrast to evaluate for other injuries otherwise unlikely ACS PE pneumothorax dissection AAA pneumonia appendicitis diverticulitis disposition pending work-up and reevaluation

## 2023-07-25 NOTE — ED ADULT NURSE NOTE - NSFALLRISKINTERV_ED_ALL_ED
Assistance OOB with selected safe patient handling equipment if applicable/Communicate fall risk and risk factors to all staff, patient, and family/Monitor for mental status changes and reorient to person, place, and time, as needed/Move patient closer to nursing station/within visual sight of ED staff/Provide patient with walking aids/Provide visual cue: yellow wristband, yellow gown, etc/Reinforce activity limits and safety measures with patient and family/Toileting schedule using arm’s reach rule for commode and bathroom/Use of alarms - bed, stretcher, chair and/or video monitoring/Call bell, personal items and telephone in reach/Instruct patient to call for assistance before getting out of bed/chair/stretcher/Non-slip footwear applied when patient is off stretcher/[place] to call system/Physically safe environment - no spills, clutter or unnecessary equipment/Purposeful Proactive Rounding/Room/bathroom lighting operational, light cord in reach

## 2023-07-25 NOTE — ED ADULT TRIAGE NOTE - TEMPERATURE IN CELSIUS (DEGREES C)
36.7 pt with known hx of renal failure with worsening lab and symptoms.  repeat labs, nephrology, admit

## 2023-07-25 NOTE — ED ADULT NURSE NOTE - OBJECTIVE STATEMENT
Patient is a 82 year old female brought in by EMS from home for hematuria. Patient is A&Ox0, nonverbal, bedbound at baseline - wife at bedside to give history. Wife reporting 2 days ago she noticed blood in his urine. On assessment A&Ox0, contracted, no edema, no jvd, breathing comfortably on room air, no accessory muscle use, abdomen soft, incontinence of urine (bright red/pink), stage 3 wound to coccyx. PMH HIV/AIDS, dementia, HTN, HLD.

## 2023-07-25 NOTE — ED ADULT NURSE REASSESSMENT NOTE - NS ED NURSE REASSESS COMMENT FT1
Pt straight cathed using sterile technique. Second RN present to confirm sterility. Pt tolerated procedure well.  Dark red urine drained. Sterile specimens collected and sent to lab.

## 2023-07-25 NOTE — ED PROVIDER NOTE - OBJECTIVE STATEMENT
82-year-old male history of Parkinson disease, dementia, hypertension, hyperlipidemia, BPH, HIV not on AC presenting with a chief complaint hematuria.  Patient is nonverbal at baseline.  Patient is accompanied by wife.  Per wife patient has been having bloody urine.  Patient wife was away and was notified when she returned home.  States symptoms have been going on for the past day.  Wife states the blood started off bright red but now is a maroon color.  Per the wife this happened to the patient about 3 years ago and patient was diagnosed with urinary tract infection.  ROS is limited secondary to patient's nonverbal status.

## 2023-07-25 NOTE — ED PROVIDER NOTE - PROGRESS NOTE DETAILS
Merritt Díaz PA-C: patient signed out to me by previous team. CT results reviewed. ceftriaxone given. spoke to hospitalist. request urology consult. spoke with urology, pending consult at this time. will admit to medicine. discussed with ED attending Merritt Díaz PA-C: bladder scan 252. condom cath placed. dark red blood noted. spoke with urology. pending consult at this time. Attending Masom:  uro placed 3 way, no need for cbi, abx

## 2023-07-25 NOTE — ED PROVIDER NOTE - PHYSICAL EXAMINATION
GENERAL: asleep, NAD  HEENT: NC/AT, moist mucous membranes, PERRL, EOMI  LUNGS: CTAB, no wheezes or crackles   CARDIAC: RRR, no m/r/g  ABDOMEN: Soft, non tender, non distended, no rebound, no guarding  BACK: No midline spinal tenderness, no CVA tenderness, + stage 4 sacral wound at the coccyx   EXT: No edema, no calf tenderness, 2+ DP pulses bilaterally, no deformities.  NEURO: A&Ox0. Upper extremities contracted.  SKIN: Warm and dry. No rash.

## 2023-07-26 DIAGNOSIS — G30.8 OTHER ALZHEIMER'S DISEASE: ICD-10-CM

## 2023-07-26 DIAGNOSIS — I10 ESSENTIAL (PRIMARY) HYPERTENSION: ICD-10-CM

## 2023-07-26 DIAGNOSIS — R31.9 HEMATURIA, UNSPECIFIED: ICD-10-CM

## 2023-07-26 DIAGNOSIS — N39.0 URINARY TRACT INFECTION, SITE NOT SPECIFIED: ICD-10-CM

## 2023-07-26 DIAGNOSIS — Z21 ASYMPTOMATIC HUMAN IMMUNODEFICIENCY VIRUS [HIV] INFECTION STATUS: ICD-10-CM

## 2023-07-26 DIAGNOSIS — R53.2 FUNCTIONAL QUADRIPLEGIA: ICD-10-CM

## 2023-07-26 LAB
HCT VFR BLD CALC: 29.3 % — LOW (ref 39–50)
HGB BLD-MCNC: 9.4 G/DL — LOW (ref 13–17)
MCHC RBC-ENTMCNC: 30.3 PG — SIGNIFICANT CHANGE UP (ref 27–34)
MCHC RBC-ENTMCNC: 32.1 GM/DL — SIGNIFICANT CHANGE UP (ref 32–36)
MCV RBC AUTO: 94.5 FL — SIGNIFICANT CHANGE UP (ref 80–100)
NRBC # BLD: 0 /100 WBCS — SIGNIFICANT CHANGE UP (ref 0–0)
PLATELET # BLD AUTO: 245 K/UL — SIGNIFICANT CHANGE UP (ref 150–400)
RBC # BLD: 3.1 M/UL — LOW (ref 4.2–5.8)
RBC # FLD: 14.7 % — HIGH (ref 10.3–14.5)
WBC # BLD: 11.1 K/UL — HIGH (ref 3.8–10.5)
WBC # FLD AUTO: 11.1 K/UL — HIGH (ref 3.8–10.5)

## 2023-07-26 PROCEDURE — 99222 1ST HOSP IP/OBS MODERATE 55: CPT

## 2023-07-26 PROCEDURE — 51703 INSERT BLADDER CATH COMPLEX: CPT

## 2023-07-26 RX ORDER — TAMSULOSIN HYDROCHLORIDE 0.4 MG/1
0.8 CAPSULE ORAL AT BEDTIME
Refills: 0 | Status: DISCONTINUED | OUTPATIENT
Start: 2023-07-26 | End: 2023-08-02

## 2023-07-26 RX ORDER — CEFTRIAXONE 500 MG/1
1000 INJECTION, POWDER, FOR SOLUTION INTRAMUSCULAR; INTRAVENOUS EVERY 24 HOURS
Refills: 0 | Status: DISCONTINUED | OUTPATIENT
Start: 2023-07-26 | End: 2023-07-28

## 2023-07-26 RX ORDER — RITONAVIR 100 MG/1
100 TABLET, FILM COATED ORAL DAILY
Refills: 0 | Status: DISCONTINUED | OUTPATIENT
Start: 2023-07-26 | End: 2023-08-02

## 2023-07-26 RX ORDER — SODIUM CHLORIDE 9 MG/ML
2000 INJECTION INTRAMUSCULAR; INTRAVENOUS; SUBCUTANEOUS
Refills: 0 | Status: DISCONTINUED | OUTPATIENT
Start: 2023-07-26 | End: 2023-08-02

## 2023-07-26 RX ORDER — DONEPEZIL HYDROCHLORIDE 10 MG/1
10 TABLET, FILM COATED ORAL AT BEDTIME
Refills: 0 | Status: DISCONTINUED | OUTPATIENT
Start: 2023-07-26 | End: 2023-08-02

## 2023-07-26 RX ORDER — ATORVASTATIN CALCIUM 80 MG/1
10 TABLET, FILM COATED ORAL AT BEDTIME
Refills: 0 | Status: DISCONTINUED | OUTPATIENT
Start: 2023-07-26 | End: 2023-08-02

## 2023-07-26 RX ORDER — TAMSULOSIN HYDROCHLORIDE 0.4 MG/1
2 CAPSULE ORAL
Qty: 0 | Refills: 0 | DISCHARGE

## 2023-07-26 RX ORDER — DONEPEZIL HYDROCHLORIDE 10 MG/1
1 TABLET, FILM COATED ORAL
Qty: 0 | Refills: 0 | DISCHARGE

## 2023-07-26 RX ORDER — LANOLIN ALCOHOL/MO/W.PET/CERES
5 CREAM (GRAM) TOPICAL AT BEDTIME
Refills: 0 | Status: DISCONTINUED | OUTPATIENT
Start: 2023-07-26 | End: 2023-07-27

## 2023-07-26 RX ORDER — AMLODIPINE BESYLATE 2.5 MG/1
5 TABLET ORAL DAILY
Refills: 0 | Status: DISCONTINUED | OUTPATIENT
Start: 2023-07-26 | End: 2023-08-02

## 2023-07-26 RX ORDER — BACLOFEN 100 %
5 POWDER (GRAM) MISCELLANEOUS EVERY 8 HOURS
Refills: 0 | Status: DISCONTINUED | OUTPATIENT
Start: 2023-07-26 | End: 2023-07-27

## 2023-07-26 RX ORDER — FLUTICASONE PROPIONATE 50 MCG
1 SPRAY, SUSPENSION NASAL
Refills: 0 | Status: DISCONTINUED | OUTPATIENT
Start: 2023-07-26 | End: 2023-08-02

## 2023-07-26 RX ORDER — EMTRICITABINE AND TENOFOVIR DISOPROXIL FUMARATE 200; 300 MG/1; MG/1
1 TABLET, FILM COATED ORAL
Qty: 0 | Refills: 0 | DISCHARGE

## 2023-07-26 RX ORDER — DARUNAVIR 75 MG/1
1 TABLET, FILM COATED ORAL
Qty: 0 | Refills: 0 | DISCHARGE

## 2023-07-26 RX ORDER — FINASTERIDE 5 MG/1
5 TABLET, FILM COATED ORAL DAILY
Refills: 0 | Status: DISCONTINUED | OUTPATIENT
Start: 2023-07-26 | End: 2023-08-02

## 2023-07-26 RX ORDER — CHOLECALCIFEROL (VITAMIN D3) 125 MCG
2000 CAPSULE ORAL DAILY
Refills: 0 | Status: DISCONTINUED | OUTPATIENT
Start: 2023-07-26 | End: 2023-08-02

## 2023-07-26 RX ORDER — DARUNAVIR 75 MG/1
800 TABLET, FILM COATED ORAL DAILY
Refills: 0 | Status: DISCONTINUED | OUTPATIENT
Start: 2023-07-26 | End: 2023-08-02

## 2023-07-26 RX ORDER — EMTRICITABINE AND TENOFOVIR DISOPROXIL FUMARATE 200; 300 MG/1; MG/1
1 TABLET, FILM COATED ORAL DAILY
Refills: 0 | Status: DISCONTINUED | OUTPATIENT
Start: 2023-07-26 | End: 2023-08-02

## 2023-07-26 RX ORDER — FINASTERIDE 5 MG/1
1 TABLET, FILM COATED ORAL
Refills: 0 | DISCHARGE

## 2023-07-26 RX ORDER — CARBIDOPA AND LEVODOPA 25; 100 MG/1; MG/1
1 TABLET ORAL
Qty: 0 | Refills: 0 | DISCHARGE

## 2023-07-26 RX ADMIN — Medication 5 MILLIGRAM(S): at 21:57

## 2023-07-26 RX ADMIN — Medication 2000 UNIT(S): at 12:11

## 2023-07-26 RX ADMIN — DONEPEZIL HYDROCHLORIDE 10 MILLIGRAM(S): 10 TABLET, FILM COATED ORAL at 22:19

## 2023-07-26 RX ADMIN — SODIUM CHLORIDE 50 MILLILITER(S): 9 INJECTION INTRAMUSCULAR; INTRAVENOUS; SUBCUTANEOUS at 16:46

## 2023-07-26 RX ADMIN — DARUNAVIR 800 MILLIGRAM(S): 75 TABLET, FILM COATED ORAL at 13:23

## 2023-07-26 RX ADMIN — Medication 5 MILLIGRAM(S): at 13:22

## 2023-07-26 RX ADMIN — EMTRICITABINE AND TENOFOVIR DISOPROXIL FUMARATE 1 TABLET(S): 200; 300 TABLET, FILM COATED ORAL at 13:22

## 2023-07-26 RX ADMIN — FINASTERIDE 5 MILLIGRAM(S): 5 TABLET, FILM COATED ORAL at 12:11

## 2023-07-26 RX ADMIN — CEFTRIAXONE 100 MILLIGRAM(S): 500 INJECTION, POWDER, FOR SOLUTION INTRAMUSCULAR; INTRAVENOUS at 15:47

## 2023-07-26 RX ADMIN — Medication 1 SPRAY(S): at 20:00

## 2023-07-26 RX ADMIN — AMLODIPINE BESYLATE 5 MILLIGRAM(S): 2.5 TABLET ORAL at 12:11

## 2023-07-26 RX ADMIN — ATORVASTATIN CALCIUM 10 MILLIGRAM(S): 80 TABLET, FILM COATED ORAL at 21:57

## 2023-07-26 RX ADMIN — RITONAVIR 100 MILLIGRAM(S): 100 TABLET, FILM COATED ORAL at 13:23

## 2023-07-26 NOTE — H&P ADULT - CONVERSATION DETAILS
detailed discussed with above re all of above options  quite clear in and requesting that intubation and/or chest compression be withheld     does want other non-heroic measures including IVF, antibiotics etc    MOLST copied and and placed in chart    DNR ordered in [facility]    30 minutes for advanced care planning discussion separate and in addition to the E&M service provided

## 2023-07-26 NOTE — PROCEDURE NOTE - ADDITIONAL PROCEDURE DETAILS
condom cath removed, 22F hotter 3 way catheter placed, 30cc in balloon, urine is translucent red, irrigated with sterile water with aspiration of 1-2 small clot, urine is now translucent light stephanie.

## 2023-07-26 NOTE — CONSULT NOTE ADULT - ASSESSMENT
82y Male with PMHx of Parkinson disease, dementia, hypertension, hyperlipidemia, BPH, HIV not on AC presenting with hematuria. In ED, pt with a white count 11.43, H/H stable 11.2/35.4, UA+, UCX pending. CT scan reveals findings consistent with cystitis, prostatitis.     -PVR to ensure emptying   -condom cath to visualize urine color  82y Male with PMHx of Parkinson disease, dementia, hypertension, hyperlipidemia, BPH, HIV not on AC presenting with hematuria. In ED, pt with a white count 11.43, H/H stable 11.2/35.4, UA+, UCX pending. CT scan reveals findings consistent with cystitis, prostatitis.     -IV abx   -Wong for maximal drainage   -F/u urine culture       discussed with Dr. Hood, to be discussed with attending  82y Male with PMHx of Parkinson disease, dementia, hypertension, hyperlipidemia, BPH, HIV not on AC presenting with hematuria. In ED, pt with a white count 11.43, H/H stable 11.2/35.4, UA+, UCX pending. CT scan reveals findings consistent with cystitis, prostatitis.     -IV abx   -Wong for maximal drainage, no cbi at this time   -monitor urine output and color   -F/u urine culture       discussed with Dr. Hood, to be discussed with attending  82y Male with PMHx of Parkinson disease, dementia, hypertension, hyperlipidemia, BPH, HIV not on AC presenting with hematuria. In ED, pt with a white count 11.43, H/H stable 11.2/35.4, UA+, UCX pending. CT scan reveals findings consistent with cystitis, prostatitis.     -IV abx   -Wong for maximal drainage, no cbi at this time   -monitor urine output and color   -F/u urine culture       discussed with Dr. Gates    Urology

## 2023-07-26 NOTE — CONSULT NOTE ADULT - PROBLEM SELECTOR RECOMMENDATION 9
Continue Ceftriaxone for now - has some prostate penetration but not ideal; but clinically UTI is bigger acute issue and would await confirmation of quinolone sensitivity before changing to empiric quinolone given high rates of resistance in urinary pathogens.    Await UCx results

## 2023-07-26 NOTE — PROGRESS NOTE ADULT - ASSESSMENT
82y Male with PMHx of Parkinson disease, dementia, hypertension, hyperlipidemia, BPH, HIV not on AC presenting with hematuria. In ED, pt with a white count 11.43, H/H stable 11.2/35.4, UA+, UCX pending. CT scan reveals findings consistent with cystitis, prostatitis.     - Labs reviewed - WBC 11.10  - Continue antibiotics  - Follow up urine culture  - Continue Wong, monitor urine color

## 2023-07-26 NOTE — CONSULT NOTE ADULT - SUBJECTIVE AND OBJECTIVE BOX
HPI:  82y Male with PMHx of Parkinson disease, dementia, hypertension, hyperlipidemia, BPH, HIV not on AC presenting with hematuria.  Patient is nonverbal at baseline.  Patient is accompanied by wife.  Per wife patient has been having bloody urine.  Patient wife was away and was notified when she returned home.  States symptoms have been going on for the past day.  Wife states the blood started off bright red but now is a maroon color.  Per the wife this happened to the patient about 3 years ago and patient was diagnosed with urinary tract infection. ROS is limited secondary to patient's nonverbal status. Pt has been seen by Dr. Ponce in the past for BPH.     In ED: pt is AVSS, white count 11.43, H/H stable 11.2/35.4, UA+, UCX pending. CT scan reveals findings consistent with cystitis, prostatitis.     PAST MEDICAL & SURGICAL HISTORY:  HIV (human immunodeficiency virus infection)      HTN (hypertension)      Alzheimer's disease of other onset      No significant past surgical history          FAMILY HISTORY:  No pertinent family history in first degree relatives    No known  malignancy     Social History:  Denies alcohol and drug abuse, nonsmoker     MEDICATIONS  (STANDING):    MEDICATIONS  (PRN):    Allergies    No Known Allergies    Intolerances      REVIEW OF SYSTEMS: Pertinent positives and negatives as stated in HPI, otherwise negative    Vital signs  T(C): 36.7 (23 @ 20:00), Max: 37.7 (23 @ 17:15)  HR: 81 (23 @ 20:00)  BP: 121/64 (23 @ 20:00)  SpO2: 96% (23 @ 20:00)  Wt(kg): --    Physical Exam  Gen: NAD  HEENT: normocephalic, atraumatic, no scleral icterus  Pulm: CTA b/l, No respiratory distress, no subcostal retractions, no accessory muscle use   CV: S1 S2, RRR,  no JVD  Abd: Soft, NT, ND, no rebound tenderness or guarding  : Uncircumcised/Circumcised, no lesions.  No discharge or blood at urethral meatus.    MSK:  No CVAT, Moving all extremities, full ROM in all extremities, No edema present  NEURO: A&Ox3, no focal neurological deficits, CN 2-12 grossly intact  SKIN: warm, dry, no rash.    LABS:         @ 17:24    WBC 11.43 / Hct 35.4  / SCr 0.52         142  |  104  |  20  ----------------------------<  179<H>  4.9   |  24  |  0.52    Ca    8.5      2023 17:24    TPro  7.9  /  Alb  2.7<L>  /  TBili  0.4  /  DBili  x   /  AST  22  /  ALT  8<L>  /  AlkPhos  97      PT/INR - ( 2023 17:24 )   PT: 10.9 sec;   INR: 0.94 ratio         PTT - ( 2023 17:24 )  PTT:33.8 sec  Urinalysis Basic - ( 2023 20:08 )    Color: Red / Appearance: Turbid / S.010 / pH: x  Gluc: x / Ketone: large  / Bili: Large / Urobili: >=8 mg/dL   Blood: x / Protein: 300 mg/dL / Nitrite: Positive   Leuk Esterase: Large / RBC: >50 /hpf / WBC >50 /HPF   Sq Epi: x / Non Sq Epi: x / Bacteria: Moderate        Urine Cx: pending     Radiology:  < from: CT Abdomen and Pelvis w/ IV Cont (23 @ 21:12) >  ACC: [id number] EXAM:  CT ABDOMEN AND PELVIS IC   ORDERED BY:  NURYS IQBAL     PROCEDURE DATE:  2023          INTERPRETATION:  CLINICAL INFORMATION: Hematuria    COMPARISON: CT abdomen and pelvis 2021    CONTRAST/COMPLICATIONS:  IV Contrast: Omnipaque 350  90 cc administered   10 cc discarded  Oral Contrast: NONE  Complications: None reported at time of study completion    PROCEDURE:  CT of the Abdomen and Pelvis was performed.  Sagittal and coronal reformats were performed.    FINDINGS:  LOWER CHEST: Small loculated right pleural effusion with associated   atelectasis, decreased from prior exam on 2021. Patchy nodular   opacities seen in the dependent left lower lobe, which may represent   atelectasis or pneumonia in the appropriate clinical setting.    LIVER: Within normal limits.  BILE DUCTS: Normal caliber.  GALLBLADDER: Contracted with mild wall thickening, similar prior.   Question tiny cholelithiasis.  SPLEEN: Within normal limits.  PANCREAS: Within normal limits.  ADRENALS: Within normal limits.  KIDNEYS/URETERS: Symmetric renal enhancement. No hydronephrosis.   Bilateral nonobstructing renal calculi. Right renal cysts and additional   subcentimeter hypodense foci bilaterally, which are too small to   characterize.    BLADDER: Thickened, trabeculated bladder wall with mucosal   hyperenhancement and suggestion of layering debris. Diverticulum along   the anterior aspect of the bladder dome.  REPRODUCTIVE ORGANS: Heterogeneous prostate gland with hyperenhancement   along the corpus spongiosum.    BOWEL: Mildly prominent air-filled loops of small bowel without evidence   of bowel obstruction. Colonic diverticulosis without acute   diverticulitis. Appendix is normal.  PERITONEUM: No ascites.  VESSELS: Atherosclerotic changes.  RETROPERITONEUM/LYMPH NODES: No lymphadenopathy.  ABDOMINAL WALL: Within normal limits.  BONES: T2 and T3 vertebral hemangioma with involvement of the posterior   elements. Degenerative changes. Scoliosis.    IMPRESSION:  Findings concerning for cystitis and prostatitis. No organized fluid   collection.    Small loculated right pleural effusion with associated atelectasis,   decreased from prior exam on 2021. Patchy nodular opacities seen in   the dependent left lowerlobe, which may represent atelectasis or   pneumonia in the appropriate clinical setting.    --- End of Report ---          NIR IBARRA MD; Resident Radiologist  This document has been electronically signed.   TATE JULES MD; Attending Radiologist  This document has been electronically signed. 2023 10:53PM    < end of copied text >   HPI:  82y Male with PMHx of Parkinson disease, dementia, hypertension, hyperlipidemia, BPH, HIV not on AC presenting with hematuria.  Patient is nonverbal at baseline.  Patient is accompanied by wife.  Per wife patient has been having bloody urine.  Patient wife was away and was notified when she returned home.  States symptoms have been going on for the past day.  Wife states the blood started off bright red but now is a maroon color.  Per the wife this happened to the patient about 3 years ago and patient was diagnosed with urinary tract infection. ROS is limited secondary to patient's nonverbal status. Pt has been seen by Dr. Ponce in the past for BPH, had a UDS in 2018. Followed up with Dr. Gates when patient was having blood in his rectum and underwent an unremarkable cystoscopy in . Pt's family has been performing CIC for a few years, as per wife stopped in  and since then patient has been voiding okay in his diaper.     In ED: pt is AVSS, white count 11.43, H/H stable 11.2/35.4, UA+, UCX pending. CT scan reveals findings consistent with cystitis, prostatitis.     PAST MEDICAL & SURGICAL HISTORY:  HIV (human immunodeficiency virus infection)      HTN (hypertension)      Alzheimer's disease of other onset      No significant past surgical history          FAMILY HISTORY:  No pertinent family history in first degree relatives    No known  malignancy     Social History:  Denies alcohol and drug abuse, nonsmoker     MEDICATIONS  (STANDING):    MEDICATIONS  (PRN):    Allergies    No Known Allergies    Intolerances      REVIEW OF SYSTEMS: Pertinent positives and negatives as stated in HPI, otherwise negative    Vital signs  T(C): 36.7 (23 @ 20:00), Max: 37.7 (23 @ 17:15)  HR: 81 (23 @ 20:00)  BP: 121/64 (23 @ 20:00)  SpO2: 96% (23 @ 20:00)  Wt(kg): --    Physical Exam  Gen: NAD  HEENT: normocephalic, atraumatic, no scleral icterus  Pulm: CTA b/l, No respiratory distress, no subcostal retractions, no accessory muscle use   CV: S1 S2, RRR,  no JVD  Abd: Soft, NT, ND, no rebound tenderness or guarding  : Uncircumcised/Circumcised, no lesions.  No discharge or blood at urethral meatus.    MSK:  No CVAT, Moving all extremities, full ROM in all extremities, No edema present  NEURO: A&Ox3, no focal neurological deficits, CN 2-12 grossly intact  SKIN: warm, dry, no rash.    LABS:         @ 17:24    WBC 11.43 / Hct 35.4  / SCr 0.52         142  |  104  |  20  ----------------------------<  179<H>  4.9   |  24  |  0.52    Ca    8.5      2023 17:24    TPro  7.9  /  Alb  2.7<L>  /  TBili  0.4  /  DBili  x   /  AST  22  /  ALT  8<L>  /  AlkPhos  97      PT/INR - ( 2023 17:24 )   PT: 10.9 sec;   INR: 0.94 ratio         PTT - ( 2023 17:24 )  PTT:33.8 sec  Urinalysis Basic - ( 2023 20:08 )    Color: Red / Appearance: Turbid / S.010 / pH: x  Gluc: x / Ketone: large  / Bili: Large / Urobili: >=8 mg/dL   Blood: x / Protein: 300 mg/dL / Nitrite: Positive   Leuk Esterase: Large / RBC: >50 /hpf / WBC >50 /HPF   Sq Epi: x / Non Sq Epi: x / Bacteria: Moderate        Urine Cx: pending     Radiology:  < from: CT Abdomen and Pelvis w/ IV Cont (23 @ 21:12) >  ACC: [id number] EXAM:  CT ABDOMEN AND PELVIS IC   ORDERED BY:  NURYS IQBAL     PROCEDURE DATE:  2023          INTERPRETATION:  CLINICAL INFORMATION: Hematuria    COMPARISON: CT abdomen and pelvis 2021    CONTRAST/COMPLICATIONS:  IV Contrast: Omnipaque 350  90 cc administered   10 cc discarded  Oral Contrast: NONE  Complications: None reported at time of study completion    PROCEDURE:  CT of the Abdomen and Pelvis was performed.  Sagittal and coronal reformats were performed.    FINDINGS:  LOWER CHEST: Small loculated right pleural effusion with associated   atelectasis, decreased from prior exam on 2021. Patchy nodular   opacities seen in the dependent left lower lobe, which may represent   atelectasis or pneumonia in the appropriate clinical setting.    LIVER: Within normal limits.  BILE DUCTS: Normal caliber.  GALLBLADDER: Contracted with mild wall thickening, similar prior.   Question tiny cholelithiasis.  SPLEEN: Within normal limits.  PANCREAS: Within normal limits.  ADRENALS: Within normal limits.  KIDNEYS/URETERS: Symmetric renal enhancement. No hydronephrosis.   Bilateral nonobstructing renal calculi. Right renal cysts and additional   subcentimeter hypodense foci bilaterally, which are too small to   characterize.    BLADDER: Thickened, trabeculated bladder wall with mucosal   hyperenhancement and suggestion of layering debris. Diverticulum along   the anterior aspect of the bladder dome.  REPRODUCTIVE ORGANS: Heterogeneous prostate gland with hyperenhancement   along the corpus spongiosum.    BOWEL: Mildly prominent air-filled loops of small bowel without evidence   of bowel obstruction. Colonic diverticulosis without acute   diverticulitis. Appendix is normal.  PERITONEUM: No ascites.  VESSELS: Atherosclerotic changes.  RETROPERITONEUM/LYMPH NODES: No lymphadenopathy.  ABDOMINAL WALL: Within normal limits.  BONES: T2 and T3 vertebral hemangioma with involvement of the posterior   elements. Degenerative changes. Scoliosis.    IMPRESSION:  Findings concerning for cystitis and prostatitis. No organized fluid   collection.    Small loculated right pleural effusion with associated atelectasis,   decreased from prior exam on 2021. Patchy nodular opacities seen in   the dependent left lowerlobe, which may represent atelectasis or   pneumonia in the appropriate clinical setting.    --- End of Report ---          NIR IBARRA MD; Resident Radiologist  This document has been electronically signed.   TATE JULES MD; Attending Radiologist  This document has been electronically signed. 2023 10:53PM    < end of copied text >   HPI:  82y Male with PMHx of Parkinson disease, dementia, hypertension, hyperlipidemia, BPH, HIV not on AC presenting with hematuria since yesterday.  Patient is nonverbal at baseline.  Patient is accompanied by wife.  Per wife, she was informed by home health aide that patient has been having bloody urine since yesterday. Wife states the blood started off bright red but now is a maroon color. Denies any other changes in mental status, bowel movements or atypical behavior. Per the wife about 3 years ago, patient had blood in rectum. ROS is limited secondary to patient's nonverbal status. Pt has been seen by Dr. Ponce in the past for BPH, had a UDS in 2018. Followed up with Dr. Gates when patient was having blood in his rectum and underwent an unremarkable cystoscopy in . Pt's family has been performing CIC for a few years, as per wife stopped in  and since then patient has been voiding okay in his diaper.     In ED: pt is AVSS, white count 11.43, H/H stable 11.2/35.4, UA+, UCX pending. CT scan reveals findings consistent with cystitis, prostatitis. random PVR 250cc, patient with 175cc light merlot colored urine from condom cath.     PAST MEDICAL & SURGICAL HISTORY:  HIV (human immunodeficiency virus infection)      HTN (hypertension)      Alzheimer's disease of other onset      No significant past surgical history          FAMILY HISTORY:  No pertinent family history in first degree relatives    No known  malignancy     Social History:  Denies alcohol and drug abuse, nonsmoker     MEDICATIONS  (STANDING):    MEDICATIONS  (PRN):    Allergies    No Known Allergies    Intolerances      REVIEW OF SYSTEMS: Pertinent positives and negatives as stated in HPI, otherwise negative    Vital signs  T(C): 36.7 (23 @ 20:00), Max: 37.7 (23 @ 17:15)  HR: 81 (23 @ 20:00)  BP: 121/64 (23 @ 20:00)  SpO2: 96% (23 @ 20:00)  Wt(kg): --    Physical Exam  Gen: NAD  HEENT: normocephalic, atraumatic, no scleral icterus  Pulm: CTA b/l, No respiratory distress  CV: S1 S2,   Abd: Soft, NT, ND, no rebound tenderness or guarding  : condom cath removed, 22F hotter 3 way catheter placed, 30cc in balloon, urine is translucent red, irrigated with sterile water with aspiration of 1-2 small clot, urine is now translucent light stephanie   MSK:  No CVAT  NEURO: demented   SKIN: warm, dry, no rash.    LABS:         @ 17:24    WBC 11.43 / Hct 35.4  / SCr 0.52         142  |  104  |  20  ----------------------------<  179<H>  4.9   |  24  |  0.52    Ca    8.5      2023 17:24    TPro  7.9  /  Alb  2.7<L>  /  TBili  0.4  /  DBili  x   /  AST  22  /  ALT  8<L>  /  AlkPhos  97      PT/INR - ( 2023 17:24 )   PT: 10.9 sec;   INR: 0.94 ratio         PTT - ( 2023 17:24 )  PTT:33.8 sec  Urinalysis Basic - ( 2023 20:08 )    Color: Red / Appearance: Turbid / S.010 / pH: x  Gluc: x / Ketone: large  / Bili: Large / Urobili: >=8 mg/dL   Blood: x / Protein: 300 mg/dL / Nitrite: Positive   Leuk Esterase: Large / RBC: >50 /hpf / WBC >50 /HPF   Sq Epi: x / Non Sq Epi: x / Bacteria: Moderate        Urine Cx: pending     Radiology:  < from: CT Abdomen and Pelvis w/ IV Cont (23 @ 21:12) >  ACC: [id number] EXAM:  CT ABDOMEN AND PELVIS IC   ORDERED BY:  NURYS IQBAL     PROCEDURE DATE:  2023          INTERPRETATION:  CLINICAL INFORMATION: Hematuria    COMPARISON: CT abdomen and pelvis 2021    CONTRAST/COMPLICATIONS:  IV Contrast: Omnipaque 350  90 cc administered   10 cc discarded  Oral Contrast: NONE  Complications: None reported at time of study completion    PROCEDURE:  CT of the Abdomen and Pelvis was performed.  Sagittal and coronal reformats were performed.    FINDINGS:  LOWER CHEST: Small loculated right pleural effusion with associated   atelectasis, decreased from prior exam on 2021. Patchy nodular   opacities seen in the dependent left lower lobe, which may represent   atelectasis or pneumonia in the appropriate clinical setting.    LIVER: Within normal limits.  BILE DUCTS: Normal caliber.  GALLBLADDER: Contracted with mild wall thickening, similar prior.   Question tiny cholelithiasis.  SPLEEN: Within normal limits.  PANCREAS: Within normal limits.  ADRENALS: Within normal limits.  KIDNEYS/URETERS: Symmetric renal enhancement. No hydronephrosis.   Bilateral nonobstructing renal calculi. Right renal cysts and additional   subcentimeter hypodense foci bilaterally, which are too small to   characterize.    BLADDER: Thickened, trabeculated bladder wall with mucosal   hyperenhancement and suggestion of layering debris. Diverticulum along   the anterior aspect of the bladder dome.  REPRODUCTIVE ORGANS: Heterogeneous prostate gland with hyperenhancement   along the corpus spongiosum.    BOWEL: Mildly prominent air-filled loops of small bowel without evidence   of bowel obstruction. Colonic diverticulosis without acute   diverticulitis. Appendix is normal.  PERITONEUM: No ascites.  VESSELS: Atherosclerotic changes.  RETROPERITONEUM/LYMPH NODES: No lymphadenopathy.  ABDOMINAL WALL: Within normal limits.  BONES: T2 and T3 vertebral hemangioma with involvement of the posterior   elements. Degenerative changes. Scoliosis.    IMPRESSION:  Findings concerning for cystitis and prostatitis. No organized fluid   collection.    Small loculated right pleural effusion with associated atelectasis,   decreased from prior exam on 2021. Patchy nodular opacities seen in   the dependent left lowerlobe, which may represent atelectasis or   pneumonia in the appropriate clinical setting.    --- End of Report ---          NIR IBARRA MD; Resident Radiologist  This document has been electronically signed.   TATE JULES MD; Attending Radiologist  This document has been electronically signed. 2023 10:53PM    < end of copied text >

## 2023-07-26 NOTE — H&P ADULT - HISTORY OF PRESENT ILLNESS
82-year-old male history of Parkinson disease, dementia, hypertension, hyperlipidemia, BPH, HIV not on AC presenting with a chief complaint of hematuria.  Patient is nonverbal at baseline.  History provided by wife at bedside.  Per wife patient has been having bloody urine.  Patient wife was away and was notified when she returned home.  States symptoms have been going on for the past day.  Wife states the blood started off bright red but now is a maroon color.  Per the wife this happened to the patient about 3 years ago and patient was diagnosed with urinary tract infection. The patient is essentially bed bound and totally dependent on his wife and 6 hour aide for ADL

## 2023-07-26 NOTE — CONSULT NOTE ADULT - NSCONSULTADDITIONALINFOA_GEN_ALL_CORE
Michael I. Oppenheim, MD  Division of Infectious Diseases  Reachable on Teams  Pager: [phone]  O: [phone] if nights/weekends/no response    Will be away 7/27. ID service covering. Michael I. Oppenheim, MD  Division of Infectious Diseases  Reachable on Teams  Pager: [phone]  O: [phone] if nights/weekends/no response    Will be away 7/27. ID service covering.    60 minutes spent including review of inpatient and outpatient records, interview of wife and exam.

## 2023-07-26 NOTE — H&P ADULT - PROBLEM SELECTOR PLAN 1
urine culture testing  no evidence of sepsis  continue ceftriaxone IV  likely will need longer course secondary to complicating prostatitis

## 2023-07-26 NOTE — CONSULT NOTE ADULT - ASSESSMENT
82 year old with HIV, dementia, presented with hematuria likely from UTI with suggestion of prostatic involvement on CT although exam less impressive for prostatitis. Now on Ceftriaxone.

## 2023-07-26 NOTE — ED ADULT NURSE REASSESSMENT NOTE - NS ED NURSE REASSESS COMMENT FT1
Pt bladder scanned per urology request. Scan revealed 252mL of urine. SARAI jauregui made aware. Condom cath applied and draining to gravity. Family educated on purpose for condom

## 2023-07-26 NOTE — H&P ADULT - ASSESSMENT
82-year-old male history of Parkinson disease, dementia, hypertension, hyperlipidemia, BPH, HIV not on AC presenting with a chief complaint hematuria clinical presentation consistent with UTI cystitis and prostatitis

## 2023-07-26 NOTE — PATIENT PROFILE ADULT - FALL HARM RISK - HARM RISK INTERVENTIONS
Assistance with ambulation/Assistance OOB with selected safe patient handling equipment/Communicate Risk of Fall with Harm to all staff/Discuss with provider need for PT consult/Monitor gait and stability/Reinforce activity limits and safety measures with patient and family/Tailored Fall Risk Interventions/Visual Cue: Yellow wristband and red socks/Bed in lowest position, wheels locked, appropriate side rails in place/Call bell, personal items and telephone in reach/Instruct patient to call for assistance before getting out of bed or chair/Non-slip footwear when patient is out of bed/[place] to call system/Physically safe environment - no spills, clutter or unnecessary equipment/Purposeful Proactive Rounding/Room/bathroom lighting operational, light cord in reach

## 2023-07-26 NOTE — H&P ADULT - NSHPPHYSICALEXAM_GEN_ALL_CORE
PHYSICAL EXAM: vital signs noted on Sunrise  in no apparent distress  Neck: Supple, no JVD, no thyromegaly  Lungs: no wheeze, no crackles  CVS: S1 S2 no M/R/G  Abdomen: no tenderness, no organomegaly, BS present  Neuro: Alert noncommunicative  nonverbal  Skin: warm, dry  Ext:  no edema  Msk: no joint swelling or deformities  Back: no CVA tenderness, no kyphosis/scoliosis PHYSICAL EXAM  Neck: Supple  Lungs: decreased breath sounds   CVS: S1 S2 no M/R/G  Abdomen: no tenderness  Neuro: sleeping but arousable  nonverbal per wife  Skin: warm, dry  Ext: no edemas/scoliosis

## 2023-07-26 NOTE — PROGRESS NOTE ADULT - SUBJECTIVE AND OBJECTIVE BOX
Subjective  Resting comfortably.    Objective    Vital signs  T(F): , Max: 99.9 (07-25-23 @ 17:15)  HR: 91 (07-26-23 @ 04:48)  BP: 112/67 (07-26-23 @ 04:48)  SpO2: 96% (07-26-23 @ 04:48)  Wt(kg): --    Output       Gen: NAD  Abd: soft, nontender, nondistended  : nelson secured in place, draining clear yellow-brown urine without clots    Labs      07-26 @ 02:20    WBC 11.10 / Hct 29.3  / SCr --       07-25 @ 17:24    WBC 11.43 / Hct 35.4  / SCr 0.52

## 2023-07-26 NOTE — CONSULT NOTE ADULT - SUBJECTIVE AND OBJECTIVE BOX
HPI:  82y old Male with history of HIV (last viral load <30 copies/ml), dementia (bedbound, nonverbal, non-interactive) admitted  after 1 day history of hematuria, began as bright blood then became more maroon. Temp to 100.4 earlier this week, no other fevers, not noted to be sweating or shaking. Generally appeared at baseline per his wife. In ED found to have PVR of 262. Multilumen urinary catheter placed, CBI not needed as bleeding cleared. UA / CT as shown below; admitted and started on Ceftriaxone    PAST MEDICAL & SURGICAL HISTORY:  HIV (human immunodeficiency virus infection)      HTN (hypertension)      Alzheimer's disease of other onset      No significant past surgical history          Allergies    No Known Allergies    Intolerances        ANTIMICROBIALS:  cefTRIAXone   IVPB 1000 every 24 hours  darunavir 800 daily  emtricitabine 200 mG/tenofovir alafenamide 25 mG (DESCOVY) Tablet 1 daily  ritonavir Tablet 100 daily      OTHER MEDS:  amLODIPine   Tablet 5 milliGRAM(s) Oral daily  atorvastatin 10 milliGRAM(s) Oral at bedtime  baclofen 5 milliGRAM(s) Oral every 8 hours  cholecalciferol 2000 Unit(s) Oral daily  donepezil 10 milliGRAM(s) Oral at bedtime  finasteride 5 milliGRAM(s) Oral daily  fluticasone propionate 50 MICROgram(s)/spray Nasal Spray 1 Spray(s) Both Nostrils two times a day  melatonin 5 milliGRAM(s) Oral at bedtime  sodium chloride 0.9%. 2000 milliLiter(s) IV Continuous <Continuous>  tamsulosin 0.8 milliGRAM(s) Oral at bedtime      SOCIAL HISTORY:    FAMILY HISTORY:  No pertinent family history in first degree relatives        Drug Dosing Weight  Height (cm): 182.9 (2023 16:10)  Weight (kg): 81.6 (2023 16:10)  BMI (kg/m2): 24.4 (2023 16:10)  BSA (m2): 2.04 (2023 16:10)    Vital Signs Last 24 Hrs  T(F): 98.6 (23 @ 13:17), Max: 99.9 (23 @ 17:15)  Vital Signs Last 24 Hrs  HR: 84 (23 @ 13:17) (81 - 91)  BP: 102/69 (23 @ 13:17) (102/69 - 121/64)  RR: 18 (23 @ 13:17)  SpO2: 100% (23 @ 13:17) (96% - 100%)  Wt(kg): --    PE:  HEENT:  NECK:  CV:   LUNGS:  ABD:  EXTREM:                            9.4    11.10 )-----------( 245      ( 2023 02:20 )             29.3           142  |  104  |  20  ----------------------------<  179<H>  4.9   |  24  |  0.52    Ca    8.5      2023 17:24    TPro  7.9  /  Alb  2.7<L>  /  TBili  0.4  /  DBili  x   /  AST  22  /  ALT  8<L>  /  AlkPhos  97        Urinalysis Basic - ( 2023 20:08 )    Color: Red / Appearance: Turbid / S.010 / pH: x  Gluc: x / Ketone: large  / Bili: Large / Urobili: >=8 mg/dL   Blood: x / Protein: 300 mg/dL / Nitrite: Positive   Leuk Esterase: Large / RBC: >50 /hpf / WBC >50 /HPF   Sq Epi: x / Non Sq Epi: x / Bacteria: Moderate          MICROBIOLOGY:        RADIOLOGY:     HPI:  82y old Male with history of HIV (last viral load <30 copies/ml, last CD4+ 362), dementia (bedbound, nonverbal, non-interactive) admitted  after 1 day history of hematuria, began as bright blood then became more maroon. Temp to 100.4 earlier this week, no other fevers, not noted to be sweating or shaking. Generally appeared at baseline per his wife. In ED found to have PVR of 262. Multilumen urinary catheter placed, CBI not needed as bleeding cleared. UA / CT as shown below; admitted and started on Ceftriaxone. No recent antibiotic use.     PAST MEDICAL & SURGICAL HISTORY:  HIV (human immunodeficiency virus infection)  HTN (hypertension)  Alzheimer's disease of other onset  No significant past surgical history    Allergies  No Known Allergies    Intolerances    ANTIMICROBIALS:    cefTRIAXone   IVPB 1000 every 24 hours  darunavir 800 daily  emtricitabine 200 mG/tenofovir alafenamide 25 mG (DESCOVY) Tablet 1 daily  ritonavir Tablet 100 daily      OTHER MEDS:    amLODIPine   Tablet 5 milliGRAM(s) Oral daily  atorvastatin 10 milliGRAM(s) Oral at bedtime  baclofen 5 milliGRAM(s) Oral every 8 hours  cholecalciferol 2000 Unit(s) Oral daily  donepezil 10 milliGRAM(s) Oral at bedtime  finasteride 5 milliGRAM(s) Oral daily  fluticasone propionate 50 MICROgram(s)/spray Nasal Spray 1 Spray(s) Both Nostrils two times a day  melatonin 5 milliGRAM(s) Oral at bedtime  sodium chloride 0.9%. 2000 milliLiter(s) IV Continuous <Continuous>  tamsulosin 0.8 milliGRAM(s) Oral at bedtime      Drug Dosing Weight  Height (cm): 182.9 (2023 16:10)  Weight (kg): 81.6 (2023 16:10)  BMI (kg/m2): 24.4 (2023 16:10)  BSA (m2): 2.04 (2023 16:10)    Vital Signs Last 24 Hrs  T(F): 98.6 (23 @ 13:17), Max: 99.9 (23 @ 17:15)  Vital Signs Last 24 Hrs  HR: 84 (23 @ 13:17) (81 - 91)  BP: 102/69 (23 @ 13:17) (102/69 - 121/64)  RR: 18 (23 @ 13:17)  SpO2: 100% (23 @ 13:17) (96% - 100%)  Wt(kg): --    PE:  HEENT: NC/AT. Unable to cooperate with pupilary or oral exam  NECK: No masses, no adenopathy  CV: RRR. No G/R/M  LUNGS: CTA anterolaterally  ABD: Soft, +BS. No HSM. No grimace with palpation  : Prostate not enlarged, extremely soft, not warm. No grimace with palpation of prostate  BACK: Buttock decubitus dressed  NEURO: Non-verbal, non-interactive                            9.4    11.10 )-----------( 245      ( 2023 02:20 )             29.3           142  |  104  |  20  ----------------------------<  179<H>  4.9   |  24  |  0.52    Ca    8.5      2023 17:24    TPro  7.9  /  Alb  2.7<L>  /  TBili  0.4  /  DBili  x   /  AST  22  /  ALT  8<L>  /  AlkPhos  97        Urinalysis Basic - ( 2023 20:08 )    Color: Red / Appearance: Turbid / S.010 / pH: x  Gluc: x / Ketone: large  / Bili: Large / Urobili: >=8 mg/dL   Blood: x / Protein: 300 mg/dL / Nitrite: Positive   Leuk Esterase: Large / RBC: >50 /hpf / WBC >50 /HPF   Sq Epi: x / Non Sq Epi: x / Bacteria: Moderate      MICROBIOLOGY:  Pending      RADIOLOGY:    < from: CT Abdomen and Pelvis w/ IV Cont (23 @ 21:12) >  NTERPRETATION:  CLINICAL INFORMATION: Hematuria    COMPARISON: CT abdomen and pelvis 2021    CONTRAST/COMPLICATIONS:  IV Contrast: Omnipaque 350  90 cc administered   10 cc discarded  Oral Contrast: NONE  Complications: None reported at time of study completion    PROCEDURE:  CT of the Abdomen and Pelvis was performed.  Sagittal and coronal reformats were performed.    FINDINGS:  LOWER CHEST: Small loculated right pleural effusion with associated   atelectasis, decreased from prior exam on 2021. Patchy nodular   opacities seen in the dependent left lower lobe, which may represent   atelectasis or pneumonia in the appropriate clinical setting.    LIVER: Within normal limits.  BILE DUCTS: Normal caliber.  GALLBLADDER: Contracted with mild wall thickening, similar prior.   Question tiny cholelithiasis.  SPLEEN: Within normal limits.  PANCREAS: Within normal limits.  ADRENALS: Within normal limits.  KIDNEYS/URETERS: Symmetric renal enhancement. No hydronephrosis.   Bilateral nonobstructing renal calculi. Right renal cysts and additional   subcentimeter hypodense foci bilaterally, which are too small to   characterize.    BLADDER: Thickened, trabeculated bladder wall with mucosal   hyperenhancement and suggestion of layering debris. Diverticulum along   the anterior aspect of the bladder dome.  REPRODUCTIVE ORGANS: Heterogeneous prostate gland with hyperenhancement   along the corpus spongiosum.    BOWEL: Mildly prominent air-filled loops of small bowel without evidence   of bowel obstruction. Colonic diverticulosis without acute   diverticulitis. Appendix is normal.  PERITONEUM: No ascites.  VESSELS: Atherosclerotic changes.  RETROPERITONEUM/LYMPH NODES: No lymphadenopathy.  ABDOMINAL WALL: Within normal limits.  BONES: T2 and T3 vertebral hemangioma with involvement of the posterior   elements. Degenerative changes. Scoliosis.    IMPRESSION:  Findings concerning for cystitis and prostatitis. No organized fluid   collection.    Small loculated right pleural effusion with associated atelectasis,   decreased from prior exam on 2021. Patchy nodular opacities seen in   the dependent left lowerlobe, which may represent atelectasis or   pneumonia in the appropriate clinical setting.    --- End of Report ---        < end of copied text >

## 2023-07-27 ENCOUNTER — NON-APPOINTMENT (OUTPATIENT)
Age: 83
End: 2023-07-27

## 2023-07-27 LAB
ANION GAP SERPL CALC-SCNC: 9 MMOL/L — SIGNIFICANT CHANGE UP (ref 5–17)
BUN SERPL-MCNC: 13 MG/DL — SIGNIFICANT CHANGE UP (ref 7–23)
CALCIUM SERPL-MCNC: 8.1 MG/DL — LOW (ref 8.4–10.5)
CHLORIDE SERPL-SCNC: 111 MMOL/L — HIGH (ref 96–108)
CO2 SERPL-SCNC: 25 MMOL/L — SIGNIFICANT CHANGE UP (ref 22–31)
CREAT SERPL-MCNC: 0.42 MG/DL — LOW (ref 0.5–1.3)
CULTURE RESULTS: SIGNIFICANT CHANGE UP
EGFR: 107 ML/MIN/1.73M2 — SIGNIFICANT CHANGE UP
FOLATE SERPL-MCNC: 5.7 NG/ML — SIGNIFICANT CHANGE UP
GLUCOSE SERPL-MCNC: 132 MG/DL — HIGH (ref 70–99)
HCT VFR BLD CALC: 30.1 % — LOW (ref 39–50)
HGB BLD-MCNC: 9.8 G/DL — LOW (ref 13–17)
MCHC RBC-ENTMCNC: 30.7 PG — SIGNIFICANT CHANGE UP (ref 27–34)
MCHC RBC-ENTMCNC: 32.6 GM/DL — SIGNIFICANT CHANGE UP (ref 32–36)
MCV RBC AUTO: 94.4 FL — SIGNIFICANT CHANGE UP (ref 80–100)
NRBC # BLD: 0 /100 WBCS — SIGNIFICANT CHANGE UP (ref 0–0)
PHOSPHATE SERPL-MCNC: 2.7 MG/DL — SIGNIFICANT CHANGE UP (ref 2.5–4.5)
PLATELET # BLD AUTO: 245 K/UL — SIGNIFICANT CHANGE UP (ref 150–400)
POTASSIUM SERPL-MCNC: 3.3 MMOL/L — LOW (ref 3.5–5.3)
POTASSIUM SERPL-SCNC: 3.3 MMOL/L — LOW (ref 3.5–5.3)
RBC # BLD: 3.19 M/UL — LOW (ref 4.2–5.8)
RBC # FLD: 14.6 % — HIGH (ref 10.3–14.5)
SODIUM SERPL-SCNC: 145 MMOL/L — SIGNIFICANT CHANGE UP (ref 135–145)
SPECIMEN SOURCE: SIGNIFICANT CHANGE UP
TSH SERPL-MCNC: 1.68 UIU/ML — SIGNIFICANT CHANGE UP (ref 0.27–4.2)
VIT B12 SERPL-MCNC: 440 PG/ML — SIGNIFICANT CHANGE UP (ref 232–1245)
WBC # BLD: 8.07 K/UL — SIGNIFICANT CHANGE UP (ref 3.8–10.5)
WBC # FLD AUTO: 8.07 K/UL — SIGNIFICANT CHANGE UP (ref 3.8–10.5)

## 2023-07-27 PROCEDURE — 99232 SBSQ HOSP IP/OBS MODERATE 35: CPT

## 2023-07-27 PROCEDURE — 93010 ELECTROCARDIOGRAM REPORT: CPT

## 2023-07-27 RX ORDER — POTASSIUM CHLORIDE 20 MEQ
40 PACKET (EA) ORAL ONCE
Refills: 0 | Status: DISCONTINUED | OUTPATIENT
Start: 2023-07-27 | End: 2023-07-27

## 2023-07-27 RX ORDER — POTASSIUM CHLORIDE 20 MEQ
40 PACKET (EA) ORAL ONCE
Refills: 0 | Status: COMPLETED | OUTPATIENT
Start: 2023-07-27 | End: 2023-07-27

## 2023-07-27 RX ADMIN — CEFTRIAXONE 100 MILLIGRAM(S): 500 INJECTION, POWDER, FOR SOLUTION INTRAMUSCULAR; INTRAVENOUS at 14:07

## 2023-07-27 RX ADMIN — ATORVASTATIN CALCIUM 10 MILLIGRAM(S): 80 TABLET, FILM COATED ORAL at 21:29

## 2023-07-27 RX ADMIN — RITONAVIR 100 MILLIGRAM(S): 100 TABLET, FILM COATED ORAL at 16:04

## 2023-07-27 RX ADMIN — Medication 40 MILLIEQUIVALENT(S): at 17:53

## 2023-07-27 RX ADMIN — FINASTERIDE 5 MILLIGRAM(S): 5 TABLET, FILM COATED ORAL at 16:04

## 2023-07-27 RX ADMIN — TAMSULOSIN HYDROCHLORIDE 0.8 MILLIGRAM(S): 0.4 CAPSULE ORAL at 21:29

## 2023-07-27 RX ADMIN — Medication 2000 UNIT(S): at 16:05

## 2023-07-27 RX ADMIN — Medication 5 MILLIGRAM(S): at 05:12

## 2023-07-27 RX ADMIN — DONEPEZIL HYDROCHLORIDE 10 MILLIGRAM(S): 10 TABLET, FILM COATED ORAL at 21:29

## 2023-07-27 RX ADMIN — EMTRICITABINE AND TENOFOVIR DISOPROXIL FUMARATE 1 TABLET(S): 200; 300 TABLET, FILM COATED ORAL at 16:04

## 2023-07-27 RX ADMIN — Medication 1 SPRAY(S): at 05:12

## 2023-07-27 RX ADMIN — Medication 1 SPRAY(S): at 17:53

## 2023-07-27 RX ADMIN — DARUNAVIR 800 MILLIGRAM(S): 75 TABLET, FILM COATED ORAL at 16:05

## 2023-07-27 RX ADMIN — AMLODIPINE BESYLATE 5 MILLIGRAM(S): 2.5 TABLET ORAL at 05:12

## 2023-07-27 NOTE — PROGRESS NOTE ADULT - ASSESSMENT
82-yo M w/ PMH of HIV (CD4 379/14% and VL <30 in 1/2023) on TAF/FTC/DRV/r and dementia, admitted with UTI w/ CT scan suggestive of cystitis and prostatitis, s/p Wong insertion, UCx w/ >3= organisms, currently on ceftriaxone.    #Prostatitis  #Cystitis  #Leukocytosis  #Urine specimen contamination (>=3 organisms)  - UCx w/ >=3 organisms (7/25). Probable contamination.  - Leukocytosis improving on ceftriaxone.  - Please resubmit urine culture.  - Continue with ceftriaxone 1g IV Q24H. Final duration and regimen pending the new UCx result    #HIV  - Takes Descovy, Prezista, and Norvir (TAF/FTC/DRV/r). Continue at home dose.  - VL suppressed. CD4 absolute count 379, with 14%.  - Patient is in acute infection. Would recheck CD4 and VL after acute infection is addressed to assess for the need of OI PPx    Plan discussed with primary team ACP.  Thank you for this consult. Dr. Michael Oppenheim will return tomorrow (7/28).    Mc Licona MD, PhD  Attending Physician  Division of Infectious Diseases  Department of Medicine    Please contact through MS Teams message.  Office: [phone] (after 5 PM or weekend)   82-yo M w/ PMH of HIV (CD4 379/14% and VL <30 in 1/2023) on TAF/FTC/DRV/r and dementia, admitted with UTI w/ CT scan suggestive of cystitis and prostatitis, s/p Wong insertion, UCx w/ >3= organisms, currently on ceftriaxone.    #Prostatitis  #Cystitis w/ hematuria  #Leukocytosis  #Urine specimen contamination (>=3 organisms)  - UCx w/ >=3 organisms (7/25). Probable contamination.  - Leukocytosis improving on ceftriaxone.  - Please resubmit urine culture.  - Continue with ceftriaxone 1g IV Q24H. Final duration and regimen pending the new UCx result    #HIV  - Takes Descovy, Prezista, and Norvir (TAF/FTC/DRV/r). Continue at home dose.  - VL suppressed. CD4 absolute count 379, with 14%.  - Patient is in acute infection. Would recheck CD4 and VL after acute infection is addressed to assess for the need of OI PPx    Plan discussed with primary team ACP.  Thank you for this consult. Dr. Michael Oppenheim will return tomorrow (7/28).    Mc Licona MD, PhD  Attending Physician  Division of Infectious Diseases  Department of Medicine    Please contact through MS Teams message.  Office: [phone] (after 5 PM or weekend)

## 2023-07-27 NOTE — ADVANCED PRACTICE NURSE CONSULT - ASSESSMENT
When wound care RN arrived on unit, patient was found lying in a low air loss pressure redistribution support surface style bed. Mr Goel  is unable to turn independently and staff assistance x 2 was provided. Once turned, wound care RN was able to visualize an open wound with yellow slough covering approximately 75% of wound base, 25% granulation tissue present over B/L buttocks/sacral skin, area measures approximately 3cm x 2cm x unknown. From 7 o'clock to 1 o'clock there is 2cm undermining and a 12cm tunnel at 9 o'clock. Wound cleansed with normal saline and patted dry then cavilon applied to periwound, medihoney painted aquacel rope applied to     Once consult was complete, patient and family were educated regarding the need for routine turning and positioning to prevent pressure injuries and patient was placed in a left side-lying position utilizing pillow positioner assistive devices. When wound care RN arrived on unit, patient was found lying in a low air loss pressure redistribution support surface style bed. Mr Goel  is unable to turn independently and staff assistance x 2 was provided. Once turned, wound care RN was able to visualize an open wound with yellow slough covering approximately 75% of wound base (25% granulation tissue present) over B/L buttocks/sacral skin- area measures approximately 3cm x 2cm x unknown. From 7 o'clock to 1 o'clock there is 2cm undermining and a 12cm tunnel at 9 o'clock. Wound cleansed with normal saline and patted dry then cavilon applied to periwound, medihoney painted aquacel rope applied to wound base and dead space, covered with allevyn foam dressing. Once consult was complete, patient was placed in a right side-lying position utilizing pillow positioner assistive devices. Education not appropriate for patient at this time

## 2023-07-27 NOTE — PROGRESS NOTE ADULT - SUBJECTIVE AND OBJECTIVE BOX
Patient is a 82y old  Male who presents with a chief complaint of hematuria (27 Jul 2023 12:12)      DATE OF SERVICE: 07-27-23 @ 13:35    SUBJECTIVE / OVERNIGHT EVENTS: overnight events noted    ROS:  not available         MEDICATIONS  (STANDING):  amLODIPine   Tablet 5 milliGRAM(s) Oral daily  atorvastatin 10 milliGRAM(s) Oral at bedtime  baclofen 5 milliGRAM(s) Oral every 8 hours  cefTRIAXone   IVPB 1000 milliGRAM(s) IV Intermittent every 24 hours  cholecalciferol 2000 Unit(s) Oral daily  darunavir 800 milliGRAM(s) Oral daily  donepezil 10 milliGRAM(s) Oral at bedtime  emtricitabine 200 mG/tenofovir alafenamide 25 mG (DESCOVY) Tablet 1 Tablet(s) Oral daily  finasteride 5 milliGRAM(s) Oral daily  fluticasone propionate 50 MICROgram(s)/spray Nasal Spray 1 Spray(s) Both Nostrils two times a day  melatonin 5 milliGRAM(s) Oral at bedtime  ritonavir Tablet 100 milliGRAM(s) Oral daily  sodium chloride 0.9%. 2000 milliLiter(s) (50 mL/Hr) IV Continuous <Continuous>  tamsulosin 0.8 milliGRAM(s) Oral at bedtime    MEDICATIONS  (PRN):        CAPILLARY BLOOD GLUCOSE        I&O's Summary    26 Jul 2023 07:01  -  27 Jul 2023 07:00  --------------------------------------------------------  IN: 750 mL / OUT: 1050 mL / NET: -300 mL        Vital Signs Last 24 Hrs  T(C): 36.6 (27 Jul 2023 12:03), Max: 37 (26 Jul 2023 20:52)  T(F): 97.9 (27 Jul 2023 12:03), Max: 98.6 (26 Jul 2023 20:52)  HR: 79 (27 Jul 2023 12:03) (79 - 89)  BP: 99/58 (27 Jul 2023 12:03) (99/58 - 122/67)  BP(mean): --  RR: 18 (27 Jul 2023 12:03) (18 - 18)  SpO2: 97% (27 Jul 2023 12:03) (97% - 99%)        LABS:                        9.8    8.07  )-----------( 245      ( 27 Jul 2023 06:46 )             30.1     07-27    145  |  111<H>  |  13  ----------------------------<  132<H>  3.3<L>   |  25  |  0.42<L>    Ca    8.1<L>      27 Jul 2023 06:46  Phos  2.7     07-27    TPro  7.9  /  Alb  2.7<L>  /  TBili  0.4  /  DBili  x   /  AST  22  /  ALT  8<L>  /  AlkPhos  97  07-25    PT/INR - ( 25 Jul 2023 17:24 )   PT: 10.9 sec;   INR: 0.94 ratio         PTT - ( 25 Jul 2023 17:24 )  PTT:33.8 sec      Urinalysis Basic - ( 27 Jul 2023 06:46 )    Color: x / Appearance: x / SG: x / pH: x  Gluc: 132 mg/dL / Ketone: x  / Bili: x / Urobili: x   Blood: x / Protein: x / Nitrite: x   Leuk Esterase: x / RBC: x / WBC x   Sq Epi: x / Non Sq Epi: x / Bacteria: x          All consultant(s) notes reviewed and care discussed with other providers        Contact Number, Dr Guerrero [phone] Patient is a 82y old  Male who presents with a chief complaint of hematuria (27 Jul 2023 12:12)      DATE OF SERVICE: 07-27-23 @ 13:35    SUBJECTIVE / OVERNIGHT EVENTS: overnight events noted    ROS:  not available         MEDICATIONS  (STANDING):  amLODIPine   Tablet 5 milliGRAM(s) Oral daily  atorvastatin 10 milliGRAM(s) Oral at bedtime  baclofen 5 milliGRAM(s) Oral every 8 hours  cefTRIAXone   IVPB 1000 milliGRAM(s) IV Intermittent every 24 hours  cholecalciferol 2000 Unit(s) Oral daily  darunavir 800 milliGRAM(s) Oral daily  donepezil 10 milliGRAM(s) Oral at bedtime  emtricitabine 200 mG/tenofovir alafenamide 25 mG (DESCOVY) Tablet 1 Tablet(s) Oral daily  finasteride 5 milliGRAM(s) Oral daily  fluticasone propionate 50 MICROgram(s)/spray Nasal Spray 1 Spray(s) Both Nostrils two times a day  melatonin 5 milliGRAM(s) Oral at bedtime  ritonavir Tablet 100 milliGRAM(s) Oral daily  sodium chloride 0.9%. 2000 milliLiter(s) (50 mL/Hr) IV Continuous <Continuous>  tamsulosin 0.8 milliGRAM(s) Oral at bedtime    MEDICATIONS  (PRN):        CAPILLARY BLOOD GLUCOSE        I&O's Summary    26 Jul 2023 07:01  -  27 Jul 2023 07:00  --------------------------------------------------------  IN: 750 mL / OUT: 1050 mL / NET: -300 mL        Vital Signs Last 24 Hrs  T(C): 36.6 (27 Jul 2023 12:03), Max: 37 (26 Jul 2023 20:52)  T(F): 97.9 (27 Jul 2023 12:03), Max: 98.6 (26 Jul 2023 20:52)  HR: 79 (27 Jul 2023 12:03) (79 - 89)  BP: 99/58 (27 Jul 2023 12:03) (99/58 - 122/67)  BP(mean): --  RR: 18 (27 Jul 2023 12:03) (18 - 18)  SpO2: 97% (27 Jul 2023 12:03) (97% - 99%)    PHYSICAL EXAM  Neck: Supple  Lungs: decreased breath sounds   CVS: S1 S2 no M/R/G  Abdomen: no tenderness  Neuro: sleeping but rousable  Skin: warm, dry  Ext: no edema    LABS:                        9.8    8.07  )-----------( 245      ( 27 Jul 2023 06:46 )             30.1     07-27    145  |  111<H>  |  13  ----------------------------<  132<H>  3.3<L>   |  25  |  0.42<L>    Ca    8.1<L>      27 Jul 2023 06:46  Phos  2.7     07-27    TPro  7.9  /  Alb  2.7<L>  /  TBili  0.4  /  DBili  x   /  AST  22  /  ALT  8<L>  /  AlkPhos  97  07-25    PT/INR - ( 25 Jul 2023 17:24 )   PT: 10.9 sec;   INR: 0.94 ratio         PTT - ( 25 Jul 2023 17:24 )  PTT:33.8 sec      Urinalysis Basic - ( 27 Jul 2023 06:46 )    Color: x / Appearance: x / SG: x / pH: x  Gluc: 132 mg/dL / Ketone: x  / Bili: x / Urobili: x   Blood: x / Protein: x / Nitrite: x   Leuk Esterase: x / RBC: x / WBC x   Sq Epi: x / Non Sq Epi: x / Bacteria: x          All consultant(s) notes reviewed and care discussed with other providers        Contact Number, Dr Guerrero [phone]

## 2023-07-27 NOTE — ADVANCED PRACTICE NURSE CONSULT - REASON FOR CONSULT
Wound care consult initiated by RN to assess patient's skin for a possible unstageable pressure injury on B/L buttocks/sacrum, present on admission    Reason for Admission: hematuria  History of Present Illness:    82-year-old male history of Parkinson disease, dementia, hypertension, hyperlipidemia, BPH, HIV not on AC presenting with a chief complaint of hematuria.  Patient is nonverbal at baseline.  History provided by wife at bedside.  Per wife patient has been having bloody urine.  Patient wife was away and was notified when she returned home.  States symptoms have been going on for the past day.  Wife states the blood started off bright red but now is a maroon color.  Per the wife this happened to the patient about 3 years ago and patient was diagnosed with urinary tract infection. The patient is essentially bed bound and totally dependent on his wife and 6 hour aide for ADL

## 2023-07-27 NOTE — DIETITIAN INITIAL EVALUATION ADULT - REASON FOR ADMISSION
Hematuria    Per chart, patient is a 83 y/o male with PMH including parkinson's, dementia (nonverbal at baseline per chart), HTN, HLD, BPH, HIV. Patient presents to [facility] w/ hematuria. Per MD clinical picture c/w UTI cystitis and prostatitis. Started on antibiotic regimen.

## 2023-07-27 NOTE — DIETITIAN INITIAL EVALUATION ADULT - PERTINENT LABORATORY DATA
07-27    145  |  111<H>  |  13  ----------------------------<  132<H>  3.3<L>   |  25  |  0.42<L>    Ca    8.1<L>      27 Jul 2023 06:46  Phos  2.7     07-27    TPro  7.9  /  Alb  2.7<L>  /  TBili  0.4  /  DBili  x   /  AST  22  /  ALT  8<L>  /  AlkPhos  97  07-25

## 2023-07-27 NOTE — DIETITIAN INITIAL EVALUATION ADULT - ADD RECOMMEND
1. recommend offering PO only when patient is awake/alert/willing to accept PO 2/2 current lethargy, mentation    - ? consider formal SLP evaluation to determine if patient is able to currently take PO diet if energy levels/alertness does not improve  2. will add Magic Cup BID to help provide extra calories and protein when patient is able to take PO  3. follow electrolyte trends closely and replete as appropriate  4. monitor tolerance and adequacy of PO intake, weight trend, electrolytes, blood glucose levels, labs, BMs, wound healing 1. recommend offering PO only when patient is awake/alert/willing to accept PO 2/2 current lethargy, mentation    - ? consider formal SLP evaluation to determine if patient is able to currently take PO diet if energy levels/alertness does not improve  2. will add Magic Cup BID to help provide extra calories and protein when patient is able to take PO  3. follow electrolyte trends closely and replete as appropriate  4. request updated weight of patient when feasible  5. monitor tolerance and adequacy of PO intake, weight trend, electrolytes, blood glucose levels, labs, BMs, wound healing

## 2023-07-27 NOTE — PROGRESS NOTE ADULT - ASSESSMENT
82y Male with PMHx of Parkinson disease, dementia, hypertension, hyperlipidemia, BPH, HIV not on AC presenting with hematuria. In ED, pt with a white count 11.43, H/H stable 11.2/35.4, UA+, UCX pending. CT scan reveals findings consistent with cystitis, prostatitis.     - Labs reviewed - WBC 8.07 from 11.10, Cr 0.42  - Continue antibiotics per ID  - Urine culture contaminated  - Monitor urine color  - Patient should follow up outpatient with Dr. Gates

## 2023-07-27 NOTE — ADVANCED PRACTICE NURSE CONSULT - RECOMMEDATIONS
Impression:    fecal incontinence  B/L buttocks/sacral unstageable pressure injury, present on admission    Recommendations:    1) continue turning and positioning q2 and PRN utilizing offloading assistive devices  2) continue with routine pericare daily and PRN soiling  3) encourage optimal nutrition  4) waffle cushion when oob to chair  5) B/L LE complete cair air fluidized boots to offload heels/feet  6) B/L buttocks/sacral unstageable pressure injury- cleanse skin with normal saline and pat dry then apply cavilon to periwound, then paint aquacel rope with medihoney gel and pack 12cm tunnel (at 9 o'clock) and undermining. cover with allevyn foam dressing and change daily    7) triad protective barrier cream to groin/scrotum daily and PRN soiling  8) incontinence management - consider external male urinary catheter to divert urine from skin once nelson is removed, if incontinent    Plan discussed with DIEGO Faria at bedside

## 2023-07-27 NOTE — DIETITIAN INITIAL EVALUATION ADULT - PERTINENT MEDS FT
MEDICATIONS  (STANDING):  amLODIPine   Tablet 5 milliGRAM(s) Oral daily  atorvastatin 10 milliGRAM(s) Oral at bedtime  cefTRIAXone   IVPB 1000 milliGRAM(s) IV Intermittent every 24 hours  cholecalciferol 2000 Unit(s) Oral daily  darunavir 800 milliGRAM(s) Oral daily  donepezil 10 milliGRAM(s) Oral at bedtime  emtricitabine 200 mG/tenofovir alafenamide 25 mG (DESCOVY) Tablet 1 Tablet(s) Oral daily  finasteride 5 milliGRAM(s) Oral daily  fluticasone propionate 50 MICROgram(s)/spray Nasal Spray 1 Spray(s) Both Nostrils two times a day  potassium chloride   Powder 40 milliEquivalent(s) Oral once  ritonavir Tablet 100 milliGRAM(s) Oral daily  sodium chloride 0.9%. 2000 milliLiter(s) (50 mL/Hr) IV Continuous <Continuous>  tamsulosin 0.8 milliGRAM(s) Oral at bedtime    MEDICATIONS  (PRN):

## 2023-07-27 NOTE — DIETITIAN INITIAL EVALUATION ADULT - NUTRITIONGOAL OUTCOME1
- patient will consume >75% of meals during hospital admission w/ good tolerance w/ most safe and appropriate diet consistency

## 2023-07-27 NOTE — DIETITIAN INITIAL EVALUATION ADULT - OTHER CALCULATIONS
Defer fluid needs to team. Calculations utilizing patient's BW on 6/2021 admission as patient's current BW on file is a stated weight and patient visually appears much closer to his last BW of 130lbs on 6/202 admission

## 2023-07-27 NOTE — PROGRESS NOTE ADULT - PROBLEM SELECTOR PLAN 1
urine culture on admission likely procurement contamination  no evidence of sepsis  continue ceftriaxone IV  ID help appreciated

## 2023-07-27 NOTE — DIETITIAN INITIAL EVALUATION ADULT - REASON
Patient sleeping, unable to consent to NFPE 2/2 dementia, parkinson's, muscle/fat depletion observed by clinical physical observation

## 2023-07-27 NOTE — PROVIDER CONTACT NOTE (OTHER) - ASSESSMENT
Pt is A&O x0, nonverbal at baseline. Family placed multiple blankets on pt.   Reassessed without blankets in 20 min, pt rectal temp still 100.4  Pt midnight VS rectal 99.3 F

## 2023-07-27 NOTE — PROVIDER CONTACT NOTE (MEDICATION) - SITUATION
pt due for 12 noon meds , pt lethargic , strenal rub done , pt responds but then after opening his eyes closes them and goes back to sleep , unable to give medication due

## 2023-07-27 NOTE — PROGRESS NOTE ADULT - SUBJECTIVE AND OBJECTIVE BOX
Follow Up:    Prostatitis    Interval History/ROS:  VSS. Urine culture w/ >=3 organisms, possibly contamination. Patient was seen and examined at bedside. Nonverbal and does not respond to verbal stimuli. ROS unable to obtain.    Allergies  No Known Allergies        ANTIMICROBIALS:  cefTRIAXone   IVPB 1000 every 24 hours  darunavir 800 daily  emtricitabine 200 mG/tenofovir alafenamide 25 mG (DESCOVY) Tablet 1 daily  ritonavir Tablet 100 daily      OTHER MEDS:  MEDICATIONS  (STANDING):  amLODIPine   Tablet 5 daily  atorvastatin 10 at bedtime  baclofen 5 every 8 hours  donepezil 10 at bedtime  finasteride 5 daily  melatonin 5 at bedtime  tamsulosin 0.8 at bedtime      Vital Signs Last 24 Hrs  T(C): 36.6 (27 Jul 2023 12:03), Max: 37 (26 Jul 2023 13:17)  T(F): 97.9 (27 Jul 2023 12:03), Max: 98.6 (26 Jul 2023 13:17)  HR: 79 (27 Jul 2023 12:03) (79 - 89)  BP: 99/58 (27 Jul 2023 12:03) (99/58 - 122/67)  BP(mean): --  RR: 18 (27 Jul 2023 12:03) (18 - 18)  SpO2: 97% (27 Jul 2023 12:03) (97% - 100%)    Parameters below as of 27 Jul 2023 12:03  Patient On (Oxygen Delivery Method): room air        PHYSICAL EXAM:  Constitutional: non-toxic, no distress at rest  Cardiovascular:   normal S1, S2, no murmur, no edema  Respiratory:  clear BS bilaterally, no wheezes, no rales  GI:  soft, non-tender  :  +Wong collecting clear urine  Musculoskeletal:  no edema                                9.8    8.07  )-----------( 245      ( 27 Jul 2023 06:46 )             30.1       07-27    145  |  111<H>  |  13  ----------------------------<  132<H>  3.3<L>   |  25  |  0.42<L>    Ca    8.1<L>      27 Jul 2023 06:46  Phos  2.7     07-27    TPro  7.9  /  Alb  2.7<L>  /  TBili  0.4  /  DBili  x   /  AST  22  /  ALT  8<L>  /  AlkPhos  97  07-25      Urinalysis Basic - ( 27 Jul 2023 06:46 )    Color: x / Appearance: x / SG: x / pH: x  Gluc: 132 mg/dL / Ketone: x  / Bili: x / Urobili: x   Blood: x / Protein: x / Nitrite: x   Leuk Esterase: x / RBC: x / WBC x   Sq Epi: x / Non Sq Epi: x / Bacteria: x        MICROBIOLOGY:  v  Clean Catch Clean Catch (Midstream)  07-25-23   >=3 organisms. Probable collection contamination.  --  --                RADIOLOGY:  Imaging below independently reviewed.  < from: CT Abdomen and Pelvis w/ IV Cont (07.25.23 @ 21:12) >    ACC: [id number] EXAM:  CT ABDOMEN AND PELVIS IC   ORDERED BY:  NURYS IQBAL     PROCEDURE DATE:  07/25/2023          INTERPRETATION:  CLINICAL INFORMATION: Hematuria    COMPARISON: CT abdomen and pelvis 6/20/2021    CONTRAST/COMPLICATIONS:  IV Contrast: Omnipaque 350  90 cc administered   10 cc discarded  Oral Contrast: NONE  Complications: None reported at time of study completion    PROCEDURE:  CT of the Abdomen and Pelvis was performed.  Sagittal and coronal reformats were performed.    FINDINGS:  LOWER CHEST: Small loculated right pleural effusion with associated   atelectasis, decreased from prior exam on 6/20/2021. Patchy nodular   opacities seen in the dependent left lower lobe, which may represent   atelectasis or pneumonia in the appropriate clinical setting.    LIVER: Within normal limits.  BILE DUCTS: Normal caliber.  GALLBLADDER: Contracted with mild wall thickening, similar prior.   Question tiny cholelithiasis.  SPLEEN: Within normal limits.  PANCREAS: Within normal limits.  ADRENALS: Within normal limits.  KIDNEYS/URETERS: Symmetric renal enhancement. No hydronephrosis.   Bilateral nonobstructing renal calculi. Right renal cysts and additional   subcentimeter hypodense foci bilaterally, which are too small to   characterize.    BLADDER: Thickened, trabeculated bladder wall with mucosal   hyperenhancement and suggestion of layering debris. Diverticulum along   the anterior aspect of the bladder dome.  REPRODUCTIVE ORGANS: Heterogeneous prostate gland with hyperenhancement   along the corpus spongiosum.    BOWEL: Mildly prominent air-filled loops of small bowel without evidence   of bowel obstruction. Colonic diverticulosis without acute   diverticulitis. Appendix is normal.  PERITONEUM: No ascites.  VESSELS: Atherosclerotic changes.  RETROPERITONEUM/LYMPH NODES: No lymphadenopathy.  ABDOMINAL WALL: Within normal limits.  BONES: T2 and T3 vertebral hemangioma with involvement of the posterior   elements. Degenerative changes. Scoliosis.    IMPRESSION:  Findings concerning for cystitis and prostatitis. No organized fluid   collection.    Small loculated right pleural effusion with associated atelectasis,   decreased from prior exam on 6/20/2021. Patchy nodular opacities seen in   the dependent left lowerlobe, which may represent atelectasis or   pneumonia in the appropriate clinical setting.    --- End of Report ---          NIR IBARRA MD; Resident Radiologist  This document has been electronically signed.   TATE JULES MD; Attending Radiologist  This document has been electronically signed. Jul 25 2023 10:53PM    < end of copied text >

## 2023-07-27 NOTE — PROGRESS NOTE ADULT - SUBJECTIVE AND OBJECTIVE BOX
Subjective  No acute events overnight. Patient resting comfortably.    Objective    Vital signs  T(F): , Max: 99 (07-26-23 @ 12:00)  HR: 81 (07-27-23 @ 09:03)  BP: 120/63 (07-27-23 @ 09:03)  SpO2: 99% (07-27-23 @ 09:03)  Wt(kg): --    Output     OUT:    Indwelling Catheter - Urethral (mL): 1050 mL  Total OUT: 1050 mL    Total NET: -1050 mL          Gen: NAD  Abd: soft, nontender, nondistended  : nelson secured in place, draining clear, blood-tinged urine    Labs      07-27 @ 06:46    WBC 8.07  / Hct 30.1  / SCr 0.42     07-26 @ 02:20    WBC 11.10 / Hct 29.3  / SCr --           Culture - Urine (collected 07-25-23 @ 20:08)  Source: Clean Catch Clean Catch (Midstream)  Final Report (07-27-23 @ 10:47):    >=3 organisms. Probable collection contamination.

## 2023-07-27 NOTE — DIETITIAN INITIAL EVALUATION ADULT - OTHER INFO
NKFA per chart. Per 6/2021 admission, patient weighed 130lbs at the time. Patient's BW of 180lbs this admission is noted as a stated weight. Patient visually appears much closer to 130lbs figure from 2021. Request updated weight when feasible.    Hypokalemic today. Supplement, continue to follow electrolyte trends and replete as appropriate.

## 2023-07-27 NOTE — DIETITIAN INITIAL EVALUATION ADULT - ENERGY INTAKE
Poor (<50%) Per d/w RN patient w/ poor PO intake during admission, not taking any PO today including medications 2/2 lethargy, does not have sustained wakening after sternal rub.

## 2023-07-28 ENCOUNTER — TRANSCRIPTION ENCOUNTER (OUTPATIENT)
Age: 83
End: 2023-07-28

## 2023-07-28 LAB
ANION GAP SERPL CALC-SCNC: 12 MMOL/L — SIGNIFICANT CHANGE UP (ref 5–17)
BUN SERPL-MCNC: 9 MG/DL — SIGNIFICANT CHANGE UP (ref 7–23)
CALCIUM SERPL-MCNC: 8.8 MG/DL — SIGNIFICANT CHANGE UP (ref 8.4–10.5)
CHLORIDE SERPL-SCNC: 107 MMOL/L — SIGNIFICANT CHANGE UP (ref 96–108)
CO2 SERPL-SCNC: 23 MMOL/L — SIGNIFICANT CHANGE UP (ref 22–31)
CREAT SERPL-MCNC: 0.39 MG/DL — LOW (ref 0.5–1.3)
CULTURE RESULTS: SIGNIFICANT CHANGE UP
EGFR: 110 ML/MIN/1.73M2 — SIGNIFICANT CHANGE UP
GLUCOSE SERPL-MCNC: 148 MG/DL — HIGH (ref 70–99)
HCT VFR BLD CALC: 33.5 % — LOW (ref 39–50)
HGB BLD-MCNC: 10.8 G/DL — LOW (ref 13–17)
MCHC RBC-ENTMCNC: 30.4 PG — SIGNIFICANT CHANGE UP (ref 27–34)
MCHC RBC-ENTMCNC: 32.2 GM/DL — SIGNIFICANT CHANGE UP (ref 32–36)
MCV RBC AUTO: 94.4 FL — SIGNIFICANT CHANGE UP (ref 80–100)
MRSA PCR RESULT.: SIGNIFICANT CHANGE UP
NRBC # BLD: 0 /100 WBCS — SIGNIFICANT CHANGE UP (ref 0–0)
PLATELET # BLD AUTO: 267 K/UL — SIGNIFICANT CHANGE UP (ref 150–400)
POTASSIUM SERPL-MCNC: 3.6 MMOL/L — SIGNIFICANT CHANGE UP (ref 3.5–5.3)
POTASSIUM SERPL-SCNC: 3.6 MMOL/L — SIGNIFICANT CHANGE UP (ref 3.5–5.3)
RBC # BLD: 3.55 M/UL — LOW (ref 4.2–5.8)
RBC # FLD: 14.4 % — SIGNIFICANT CHANGE UP (ref 10.3–14.5)
S AUREUS DNA NOSE QL NAA+PROBE: DETECTED
SODIUM SERPL-SCNC: 142 MMOL/L — SIGNIFICANT CHANGE UP (ref 135–145)
SPECIMEN SOURCE: SIGNIFICANT CHANGE UP
WBC # BLD: 10.85 K/UL — HIGH (ref 3.8–10.5)
WBC # FLD AUTO: 10.85 K/UL — HIGH (ref 3.8–10.5)

## 2023-07-28 PROCEDURE — 71045 X-RAY EXAM CHEST 1 VIEW: CPT | Mod: 26

## 2023-07-28 PROCEDURE — 99232 SBSQ HOSP IP/OBS MODERATE 35: CPT

## 2023-07-28 RX ORDER — PIPERACILLIN AND TAZOBACTAM 4; .5 G/20ML; G/20ML
3.38 INJECTION, POWDER, LYOPHILIZED, FOR SOLUTION INTRAVENOUS EVERY 8 HOURS
Refills: 0 | Status: DISCONTINUED | OUTPATIENT
Start: 2023-07-29 | End: 2023-08-02

## 2023-07-28 RX ORDER — PIPERACILLIN AND TAZOBACTAM 4; .5 G/20ML; G/20ML
3.38 INJECTION, POWDER, LYOPHILIZED, FOR SOLUTION INTRAVENOUS ONCE
Refills: 0 | Status: COMPLETED | OUTPATIENT
Start: 2023-07-28 | End: 2023-07-28

## 2023-07-28 RX ORDER — ACETAMINOPHEN 500 MG
1000 TABLET ORAL ONCE
Refills: 0 | Status: COMPLETED | OUTPATIENT
Start: 2023-07-28 | End: 2023-07-28

## 2023-07-28 RX ORDER — CHLORHEXIDINE GLUCONATE 213 G/1000ML
1 SOLUTION TOPICAL DAILY
Refills: 0 | Status: DISCONTINUED | OUTPATIENT
Start: 2023-07-28 | End: 2023-08-02

## 2023-07-28 RX ORDER — POLYETHYLENE GLYCOL 3350 17 G/17G
17 POWDER, FOR SOLUTION ORAL ONCE
Refills: 0 | Status: COMPLETED | OUTPATIENT
Start: 2023-07-28 | End: 2023-07-28

## 2023-07-28 RX ADMIN — Medication 400 MILLIGRAM(S): at 16:58

## 2023-07-28 RX ADMIN — DONEPEZIL HYDROCHLORIDE 10 MILLIGRAM(S): 10 TABLET, FILM COATED ORAL at 21:02

## 2023-07-28 RX ADMIN — POLYETHYLENE GLYCOL 3350 17 GRAM(S): 17 POWDER, FOR SOLUTION ORAL at 16:58

## 2023-07-28 RX ADMIN — RITONAVIR 100 MILLIGRAM(S): 100 TABLET, FILM COATED ORAL at 12:34

## 2023-07-28 RX ADMIN — ATORVASTATIN CALCIUM 10 MILLIGRAM(S): 80 TABLET, FILM COATED ORAL at 21:03

## 2023-07-28 RX ADMIN — PIPERACILLIN AND TAZOBACTAM 25 GRAM(S): 4; .5 INJECTION, POWDER, LYOPHILIZED, FOR SOLUTION INTRAVENOUS at 20:52

## 2023-07-28 RX ADMIN — DARUNAVIR 800 MILLIGRAM(S): 75 TABLET, FILM COATED ORAL at 15:42

## 2023-07-28 RX ADMIN — Medication 1 SPRAY(S): at 17:12

## 2023-07-28 RX ADMIN — Medication 1000 MILLIGRAM(S): at 17:20

## 2023-07-28 RX ADMIN — AMLODIPINE BESYLATE 5 MILLIGRAM(S): 2.5 TABLET ORAL at 05:09

## 2023-07-28 RX ADMIN — TAMSULOSIN HYDROCHLORIDE 0.8 MILLIGRAM(S): 0.4 CAPSULE ORAL at 21:03

## 2023-07-28 RX ADMIN — FINASTERIDE 5 MILLIGRAM(S): 5 TABLET, FILM COATED ORAL at 12:35

## 2023-07-28 RX ADMIN — CEFTRIAXONE 100 MILLIGRAM(S): 500 INJECTION, POWDER, FOR SOLUTION INTRAMUSCULAR; INTRAVENOUS at 15:26

## 2023-07-28 RX ADMIN — CHLORHEXIDINE GLUCONATE 1 APPLICATION(S): 213 SOLUTION TOPICAL at 15:25

## 2023-07-28 RX ADMIN — PIPERACILLIN AND TAZOBACTAM 200 GRAM(S): 4; .5 INJECTION, POWDER, LYOPHILIZED, FOR SOLUTION INTRAVENOUS at 17:42

## 2023-07-28 RX ADMIN — Medication 2000 UNIT(S): at 12:39

## 2023-07-28 RX ADMIN — Medication 1 SPRAY(S): at 05:09

## 2023-07-28 RX ADMIN — EMTRICITABINE AND TENOFOVIR DISOPROXIL FUMARATE 1 TABLET(S): 200; 300 TABLET, FILM COATED ORAL at 12:35

## 2023-07-28 NOTE — DISCHARGE NOTE PROVIDER - HOSPITAL COURSE
82-year-old male history of Parkinson disease, dementia, hypertension, hyperlipidemia, BPH, HIV not on AC presenting with a chief complaint hematuria clinical presentation consistent with UTI cystitis and prostatitis.     Complicated UTI (urinary tract infection).   -with sepsis developed after admission   continue piperacillin/tazobactam day 5 today  clinically improving  likely discontinue antibiotics after today  Per ID can continue Liquid Augmentin through 8/4.     Hematuria.   -urology help appreciated  continue to monitor.     HTN (hypertension).   - continue home meds with hold parameters  acceptable.    Alzheimer's disease of other onset.   - supportive care   normal B12 folate.    HIV (human immunodeficiency virus infection).   - continue home meds darunavir, Descovy and ritonavir.     Functional quadriplegia.   -supportive care  skin care.

## 2023-07-28 NOTE — PROGRESS NOTE ADULT - PROBLEM SELECTOR PLAN 1
urine culture on admission likely procurement contamination  no evidence of sepsis  continue ceftriaxone IV  repeat blood cultures negative as expected   discharge on hold till original urine culture sensitivity back per ID attending

## 2023-07-28 NOTE — PROGRESS NOTE ADULT - NSPROGADDITIONALINFOA_GEN_ALL_CORE
discussed with patient in detail, expresses understanding of treatment plans. discussed with wife in detail over the phone in detail , expresses understanding of treatment plans.

## 2023-07-28 NOTE — PROGRESS NOTE ADULT - SUBJECTIVE AND OBJECTIVE BOX
INFECTIOUS DISEASES FOLLOW UP--Michael Oppenheim, MD      Interval History:  Clinically unchanged, wife notes no difference  from baseline  No cough  Low-grade temperature elevation last night      MEDICATIONS:  antimicrobials  cefTRIAXone   IVPB 1000 milliGRAM(s) IV Intermittent every 24 hours D#3  darunavir 800 milliGRAM(s) Oral daily  emtricitabine 200 mG/tenofovir alafenamide 25 mG (DESCOVY) Tablet 1 Tablet(s) Oral daily  ritonavir Tablet 100 milliGRAM(s) Oral daily    immunologic:    OTHER:  amLODIPine   Tablet 5 milliGRAM(s) Oral daily  atorvastatin 10 milliGRAM(s) Oral at bedtime  chlorhexidine 2% Cloths 1 Application(s) Topical daily  cholecalciferol 2000 Unit(s) Oral daily  donepezil 10 milliGRAM(s) Oral at bedtime  finasteride 5 milliGRAM(s) Oral daily  fluticasone propionate 50 MICROgram(s)/spray Nasal Spray 1 Spray(s) Both Nostrils two times a day  sodium chloride 0.9%. 2000 milliLiter(s) IV Continuous <Continuous>  tamsulosin 0.8 milliGRAM(s) Oral at bedtime      Objective:  T(C): 36.9 (07-28-23 @ 13:37), Max: 38.3 (07-27-23 @ 21:06)  HR: 91 (07-28-23 @ 13:37) (83 - 91)  BP: 121/70 (07-28-23 @ 13:37) (110/55 - 181/79)  RR: 18 (07-28-23 @ 12:32) (18 - 18)  SpO2: 96% (07-28-23 @ 12:32) (95% - 99%)    PHYSICAL EXAM:  HEENT:	NC/AT. Non-interactive  Respiratory: CTA Bilaterally  Cardiovascular: RRR, NO G/R/M  Gastrointestinal: Soft, +BS. No HSM  Extremities: No C/C/E  : Cloudy yellow urine in urinary catheter drainage      LABS:                        10.8   10.85 )-----------( 267      ( 28 Jul 2023 10:58 )             33.5     07-28    142  |  107  |  9   ----------------------------<  148<H>  3.6   |  23  |  0.39<L>    Ca    8.8      28 Jul 2023 10:57  Phos  2.7     07-27                Culture - Urine (collected 07-27-23 @ 12:41)  Source: Catheterized Catheterized  Final Report (07-28-23 @ 14:43):    <10,000 CFU/mL Normal Urogenital Monae    Culture - Urine (collected 07-25-23 @ 20:08)  Source: Clean Catch Clean Catch (Midstream)  Final Report (07-27-23 @ 10:47):    >=3 organisms. Probable collection contamination.

## 2023-07-28 NOTE — PROGRESS NOTE ADULT - SUBJECTIVE AND OBJECTIVE BOX
Patient is a 82y old  Male who presents with a chief complaint of hematuria (28 Jul 2023 15:32)      DATE OF SERVICE: 07-28-23 @ 16:03    SUBJECTIVE / OVERNIGHT EVENTS: overnight events noted    ROS:  not available          MEDICATIONS  (STANDING):  amLODIPine   Tablet 5 milliGRAM(s) Oral daily  atorvastatin 10 milliGRAM(s) Oral at bedtime  cefTRIAXone   IVPB 1000 milliGRAM(s) IV Intermittent every 24 hours  chlorhexidine 2% Cloths 1 Application(s) Topical daily  cholecalciferol 2000 Unit(s) Oral daily  darunavir 800 milliGRAM(s) Oral daily  donepezil 10 milliGRAM(s) Oral at bedtime  emtricitabine 200 mG/tenofovir alafenamide 25 mG (DESCOVY) Tablet 1 Tablet(s) Oral daily  finasteride 5 milliGRAM(s) Oral daily  fluticasone propionate 50 MICROgram(s)/spray Nasal Spray 1 Spray(s) Both Nostrils two times a day  ritonavir Tablet 100 milliGRAM(s) Oral daily  sodium chloride 0.9%. 2000 milliLiter(s) (50 mL/Hr) IV Continuous <Continuous>  tamsulosin 0.8 milliGRAM(s) Oral at bedtime    MEDICATIONS  (PRN):        CAPILLARY BLOOD GLUCOSE        I&O's Summary    27 Jul 2023 07:01  -  28 Jul 2023 07:00  --------------------------------------------------------  IN: 930 mL / OUT: 1700 mL / NET: -770 mL    28 Jul 2023 07:01  -  28 Jul 2023 16:03  --------------------------------------------------------  IN: 290 mL / OUT: 900 mL / NET: -610 mL        Vital Signs Last 24 Hrs  T(C): 36.9 (28 Jul 2023 13:37), Max: 38.3 (27 Jul 2023 21:06)  T(F): 98.5 (28 Jul 2023 13:37), Max: 100.9 (27 Jul 2023 21:06)  HR: 91 (28 Jul 2023 13:37) (83 - 91)  BP: 121/70 (28 Jul 2023 13:37) (110/55 - 181/79)  BP(mean): --  RR: 18 (28 Jul 2023 12:32) (18 - 18)  SpO2: 96% (28 Jul 2023 12:32) (95% - 99%)    PHYSICAL EXAM  Neck: Supple  Lungs: clear   CVS: S1 S2 no M/R/G  Abdomen: no tenderness  Neuro: alert  Ext: no edema    LABS:                        10.8   10.85 )-----------( 267      ( 28 Jul 2023 10:58 )             33.5     07-28    142  |  107  |  9   ----------------------------<  148<H>  3.6   |  23  |  0.39<L>    Ca    8.8      28 Jul 2023 10:57  Phos  2.7     07-27            Urinalysis Basic - ( 28 Jul 2023 10:57 )    Color: x / Appearance: x / SG: x / pH: x  Gluc: 148 mg/dL / Ketone: x  / Bili: x / Urobili: x   Blood: x / Protein: x / Nitrite: x   Leuk Esterase: x / RBC: x / WBC x   Sq Epi: x / Non Sq Epi: x / Bacteria: x          All consultant(s) notes reviewed and care discussed with other providers        Contact Number, Dr Guerrero [phone]

## 2023-07-28 NOTE — DISCHARGE NOTE PROVIDER - DETAILS OF MALNUTRITION DIAGNOSIS/DIAGNOSES
This patient has been assessed with a concern for Malnutrition and was treated during this hospitalization for the following Nutrition diagnosis/diagnoses:     -  07/27/2023: Moderate protein-calorie malnutrition

## 2023-07-28 NOTE — PROGRESS NOTE ADULT - NSPROGADDITIONALINFOA_GEN_ALL_CORE
Michael I. Oppenheim, MD  Division of Infectious Diseases  Reachable on Teams  Pager: [phone]  O: [phone] if nights/weekends/no response    ID Service covering over weekend

## 2023-07-28 NOTE — DISCHARGE NOTE PROVIDER - NSDCCPCAREPLAN_GEN_ALL_CORE_FT
PRINCIPAL DISCHARGE DIAGNOSIS  Diagnosis: Complicated UTI (urinary tract infection)  Assessment and Plan of Treatment: You had a bladder and/or kidney infection.  If you are discharged on antibiotics, you will need to finish the medication as prescribed to reduced the likelihood that the infection will recur.  Avoid medications that will cause urinary retention such as benadryl whenever possible.  Drink adequate fluids - there is no harm in drinking cranberry juice.  Females should urinate right after sex.  Contact your doctor if you experience new symptoms or continued symptoms after treatment, such as pain or burning with urination, frequent urination, urinary urgency, blood in the urine, fever, back pain, and/or nausea vomiting.        SECONDARY DISCHARGE DIAGNOSES  Diagnosis: Hematuria  Assessment and Plan of Treatment: Hematuria  is  blood in  your  urine. One  of  reason  for  blood  in  the  urine  is  Urinary  tract  infection .  Treatment  of  hematuria  depends  on the  cause .  Treatment   involves  taking  antibiotics medicine  to  clear  the  urinary  tract.  Follow  up  with  your  provider   regards  to  your  symptom  and  treatment  plan.  IF  hematuria  becomes  excessive  or  color  become  richer  seek  medical  advice.     PRINCIPAL DISCHARGE DIAGNOSIS  Diagnosis: Complicated UTI (urinary tract infection)  Assessment and Plan of Treatment: Continue to take your antibiotics as prescribed: Augmentin 2x daily through 8/4/23.  You had a bladder and/or kidney infection.  If you are discharged on antibiotics, you will need to finish the medication as prescribed to reduced the likelihood that the infection will recur.  Avoid medications that will cause urinary retention such as benadryl whenever possible.  Drink adequate fluids - there is no harm in drinking cranberry juice.  Females should urinate right after sex.  Contact your doctor if you experience new symptoms or continued symptoms after treatment, such as pain or burning with urination, frequent urination, urinary urgency, blood in the urine, fever, back pain, and/or nausea vomiting.        SECONDARY DISCHARGE DIAGNOSES  Diagnosis: Hematuria  Assessment and Plan of Treatment: Improved.  Due to UTI.  Hematuria  is  blood in  your  urine. One  of  reason  for  blood  in  the  urine  is  Urinary  tract  infection .  Treatment  of  hematuria  depends  on the  cause .  Treatment   involves  taking  antibiotics medicine  to  clear  the  urinary  tract.  Follow  up  with  your  provider   regards  to  your  symptom  and  treatment  plan.  IF  hematuria  becomes  excessive  or  color  become  richer  seek  medical  advice.    Diagnosis: Functional quadriplegia  Assessment and Plan of Treatment: Continue with supportive care.    Diagnosis: HIV (human immunodeficiency virus infection)  Assessment and Plan of Treatment: Continue to take your medications as prescribed.    Diagnosis: Alzheimer's disease of other onset  Assessment and Plan of Treatment: Continue with supportive care.    Diagnosis: HTN (hypertension)  Assessment and Plan of Treatment: Continue to take your medications daily as prescribed.

## 2023-07-28 NOTE — DISCHARGE NOTE PROVIDER - CARE PROVIDER_API CALL
Sushil Gonzales Kody  Internal Medicine  [address] [place] [address], [address]  [place] [address]  Phone: [phone]  Fax: [phone]  Follow Up Time: 1 week

## 2023-07-28 NOTE — DISCHARGE NOTE PROVIDER - NSDCMRMEDTOKEN_GEN_ALL_CORE_FT
amLODIPine 5 mg oral tablet: 1 tab(s) orally once a day  atorvastatin 10 mg oral tablet: 1 tab(s) orally once a day (at bedtime)  baclofen 10 mg oral tablet: 1 tab(s) orally 2 times a day  Descovy 200 mg-25 mg oral tablet: 1 tab(s) orally once a day  donepezil 10 mg oral tablet: 1 tab(s) orally once a day (at bedtime)  finasteride 5 mg oral tablet: 1 tab(s) orally once a day (in the morning)  Melatonin 5 mg oral tablet: 1 tab(s) orally 2 times a day (dinner and bedtime)  Prezista 800 mg oral tablet: 1 tab(s) orally once a day  Qnasl 80 mcg/inh nasal spray: 1 spray(s) nasal once a day, As Needed  ritonavir 100 mg oral tablet: 1 tab(s) orally once a day  tamsulosin 0.4 mg oral capsule: 2 cap(s) orally once a day NOTE: As per Pharmacy, 1 capsule orally 2 times a day  Vitamin D3 50 mcg (2000 intl units) oral capsule: 1 cap(s) orally once a day   amLODIPine 5 mg oral tablet: 1 tab(s) orally once a day  atorvastatin 10 mg oral tablet: 1 tab(s) orally once a day (at bedtime)  Augmentin 400 mg-57 mg/5 mL oral liquid: 10 milliliter(s) orally 2 times a day Continue medication through evening of 8/4/23  cholecalciferol oral tablet: 2000 unit(s) orally once a day  Descovy 200 mg-25 mg oral tablet: 1 tab(s) orally once a day  donepezil 10 mg oral tablet: 1 tab(s) orally once a day (at bedtime)  finasteride 5 mg oral tablet: 1 tab(s) orally once a day (in the morning)  fluticasone 50 mcg/inh nasal spray: 1 spray(s) nasal 2 times a day  Prezista 800 mg oral tablet: 1 tab(s) orally once a day  ritonavir 100 mg oral tablet: 1 tab(s) orally once a day  tamsulosin 0.4 mg oral capsule: 2 cap(s) orally once a day (at bedtime)   amLODIPine 5 mg oral tablet: 1 tab(s) orally once a day  atorvastatin 10 mg oral tablet: 1 tab(s) orally once a day (at bedtime)  Augmentin 400 mg-57 mg/5 mL oral liquid: 10 milliliter(s) orally 2 times a day Continue medication through evening of 8/4/23  cholecalciferol oral tablet: 2000 unit(s) orally once a day  Descovy 200 mg-25 mg oral tablet: 1 tab(s) orally once a day  donepezil 10 mg oral tablet: 1 tab(s) orally once a day (at bedtime)  finasteride 5 mg oral tablet: 1 tab(s) orally once a day (in the morning)  fluticasone 50 mcg/inh nasal spray: 1 spray(s) nasal 2 times a day  Prezista 800 mg oral tablet: 1 tab(s) orally once a day  ritonavir 100 mg oral powder for reconstitution: 1 packet(s) orally once a day  tamsulosin 0.4 mg oral capsule: 2 cap(s) orally once a day (at bedtime)

## 2023-07-28 NOTE — PROGRESS NOTE ADULT - PROBLEM SELECTOR PLAN 1
Spoke with lab - original urine culture showing many organisms, multiple gram positive but also GNR including what appear to be E. coli and a mucoid set of colonies (Klebsiella or Enterobacter), none c/w pseudomonas.  Request lab to do ID and sensitivities of the gram negatives do help guide therapy for longer-term  Continue Ceftriaxone for now - repeat culture being negative suggests is covering organisms in urine  If temp rises again tonight, would change to piperacillin / tazobactam

## 2023-07-28 NOTE — DISCHARGE NOTE PROVIDER - NSDCFUSCHEDAPPT_GEN_ALL_CORE_FT
Cinthya Beltrán  [facility]  [address]  Scheduled Appointment: 08/23/2023

## 2023-07-29 LAB
ANION GAP SERPL CALC-SCNC: 12 MMOL/L — SIGNIFICANT CHANGE UP (ref 5–17)
BUN SERPL-MCNC: 10 MG/DL — SIGNIFICANT CHANGE UP (ref 7–23)
CALCIUM SERPL-MCNC: 8.7 MG/DL — SIGNIFICANT CHANGE UP (ref 8.4–10.5)
CHLORIDE SERPL-SCNC: 105 MMOL/L — SIGNIFICANT CHANGE UP (ref 96–108)
CO2 SERPL-SCNC: 25 MMOL/L — SIGNIFICANT CHANGE UP (ref 22–31)
CREAT SERPL-MCNC: 0.45 MG/DL — LOW (ref 0.5–1.3)
EGFR: 105 ML/MIN/1.73M2 — SIGNIFICANT CHANGE UP
GLUCOSE SERPL-MCNC: 148 MG/DL — HIGH (ref 70–99)
HCT VFR BLD CALC: 33.7 % — LOW (ref 39–50)
HGB BLD-MCNC: 10.8 G/DL — LOW (ref 13–17)
MCHC RBC-ENTMCNC: 29.8 PG — SIGNIFICANT CHANGE UP (ref 27–34)
MCHC RBC-ENTMCNC: 32 GM/DL — SIGNIFICANT CHANGE UP (ref 32–36)
MCV RBC AUTO: 92.8 FL — SIGNIFICANT CHANGE UP (ref 80–100)
NRBC # BLD: 0 /100 WBCS — SIGNIFICANT CHANGE UP (ref 0–0)
PLATELET # BLD AUTO: 306 K/UL — SIGNIFICANT CHANGE UP (ref 150–400)
POTASSIUM SERPL-MCNC: 3.2 MMOL/L — LOW (ref 3.5–5.3)
POTASSIUM SERPL-SCNC: 3.2 MMOL/L — LOW (ref 3.5–5.3)
RBC # BLD: 3.63 M/UL — LOW (ref 4.2–5.8)
RBC # FLD: 14.2 % — SIGNIFICANT CHANGE UP (ref 10.3–14.5)
SODIUM SERPL-SCNC: 142 MMOL/L — SIGNIFICANT CHANGE UP (ref 135–145)
WBC # BLD: 10.22 K/UL — SIGNIFICANT CHANGE UP (ref 3.8–10.5)
WBC # FLD AUTO: 10.22 K/UL — SIGNIFICANT CHANGE UP (ref 3.8–10.5)

## 2023-07-29 PROCEDURE — 99232 SBSQ HOSP IP/OBS MODERATE 35: CPT

## 2023-07-29 RX ORDER — POTASSIUM CHLORIDE 20 MEQ
40 PACKET (EA) ORAL ONCE
Refills: 0 | Status: COMPLETED | OUTPATIENT
Start: 2023-07-29 | End: 2023-07-29

## 2023-07-29 RX ADMIN — ATORVASTATIN CALCIUM 10 MILLIGRAM(S): 80 TABLET, FILM COATED ORAL at 22:09

## 2023-07-29 RX ADMIN — AMLODIPINE BESYLATE 5 MILLIGRAM(S): 2.5 TABLET ORAL at 05:05

## 2023-07-29 RX ADMIN — Medication 1 SPRAY(S): at 05:05

## 2023-07-29 RX ADMIN — PIPERACILLIN AND TAZOBACTAM 25 GRAM(S): 4; .5 INJECTION, POWDER, LYOPHILIZED, FOR SOLUTION INTRAVENOUS at 05:05

## 2023-07-29 RX ADMIN — DARUNAVIR 800 MILLIGRAM(S): 75 TABLET, FILM COATED ORAL at 13:25

## 2023-07-29 RX ADMIN — FINASTERIDE 5 MILLIGRAM(S): 5 TABLET, FILM COATED ORAL at 12:10

## 2023-07-29 RX ADMIN — PIPERACILLIN AND TAZOBACTAM 25 GRAM(S): 4; .5 INJECTION, POWDER, LYOPHILIZED, FOR SOLUTION INTRAVENOUS at 22:08

## 2023-07-29 RX ADMIN — RITONAVIR 100 MILLIGRAM(S): 100 TABLET, FILM COATED ORAL at 12:10

## 2023-07-29 RX ADMIN — CHLORHEXIDINE GLUCONATE 1 APPLICATION(S): 213 SOLUTION TOPICAL at 12:12

## 2023-07-29 RX ADMIN — TAMSULOSIN HYDROCHLORIDE 0.8 MILLIGRAM(S): 0.4 CAPSULE ORAL at 22:09

## 2023-07-29 RX ADMIN — PIPERACILLIN AND TAZOBACTAM 25 GRAM(S): 4; .5 INJECTION, POWDER, LYOPHILIZED, FOR SOLUTION INTRAVENOUS at 12:11

## 2023-07-29 RX ADMIN — EMTRICITABINE AND TENOFOVIR DISOPROXIL FUMARATE 1 TABLET(S): 200; 300 TABLET, FILM COATED ORAL at 12:10

## 2023-07-29 RX ADMIN — Medication 2000 UNIT(S): at 12:10

## 2023-07-29 RX ADMIN — Medication 40 MILLIEQUIVALENT(S): at 12:48

## 2023-07-29 RX ADMIN — DONEPEZIL HYDROCHLORIDE 10 MILLIGRAM(S): 10 TABLET, FILM COATED ORAL at 22:08

## 2023-07-29 NOTE — PROGRESS NOTE ADULT - SUBJECTIVE AND OBJECTIVE BOX
Patient is a 82y old  Male who presents with a chief complaint of hematuria (29 Jul 2023 11:56)      DATE OF SERVICE: 07-29-23 @ 12:34    SUBJECTIVE / OVERNIGHT EVENTS: overnight events noted    ROS:  not available       MEDICATIONS  (STANDING):  amLODIPine   Tablet 5 milliGRAM(s) Oral daily  atorvastatin 10 milliGRAM(s) Oral at bedtime  chlorhexidine 2% Cloths 1 Application(s) Topical daily  cholecalciferol 2000 Unit(s) Oral daily  darunavir 800 milliGRAM(s) Oral daily  donepezil 10 milliGRAM(s) Oral at bedtime  emtricitabine 200 mG/tenofovir alafenamide 25 mG (DESCOVY) Tablet 1 Tablet(s) Oral daily  finasteride 5 milliGRAM(s) Oral daily  fluticasone propionate 50 MICROgram(s)/spray Nasal Spray 1 Spray(s) Both Nostrils two times a day  piperacillin/tazobactam IVPB.. 3.375 Gram(s) IV Intermittent every 8 hours  potassium chloride   Solution 40 milliEquivalent(s) Oral once  ritonavir Tablet 100 milliGRAM(s) Oral daily  sodium chloride 0.9%. 2000 milliLiter(s) (50 mL/Hr) IV Continuous <Continuous>  tamsulosin 0.8 milliGRAM(s) Oral at bedtime    MEDICATIONS  (PRN):        CAPILLARY BLOOD GLUCOSE        I&O's Summary    28 Jul 2023 07:01  -  29 Jul 2023 07:00  --------------------------------------------------------  IN: 550 mL / OUT: 1200 mL / NET: -650 mL    PHYSICAL EXAM  Neck: Supple  Lungs: clear   CVS: S1 S2 no M/R/G  Abdomen: no tenderness  Neuro: alert  Ext: no edema    LABS:                        10.8   10.22 )-----------( 306      ( 29 Jul 2023 11:17 )             33.7     07-29    142  |  105  |  10  ----------------------------<  148<H>  3.2<L>   |  25  |  0.45<L>    Ca    8.7      29 Jul 2023 11:17            Urinalysis Basic - ( 29 Jul 2023 11:17 )    Color: x / Appearance: x / SG: x / pH: x  Gluc: 148 mg/dL / Ketone: x  / Bili: x / Urobili: x   Blood: x / Protein: x / Nitrite: x   Leuk Esterase: x / RBC: x / WBC x   Sq Epi: x / Non Sq Epi: x / Bacteria: x          All consultant(s) notes reviewed and care discussed with other providers        Contact Number, Dr Guerrero [phone]

## 2023-07-29 NOTE — PROGRESS NOTE ADULT - PROBLEM SELECTOR PLAN 1
fever recurrence yesterday  will continue piperacillin/tazobactam   blood cultures negative so far  repeat urine culture negative  likely aspiration pneumonia   CT chest non-con

## 2023-07-29 NOTE — PROGRESS NOTE ADULT - SUBJECTIVE AND OBJECTIVE BOX
Patient is a 82y old  Male who presents with a chief complaint of hematuria (28 Jul 2023 23:00)    Being followed by ID for Fever    Interval history:  No acute events      ROS:  Unable due to mental status      Antimicrobials:    darunavir 800 milliGRAM(s) Oral daily  emtricitabine 200 mG/tenofovir alafenamide 25 mG (DESCOVY) Tablet 1 Tablet(s) Oral daily  piperacillin/tazobactam IVPB.. 3.375 Gram(s) IV Intermittent every 8 hours  ritonavir Tablet 100 milliGRAM(s) Oral daily      Vital Signs Last 24 Hrs  T(C): 36.6 (07-29-23 @ 08:19), Max: 38.5 (07-28-23 @ 16:33)  T(F): 97.8 (07-29-23 @ 08:19), Max: 101.3 (07-28-23 @ 16:33)  HR: 98 (07-29-23 @ 08:19) (81 - 98)  BP: 149/76 (07-29-23 @ 08:19) (111/69 - 181/79)  BP(mean): --  RR: 18 (07-29-23 @ 08:19) (18 - 18)  SpO2: 97% (07-29-23 @ 08:19) (96% - 97%)    Physical Exam:    Constitutional well preserved, NAD    HEENT EOMI,No pallor or icterus    No oral exudate or erythema    Neck supple no LN    Chest Clear to auscultation    CVS S1 S2 WNl No murmur or rub or gallop    Abd soft BS normal No tenderness no masses    Ext No cyanosis clubbing or edema    IV site no erythema tenderness or discharge    Joints no swelling or LOM    CNS Dementia, quadriplegia    Lab Data:                          10.8   10.22 )-----------( 306      ( 29 Jul 2023 11:17 )             33.7       07-29    142  |  105  |  10  ----------------------------<  148<H>  3.2<L>   |  25  |  0.45<L>    Ca    8.7      29 Jul 2023 11:17          .Blood Blood-Peripheral  07-28-23   No growth at 24 hours  --  --      Catheterized Catheterized  07-27-23   <10,000 CFU/mL Normal Urogenital Monae  --  --      Clean Catch Clean Catch (Midstream)  07-25-23   >=3 organisms. Probable collection contamination.  --  --      < from: Xray Chest 1 View- PORTABLE-Urgent (Xray Chest 1 View- PORTABLE-Urgent .) (07.28.23 @ 18:56) >  ACC: [id number] EXAM:  XR CHEST PORTABLE URGENT 1V   ORDERED BY: DALIA HILTON     PROCEDURE DATE:  07/28/2023          INTERPRETATION:  INDICATION: Dementia, fever.    TECHNIQUE: 2 portable views of the chest    COMPARISON: CT scan dated 6/20/2021    FINDINGS/  IMPRESSION: The cardiac silhouette is normal in size. The study is   slightly limited secondary to patient positioning and chin obscuring the   right lung apex. There is a small right pleural effusion and/or right   basilar atelectasis.An underlying right basilar pneumonia cannot be   excluded. The left lung is clear.    < end of copied text >  < from: CT Abdomen and Pelvis w/ IV Cont (07.25.23 @ 21:12) >  ACC: [id number] EXAM:  CT ABDOMEN AND PELVIS IC   ORDERED BY:  NURYS IQBAL     PROCEDURE DATE:  07/25/2023          INTERPRETATION:  CLINICAL INFORMATION: Hematuria    COMPARISON: CT abdomen and pelvis 6/20/2021    CONTRAST/COMPLICATIONS:  IV Contrast: Omnipaque 350  90 cc administered   10 cc discarded  Oral Contrast: NONE  Complications: None reported at time of study completion    PROCEDURE:  CT of the Abdomen and Pelvis was performed.  Sagittal and coronal reformats were performed.    FINDINGS:  LOWER CHEST: Small loculated right pleural effusion with associated   atelectasis, decreased from prior exam on 6/20/2021. Patchy nodular   opacities seen in the dependent left lower lobe, which may represent   atelectasis or pneumonia in the appropriate clinical setting.    LIVER: Within normal limits.  BILE DUCTS: Normal caliber.  GALLBLADDER: Contracted with mild wall thickening, similar prior.   Question tiny cholelithiasis.  SPLEEN: Within normal limits.  PANCREAS: Within normal limits.  ADRENALS: Within normal limits.  KIDNEYS/URETERS: Symmetric renal enhancement. No hydronephrosis.   Bilateral nonobstructing renal calculi. Right renal cysts and additional   subcentimeter hypodense foci bilaterally, which are too small to   characterize.    BLADDER: Thickened, trabeculated bladder wall with mucosal   hyperenhancement and suggestion of layering debris. Diverticulum along   the anterior aspect of the bladder dome.  REPRODUCTIVE ORGANS: Heterogeneous prostate gland with hyperenhancement   along the corpus spongiosum.    BOWEL: Mildly prominent air-filled loops of small bowel without evidence   of bowel obstruction. Colonic diverticulosis without acute   diverticulitis. Appendix is normal.  PERITONEUM: No ascites.  VESSELS: Atherosclerotic changes.  RETROPERITONEUM/LYMPH NODES: No lymphadenopathy.  ABDOMINAL WALL: Within normal limits.  BONES: T2 and T3 vertebral hemangioma with involvement of the posterior   elements. Degenerative changes. Scoliosis.    IMPRESSION:  Findings concerning for cystitis and prostatitis. No organized fluid   collection.    Small loculated right pleural effusion with associated atelectasis,   decreased from prior exam on 6/20/2021. Patchy nodular opacities seen in   the dependent left lowerlobe, which may represent atelectasis or   pneumonia in the appropriate clinical setting.    < end of copied text >                     Patient is a 82y old  Male who presents with a chief complaint of hematuria (28 Jul 2023 23:00)    Being followed by ID for Fever    Interval history:  No acute events      ROS:  Unable due to mental status      Antimicrobials:    darunavir 800 milliGRAM(s) Oral daily  emtricitabine 200 mG/tenofovir alafenamide 25 mG (DESCOVY) Tablet 1 Tablet(s) Oral daily  piperacillin/tazobactam IVPB.. 3.375 Gram(s) IV Intermittent every 8 hours  ritonavir Tablet 100 milliGRAM(s) Oral daily      Vital Signs Last 24 Hrs  T(C): 36.6 (07-29-23 @ 08:19), Max: 38.5 (07-28-23 @ 16:33)  T(F): 97.8 (07-29-23 @ 08:19), Max: 101.3 (07-28-23 @ 16:33)  HR: 98 (07-29-23 @ 08:19) (81 - 98)  BP: 149/76 (07-29-23 @ 08:19) (111/69 - 181/79)  BP(mean): --  RR: 18 (07-29-23 @ 08:19) (18 - 18)  SpO2: 97% (07-29-23 @ 08:19) (96% - 97%)    Physical Exam:    Constitutional well preserved, NAD, moaning    HEENT EOMI, No pallor or icterus    No oral exudate or erythema    Neck supple no LN    Chest Decreased breath sounds    CVS S1 S2 WNl No murmur or rub or gallop    Abd soft BS normal No tenderness no masses    Ext No cyanosis clubbing or edema    IV site no erythema tenderness or discharge    Joints no swelling or LOM    CNS Dementia, quadriplegia, arm contractures    Lab Data:                          10.8   10.22 )-----------( 306      ( 29 Jul 2023 11:17 )             33.7       07-29    142  |  105  |  10  ----------------------------<  148<H>  3.2<L>   |  25  |  0.45<L>    Ca    8.7      29 Jul 2023 11:17          .Blood Blood-Peripheral  07-28-23   No growth at 24 hours  --  --      Catheterized Catheterized  07-27-23   <10,000 CFU/mL Normal Urogenital Monae  --  --      Clean Catch Clean Catch (Midstream)  07-25-23   >=3 organisms. Probable collection contamination.  --  --      < from: Xray Chest 1 View- PORTABLE-Urgent (Xray Chest 1 View- PORTABLE-Urgent .) (07.28.23 @ 18:56) >  ACC: [id number] EXAM:  XR CHEST PORTABLE URGENT 1V   ORDERED BY: DALIA HILTON     PROCEDURE DATE:  07/28/2023          INTERPRETATION:  INDICATION: Dementia, fever.    TECHNIQUE: 2 portable views of the chest    COMPARISON: CT scan dated 6/20/2021    FINDINGS/  IMPRESSION: The cardiac silhouette is normal in size. The study is   slightly limited secondary to patient positioning and chin obscuring the   right lung apex. There is a small right pleural effusion and/or right   basilar atelectasis.An underlying right basilar pneumonia cannot be   excluded. The left lung is clear.    < end of copied text >  < from: CT Abdomen and Pelvis w/ IV Cont (07.25.23 @ 21:12) >  ACC: [id number] EXAM:  CT ABDOMEN AND PELVIS IC   ORDERED BY:  NURYS IQBAL     PROCEDURE DATE:  07/25/2023          INTERPRETATION:  CLINICAL INFORMATION: Hematuria    COMPARISON: CT abdomen and pelvis 6/20/2021    CONTRAST/COMPLICATIONS:  IV Contrast: Omnipaque 350  90 cc administered   10 cc discarded  Oral Contrast: NONE  Complications: None reported at time of study completion    PROCEDURE:  CT of the Abdomen and Pelvis was performed.  Sagittal and coronal reformats were performed.    FINDINGS:  LOWER CHEST: Small loculated right pleural effusion with associated   atelectasis, decreased from prior exam on 6/20/2021. Patchy nodular   opacities seen in the dependent left lower lobe, which may represent   atelectasis or pneumonia in the appropriate clinical setting.    LIVER: Within normal limits.  BILE DUCTS: Normal caliber.  GALLBLADDER: Contracted with mild wall thickening, similar prior.   Question tiny cholelithiasis.  SPLEEN: Within normal limits.  PANCREAS: Within normal limits.  ADRENALS: Within normal limits.  KIDNEYS/URETERS: Symmetric renal enhancement. No hydronephrosis.   Bilateral nonobstructing renal calculi. Right renal cysts and additional   subcentimeter hypodense foci bilaterally, which are too small to   characterize.    BLADDER: Thickened, trabeculated bladder wall with mucosal   hyperenhancement and suggestion of layering debris. Diverticulum along   the anterior aspect of the bladder dome.  REPRODUCTIVE ORGANS: Heterogeneous prostate gland with hyperenhancement   along the corpus spongiosum.    BOWEL: Mildly prominent air-filled loops of small bowel without evidence   of bowel obstruction. Colonic diverticulosis without acute   diverticulitis. Appendix is normal.  PERITONEUM: No ascites.  VESSELS: Atherosclerotic changes.  RETROPERITONEUM/LYMPH NODES: No lymphadenopathy.  ABDOMINAL WALL: Within normal limits.  BONES: T2 and T3 vertebral hemangioma with involvement of the posterior   elements. Degenerative changes. Scoliosis.    IMPRESSION:  Findings concerning for cystitis and prostatitis. No organized fluid   collection.    Small loculated right pleural effusion with associated atelectasis,   decreased from prior exam on 6/20/2021. Patchy nodular opacities seen in   the dependent left lowerlobe, which may represent atelectasis or   pneumonia in the appropriate clinical setting.    < end of copied text >

## 2023-07-29 NOTE — PROGRESS NOTE ADULT - PROBLEM SELECTOR PLAN 1
Original urine culture showing many organisms, multiple gram positive but also GNR including what appear to be E. coli and a mucoid set of colonies (Klebsiella or Enterobacter), none c/w pseudomonas.  Dr Oppenheimer requested lab to do ID and sensitivities of the gram negatives do help guide therapy for longer-term  Changed from Ceftriaxone to Piperacillin/Tazobactam due to persistent fever, although UC/s was suppressed.  F/U ID/susceptibilities of Gm negative urine isolates

## 2023-07-30 LAB
-  AMIKACIN: SIGNIFICANT CHANGE UP
-  AMIKACIN: SIGNIFICANT CHANGE UP
-  AMOXICILLIN/CLAVULANIC ACID: SIGNIFICANT CHANGE UP
-  AMOXICILLIN/CLAVULANIC ACID: SIGNIFICANT CHANGE UP
-  AMPICILLIN/SULBACTAM: SIGNIFICANT CHANGE UP
-  AMPICILLIN/SULBACTAM: SIGNIFICANT CHANGE UP
-  AMPICILLIN: SIGNIFICANT CHANGE UP
-  AMPICILLIN: SIGNIFICANT CHANGE UP
-  AZTREONAM: SIGNIFICANT CHANGE UP
-  AZTREONAM: SIGNIFICANT CHANGE UP
-  CEFAZOLIN: SIGNIFICANT CHANGE UP
-  CEFAZOLIN: SIGNIFICANT CHANGE UP
-  CEFEPIME: SIGNIFICANT CHANGE UP
-  CEFEPIME: SIGNIFICANT CHANGE UP
-  CEFOXITIN: SIGNIFICANT CHANGE UP
-  CEFOXITIN: SIGNIFICANT CHANGE UP
-  CEFTRIAXONE: SIGNIFICANT CHANGE UP
-  CEFTRIAXONE: SIGNIFICANT CHANGE UP
-  CEFUROXIME: SIGNIFICANT CHANGE UP
-  CEFUROXIME: SIGNIFICANT CHANGE UP
-  CIPROFLOXACIN: SIGNIFICANT CHANGE UP
-  CIPROFLOXACIN: SIGNIFICANT CHANGE UP
-  ERTAPENEM: SIGNIFICANT CHANGE UP
-  ERTAPENEM: SIGNIFICANT CHANGE UP
-  GENTAMICIN: SIGNIFICANT CHANGE UP
-  GENTAMICIN: SIGNIFICANT CHANGE UP
-  IMIPENEM: SIGNIFICANT CHANGE UP
-  IMIPENEM: SIGNIFICANT CHANGE UP
-  LEVOFLOXACIN: SIGNIFICANT CHANGE UP
-  LEVOFLOXACIN: SIGNIFICANT CHANGE UP
-  MEROPENEM: SIGNIFICANT CHANGE UP
-  MEROPENEM: SIGNIFICANT CHANGE UP
-  NITROFURANTOIN: SIGNIFICANT CHANGE UP
-  NITROFURANTOIN: SIGNIFICANT CHANGE UP
-  PIPERACILLIN/TAZOBACTAM: SIGNIFICANT CHANGE UP
-  PIPERACILLIN/TAZOBACTAM: SIGNIFICANT CHANGE UP
-  TOBRAMYCIN: SIGNIFICANT CHANGE UP
-  TOBRAMYCIN: SIGNIFICANT CHANGE UP
-  TRIMETHOPRIM/SULFAMETHOXAZOLE: SIGNIFICANT CHANGE UP
-  TRIMETHOPRIM/SULFAMETHOXAZOLE: SIGNIFICANT CHANGE UP
ANION GAP SERPL CALC-SCNC: 11 MMOL/L — SIGNIFICANT CHANGE UP (ref 5–17)
BUN SERPL-MCNC: 11 MG/DL — SIGNIFICANT CHANGE UP (ref 7–23)
CALCIUM SERPL-MCNC: 8.3 MG/DL — LOW (ref 8.4–10.5)
CHLORIDE SERPL-SCNC: 106 MMOL/L — SIGNIFICANT CHANGE UP (ref 96–108)
CO2 SERPL-SCNC: 24 MMOL/L — SIGNIFICANT CHANGE UP (ref 22–31)
CREAT SERPL-MCNC: 0.49 MG/DL — LOW (ref 0.5–1.3)
CULTURE RESULTS: SIGNIFICANT CHANGE UP
EGFR: 102 ML/MIN/1.73M2 — SIGNIFICANT CHANGE UP
GLUCOSE SERPL-MCNC: 114 MG/DL — HIGH (ref 70–99)
HCT VFR BLD CALC: 30.2 % — LOW (ref 39–50)
HGB BLD-MCNC: 9.7 G/DL — LOW (ref 13–17)
MCHC RBC-ENTMCNC: 30.3 PG — SIGNIFICANT CHANGE UP (ref 27–34)
MCHC RBC-ENTMCNC: 32.1 GM/DL — SIGNIFICANT CHANGE UP (ref 32–36)
MCV RBC AUTO: 94.4 FL — SIGNIFICANT CHANGE UP (ref 80–100)
METHOD TYPE: SIGNIFICANT CHANGE UP
METHOD TYPE: SIGNIFICANT CHANGE UP
NRBC # BLD: 0 /100 WBCS — SIGNIFICANT CHANGE UP (ref 0–0)
ORGANISM # SPEC MICROSCOPIC CNT: SIGNIFICANT CHANGE UP
PLATELET # BLD AUTO: 314 K/UL — SIGNIFICANT CHANGE UP (ref 150–400)
POTASSIUM SERPL-MCNC: 3.4 MMOL/L — LOW (ref 3.5–5.3)
POTASSIUM SERPL-SCNC: 3.4 MMOL/L — LOW (ref 3.5–5.3)
RBC # BLD: 3.2 M/UL — LOW (ref 4.2–5.8)
RBC # FLD: 14.4 % — SIGNIFICANT CHANGE UP (ref 10.3–14.5)
SODIUM SERPL-SCNC: 141 MMOL/L — SIGNIFICANT CHANGE UP (ref 135–145)
WBC # BLD: 12.24 K/UL — HIGH (ref 3.8–10.5)
WBC # FLD AUTO: 12.24 K/UL — HIGH (ref 3.8–10.5)

## 2023-07-30 PROCEDURE — 71250 CT THORAX DX C-: CPT | Mod: 26

## 2023-07-30 PROCEDURE — 99232 SBSQ HOSP IP/OBS MODERATE 35: CPT

## 2023-07-30 RX ORDER — POTASSIUM CHLORIDE 20 MEQ
40 PACKET (EA) ORAL ONCE
Refills: 0 | Status: COMPLETED | OUTPATIENT
Start: 2023-07-30 | End: 2023-07-30

## 2023-07-30 RX ADMIN — TAMSULOSIN HYDROCHLORIDE 0.8 MILLIGRAM(S): 0.4 CAPSULE ORAL at 21:09

## 2023-07-30 RX ADMIN — Medication 1 SPRAY(S): at 05:27

## 2023-07-30 RX ADMIN — Medication 40 MILLIEQUIVALENT(S): at 09:18

## 2023-07-30 RX ADMIN — ATORVASTATIN CALCIUM 10 MILLIGRAM(S): 80 TABLET, FILM COATED ORAL at 21:09

## 2023-07-30 RX ADMIN — PIPERACILLIN AND TAZOBACTAM 25 GRAM(S): 4; .5 INJECTION, POWDER, LYOPHILIZED, FOR SOLUTION INTRAVENOUS at 05:27

## 2023-07-30 RX ADMIN — AMLODIPINE BESYLATE 5 MILLIGRAM(S): 2.5 TABLET ORAL at 05:27

## 2023-07-30 RX ADMIN — DONEPEZIL HYDROCHLORIDE 10 MILLIGRAM(S): 10 TABLET, FILM COATED ORAL at 21:09

## 2023-07-30 RX ADMIN — Medication 1 SPRAY(S): at 17:36

## 2023-07-30 RX ADMIN — Medication 40 MILLIEQUIVALENT(S): at 13:48

## 2023-07-30 RX ADMIN — RITONAVIR 100 MILLIGRAM(S): 100 TABLET, FILM COATED ORAL at 12:16

## 2023-07-30 RX ADMIN — CHLORHEXIDINE GLUCONATE 1 APPLICATION(S): 213 SOLUTION TOPICAL at 12:18

## 2023-07-30 RX ADMIN — Medication 2000 UNIT(S): at 12:15

## 2023-07-30 RX ADMIN — PIPERACILLIN AND TAZOBACTAM 25 GRAM(S): 4; .5 INJECTION, POWDER, LYOPHILIZED, FOR SOLUTION INTRAVENOUS at 13:48

## 2023-07-30 RX ADMIN — PIPERACILLIN AND TAZOBACTAM 25 GRAM(S): 4; .5 INJECTION, POWDER, LYOPHILIZED, FOR SOLUTION INTRAVENOUS at 21:08

## 2023-07-30 RX ADMIN — DARUNAVIR 800 MILLIGRAM(S): 75 TABLET, FILM COATED ORAL at 12:15

## 2023-07-30 RX ADMIN — FINASTERIDE 5 MILLIGRAM(S): 5 TABLET, FILM COATED ORAL at 12:16

## 2023-07-30 RX ADMIN — EMTRICITABINE AND TENOFOVIR DISOPROXIL FUMARATE 1 TABLET(S): 200; 300 TABLET, FILM COATED ORAL at 12:16

## 2023-07-30 NOTE — PROGRESS NOTE ADULT - SUBJECTIVE AND OBJECTIVE BOX
Patient is a 82y old  Male who presents with a chief complaint of hematuria (30 Jul 2023 11:15)      DATE OF SERVICE: 07-30-23 @ 12:14    SUBJECTIVE / OVERNIGHT EVENTS: overnight events noted    ROS:  not available         MEDICATIONS  (STANDING):  amLODIPine   Tablet 5 milliGRAM(s) Oral daily  atorvastatin 10 milliGRAM(s) Oral at bedtime  chlorhexidine 2% Cloths 1 Application(s) Topical daily  cholecalciferol 2000 Unit(s) Oral daily  darunavir 800 milliGRAM(s) Oral daily  donepezil 10 milliGRAM(s) Oral at bedtime  emtricitabine 200 mG/tenofovir alafenamide 25 mG (DESCOVY) Tablet 1 Tablet(s) Oral daily  finasteride 5 milliGRAM(s) Oral daily  fluticasone propionate 50 MICROgram(s)/spray Nasal Spray 1 Spray(s) Both Nostrils two times a day  piperacillin/tazobactam IVPB.. 3.375 Gram(s) IV Intermittent every 8 hours  potassium chloride   Solution 40 milliEquivalent(s) Oral once  ritonavir Tablet 100 milliGRAM(s) Oral daily  sodium chloride 0.9%. 2000 milliLiter(s) (50 mL/Hr) IV Continuous <Continuous>  tamsulosin 0.8 milliGRAM(s) Oral at bedtime    MEDICATIONS  (PRN):        CAPILLARY BLOOD GLUCOSE        I&O's Summary    29 Jul 2023 07:01  -  30 Jul 2023 07:00  --------------------------------------------------------  IN: 540 mL / OUT: 0 mL / NET: 540 mL        Vital Signs Last 24 Hrs  T(C): 36.9 (30 Jul 2023 08:06), Max: 37.3 (29 Jul 2023 12:20)  T(F): 98.5 (30 Jul 2023 08:06), Max: 99.1 (29 Jul 2023 12:20)  HR: 79 (30 Jul 2023 08:06) (70 - 100)  BP: 102/60 (30 Jul 2023 08:06) (102/60 - 150/80)  BP(mean): --  RR: 18 (30 Jul 2023 08:06) (17 - 18)  SpO2: 99% (30 Jul 2023 08:06) (97% - 99%)      PHYSICAL EXAM  Neck: Supple  Lungs: clear   CVS: S1 S2 no M/R/G  Abdomen: no tenderness  Neuro: alert  Ext: no edema    LABS:                        9.7    12.24 )-----------( 314      ( 30 Jul 2023 07:03 )             30.2     07-30    141  |  106  |  11  ----------------------------<  114<H>  3.4<L>   |  24  |  0.49<L>    Ca    8.3<L>      30 Jul 2023 07:09            Urinalysis Basic - ( 30 Jul 2023 07:09 )    Color: x / Appearance: x / SG: x / pH: x  Gluc: 114 mg/dL / Ketone: x  / Bili: x / Urobili: x   Blood: x / Protein: x / Nitrite: x   Leuk Esterase: x / RBC: x / WBC x   Sq Epi: x / Non Sq Epi: x / Bacteria: x          All consultant(s) notes reviewed and care discussed with other providers        Contact Number, Dr Guerrero [phone]

## 2023-07-30 NOTE — PROGRESS NOTE ADULT - PROBLEM SELECTOR PLAN 1
Original urine culture showing many organisms, multiple gram positive but also GNR including what appear to be E. coli and a mucoid set of colonies (Klebsiella or Enterobacter), none c/w pseudomonas.  Dr Oppenheimer requested lab to do ID and sensitivities of the gram negatives do help guide therapy for longer-term- pending  Changed from Ceftriaxone to Piperacillin/Tazobactam due to persistent fever, although UC/s was suppressed.  F/U ID/susceptibilities of Gm negative urine isolates  Will also cover possibility of aspiration Original urine culture showing many organisms, multiple gram positive but also GNR including what appear to be E. coli and a mucoid set of colonies (Klebsiella or Enterobacter), none c/w pseudomonas.  Dr Oppenheimer requested lab to do ID and sensitivities of the gram negatives do help guide therapy for longer-term- pending  Changed from Ceftriaxone to Piperacillin/Tazobactam due to persistent fever, although UC/s was suppressed.  F/U ID/susceptibilities of Gm negative urine isolates  Will also cover possibility of aspiration- for CT chest, recommend swallowing evaluation (no report of gagging while eating)

## 2023-07-30 NOTE — PROGRESS NOTE ADULT - SUBJECTIVE AND OBJECTIVE BOX
Patient is a 82y old  Male who presents with a chief complaint of hematuria (29 Jul 2023 12:34)    Being followed by ID for UTI, prostatitis, HIV    Interval history:  No acute events      ROS:  Unable due to mental status      Antimicrobials:    darunavir 800 milliGRAM(s) Oral daily  emtricitabine 200 mG/tenofovir alafenamide 25 mG (DESCOVY) Tablet 1 Tablet(s) Oral daily  piperacillin/tazobactam IVPB.. 3.375 Gram(s) IV Intermittent every 8 hours  ritonavir Tablet 100 milliGRAM(s) Oral daily      Vital Signs Last 24 Hrs  T(C): 36.9 (07-30-23 @ 08:06), Max: 37.3 (07-29-23 @ 12:20)  T(F): 98.5 (07-30-23 @ 08:06), Max: 99.1 (07-29-23 @ 12:20)  HR: 79 (07-30-23 @ 08:06) (70 - 100)  BP: 102/60 (07-30-23 @ 08:06) (102/60 - 150/80)  BP(mean): --  RR: 18 (07-30-23 @ 08:06) (17 - 18)  SpO2: 99% (07-30-23 @ 08:06) (97% - 99%)    Physical Exam:    Constitutional well preserved, NAD    HEENT EOMI,No pallor or icterus    No oral exudate or erythema    Neck supple no LN    Chest Clear to auscultation    CVS S1 S2 WNl No murmur or rub or gallop    Abd soft BS normal No tenderness no masses    Ext No cyanosis clubbing or edema    IV site no erythema tenderness or discharge    Joints no swelling or LOM    CNS Non verbal, UE contractures    Lab Data:                          9.7    12.24 )-----------( 314      ( 30 Jul 2023 07:03 )             30.2       07-30    141  |  106  |  11  ----------------------------<  114<H>  3.4<L>   |  24  |  0.49<L>    Ca    8.3<L>      30 Jul 2023 07:09          .Blood Blood-Peripheral  07-28-23   No growth at 48 Hours  --  --      Catheterized Catheterized  07-27-23   <10,000 CFU/mL Normal Urogenital Monae  --  --      Clean Catch Clean Catch (Midstream)  07-25-23   >=3 organisms. Probable collection contamination.  --  --      < from: Xray Chest 1 View- PORTABLE-Urgent (Xray Chest 1 View- PORTABLE-Urgent .) (07.28.23 @ 18:56) >  ACC: [id number] EXAM:  XR CHEST PORTABLE URGENT 1V   ORDERED BY: DALIA HILTON     PROCEDURE DATE:  07/28/2023          INTERPRETATION:  INDICATION: Dementia, fever.    TECHNIQUE: 2 portable views of the chest    COMPARISON: CT scan dated 6/20/2021    FINDINGS/  IMPRESSION: The cardiac silhouette is normal in size. The study is   slightly limited secondary to patient positioning and chin obscuring the   right lung apex. There is a small right pleural effusion and/or right   basilar atelectasis.An underlying right basilar pneumonia cannot be   excluded. The left lung is clear.    < end of copied text >                     Patient is a 82y old  Male who presents with a chief complaint of hematuria (29 Jul 2023 12:34)    Being followed by ID for UTI, prostatitis, HIV    Interval history:  No acute events      ROS:  Unable due to mental status      Antimicrobials:    darunavir 800 milliGRAM(s) Oral daily  emtricitabine 200 mG/tenofovir alafenamide 25 mG (DESCOVY) Tablet 1 Tablet(s) Oral daily  piperacillin/tazobactam IVPB.. 3.375 Gram(s) IV Intermittent every 8 hours  ritonavir Tablet 100 milliGRAM(s) Oral daily      Vital Signs Last 24 Hrs  T(C): 36.9 (07-30-23 @ 08:06), Max: 37.3 (07-29-23 @ 12:20)  T(F): 98.5 (07-30-23 @ 08:06), Max: 99.1 (07-29-23 @ 12:20)  HR: 79 (07-30-23 @ 08:06) (70 - 100)  BP: 102/60 (07-30-23 @ 08:06) (102/60 - 150/80)  BP(mean): --  RR: 18 (07-30-23 @ 08:06) (17 - 18)  SpO2: 99% (07-30-23 @ 08:06) (97% - 99%)    Physical Exam:    Constitutional well preserved, NAD    HEENT EOMI, No pallor or icterus    No oral exudate or erythema    Neck supple no LN    Chest Clear to auscultation    CVS S1 S2 WNl No murmur or rub or gallop    Abd soft BS normal No tenderness no masses    Ext No cyanosis clubbing or edema    IV site no erythema tenderness or discharge    Joints no swelling or LOM    CNS Non verbal, UE contractures    Lab Data:                          9.7    12.24 )-----------( 314      ( 30 Jul 2023 07:03 )             30.2       07-30    141  |  106  |  11  ----------------------------<  114<H>  3.4<L>   |  24  |  0.49<L>    Ca    8.3<L>      30 Jul 2023 07:09          .Blood Blood-Peripheral  07-28-23   No growth at 48 Hours  --  --      Catheterized Catheterized  07-27-23   <10,000 CFU/mL Normal Urogenital Monae  --  --      Clean Catch Clean Catch (Midstream)  07-25-23   >=3 organisms. Probable collection contamination.  --  --      < from: Xray Chest 1 View- PORTABLE-Urgent (Xray Chest 1 View- PORTABLE-Urgent .) (07.28.23 @ 18:56) >  ACC: [id number] EXAM:  XR CHEST PORTABLE URGENT 1V   ORDERED BY: DALIA HILTON     PROCEDURE DATE:  07/28/2023          INTERPRETATION:  INDICATION: Dementia, fever.    TECHNIQUE: 2 portable views of the chest    COMPARISON: CT scan dated 6/20/2021    FINDINGS/  IMPRESSION: The cardiac silhouette is normal in size. The study is   slightly limited secondary to patient positioning and chin obscuring the   right lung apex. There is a small right pleural effusion and/or right   basilar atelectasis.An underlying right basilar pneumonia cannot be   excluded. The left lung is clear.    < end of copied text >

## 2023-07-30 NOTE — PROGRESS NOTE ADULT - ASSESSMENT
82 year old with HIV, dementia, presented with hematuria likely from UTI with suggestion of prostatic involvement on CT although exam less impressive for prostatitis. Switched from ceftriaxone to Piperacillin/tazobactam. Afebrile after 7/28

## 2023-07-30 NOTE — PROGRESS NOTE ADULT - PROBLEM SELECTOR PLAN 1
with sepsis developed after admission   fever resolved   continue piperacillin/tazobactam day 3 today  blood cultures negative and repeat urine culture negative  likely aspiration pneumonia   CT chest non-con done awaiting report

## 2023-07-31 LAB
ANION GAP SERPL CALC-SCNC: 11 MMOL/L — SIGNIFICANT CHANGE UP (ref 5–17)
BUN SERPL-MCNC: 12 MG/DL — SIGNIFICANT CHANGE UP (ref 7–23)
CALCIUM SERPL-MCNC: 8.4 MG/DL — SIGNIFICANT CHANGE UP (ref 8.4–10.5)
CHLORIDE SERPL-SCNC: 108 MMOL/L — SIGNIFICANT CHANGE UP (ref 96–108)
CO2 SERPL-SCNC: 23 MMOL/L — SIGNIFICANT CHANGE UP (ref 22–31)
CREAT SERPL-MCNC: 0.46 MG/DL — LOW (ref 0.5–1.3)
EGFR: 104 ML/MIN/1.73M2 — SIGNIFICANT CHANGE UP
GLUCOSE SERPL-MCNC: 134 MG/DL — HIGH (ref 70–99)
HCT VFR BLD CALC: 29.8 % — LOW (ref 39–50)
HGB BLD-MCNC: 9.5 G/DL — LOW (ref 13–17)
MCHC RBC-ENTMCNC: 30.5 PG — SIGNIFICANT CHANGE UP (ref 27–34)
MCHC RBC-ENTMCNC: 31.9 GM/DL — LOW (ref 32–36)
MCV RBC AUTO: 95.8 FL — SIGNIFICANT CHANGE UP (ref 80–100)
NRBC # BLD: 0 /100 WBCS — SIGNIFICANT CHANGE UP (ref 0–0)
PLATELET # BLD AUTO: 325 K/UL — SIGNIFICANT CHANGE UP (ref 150–400)
POTASSIUM SERPL-MCNC: 3.8 MMOL/L — SIGNIFICANT CHANGE UP (ref 3.5–5.3)
POTASSIUM SERPL-SCNC: 3.8 MMOL/L — SIGNIFICANT CHANGE UP (ref 3.5–5.3)
RBC # BLD: 3.11 M/UL — LOW (ref 4.2–5.8)
RBC # FLD: 14.7 % — HIGH (ref 10.3–14.5)
SODIUM SERPL-SCNC: 142 MMOL/L — SIGNIFICANT CHANGE UP (ref 135–145)
WBC # BLD: 10.58 K/UL — HIGH (ref 3.8–10.5)
WBC # FLD AUTO: 10.58 K/UL — HIGH (ref 3.8–10.5)

## 2023-07-31 PROCEDURE — 99222 1ST HOSP IP/OBS MODERATE 55: CPT

## 2023-07-31 RX ADMIN — EMTRICITABINE AND TENOFOVIR DISOPROXIL FUMARATE 1 TABLET(S): 200; 300 TABLET, FILM COATED ORAL at 11:45

## 2023-07-31 RX ADMIN — ATORVASTATIN CALCIUM 10 MILLIGRAM(S): 80 TABLET, FILM COATED ORAL at 21:01

## 2023-07-31 RX ADMIN — CHLORHEXIDINE GLUCONATE 1 APPLICATION(S): 213 SOLUTION TOPICAL at 11:44

## 2023-07-31 RX ADMIN — PIPERACILLIN AND TAZOBACTAM 25 GRAM(S): 4; .5 INJECTION, POWDER, LYOPHILIZED, FOR SOLUTION INTRAVENOUS at 05:01

## 2023-07-31 RX ADMIN — AMLODIPINE BESYLATE 5 MILLIGRAM(S): 2.5 TABLET ORAL at 05:01

## 2023-07-31 RX ADMIN — Medication 2000 UNIT(S): at 11:44

## 2023-07-31 RX ADMIN — Medication 1 SPRAY(S): at 05:01

## 2023-07-31 RX ADMIN — DONEPEZIL HYDROCHLORIDE 10 MILLIGRAM(S): 10 TABLET, FILM COATED ORAL at 21:01

## 2023-07-31 RX ADMIN — DARUNAVIR 800 MILLIGRAM(S): 75 TABLET, FILM COATED ORAL at 11:44

## 2023-07-31 RX ADMIN — FINASTERIDE 5 MILLIGRAM(S): 5 TABLET, FILM COATED ORAL at 11:45

## 2023-07-31 RX ADMIN — RITONAVIR 100 MILLIGRAM(S): 100 TABLET, FILM COATED ORAL at 11:45

## 2023-07-31 RX ADMIN — Medication 1 SPRAY(S): at 17:40

## 2023-07-31 RX ADMIN — PIPERACILLIN AND TAZOBACTAM 25 GRAM(S): 4; .5 INJECTION, POWDER, LYOPHILIZED, FOR SOLUTION INTRAVENOUS at 13:59

## 2023-07-31 RX ADMIN — PIPERACILLIN AND TAZOBACTAM 25 GRAM(S): 4; .5 INJECTION, POWDER, LYOPHILIZED, FOR SOLUTION INTRAVENOUS at 21:00

## 2023-07-31 RX ADMIN — TAMSULOSIN HYDROCHLORIDE 0.8 MILLIGRAM(S): 0.4 CAPSULE ORAL at 21:01

## 2023-07-31 NOTE — SWALLOW BEDSIDE ASSESSMENT ADULT - SLP PERTINENT HISTORY OF CURRENT PROBLEM
82-year-old male history of Parkinson disease, dementia, hypertension, hyperlipidemia, BPH, HIV not on AC presenting with a chief complaint hematuria; clinical presentation consistent with UTI cystitis and prostatitis. Pt is nonverbal at baseline. Pt intermittently febrile this admission - ID following for abx management.

## 2023-07-31 NOTE — CONSULT NOTE ADULT - SUBJECTIVE AND OBJECTIVE BOX
Wound SURGERY CONSULT NOTE    HPI:  82-year-old male history of Parkinson disease, dementia, hypertension, hyperlipidemia, BPH, HIV not on AC presenting with a chief complaint of hematuria.  Patient is nonverbal at baseline.  History provided by wife at bedside.  Per wife patient has been having bloody urine.  Patient wife was away and was notified when she returned home.  States symptoms have been going on for the past day.  Wife states the blood started off bright red but now is a maroon color.  Per the wife this happened to the patient about 3 years ago and patient was diagnosed with urinary tract infection. The patient is essentially bed bound and totally dependent on his wife and 6 hour aide for ADL         N/V/D,  BM/ Flatus,   NGT,     palp/ sob/dyspnea/ cp,       F/C/S  Wound consult requested by team to assist w/ management of      wound/ pressure injury.   Pt (unable to)  c/o pain, drainage, odor, color change,  or worsening swelling. Offloading and pericare initiated upon admission as pt Increasingly sedentary 2/2 to illness. Pt is Incontinent of urine & stool. (+)nelson/ ostomy.   No h/o bites, scratches, falls, trauma.  Pt seen by Wound RN  [facility]/  [place],TRIAD/ Aquacell/ medihoney/ Allevyn foam/ dakins/ Adaptic/ DSD recommended used at home/ while awaiting consult.  Appetite good/ decreased.  weight loss.  S&S / RD consult appreciated All questions asked and answered to pt's and family's expressed understanding and satisfaction.    Current Diet: Diet, Pureed:   DASH/TLC Sodium & Cholesterol Restricted (DASH) (07-26-23 @ 09:55)      PAST MEDICAL & SURGICAL HISTORY:  HIV (human immunodeficiency virus infection)    HTN (hypertension)    Alzheimer's disease of other onset    No significant past surgical history        REVIEW OF SYSTEMS: Pt unable to offer      MEDICATIONS  (STANDING):  amLODIPine Tablet 5 milliGRAM(s) Oral daily  atorvastatin 10 milliGRAM(s) Oral at bedtime  chlorhexidine 2% Cloths 1 Application(s) Topical daily  cholecalciferol 2000 Unit(s) Oral daily  darunavir 800 milliGRAM(s) Oral daily  donepezil 10 milliGRAM(s) Oral at bedtime  emtricitabine 200 mG/tenofovir alafenamide 25 mG (DESCOVY) Tablet 1 Tablet(s) Oral daily  finasteride 5 milliGRAM(s) Oral daily  fluticasone propionate 50 MICROgram(s)/spray Nasal Spray 1 Spray(s) Both Nostrils two times a day  piperacillin/tazobactam IVPB.. 3.375 Gram(s) IV Intermittent every 8 hours  ritonavir Tablet 100 milliGRAM(s) Oral daily  sodium chloride 0.9%. 2000 milliLiter(s) (50 mL/Hr) IV Continuous <Continuous>  tamsulosin 0.8 milliGRAM(s) Oral at bedtime      No Known Allergies      SOCIAL HISTORY:  / /single/ ; (+)HHA/ lives in SNF; Former smoker, No current/ Denies smoking, ETOH, drugs    FAMILY HISTORY: No pertinent family history in first degree relatives      PHYSICAL EXAM:  Vital Signs Last 24 Hrs  T(C): 37 (31 Jul 2023 12:27), Max: 37.3 (31 Jul 2023 04:20)  T(F): 98.6 (31 Jul 2023 12:27), Max: 99.1 (31 Jul 2023 04:20)  HR: 85 (31 Jul 2023 12:27) (84 - 87)  BP: 106/64 (31 Jul 2023 12:27) (100/60 - 108/63)  BP(mean): --  RR: 18 (31 Jul 2023 12:27) (18 - 18)  SpO2: 98% (31 Jul 2023 12:27) (97% - 98%)    Parameters below as of 31 Jul 2023 12:27  Patient On (Oxygen Delivery Method): room air    NAD, Guarded but stable,  A&Ox3/ Alert/ Confused  cachectic/ thin, MO/ Obese, frail,  WD/ WN/ WG,  Disheveled  Total Care Sport/ Versa Care P500 / Envella Progressa bed     HEENT:  NC/AT, PERRL, EOMI, sclera clear, mucosa moist, throat clear, trachea midline, neck supple, trach  Respiratory: nonlabored w/ equal chest rise  Gastrointestinal: soft NT/ND (+)BS  (+)PEG (+)ostomy (+)NGT  : (+)nelson/ purewick/ condom cath  Neurology:  weakened strength & sensation grossly intact, paraesthesia  nonverbal, no follow commands, paraplegic  Psych: calm/ appropriate/ flat affect/ easily agitated/ restless/ anxious/ difficult to assess  Musculoskeletal:  limited stiff / p/FROM, no deformities/ contractures  Vascular: BLE equally warm/ cool,  no cyanosis, clubbing, edema nor acute ischemia           >LE //BLE edema equal           BLE DP/PT pulses palpable          BLE hemosiderin staining/ varicose veins  Skin:  moist w/ good turgor  thin, dry, pale, frail,  ecchymosis w/o hematoma  blistering  or serosanguinous drainage  No odor, erythema, increased warmth, tenderness, induration, fluctuance, nor crepitus    LABS/ CULTURES/ RADIOLOGY:                        9.5    10.58 )-----------( 325      ( 31 Jul 2023 05:41 )             29.8       142  |  108  |  12  ----------------------------<  134      [07-31-23 @ 05:40]  3.8   |  23  |  0.46        Ca     8.4     [07-31-23 @ 05:40]            Culture - Blood (collected 07-28-23 @ 00:55)  Source: .Blood Blood-Peripheral  Preliminary Report (07-31-23 @ 03:01):    No growth at 72 Hours    Culture - Blood (collected 07-28-23 @ 00:55)  Source: .Blood Blood-Peripheral  Preliminary Report (07-31-23 @ 03:01):    No growth at 72 Hours      ACC: [id number] EXAM:  CT ABDOMEN AND PELVIS IC   ORDERED BY:  NURYS IQBAL     PROCEDURE DATE:  07/25/2023          INTERPRETATION:  CLINICAL INFORMATION: Hematuria    COMPARISON: CT abdomen and pelvis 6/20/2021    CONTRAST/COMPLICATIONS:  IV Contrast: Omnipaque 350  90 cc administered   10 cc discarded  Oral Contrast: NONE  Complications: None reported at time of study completion    PROCEDURE:  CT of the Abdomen and Pelvis was performed.  Sagittal and coronal reformats were performed.    FINDINGS:  LOWER CHEST: Small loculated right pleural effusion with associated   atelectasis, decreased from prior exam on 6/20/2021. Patchy nodular   opacities seen in the dependent left lower lobe, which may represent   atelectasis or pneumonia in the appropriate clinical setting.    LIVER: Within normal limits.  BILE DUCTS: Normal caliber.  GALLBLADDER: Contracted with mild wall thickening, similar prior.   Question tiny cholelithiasis.  SPLEEN: Within normal limits.  PANCREAS: Within normal limits.  ADRENALS: Within normal limits.  KIDNEYS/URETERS: Symmetric renal enhancement. No hydronephrosis.   Bilateral nonobstructing renal calculi. Right renal cysts and additional   subcentimeter hypodense foci bilaterally, which are too small to   characterize.    BLADDER: Thickened, trabeculated bladder wall with mucosal   hyperenhancement and suggestion of layering debris. Diverticulum along   the anterior aspect of the bladder dome.  REPRODUCTIVE ORGANS: Heterogeneous prostate gland with hyperenhancement   along the corpus spongiosum.    BOWEL: Mildly prominent air-filled loops of small bowel without evidence   of bowel obstruction. Colonic diverticulosis without acute   diverticulitis. Appendix is normal.  PERITONEUM: No ascites.  VESSELS: Atherosclerotic changes.  RETROPERITONEUM/LYMPH NODES: No lymphadenopathy.  ABDOMINAL WALL: Within normal limits.  BONES: T2 and T3 vertebral hemangioma with involvement of the posterior   elements. Degenerative changes. Scoliosis.    IMPRESSION:  Findings concerning for cystitis and prostatitis. No organized fluid   collection.    Small loculated right pleural effusion with associated atelectasis,   decreased from prior exam on 6/20/2021. Patchy nodular opacities seen in   the dependent left lowerlobe, which may represent atelectasis or   pneumonia in the appropriate clinical setting.       Wound SURGERY CONSULT NOTE    HPI:  82-year-old male history of Parkinson disease, dementia, hypertension, hyperlipidemia, BPH, HIV not on AC presenting with a chief complaint of hematuria.  Patient is nonverbal at baseline.  History provided by wife at bedside.  Per wife patient has been having bloody urine.  Patient wife was away and was notified when she returned home.  States symptoms have been going on for the past day.  Wife states the blood started off bright red but now is a maroon color.  Per the wife this happened to the patient about 3 years ago and patient was diagnosed with urinary tract infection. The patient is essentially bed bound and totally dependent on his wife and 6 hour aide for ADL.  Currently no N/V/D, palp/ sob/dyspnea/ cp, or F/C/S.  Wound consult requested by team to assist w/ management of sacral pressure injury.  Pt unable to c/o pain, drainage, odor, color change,  or worsening swelling. Offloading and pericare initiated upon admission as pt Increasingly sedentary 2/2 to illness. Pt is Incontinent of urine & stool. No h/o bites, scratches, falls, trauma.  Pt seen by Wound RN  Kelsi/ clarissa dressing recommended.  Appetite so/so. RD consult appreciated     Current Diet: Diet, Pureed:   DASH/TLC Sodium & Cholesterol Restricted (DASH) (07-26-23 @ 09:55)      PAST MEDICAL & SURGICAL HISTORY:  HIV (human immunodeficiency virus infection)    HTN (hypertension)    Alzheimer's disease of other onset    No significant past surgical history        REVIEW OF SYSTEMS: Pt unable to offer      MEDICATIONS  (STANDING):  amLODIPine Tablet 5 milliGRAM(s) Oral daily  atorvastatin 10 milliGRAM(s) Oral at bedtime  chlorhexidine 2% Cloths 1 Application(s) Topical daily  cholecalciferol 2000 Unit(s) Oral daily  darunavir 800 milliGRAM(s) Oral daily  donepezil 10 milliGRAM(s) Oral at bedtime  emtricitabine 200 mG/tenofovir alafenamide 25 mG (DESCOVY) Tablet 1 Tablet(s) Oral daily  finasteride 5 milliGRAM(s) Oral daily  fluticasone propionate 50 MICROgram(s)/spray Nasal Spray 1 Spray(s) Both Nostrils two times a day  piperacillin/tazobactam IVPB.. 3.375 Gram(s) IV Intermittent every 8 hours  ritonavir Tablet 100 milliGRAM(s) Oral daily  sodium chloride 0.9%. 2000 milliLiter(s) (50 mL/Hr) IV Continuous <Continuous>  tamsulosin 0.8 milliGRAM(s) Oral at bedtime      No Known Allergies      SOCIAL HISTORY:  ; (+) HHA, No smoking, ETOH, drugs    FAMILY HISTORY: No pertinent family history in first degree relatives      PHYSICAL EXAM:  Vital Signs Last 24 Hrs  T(C): 37 (31 Jul 2023 12:27), Max: 37.3 (31 Jul 2023 04:20)  T(F): 98.6 (31 Jul 2023 12:27), Max: 99.1 (31 Jul 2023 04:20)  HR: 85 (31 Jul 2023 12:27) (84 - 87)  BP: 106/64 (31 Jul 2023 12:27) (100/60 - 108/63)  BP(mean): --  RR: 18 (31 Jul 2023 12:27) (18 - 18)  SpO2: 98% (31 Jul 2023 12:27) (97% - 98%)    Parameters below as of 31 Jul 2023 12:27  Patient On (Oxygen Delivery Method): room air    NAD, Alert, cachectic, frail  WD/ WN/ WG  Versa Care P500 bed  HEENT:  NC/AT, EOMI, sclera clear, mucosa moist, throat clear, trachea midline, neck supple  Respiratory: nonlabored w/ equal chest rise  Gastrointestinal: soft NT/ND  Neurology:  nonverbal, no follow commands  Psych: calm/ appropriate  Musculoskeletal: FROM, no deformities/ contractures  Vascular: BLE equally warm,  no cyanosis, clubbing, edema nor acute ischemia  Skin:  moist w/ good turgor  Sacral stage 4 pressure injury   granular tissue w/ scant slough- palpable bone- periosteum intact    4cm x 3cm x 1cm. undermining 7-1:00 w/ 4cm tunnel at 7:00    (+) serosanguinous drainage  No odor, erythema, increased warmth, tenderness, induration, fluctuance, nor crepitus    LABS/ CULTURES/ RADIOLOGY:                        9.5    10.58 )-----------( 325      ( 31 Jul 2023 05:41 )             29.8       142  |  108  |  12  ----------------------------<  134      [07-31-23 @ 05:40]  3.8   |  23  |  0.46        Ca     8.4     [07-31-23 @ 05:40]            Culture - Blood (collected 07-28-23 @ 00:55)  Source: .Blood Blood-Peripheral  Preliminary Report (07-31-23 @ 03:01):    No growth at 72 Hours    Culture - Blood (collected 07-28-23 @ 00:55)  Source: .Blood Blood-Peripheral  Preliminary Report (07-31-23 @ 03:01):    No growth at 72 Hours      ACC: [id number] EXAM:  CT ABDOMEN AND PELVIS IC   ORDERED BY:  NURYS IQBAL     PROCEDURE DATE:  07/25/2023      INTERPRETATION:  CLINICAL INFORMATION: Hematuria    COMPARISON: CT abdomen and pelvis 6/20/2021    CONTRAST/COMPLICATIONS:  IV Contrast: Omnipaque 350  90 cc administered   10 cc discarded  Oral Contrast: NONE  Complications: None reported at time of study completion    PROCEDURE:  CT of the Abdomen and Pelvis was performed.  Sagittal and coronal reformats were performed.    FINDINGS:  LOWER CHEST: Small loculated right pleural effusion with associated   atelectasis, decreased from prior exam on 6/20/2021. Patchy nodular   opacities seen in the dependent left lower lobe, which may represent   atelectasis or pneumonia in the appropriate clinical setting.    LIVER: Within normal limits.  BILE DUCTS: Normal caliber.  GALLBLADDER: Contracted with mild wall thickening, similar prior.   Question tiny cholelithiasis.  SPLEEN: Within normal limits.  PANCREAS: Within normal limits.  ADRENALS: Within normal limits.  KIDNEYS/URETERS: Symmetric renal enhancement. No hydronephrosis.   Bilateral nonobstructing renal calculi. Right renal cysts and additional   subcentimeter hypodense foci bilaterally, which are too small to   characterize.    BLADDER: Thickened, trabeculated bladder wall with mucosal   hyperenhancement and suggestion of layering debris. Diverticulum along   the anterior aspect of the bladder dome.  REPRODUCTIVE ORGANS: Heterogeneous prostate gland with hyperenhancement   along the corpus spongiosum.    BOWEL: Mildly prominent air-filled loops of small bowel without evidence   of bowel obstruction. Colonic diverticulosis without acute   diverticulitis. Appendix is normal.  PERITONEUM: No ascites.  VESSELS: Atherosclerotic changes.  RETROPERITONEUM/LYMPH NODES: No lymphadenopathy.  ABDOMINAL WALL: Within normal limits.  BONES: T2 and T3 vertebral hemangioma with involvement of the posterior   elements. Degenerative changes. Scoliosis.    IMPRESSION:  Findings concerning for cystitis and prostatitis. No organized fluid   collection.    Small loculated right pleural effusion with associated atelectasis,   decreased from prior exam on 6/20/2021. Patchy nodular opacities seen in   the dependent left lowerlobe, which may represent atelectasis or   pneumonia in the appropriate clinical setting.       Wound SURGERY CONSULT NOTE    HPI:  82-year-old male history of Parkinson disease, dementia, hypertension, hyperlipidemia, BPH, HIV not on AC presenting with a chief complaint of hematuria.  Patient is nonverbal at baseline.  History provided by wife at bedside.  Per wife patient has been having bloody urine.  Patient wife was away and was notified when she returned home.  States symptoms have been going on for the past day.  Wife states the blood started off bright red but now is a maroon color.  Per the wife this happened to the patient about 3 years ago and patient was diagnosed with urinary tract infection. The patient is essentially bed bound and totally dependent on his wife and 6 hour aide for ADL.  Currently no N/V/D, palp/ sob/dyspnea/ cp, or F/C/S.  Wound consult requested by team to assist w/ management of sacral pressure injury.  Pt unable to c/o pain, drainage, odor, color change,  or worsening swelling. Offloading and pericare initiated upon admission as pt Increasingly sedentary 2/2 to illness. Pt is Incontinent of urine & stool. No h/o bites, scratches, falls, trauma.  Pt seen by Wound RN  Kelsi/ clarissa dressing recommended.  Appetite so/so. RD consult appreciated     Current Diet: Diet, Pureed:   DASH/TLC Sodium & Cholesterol Restricted (DASH) (07-26-23 @ 09:55)      PAST MEDICAL & SURGICAL HISTORY:  HIV (human immunodeficiency virus infection)    HTN (hypertension)    Alzheimer's disease of other onset    No significant past surgical history        REVIEW OF SYSTEMS: Pt unable to offer      MEDICATIONS  (STANDING):  amLODIPine Tablet 5 milliGRAM(s) Oral daily  atorvastatin 10 milliGRAM(s) Oral at bedtime  chlorhexidine 2% Cloths 1 Application(s) Topical daily  cholecalciferol 2000 Unit(s) Oral daily  darunavir 800 milliGRAM(s) Oral daily  donepezil 10 milliGRAM(s) Oral at bedtime  emtricitabine 200 mG/tenofovir alafenamide 25 mG (DESCOVY) Tablet 1 Tablet(s) Oral daily  finasteride 5 milliGRAM(s) Oral daily  fluticasone propionate 50 MICROgram(s)/spray Nasal Spray 1 Spray(s) Both Nostrils two times a day  piperacillin/tazobactam IVPB.. 3.375 Gram(s) IV Intermittent every 8 hours  ritonavir Tablet 100 milliGRAM(s) Oral daily  sodium chloride 0.9%. 2000 milliLiter(s) (50 mL/Hr) IV Continuous <Continuous>  tamsulosin 0.8 milliGRAM(s) Oral at bedtime      No Known Allergies      SOCIAL HISTORY:  ; (+) HHA, No smoking, ETOH, drugs    FAMILY HISTORY: No pertinent family history in first degree relatives      PHYSICAL EXAM:  Vital Signs Last 24 Hrs  T(C): 37 (31 Jul 2023 12:27), Max: 37.3 (31 Jul 2023 04:20)  T(F): 98.6 (31 Jul 2023 12:27), Max: 99.1 (31 Jul 2023 04:20)  HR: 85 (31 Jul 2023 12:27) (84 - 87)  BP: 106/64 (31 Jul 2023 12:27) (100/60 - 108/63)  BP(mean): --  RR: 18 (31 Jul 2023 12:27) (18 - 18)  SpO2: 98% (31 Jul 2023 12:27) (97% - 98%)    Parameters below as of 31 Jul 2023 12:27  Patient On (Oxygen Delivery Method): room air    NAD, Alert, cachectic, frail  WD/ WN/ WG  Versa Care P500 bed  HEENT:  NC/AT, EOMI, sclera clear, mucosa moist, throat clear, trachea midline, neck supple  Respiratory: nonlabored w/ equal chest rise  Gastrointestinal: soft NT/ND  Neurology:  nonverbal, no follow commands  Psych: calm/ appropriate  Musculoskeletal: FROM, no deformities/ contractures  Vascular: BLE equally warm,  no cyanosis, clubbing, edema nor acute ischemia, +DP pulses palpable BLE  Skin:  moist w/ good turgor  Sacral stage 4 pressure injury   granular tissue w/ scant slough- palpable bone- periosteum intact    4cm x 3cm x 1cm. undermining 7-1:00 w/ 4cm tunnel at 7:00    (+) serosanguinous drainage  No odor, erythema, increased warmth, tenderness, induration, fluctuance, nor crepitus    LABS/ CULTURES/ RADIOLOGY:                        9.5    10.58 )-----------( 325      ( 31 Jul 2023 05:41 )             29.8       142  |  108  |  12  ----------------------------<  134      [07-31-23 @ 05:40]  3.8   |  23  |  0.46        Ca     8.4     [07-31-23 @ 05:40]            Culture - Blood (collected 07-28-23 @ 00:55)  Source: .Blood Blood-Peripheral  Preliminary Report (07-31-23 @ 03:01):    No growth at 72 Hours    Culture - Blood (collected 07-28-23 @ 00:55)  Source: .Blood Blood-Peripheral  Preliminary Report (07-31-23 @ 03:01):    No growth at 72 Hours      ACC: [id number] EXAM:  CT ABDOMEN AND PELVIS IC   ORDERED BY:  NURYS IQBAL     PROCEDURE DATE:  07/25/2023      INTERPRETATION:  CLINICAL INFORMATION: Hematuria    COMPARISON: CT abdomen and pelvis 6/20/2021    CONTRAST/COMPLICATIONS:  IV Contrast: Omnipaque 350  90 cc administered   10 cc discarded  Oral Contrast: NONE  Complications: None reported at time of study completion    PROCEDURE:  CT of the Abdomen and Pelvis was performed.  Sagittal and coronal reformats were performed.    FINDINGS:  LOWER CHEST: Small loculated right pleural effusion with associated   atelectasis, decreased from prior exam on 6/20/2021. Patchy nodular   opacities seen in the dependent left lower lobe, which may represent   atelectasis or pneumonia in the appropriate clinical setting.    LIVER: Within normal limits.  BILE DUCTS: Normal caliber.  GALLBLADDER: Contracted with mild wall thickening, similar prior.   Question tiny cholelithiasis.  SPLEEN: Within normal limits.  PANCREAS: Within normal limits.  ADRENALS: Within normal limits.  KIDNEYS/URETERS: Symmetric renal enhancement. No hydronephrosis.   Bilateral nonobstructing renal calculi. Right renal cysts and additional   subcentimeter hypodense foci bilaterally, which are too small to   characterize.    BLADDER: Thickened, trabeculated bladder wall with mucosal   hyperenhancement and suggestion of layering debris. Diverticulum along   the anterior aspect of the bladder dome.  REPRODUCTIVE ORGANS: Heterogeneous prostate gland with hyperenhancement   along the corpus spongiosum.    BOWEL: Mildly prominent air-filled loops of small bowel without evidence   of bowel obstruction. Colonic diverticulosis without acute   diverticulitis. Appendix is normal.  PERITONEUM: No ascites.  VESSELS: Atherosclerotic changes.  RETROPERITONEUM/LYMPH NODES: No lymphadenopathy.  ABDOMINAL WALL: Within normal limits.  BONES: T2 and T3 vertebral hemangioma with involvement of the posterior   elements. Degenerative changes. Scoliosis.    IMPRESSION:  Findings concerning for cystitis and prostatitis. No organized fluid   collection.    Small loculated right pleural effusion with associated atelectasis,   decreased from prior exam on 6/20/2021. Patchy nodular opacities seen in   the dependent left lowerlobe, which may represent atelectasis or   pneumonia in the appropriate clinical setting.

## 2023-07-31 NOTE — SWALLOW VFSS/MBS ASSESSMENT ADULT - SLP GENERAL OBSERVATIONS
Pt encountered contracted in bed, on RA, nonverbal. Does not follow commands. Wife at bedside and confirmed pt eats pureed solids and thin liquids via tsp at home. Stated she has pureed meals delivered weekly. Denied coughing during PO intake at home.

## 2023-07-31 NOTE — PROGRESS NOTE ADULT - SUBJECTIVE AND OBJECTIVE BOX
Patient is a 82y old  Male who presents with a chief complaint of hematuria (31 Jul 2023 14:22)      DATE OF SERVICE: 07-31-23 @ 15:35    SUBJECTIVE / OVERNIGHT EVENTS: overnight events noted    ROS:  Resp: No cough no sputum production  CVS: No chest pain no palpitations no orthopnea  GI: no N/V/D  : no dysuria, no hematuria  Neuro: no weakness no paresthesias  Heme: No petechiae no easy bruising  Msk: No joint pain no swelling  Skin: No rash no itching        MEDICATIONS  (STANDING):  amLODIPine   Tablet 5 milliGRAM(s) Oral daily  atorvastatin 10 milliGRAM(s) Oral at bedtime  chlorhexidine 2% Cloths 1 Application(s) Topical daily  cholecalciferol 2000 Unit(s) Oral daily  darunavir 800 milliGRAM(s) Oral daily  donepezil 10 milliGRAM(s) Oral at bedtime  emtricitabine 200 mG/tenofovir alafenamide 25 mG (DESCOVY) Tablet 1 Tablet(s) Oral daily  finasteride 5 milliGRAM(s) Oral daily  fluticasone propionate 50 MICROgram(s)/spray Nasal Spray 1 Spray(s) Both Nostrils two times a day  piperacillin/tazobactam IVPB.. 3.375 Gram(s) IV Intermittent every 8 hours  ritonavir Tablet 100 milliGRAM(s) Oral daily  sodium chloride 0.9%. 2000 milliLiter(s) (50 mL/Hr) IV Continuous <Continuous>  tamsulosin 0.8 milliGRAM(s) Oral at bedtime    MEDICATIONS  (PRN):        CAPILLARY BLOOD GLUCOSE        I&O's Summary    30 Jul 2023 07:01  -  31 Jul 2023 07:00  --------------------------------------------------------  IN: 600 mL / OUT: 0 mL / NET: 600 mL        Vital Signs Last 24 Hrs  T(C): 37 (31 Jul 2023 12:27), Max: 37.3 (31 Jul 2023 04:20)  T(F): 98.6 (31 Jul 2023 12:27), Max: 99.1 (31 Jul 2023 04:20)  HR: 85 (31 Jul 2023 12:27) (84 - 87)  BP: 106/64 (31 Jul 2023 12:27) (100/60 - 108/63)  BP(mean): --  RR: 18 (31 Jul 2023 12:27) (18 - 18)  SpO2: 98% (31 Jul 2023 12:27) (97% - 98%)    PHYSICAL EXAM  Neck: Supple  Lungs: clear   CVS: S1 S2 no M/R/G  Abdomen: no tenderness  Neuro: alert  Ext: no edema    LABS:                        9.5    10.58 )-----------( 325      ( 31 Jul 2023 05:41 )             29.8     07-31    142  |  108  |  12  ----------------------------<  134<H>  3.8   |  23  |  0.46<L>    Ca    8.4      31 Jul 2023 05:40            Urinalysis Basic - ( 31 Jul 2023 05:40 )    Color: x / Appearance: x / SG: x / pH: x  Gluc: 134 mg/dL / Ketone: x  / Bili: x / Urobili: x   Blood: x / Protein: x / Nitrite: x   Leuk Esterase: x / RBC: x / WBC x   Sq Epi: x / Non Sq Epi: x / Bacteria: x          All consultant(s) notes reviewed and care discussed with other providers        Contact Number, Dr Guerrero [phone]

## 2023-07-31 NOTE — SWALLOW BEDSIDE ASSESSMENT ADULT - ORAL PREPARATORY PHASE
reduced orientation to feeding task; unable to express liquid from straw consistently reduced orientation to feeding task

## 2023-07-31 NOTE — SWALLOW BEDSIDE ASSESSMENT ADULT - SWALLOW EVAL: DIAGNOSIS
Patient admitted with hematuria in setting of UTI. PMHx notable for HIV, Parkinson's disease, and dementia. Patient presents with an oropharyngeal dysphagia characterized by reduced orientation to feeding task, delayed oral transit time, delayed trigger of the swallow, and multiple swallows per bolus. No overt s/s of laryngeal penetration/aspiration observed with purees and thin liquids, however, given co-morbidities pt is at high risk for silent aspiration.

## 2023-07-31 NOTE — SWALLOW BEDSIDE ASSESSMENT ADULT - COMMENTS
CT chest 7/30:  IMPRESSION:  Patchy groundglass and consolidative opacities in the left lower lobe may   be infectious or inflammatory in etiology.  Small right pleural effusion.  7 mm nodules in the right upper lobe, one of which is new. Six-month   follow-up chest CT suggested for further evaluation.

## 2023-07-31 NOTE — SWALLOW BEDSIDE ASSESSMENT ADULT - SLP GENERAL OBSERVATIONS
Patient encountered contracted in bed; nonverbal; eyes closed. Does not follow commands. Did open oral cavity in response to tsp and cup.

## 2023-07-31 NOTE — SWALLOW VFSS/MBS ASSESSMENT ADULT - DIAGNOSTIC IMPRESSIONS
Patient admitted with hematuria in setting of UTI. PMHx notable for HIV, Parkinson's disease, and dementia. Seen for FEES to r/o aspiration. Patient presents with a mild oropharyngeal dysphagia characterized by anterior loss of liquids, piecemeal deglutition, spillage into the pharynx prior to airway closure, and delayed trigger of the swallow. No laryngeal penetration or aspiration observed with pureed solids and thin liquids.    Disorders: reduced lingual strength/ROM/Rate of motion, delay in trigger of the swallow reflex.

## 2023-07-31 NOTE — SWALLOW VFSS/MBS ASSESSMENT ADULT - RECOMMENDED FEEDING/EATING TECHNIQUES
allow for swallow between intakes/alternate food with liquid/crush medication (when feasible)/maintain upright posture during/after eating for 30 mins/small sips/bites

## 2023-07-31 NOTE — SWALLOW BEDSIDE ASSESSMENT ADULT - ASR SWALLOW RECOMMEND DIAG
Can consider instrumental swallow study to r/o silent aspiration if pt/family wishes align with further dysphagia w/u, however, would possibly yield suboptimal results as pt is contracted and cannot follow commands.

## 2023-07-31 NOTE — SWALLOW BEDSIDE ASSESSMENT ADULT - SWALLOW EVAL: RECOMMENDED DIET
Pureed solids and thin liquids via tsp or small cup sips. Recommend GOC discussion in regards to provision of nutrition to determine if further dysphagia w/u is warranted. Pureed solids and thin liquids via tsp or small cup sips with 100% assist. Recommend GOC discussion in regards to provision of nutrition to determine if further dysphagia w/u is warranted.

## 2023-07-31 NOTE — SWALLOW BEDSIDE ASSESSMENT ADULT - ADDITIONAL RECOMMENDATIONS
Maintain good oral hygiene.  Consider Palliative consult if family has questions regarding PO feeding vs artificial nutrition.

## 2023-07-31 NOTE — SWALLOW BEDSIDE ASSESSMENT ADULT - SWALLOW EVAL: RECOMMENDED FEEDING/EATING TECHNIQUES
allow for swallow between intakes/crush medication (when feasible)/maintain upright posture during/after eating for 30 mins/no straws/small sips/bites

## 2023-07-31 NOTE — PROGRESS NOTE ADULT - PROBLEM SELECTOR PLAN 1
with sepsis developed after admission   continue piperacillin/tazobactam day 5 today  CT confirms LLL pneumonia likely aspiration pneumonia   likely 5 days total  ID follow up   FEEST done no aspiration on feeding  continue to follow recommendations

## 2023-07-31 NOTE — CONSULT NOTE ADULT - ASSESSMENT
A/P: 82-year-old male history of Parkinson disease, dementia, hypertension, hyperlipidemia, BPH, HIV not on AC presenting with a chief complaint hematuria clinical presentation consistent with UTI cystitis and prostatitis      Wound Consult requested to assist w/ management of Sacral Stage 4 pressure injury    Sacrum - Aquacel packing QD  BLE elevation & Compression  A/P CT as per medicine- no sacral osteo noted  Abx per Medicine/ ID  Moisturize intact skin w/ SWEEN cream BID  Nutrition Consult for optimization in pt w/ Moderate Protein Calorie Malnutrition,        encourage high quality protein, MVI & Vit C to promote wound healing  Continue turning and positioning w/ offloading assistive devices as per protocol  Buttocks/ Sacrum CAVILON ADVANCE TIW and prn soiling        Continue w/ attends under pads and Pericare care as per protocol  Waffle Cushion to chair when oob to chair  Continue w/ low air loss pressure redistribution bed surface   Pt will need Group 2 mattress on hospital bed and ROHO cushion for wheel chair upon discharge home  Care as per medicine, will follow w/ you  Upon discharge f/u as outpatient at [facility] [address] [place], [place] [phone]  Seen w/ attng and D/w team & RN  Thank you for this consult  Isabella Nagy PA-C [id number]  Nights/ Weekends/ Holidays please call:  General Surgery Consult pager [phone]) for emergencies  Wound PT for multilayer leg wrapping or VAC issues ([phone])   I spent 55minutes face to face w/ this pt of which more than 50% of the time was spent counseling & coordinating care of this pt.  A/P: 82-year-old male history of Parkinson disease, dementia, hypertension, hyperlipidemia, BPH, HIV not on AC presenting with a chief complaint hematuria clinical presentation consistent with UTI cystitis and prostatitis      Wound Consult requested to assist w/ management of:  Sacral Stage 4 pressure injury    Sacrum - Aquacel packing QD  BLE elevation & Compression  A/P CT as per medicine- no sacral osteo noted  Abx per Medicine/ ID  Moisturize intact skin w/ SWEEN cream BID  Nutrition Consult for optimization in pt w/ Moderate Protein Calorie Malnutrition,        encourage high quality protein, MVI & Vit C to promote wound healing  Continue turning and positioning w/ offloading assistive devices as per protocol  Buttocks/ Sacrum CAVILON ADVANCE TIW and prn soiling        Continue w/ attends under pads and Pericare care as per protocol  Waffle Cushion to chair when oob to chair  Continue w/ low air loss pressure redistribution bed surface   Pt will need Group 2 mattress on hospital bed and ROHO cushion for wheel chair upon discharge home  Care as per medicine, will follow w/ you  Upon discharge f/u as outpatient at [facility] [address] [place] [address] [phone]  Seen w/ attng and D/w team & RN  Thank you for this consult  Isabella Nagy PA-C CWS [id number]  Nights/ Weekends/ Holidays please call:  General Surgery Consult pager ([phone]) for emergencies  Wound PT for multilayer leg wrapping or VAC issues (x [phone])

## 2023-08-01 LAB
ANION GAP SERPL CALC-SCNC: 9 MMOL/L — SIGNIFICANT CHANGE UP (ref 5–17)
BUN SERPL-MCNC: 11 MG/DL — SIGNIFICANT CHANGE UP (ref 7–23)
CALCIUM SERPL-MCNC: 8.7 MG/DL — SIGNIFICANT CHANGE UP (ref 8.4–10.5)
CHLORIDE SERPL-SCNC: 107 MMOL/L — SIGNIFICANT CHANGE UP (ref 96–108)
CO2 SERPL-SCNC: 28 MMOL/L — SIGNIFICANT CHANGE UP (ref 22–31)
CREAT SERPL-MCNC: 0.55 MG/DL — SIGNIFICANT CHANGE UP (ref 0.5–1.3)
EGFR: 99 ML/MIN/1.73M2 — SIGNIFICANT CHANGE UP
GLUCOSE SERPL-MCNC: 130 MG/DL — HIGH (ref 70–99)
HCT VFR BLD CALC: 31.5 % — LOW (ref 39–50)
HGB BLD-MCNC: 9.9 G/DL — LOW (ref 13–17)
MCHC RBC-ENTMCNC: 30.1 PG — SIGNIFICANT CHANGE UP (ref 27–34)
MCHC RBC-ENTMCNC: 31.4 GM/DL — LOW (ref 32–36)
MCV RBC AUTO: 95.7 FL — SIGNIFICANT CHANGE UP (ref 80–100)
NRBC # BLD: 0 /100 WBCS — SIGNIFICANT CHANGE UP (ref 0–0)
PLATELET # BLD AUTO: 351 K/UL — SIGNIFICANT CHANGE UP (ref 150–400)
POTASSIUM SERPL-MCNC: 4 MMOL/L — SIGNIFICANT CHANGE UP (ref 3.5–5.3)
POTASSIUM SERPL-SCNC: 4 MMOL/L — SIGNIFICANT CHANGE UP (ref 3.5–5.3)
RBC # BLD: 3.29 M/UL — LOW (ref 4.2–5.8)
RBC # FLD: 14.6 % — HIGH (ref 10.3–14.5)
SODIUM SERPL-SCNC: 144 MMOL/L — SIGNIFICANT CHANGE UP (ref 135–145)
WBC # BLD: 9.26 K/UL — SIGNIFICANT CHANGE UP (ref 3.8–10.5)
WBC # FLD AUTO: 9.26 K/UL — SIGNIFICANT CHANGE UP (ref 3.8–10.5)

## 2023-08-01 PROCEDURE — 99231 SBSQ HOSP IP/OBS SF/LOW 25: CPT

## 2023-08-01 RX ADMIN — Medication 1 SPRAY(S): at 17:32

## 2023-08-01 RX ADMIN — TAMSULOSIN HYDROCHLORIDE 0.8 MILLIGRAM(S): 0.4 CAPSULE ORAL at 21:05

## 2023-08-01 RX ADMIN — EMTRICITABINE AND TENOFOVIR DISOPROXIL FUMARATE 1 TABLET(S): 200; 300 TABLET, FILM COATED ORAL at 12:37

## 2023-08-01 RX ADMIN — ATORVASTATIN CALCIUM 10 MILLIGRAM(S): 80 TABLET, FILM COATED ORAL at 21:05

## 2023-08-01 RX ADMIN — FINASTERIDE 5 MILLIGRAM(S): 5 TABLET, FILM COATED ORAL at 12:37

## 2023-08-01 RX ADMIN — DARUNAVIR 800 MILLIGRAM(S): 75 TABLET, FILM COATED ORAL at 12:37

## 2023-08-01 RX ADMIN — RITONAVIR 100 MILLIGRAM(S): 100 TABLET, FILM COATED ORAL at 12:37

## 2023-08-01 RX ADMIN — DONEPEZIL HYDROCHLORIDE 10 MILLIGRAM(S): 10 TABLET, FILM COATED ORAL at 21:05

## 2023-08-01 RX ADMIN — Medication 2000 UNIT(S): at 12:36

## 2023-08-01 RX ADMIN — PIPERACILLIN AND TAZOBACTAM 25 GRAM(S): 4; .5 INJECTION, POWDER, LYOPHILIZED, FOR SOLUTION INTRAVENOUS at 05:00

## 2023-08-01 RX ADMIN — CHLORHEXIDINE GLUCONATE 1 APPLICATION(S): 213 SOLUTION TOPICAL at 12:38

## 2023-08-01 RX ADMIN — Medication 1 SPRAY(S): at 05:00

## 2023-08-01 RX ADMIN — PIPERACILLIN AND TAZOBACTAM 25 GRAM(S): 4; .5 INJECTION, POWDER, LYOPHILIZED, FOR SOLUTION INTRAVENOUS at 13:18

## 2023-08-01 RX ADMIN — PIPERACILLIN AND TAZOBACTAM 25 GRAM(S): 4; .5 INJECTION, POWDER, LYOPHILIZED, FOR SOLUTION INTRAVENOUS at 21:05

## 2023-08-01 RX ADMIN — AMLODIPINE BESYLATE 5 MILLIGRAM(S): 2.5 TABLET ORAL at 05:00

## 2023-08-01 NOTE — PROGRESS NOTE ADULT - ASSESSMENT
82 year old with HIV, dementia, presented with hematuria likely from UTI with suggestion of prostatic involvement on CT although exam less impressive for prostatitis. Switched from ceftriaxone to Piperacillin/tazobactam. Possible aspiration PNA on CT. Afebrile after 7/28

## 2023-08-01 NOTE — PROGRESS NOTE ADULT - NSPROGADDITIONALINFOA_GEN_ALL_CORE
Michael I. Oppenheim, MD  Division of Infectious Diseases  Reachable on Teams  Pager: [phone]  O: [phone] if nights/weekends/no response

## 2023-08-01 NOTE — PROGRESS NOTE ADULT - SUBJECTIVE AND OBJECTIVE BOX
Patient is a 82y old  Male who presents with a chief complaint of hematuria (31 Jul 2023 15:33)      DATE OF SERVICE: 08-01-23 @ 14:29    SUBJECTIVE / OVERNIGHT EVENTS: overnight events noted    ROS:  not available       MEDICATIONS  (STANDING):  amLODIPine   Tablet 5 milliGRAM(s) Oral daily  atorvastatin 10 milliGRAM(s) Oral at bedtime  chlorhexidine 2% Cloths 1 Application(s) Topical daily  cholecalciferol 2000 Unit(s) Oral daily  darunavir 800 milliGRAM(s) Oral daily  donepezil 10 milliGRAM(s) Oral at bedtime  emtricitabine 200 mG/tenofovir alafenamide 25 mG (DESCOVY) Tablet 1 Tablet(s) Oral daily  finasteride 5 milliGRAM(s) Oral daily  fluticasone propionate 50 MICROgram(s)/spray Nasal Spray 1 Spray(s) Both Nostrils two times a day  piperacillin/tazobactam IVPB.. 3.375 Gram(s) IV Intermittent every 8 hours  ritonavir Tablet 100 milliGRAM(s) Oral daily  sodium chloride 0.9%. 2000 milliLiter(s) (50 mL/Hr) IV Continuous <Continuous>  tamsulosin 0.8 milliGRAM(s) Oral at bedtime    MEDICATIONS  (PRN):        CAPILLARY BLOOD GLUCOSE        I&O's Summary    31 Jul 2023 07:01  -  01 Aug 2023 07:00  --------------------------------------------------------  IN: 1260 mL / OUT: 0 mL / NET: 1260 mL        Vital Signs Last 24 Hrs  T(C): 36.6 (01 Aug 2023 08:59), Max: 37 (31 Jul 2023 19:34)  T(F): 97.8 (01 Aug 2023 08:59), Max: 98.6 (31 Jul 2023 19:34)  HR: 95 (01 Aug 2023 08:59) (91 - 98)  BP: 123/62 (01 Aug 2023 08:59) (106/62 - 129/64)  BP(mean): --  RR: 18 (01 Aug 2023 08:59) (18 - 18)  SpO2: 97% (01 Aug 2023 04:40) (97% - 98%)    PHYSICAL EXAM  Neck: Supple  Lungs: clear   CVS: S1 S2 no M/R/G  Abdomen: no tenderness  Neuro: alert  Ext: no edema    LABS:                        9.9    9.26  )-----------( 351      ( 01 Aug 2023 07:20 )             31.5     08-01    144  |  107  |  11  ----------------------------<  130<H>  4.0   |  28  |  0.55    Ca    8.7      01 Aug 2023 07:15            Urinalysis Basic - ( 01 Aug 2023 07:15 )    Color: x / Appearance: x / SG: x / pH: x  Gluc: 130 mg/dL / Ketone: x  / Bili: x / Urobili: x   Blood: x / Protein: x / Nitrite: x   Leuk Esterase: x / RBC: x / WBC x   Sq Epi: x / Non Sq Epi: x / Bacteria: x          All consultant(s) notes reviewed and care discussed with other providers        Contact Number, Dr Guerrero [phone]

## 2023-08-01 NOTE — PROGRESS NOTE ADULT - SUBJECTIVE AND OBJECTIVE BOX
INFECTIOUS DISEASES FOLLOW UP--Michael Oppenheim, MD      Interval History:  Doing well - afebrile since 6/28   Wife feels is doing well and back at baseline      MEDICATIONS:  antimicrobials  darunavir 800 milliGRAM(s) Oral daily  emtricitabine 200 mG/tenofovir alafenamide 25 mG (DESCOVY) Tablet 1 Tablet(s) Oral daily  Ceftriaxone 7/26 - 7/28  piperacillin/tazobactam IVPB.. 3.375 Gram(s) IV Intermittent every 8 hours 7/28-  ritonavir Tablet 100 milliGRAM(s) Oral daily    immunologic:    OTHER:  amLODIPine   Tablet 5 milliGRAM(s) Oral daily  atorvastatin 10 milliGRAM(s) Oral at bedtime  chlorhexidine 2% Cloths 1 Application(s) Topical daily  cholecalciferol 2000 Unit(s) Oral daily  donepezil 10 milliGRAM(s) Oral at bedtime  finasteride 5 milliGRAM(s) Oral daily  fluticasone propionate 50 MICROgram(s)/spray Nasal Spray 1 Spray(s) Both Nostrils two times a day  sodium chloride 0.9%. 2000 milliLiter(s) IV Continuous <Continuous>  tamsulosin 0.8 milliGRAM(s) Oral at bedtime      Objective:  T(C): 36.6 (08-01-23 @ 08:59), Max: 37 (07-31-23 @ 19:34)  HR: 95 (08-01-23 @ 08:59) (91 - 98)  BP: 123/62 (08-01-23 @ 08:59) (106/62 - 129/64)  RR: 18 (08-01-23 @ 08:59) (18 - 18)  SpO2: 97% (08-01-23 @ 04:40) (97% - 98%)    PHYSICAL EXAM:  Respiratory: Coarse BS R>L, no focal consolidation  Cardiovascular:  RRR No G/R/M  Gastrointestinal: Soft. +BS. No HSM        LABS:                        9.9    9.26  )-----------( 351      ( 01 Aug 2023 07:20 )             31.5     08-01    144  |  107  |  11  ----------------------------<  130<H>  4.0   |  28  |  0.55    Ca    8.7      01 Aug 2023 07:15

## 2023-08-01 NOTE — PROGRESS NOTE ADULT - PROBLEM SELECTOR PLAN 1
Original urine culture showing many organisms, multiple gram positive but also GNR including what appear to be E. coli and a mucoid set of colonies (Klebsiella or Enterobacter), none c/w pseudomonas.  Changed from Ceftriaxone to Piperacillin/Tazobactam due to persistent fever, although UC/s was suppressed.  Urine GN organisms all highly sensitive  Also covered for possibility of aspiration; swallow evaluation shows able to swallow liquids with assist  If discharged tomorrow, would treat with amoxcillin/clavulanic acid through 8/4. Per wife, elixir preferred to crushable pills. Would use elixir form with amoxicillin 400 mg / clavulanate 57 mg per 5 ml; take 10 ml BID

## 2023-08-01 NOTE — PROGRESS NOTE ADULT - PROBLEM SELECTOR PLAN 1
with sepsis developed after admission   continue piperacillin/tazobactam day 5 today  clinically improving  likely discontinue antibiotics after today  ID follow up

## 2023-08-02 ENCOUNTER — TRANSCRIPTION ENCOUNTER (OUTPATIENT)
Age: 83
End: 2023-08-02

## 2023-08-02 VITALS
OXYGEN SATURATION: 98 % | TEMPERATURE: 99 F | HEART RATE: 85 BPM | SYSTOLIC BLOOD PRESSURE: 110 MMHG | DIASTOLIC BLOOD PRESSURE: 66 MMHG | RESPIRATION RATE: 18 BRPM

## 2023-08-02 PROCEDURE — 92610 EVALUATE SWALLOWING FUNCTION: CPT

## 2023-08-02 PROCEDURE — 84132 ASSAY OF SERUM POTASSIUM: CPT

## 2023-08-02 PROCEDURE — 92612 ENDOSCOPY SWALLOW (FEES) VID: CPT

## 2023-08-02 PROCEDURE — 85018 HEMOGLOBIN: CPT

## 2023-08-02 PROCEDURE — 81001 URINALYSIS AUTO W/SCOPE: CPT

## 2023-08-02 PROCEDURE — 82746 ASSAY OF FOLIC ACID SERUM: CPT

## 2023-08-02 PROCEDURE — 87086 URINE CULTURE/COLONY COUNT: CPT

## 2023-08-02 PROCEDURE — 85610 PROTHROMBIN TIME: CPT

## 2023-08-02 PROCEDURE — 96374 THER/PROPH/DIAG INJ IV PUSH: CPT

## 2023-08-02 PROCEDURE — 80053 COMPREHEN METABOLIC PANEL: CPT

## 2023-08-02 PROCEDURE — 87640 STAPH A DNA AMP PROBE: CPT

## 2023-08-02 PROCEDURE — 93005 ELECTROCARDIOGRAM TRACING: CPT

## 2023-08-02 PROCEDURE — 82803 BLOOD GASES ANY COMBINATION: CPT

## 2023-08-02 PROCEDURE — 82435 ASSAY OF BLOOD CHLORIDE: CPT

## 2023-08-02 PROCEDURE — 84295 ASSAY OF SERUM SODIUM: CPT

## 2023-08-02 PROCEDURE — 87040 BLOOD CULTURE FOR BACTERIA: CPT

## 2023-08-02 PROCEDURE — 87641 MR-STAPH DNA AMP PROBE: CPT

## 2023-08-02 PROCEDURE — 82947 ASSAY GLUCOSE BLOOD QUANT: CPT

## 2023-08-02 PROCEDURE — 36415 COLL VENOUS BLD VENIPUNCTURE: CPT

## 2023-08-02 PROCEDURE — 84443 ASSAY THYROID STIM HORMONE: CPT

## 2023-08-02 PROCEDURE — 71045 X-RAY EXAM CHEST 1 VIEW: CPT

## 2023-08-02 PROCEDURE — 82330 ASSAY OF CALCIUM: CPT

## 2023-08-02 PROCEDURE — 84100 ASSAY OF PHOSPHORUS: CPT

## 2023-08-02 PROCEDURE — 80048 BASIC METABOLIC PNL TOTAL CA: CPT

## 2023-08-02 PROCEDURE — 85730 THROMBOPLASTIN TIME PARTIAL: CPT

## 2023-08-02 PROCEDURE — 86850 RBC ANTIBODY SCREEN: CPT

## 2023-08-02 PROCEDURE — 87077 CULTURE AEROBIC IDENTIFY: CPT

## 2023-08-02 PROCEDURE — 86901 BLOOD TYPING SEROLOGIC RH(D): CPT

## 2023-08-02 PROCEDURE — 85025 COMPLETE CBC W/AUTO DIFF WBC: CPT

## 2023-08-02 PROCEDURE — 94640 AIRWAY INHALATION TREATMENT: CPT

## 2023-08-02 PROCEDURE — 85027 COMPLETE CBC AUTOMATED: CPT

## 2023-08-02 PROCEDURE — 87186 SC STD MICRODIL/AGAR DIL: CPT

## 2023-08-02 PROCEDURE — 86900 BLOOD TYPING SEROLOGIC ABO: CPT

## 2023-08-02 PROCEDURE — 83605 ASSAY OF LACTIC ACID: CPT

## 2023-08-02 PROCEDURE — 99285 EMERGENCY DEPT VISIT HI MDM: CPT | Mod: 25

## 2023-08-02 PROCEDURE — 85014 HEMATOCRIT: CPT

## 2023-08-02 PROCEDURE — 74177 CT ABD & PELVIS W/CONTRAST: CPT | Mod: MA

## 2023-08-02 PROCEDURE — 82607 VITAMIN B-12: CPT

## 2023-08-02 PROCEDURE — 71250 CT THORAX DX C-: CPT

## 2023-08-02 RX ORDER — RITONAVIR 100 MG/1
100 TABLET, FILM COATED ORAL DAILY
Refills: 0 | Status: DISCONTINUED | OUTPATIENT
Start: 2023-08-02 | End: 2023-08-02

## 2023-08-02 RX ORDER — TAMSULOSIN HYDROCHLORIDE 0.4 MG/1
2 CAPSULE ORAL
Qty: 0 | Refills: 0 | DISCHARGE
Start: 2023-08-02

## 2023-08-02 RX ORDER — FLUTICASONE PROPIONATE 50 MCG
1 SPRAY, SUSPENSION NASAL
Qty: 0 | Refills: 0 | DISCHARGE
Start: 2023-08-02

## 2023-08-02 RX ORDER — TAMSULOSIN HYDROCHLORIDE 0.4 MG/1
2 CAPSULE ORAL
Refills: 0 | DISCHARGE

## 2023-08-02 RX ORDER — CHOLECALCIFEROL (VITAMIN D3) 125 MCG
1 CAPSULE ORAL
Refills: 0 | DISCHARGE

## 2023-08-02 RX ORDER — LANOLIN ALCOHOL/MO/W.PET/CERES
1 CREAM (GRAM) TOPICAL
Qty: 0 | Refills: 0 | DISCHARGE

## 2023-08-02 RX ORDER — BECLOMETHASONE DIPROPIONATE 42 MCG
1 AEROSOL, SPRAY (GRAM) NASAL
Qty: 0 | Refills: 0 | DISCHARGE

## 2023-08-02 RX ORDER — RITONAVIR 100 MG/1
1 TABLET, FILM COATED ORAL
Qty: 0 | Refills: 0 | DISCHARGE
Start: 2023-08-02

## 2023-08-02 RX ORDER — BACLOFEN 100 %
1 POWDER (GRAM) MISCELLANEOUS
Refills: 0 | DISCHARGE

## 2023-08-02 RX ORDER — CHOLECALCIFEROL (VITAMIN D3) 125 MCG
2000 CAPSULE ORAL
Qty: 0 | Refills: 0 | DISCHARGE
Start: 2023-08-02

## 2023-08-02 RX ADMIN — DARUNAVIR 800 MILLIGRAM(S): 75 TABLET, FILM COATED ORAL at 12:19

## 2023-08-02 RX ADMIN — Medication 2000 UNIT(S): at 12:19

## 2023-08-02 RX ADMIN — RITONAVIR 100 MILLIGRAM(S): 100 TABLET, FILM COATED ORAL at 12:46

## 2023-08-02 RX ADMIN — CHLORHEXIDINE GLUCONATE 1 APPLICATION(S): 213 SOLUTION TOPICAL at 12:19

## 2023-08-02 RX ADMIN — EMTRICITABINE AND TENOFOVIR DISOPROXIL FUMARATE 1 TABLET(S): 200; 300 TABLET, FILM COATED ORAL at 12:19

## 2023-08-02 RX ADMIN — FINASTERIDE 5 MILLIGRAM(S): 5 TABLET, FILM COATED ORAL at 12:18

## 2023-08-02 RX ADMIN — PIPERACILLIN AND TAZOBACTAM 25 GRAM(S): 4; .5 INJECTION, POWDER, LYOPHILIZED, FOR SOLUTION INTRAVENOUS at 05:38

## 2023-08-02 RX ADMIN — AMLODIPINE BESYLATE 5 MILLIGRAM(S): 2.5 TABLET ORAL at 05:39

## 2023-08-02 RX ADMIN — Medication 1 SPRAY(S): at 05:39

## 2023-08-02 NOTE — PROGRESS NOTE ADULT - PROBLEM SELECTOR PROBLEM 1
Complicated UTI (urinary tract infection)

## 2023-08-02 NOTE — DISCHARGE NOTE NURSING/CASE MANAGEMENT/SOCIAL WORK - PATIENT PORTAL LINK FT
You can access the FollowMyHealth Patient Portal offered by [facility] by registering at the following website: [email or web address]. By joining [employer]’s FollowMyHealth portal, you will also be able to view your health information using other applications (apps) compatible with our system.

## 2023-08-02 NOTE — PROGRESS NOTE ADULT - REASON FOR ADMISSION
hematuria

## 2023-08-02 NOTE — PROGRESS NOTE ADULT - PROBLEM SELECTOR PROBLEM 2
HIV (human immunodeficiency virus infection)
HIV (human immunodeficiency virus infection)
Hematuria
HIV (human immunodeficiency virus infection)
Hematuria
Hematuria
HIV (human immunodeficiency virus infection)
Hematuria

## 2023-08-02 NOTE — PROGRESS NOTE ADULT - PROBLEM SELECTOR PLAN 5
continue home meds darunavir, Descovy and ritonavir

## 2023-08-02 NOTE — PROGRESS NOTE ADULT - PROBLEM SELECTOR PLAN 2
Continue current HIV regimen (Darunavir/ritonavir & Descovy.)  Follows at our outpatient practice for longer-term management
urology help appreciated  discontinue IVF  TOV
urology help appreciated  continue to monitor
Continue current HIV regimen (Darunavir/ritonavir & Descovy.)  Follows at our outpatient practice for longer-term management
Continue current HIV regimen (Darunavir/ritonavir & Descovy.)  Follows at our outpatient practice for longer-term management
urology help appreciated  continue IVF for now  Wong placed  noticeably improved hematuria
urology help appreciated  discontinue IVF  ? TOV
Continue current HIV regimen (Darunavir/ritonavir & Descovy.)  Follows at our outpatient practice for longer-term management
urology help appreciated  continue to monitor

## 2023-08-02 NOTE — PROGRESS NOTE ADULT - PROBLEM SELECTOR PLAN 1
with sepsis developed after admission now resolved  ID follow up appreciated  continue po antibiotics Augmentin liquid till 8/4

## 2023-08-02 NOTE — PROGRESS NOTE ADULT - PROBLEM SELECTOR PLAN 4
supportive care   normal B12 folate

## 2023-08-02 NOTE — PROGRESS NOTE ADULT - PROVIDER SPECIALTY LIST ADULT
Internal Medicine
Urology
Urology
Infectious Disease
Internal Medicine
Infectious Disease
Internal Medicine

## 2023-08-02 NOTE — PROGRESS NOTE ADULT - PROBLEM SELECTOR PLAN 3
continue home meds with hold parameters  acceptable

## 2023-08-02 NOTE — PROGRESS NOTE ADULT - PROBLEM SELECTOR PLAN 6
supportive care  skin care
supportive care  skin care
PT evaluation not needed   supportive care  skin care
PT evaluation not needed   supportive care  skin care
supportive care  skin care
supportive care  skin care
PT evaluation not needed   supportive care  skin care

## 2023-08-02 NOTE — PROGRESS NOTE ADULT - PROBLEM SELECTOR PROBLEM 4
Alzheimer's disease of other onset

## 2023-08-02 NOTE — PROGRESS NOTE ADULT - SUBJECTIVE AND OBJECTIVE BOX
Patient is a 82y old  Male who presents with a chief complaint of hematuria (01 Aug 2023 18:20)      DATE OF SERVICE: 08-02-23 @ 16:43    SUBJECTIVE / OVERNIGHT EVENTS: overnight events noted    ROS:  no overnight events            MEDICATIONS  (STANDING):  amLODIPine   Tablet 5 milliGRAM(s) Oral daily  atorvastatin 10 milliGRAM(s) Oral at bedtime  chlorhexidine 2% Cloths 1 Application(s) Topical daily  cholecalciferol 2000 Unit(s) Oral daily  darunavir 800 milliGRAM(s) Oral daily  donepezil 10 milliGRAM(s) Oral at bedtime  emtricitabine 200 mG/tenofovir alafenamide 25 mG (DESCOVY) Tablet 1 Tablet(s) Oral daily  finasteride 5 milliGRAM(s) Oral daily  fluticasone propionate 50 MICROgram(s)/spray Nasal Spray 1 Spray(s) Both Nostrils two times a day  piperacillin/tazobactam IVPB.. 3.375 Gram(s) IV Intermittent every 8 hours  ritonavir Powder 100 milliGRAM(s) Oral daily  sodium chloride 0.9%. 2000 milliLiter(s) (50 mL/Hr) IV Continuous <Continuous>  tamsulosin 0.8 milliGRAM(s) Oral at bedtime    MEDICATIONS  (PRN):        CAPILLARY BLOOD GLUCOSE        I&O's Summary    01 Aug 2023 07:01  -  02 Aug 2023 07:00  --------------------------------------------------------  IN: 960 mL / OUT: 0 mL / NET: 960 mL        Vital Signs Last 24 Hrs  T(C): 37.2 (02 Aug 2023 11:05), Max: 37.3 (01 Aug 2023 20:00)  T(F): 99 (02 Aug 2023 11:05), Max: 99.1 (01 Aug 2023 20:00)  HR: 85 (02 Aug 2023 11:05) (85 - 100)  BP: 110/66 (02 Aug 2023 11:05) (110/66 - 117/68)  BP(mean): --  RR: 18 (02 Aug 2023 11:05) (18 - 18)  SpO2: 98% (02 Aug 2023 11:05) (98% - 98%)      PHYSICAL EXAM  Neck: Supple  Lungs: clear   CVS: S1 S2 no M/R/G  Abdomen: no tenderness  Neuro: alert  Ext: no edema    LABS:                        9.9    9.26  )-----------( 351      ( 01 Aug 2023 07:20 )             31.5     08-01    144  |  107  |  11  ----------------------------<  130<H>  4.0   |  28  |  0.55    Ca    8.7      01 Aug 2023 07:15            Urinalysis Basic - ( 01 Aug 2023 07:15 )    Color: x / Appearance: x / SG: x / pH: x  Gluc: 130 mg/dL / Ketone: x  / Bili: x / Urobili: x   Blood: x / Protein: x / Nitrite: x   Leuk Esterase: x / RBC: x / WBC x   Sq Epi: x / Non Sq Epi: x / Bacteria: x          All consultant(s) notes reviewed and care discussed with other providers        Contact Number, Dr Guerrero [phone]

## 2023-08-02 NOTE — DISCHARGE NOTE NURSING/CASE MANAGEMENT/SOCIAL WORK - NSDCPEFALRISK_GEN_ALL_CORE
For information on Fall & Injury Prevention, visit: [email or web address] OR  [email or web address] OR  https://www.cdc.gov/steadi/patient.html

## 2023-08-02 NOTE — PROGRESS NOTE ADULT - NUTRITIONAL ASSESSMENT
This patient has been assessed with a concern for Malnutrition and has been determined to have a diagnosis/diagnoses of Moderate protein-calorie malnutrition.    This patient is being managed with:   Diet Pureed-  DASH/TLC {Sodium & Cholesterol Restricted} (DASH)  Entered: Jul 26 2023  9:54AM  

## 2023-08-08 ENCOUNTER — NON-APPOINTMENT (OUTPATIENT)
Age: 83
End: 2023-08-08

## 2023-08-23 ENCOUNTER — APPOINTMENT (OUTPATIENT)
Dept: INFECTIOUS DISEASE | Facility: CLINIC | Age: 83
End: 2023-08-23

## 2023-09-21 ENCOUNTER — NON-APPOINTMENT (OUTPATIENT)
Age: 83
End: 2023-09-21

## 2023-09-21 NOTE — PROGRESS NOTE ADULT - MINUTES
Medication: topiramate (TOPAMAX) 50 MG tablet  Last office visit date: 06/30/2023  Next appointment scheduled?: No; no f/u on file    Number of refills given: 90 with 0 refills      
45
50
45

## 2023-09-27 ENCOUNTER — TRANSCRIPTION ENCOUNTER (OUTPATIENT)
Age: 83
End: 2023-09-27

## 2023-10-30 ENCOUNTER — NON-APPOINTMENT (OUTPATIENT)
Age: 83
End: 2023-10-30

## 2023-11-03 NOTE — PHYSICAL THERAPY INITIAL EVALUATION ADULT - MANUAL MUSCLE TESTING RESULTS, REHAB EVAL
Annel from [facility] in [place] is requesting a call back at [phone] to discuss patient's labs for upcoming procedure on 11/06/2023 with Dr. Castle.   functionally at least 3/5 t/o/grossly assessed due to

## 2023-11-15 ENCOUNTER — APPOINTMENT (OUTPATIENT)
Dept: INFECTIOUS DISEASE | Facility: CLINIC | Age: 83
End: 2023-11-15

## 2024-01-16 ENCOUNTER — TRANSCRIPTION ENCOUNTER (OUTPATIENT)
Age: 84
End: 2024-01-16

## 2024-02-03 NOTE — ED ADULT TRIAGE NOTE - WEIGHT METHOD
Called by [facility] to pronounce patient                                                                                     Death Note      Called to bedside by patient's nurse. Family at bedside.     Patient is not responding to verbal or tactile stimuli.   Patient does not have a papillary or corneal reflex.   Patient's pupils are fixed and dilated.   No heart or breath sounds on auscultation.   No respirations.   No palpable pulses.     Time of death 2255.     Cause of Death cardiovascular collapse      Milan Jones MD  Internal Medicine Resident  [email or web address]  
stated

## 2024-03-07 NOTE — H&P ADULT. - NEUROLOGICAL SYMPTOMS
weakness Dapsone Pregnancy And Lactation Text: This medication is Pregnancy Category C and is not considered safe during pregnancy or breast feeding. Hydroxychloroquine Counseling:  I discussed with the patient that a baseline ophthalmologic exam is needed at the start of therapy and every year thereafter while on therapy. A CBC may also be warranted for monitoring.  The side effects of this medication were discussed with the patient, including but not limited to agranulocytosis, aplastic anemia, seizures, rashes, retinopathy, and liver toxicity. Patient instructed to call the office should any adverse effect occur.  The patient verbalized understanding of the proper use and possible adverse effects of Plaquenil.  All the patient's questions and concerns were addressed. Sski Pregnancy And Lactation Text: This medication is Pregnancy Category D and isn't considered safe during pregnancy. It is excreted in breast milk. Stelara Pregnancy And Lactation Text: This medication is Pregnancy Category B and is considered safe during pregnancy. It is unknown if this medication is excreted in breast milk. Isotretinoin Pregnancy And Lactation Text: This medication is Pregnancy Category X and is considered extremely dangerous during pregnancy. It is unknown if it is excreted in breast milk. Minoxidil Pregnancy And Lactation Text: This medication has not been assigned a Pregnancy Risk Category but animal studies failed to show danger with the topical medication. It is unknown if the medication is excreted in breast milk. Zyclara Pregnancy And Lactation Text: This medication is Pregnancy Category C. It is unknown if this medication is excreted in breast milk. Quinolones Pregnancy And Lactation Text: This medication is Pregnancy Category C and it isn't know if it is safe during pregnancy. It is also excreted in breast milk. Tremfya Counseling: I discussed with the patient the risks of guselkumab including but not limited to immunosuppression, serious infections, worsening of inflammatory bowel disease and drug reactions.  The patient understands that monitoring is required including a PPD at baseline and must alert us or the primary physician if symptoms of infection or other concerning signs are noted. Doxycycline Counseling:  Patient counseled regarding possible photosensitivity and increased risk for sunburn.  Patient instructed to avoid sunlight, if possible.  When exposed to sunlight, patients should wear protective clothing, sunglasses, and sunscreen.  The patient was instructed to call the office immediately if the following severe adverse effects occur:  hearing changes, easy bruising/bleeding, severe headache, or vision changes.  The patient verbalized understanding of the proper use and possible adverse effects of doxycycline.  All of the patient's questions and concerns were addressed. Acitretin Counseling:  I discussed with the patient the risks of acitretin including but not limited to hair loss, dry lips/skin/eyes, liver damage, hyperlipidemia, depression/suicidal ideation, photosensitivity.  Serious rare side effects can include but are not limited to pancreatitis, pseudotumor cerebri, bony changes, clot formation/stroke/heart attack.  Patient understands that alcohol is contraindicated since it can result in liver toxicity and significantly prolong the elimination of the drug by many years. Infliximab Counseling:  I discussed with the patient the risks of infliximab including but not limited to myelosuppression, immunosuppression, autoimmune hepatitis, demyelinating diseases, lymphoma, and serious infections.  The patient understands that monitoring is required including a PPD at baseline and must alert us or the primary physician if symptoms of infection or other concerning signs are noted. Valtrex Pregnancy And Lactation Text: this medication is Pregnancy Category B and is considered safe during pregnancy. This medication is not directly found in breast milk but it's metabolite acyclovir is present. Zyclara Counseling:  I discussed with the patient the risks of imiquimod including but not limited to erythema, scaling, itching, weeping, crusting, and pain.  Patient understands that the inflammatory response to imiquimod is variable from person to person and was educated regarded proper titration schedule.  If flu-like symptoms develop, patient knows to discontinue the medication and contact us. Siliq Pregnancy And Lactation Text: The risk during pregnancy and breastfeeding is uncertain with this medication. Tetracycline Pregnancy And Lactation Text: This medication is Pregnancy Category D and not consider safe during pregnancy. It is also excreted in breast milk. Oxybutynin Counseling:  I discussed with the patient the risks of oxybutynin including but not limited to skin rash, drowsiness, dry mouth, difficulty urinating, and blurred vision. Bexarotene Counseling:  I discussed with the patient the risks of bexarotene including but not limited to hair loss, dry lips/skin/eyes, liver abnormalities, hyperlipidemia, pancreatitis, depression/suicidal ideation, photosensitivity, drug rash/allergic reactions, hypothyroidism, anemia, leukopenia, infection, cataracts, and teratogenicity.  Patient understands that they will need regular blood tests to check lipid profile, liver function tests, white blood cell count, thyroid function tests and pregnancy test if applicable. Gabapentin Pregnancy And Lactation Text: This medication is Pregnancy Category C and isn't considered safe during pregnancy. It is excreted in breast milk. Hydroxychloroquine Pregnancy And Lactation Text: This medication has been shown to cause fetal harm but it isn't assigned a Pregnancy Risk Category. There are small amounts excreted in breast milk. Carac Counseling:  I discussed with the patient the risks of Carac including but not limited to erythema, scaling, itching, weeping, crusting, and pain. Stelara Counseling:  I discussed with the patient the risks of ustekinumab including but not limited to immunosuppression, malignancy, posterior leukoencephalopathy syndrome, and serious infections.  The patient understands that monitoring is required including a PPD at baseline and must alert us or the primary physician if symptoms of infection or other concerning signs are noted. Erivedge Counseling- I discussed with the patient the risks of Erivedge including but not limited to nausea, vomiting, diarrhea, constipation, weight loss, changes in the sense of taste, decreased appetite, muscle spasms, and hair loss.  The patient verbalized understanding of the proper use and possible adverse effects of Erivedge.  All of the patient's questions and concerns were addressed. SSKI Counseling:  I discussed with the patient the risks of SSKI including but not limited to thyroid abnormalities, metallic taste, GI upset, fever, headache, acne, arthralgias, paraesthesias, lymphadenopathy, easy bleeding, arrhythmias, and allergic reaction. Azathioprine Pregnancy And Lactation Text: This medication is Pregnancy Category D and isn't considered safe during pregnancy. It is unknown if this medication is excreted in breast milk. Topical Sulfur Applications Counseling: Topical Sulfur Counseling: Patient counseled that this medication may cause skin irritation or allergic reactions.  In the event of skin irritation, the patient was advised to reduce the amount of the drug applied or use it less frequently.   The patient verbalized understanding of the proper use and possible adverse effects of topical sulfur application.  All of the patient's questions and concerns were addressed. Benzoyl Peroxide Pregnancy And Lactation Text: This medication is Pregnancy Category C. It is unknown if benzoyl peroxide is excreted in breast milk. Elidel Counseling: Patient may experience a mild burning sensation during topical application. Elidel is not approved in children less than 2 years of age. There have been case reports of hematologic and skin malignancies in patients using topical calcineurin inhibitors although causality is questionable. Enbrel Counseling:  I discussed with the patient the risks of etanercept including but not limited to myelosuppression, immunosuppression, autoimmune hepatitis, demyelinating diseases, lymphoma, and infections.  The patient understands that monitoring is required including a PPD at baseline and must alert us or the primary physician if symptoms of infection or other concerning signs are noted. Spironolactone Pregnancy And Lactation Text: This medication can cause feminization of the male fetus and should be avoided during pregnancy. The active metabolite is also found in breast milk. Terbinafine Pregnancy And Lactation Text: This medication is Pregnancy Category B and is considered safe during pregnancy. It is also excreted in breast milk and breast feeding isn't recommended. Birth Control Pills Pregnancy And Lactation Text: This medication should be avoided if pregnant and for the first 30 days post-partum. Prednisone Pregnancy And Lactation Text: This medication is Pregnancy Category C and it isn't know if it is safe during pregnancy. This medication is excreted in breast milk. Bactrim Counseling:  I discussed with the patient the risks of sulfa antibiotics including but not limited to GI upset, allergic reaction, drug rash, diarrhea, dizziness, photosensitivity, and yeast infections.  Rarely, more serious reactions can occur including but not limited to aplastic anemia, agranulocytosis, methemoglobinemia, blood dyscrasias, liver or kidney failure, lung infiltrates or desquamative/blistering drug rashes. High Dose Vitamin A Pregnancy And Lactation Text: High dose vitamin A therapy is contraindicated during pregnancy and breast feeding. Arava Counseling:  Patient counseled regarding adverse effects of Arava including but not limited to nausea, vomiting, abnormalities in liver function tests. Patients may develop mouth sores, rash, diarrhea, and abnormalities in blood counts. The patient understands that monitoring is required including LFTs and blood counts.  There is a rare possibility of scarring of the liver and lung problems that can occur when taking methotrexate. Persistent nausea, loss of appetite, pale stools, dark urine, cough, and shortness of breath should be reported immediately. Patient advised to discontinue Arava treatment and consult with a physician prior to attempting conception. The patient will have to undergo a treatment to eliminate Arava from the body prior to conception. Odomzo Counseling- I discussed with the patient the risks of Odomzo including but not limited to nausea, vomiting, diarrhea, constipation, weight loss, changes in the sense of taste, decreased appetite, muscle spasms, and hair loss.  The patient verbalized understanding of the proper use and possible adverse effects of Odomzo.  All of the patient's questions and concerns were addressed. Acitretin Pregnancy And Lactation Text: This medication is Pregnancy Category X and should not be given to women who are pregnant or may become pregnant in the future. This medication is excreted in breast milk. Protopic Pregnancy And Lactation Text: This medication is Pregnancy Category C. It is unknown if this medication is excreted in breast milk when applied topically. Tazorac Counseling:  Patient advised that medication is irritating and drying.  Patient may need to apply sparingly and wash off after an hour before eventually leaving it on overnight.  The patient verbalized understanding of the proper use and possible adverse effects of tazorac.  All of the patient's questions and concerns were addressed. Dapsone Counseling: I discussed with the patient the risks of dapsone including but not limited to hemolytic anemia, agranulocytosis, rashes, methemoglobinemia, kidney failure, peripheral neuropathy, headaches, GI upset, and liver toxicity.  Patients who start dapsone require monitoring including baseline LFTs and weekly CBCs for the first month, then every month thereafter.  The patient verbalized understanding of the proper use and possible adverse effects of dapsone.  All of the patient's questions and concerns were addressed. Cimetidine Counseling:  I discussed with the patient the risks of Cimetidine including but not limited to gynecomastia, headache, diarrhea, nausea, drowsiness, arrhythmias, pancreatitis, skin rashes, psychosis, bone marrow suppression and kidney toxicity. Erivedge Pregnancy And Lactation Text: This medication is Pregnancy Category X and is absolutely contraindicated during pregnancy. It is unknown if it is excreted in breast milk. Include Pregnancy/Lactation Warning?: No Taltz Counseling: I discussed with the patient the risks of ixekizumab including but not limited to immunosuppression, serious infections, worsening of inflammatory bowel disease and drug reactions.  The patient understands that monitoring is required including a PPD at baseline and must alert us or the primary physician if symptoms of infection or other concerning signs are noted. Drysol Pregnancy And Lactation Text: This medication is considered safe during pregnancy and breast feeding. Albendazole Pregnancy And Lactation Text: This medication is Pregnancy Category C and it isn't known if it is safe during pregnancy. It is also excreted in breast milk. Azithromycin Pregnancy And Lactation Text: This medication is considered safe during pregnancy and is also secreted in breast milk. Xolair Counseling:  Patient informed of potential adverse effects including but not limited to fever, muscle aches, rash and allergic reactions.  The patient verbalized understanding of the proper use and possible adverse effects of Xolair.  All of the patient's questions and concerns were addressed. Skyrizi Counseling: I discussed with the patient the risks of risankizumab-rzaa including but not limited to immunosuppression, and serious infections.  The patient understands that monitoring is required including a PPD at baseline and must alert us or the primary physician if symptoms of infection or other concerning signs are noted. Bactrim Pregnancy And Lactation Text: This medication is Pregnancy Category D and is known to cause fetal risk.  It is also excreted in breast milk. Carac Pregnancy And Lactation Text: This medication is Pregnancy Category X and contraindicated in pregnancy and in women who may become pregnant. It is unknown if this medication is excreted in breast milk. Rituxan Counseling:  I discussed with the patient the risks of Rituxan infusions. Side effects can include infusion reactions, severe drug rashes including mucocutaneous reactions, reactivation of latent hepatitis and other infections and rarely progressive multifocal leukoencephalopathy.  All of the patient's questions and concerns were addressed. Valtrex Counseling: I discussed with the patient the risks of valacyclovir including but not limited to kidney damage, nausea, vomiting and severe allergy.  The patient understands that if the infection seems to be worsening or is not improving, they are to call. 5-Fu Counseling: 5-Fluorouracil Counseling:  I discussed with the patient the risks of 5-fluorouracil including but not limited to erythema, scaling, itching, weeping, crusting, and pain. Rifampin Pregnancy And Lactation Text: This medication is Pregnancy Category C and it isn't know if it is safe during pregnancy. It is also excreted in breast milk and should not be used if you are breast feeding. Cimzia Counseling:  I discussed with the patient the risks of Cimzia including but not limited to immunosuppression, allergic reactions and infections.  The patient understands that monitoring is required including a PPD at baseline and must alert us or the primary physician if symptoms of infection or other concerning signs are noted. Minocycline Counseling: Patient advised regarding possible photosensitivity and discoloration of the teeth, skin, lips, tongue and gums.  Patient instructed to avoid sunlight, if possible.  When exposed to sunlight, patients should wear protective clothing, sunglasses, and sunscreen.  The patient was instructed to call the office immediately if the following severe adverse effects occur:  hearing changes, easy bruising/bleeding, severe headache, or vision changes.  The patient verbalized understanding of the proper use and possible adverse effects of minocycline.  All of the patient's questions and concerns were addressed. Itraconazole Counseling:  I discussed with the patient the risks of itraconazole including but not limited to liver damage, nausea/vomiting, neuropathy, and severe allergy.  The patient understands that this medication is best absorbed when taken with acidic beverages such as non-diet cola or ginger ale.  The patient understands that monitoring is required including baseline LFTs and repeat LFTs at intervals.  The patient understands that they are to contact us or the primary physician if concerning signs are noted. Ketoconazole Pregnancy And Lactation Text: This medication is Pregnancy Category C and it isn't know if it is safe during pregnancy. It is also excreted in breast milk and breast feeding isn't recommended. Cyclophosphamide Counseling:  I discussed with the patient the risks of cyclophosphamide including but not limited to hair loss, hormonal abnormalities, decreased fertility, abdominal pain, diarrhea, nausea and vomiting, bone marrow suppression and infection. The patient understands that monitoring is required while taking this medication. Imiquimod Counseling:  I discussed with the patient the risks of imiquimod including but not limited to erythema, scaling, itching, weeping, crusting, and pain.  Patient understands that the inflammatory response to imiquimod is variable from person to person and was educated regarded proper titration schedule.  If flu-like symptoms develop, patient knows to discontinue the medication and contact us. Erythromycin Pregnancy And Lactation Text: This medication is Pregnancy Category B and is considered safe during pregnancy. It is also excreted in breast milk. Griseofulvin Pregnancy And Lactation Text: This medication is Pregnancy Category X and is known to cause serious birth defects. It is unknown if this medication is excreted in breast milk but breast feeding should be avoided. Colchicine Counseling:  Patient counseled regarding adverse effects including but not limited to stomach upset (nausea, vomiting, stomach pain, or diarrhea).  Patient instructed to limit alcohol consumption while taking this medication.  Colchicine may reduce blood counts especially with prolonged use.  The patient understands that monitoring of kidney function and blood counts may be required, especially at baseline. The patient verbalized understanding of the proper use and possible adverse effects of colchicine.  All of the patient's questions and concerns were addressed. Clofazimine Counseling:  I discussed with the patient the risks of clofazimine including but not limited to skin and eye pigmentation, liver damage, nausea/vomiting, gastrointestinal bleeding and allergy. Rituxan Pregnancy And Lactation Text: This medication is Pregnancy Category C and it isn't know if it is safe during pregnancy. It is unknown if this medication is excreted in breast milk but similar antibodies are known to be excreted. Metronidazole Counseling:  I discussed with the patient the risks of metronidazole including but not limited to seizures, nausea/vomiting, a metallic taste in the mouth, nausea/vomiting and severe allergy. Doxepin Counseling:  Patient advised that the medication is sedating and not to drive a car after taking this medication. Patient informed of potential adverse effects including but not limited to dry mouth, urinary retention, and blurry vision.  The patient verbalized understanding of the proper use and possible adverse effects of doxepin.  All of the patient's questions and concerns were addressed. Dupixent Pregnancy And Lactation Text: This medication likely crosses the placenta but the risk for the fetus is uncertain. This medication is excreted in breast milk. Quinolones Counseling:  I discussed with the patient the risks of fluoroquinolones including but not limited to GI upset, allergic reaction, drug rash, diarrhea, dizziness, photosensitivity, yeast infections, liver function test abnormalities, tendonitis/tendon rupture. Hydroxyzine Pregnancy And Lactation Text: This medication is not safe during pregnancy and should not be taken. It is also excreted in breast milk and breast feeding isn't recommended. Clindamycin Counseling: I counseled the patient regarding use of clindamycin as an antibiotic for prophylactic and/or therapeutic purposes. Clindamycin is active against numerous classes of bacteria, including skin bacteria. Side effects may include nausea, diarrhea, gastrointestinal upset, rash, hives, yeast infections, and in rare cases, colitis. Griseofulvin Counseling:  I discussed with the patient the risks of griseofulvin including but not limited to photosensitivity, cytopenia, liver damage, nausea/vomiting and severe allergy.  The patient understands that this medication is best absorbed when taken with a fatty meal (e.g., ice cream or french fries). Rifampin Counseling: I discussed with the patient the risks of rifampin including but not limited to liver damage, kidney damage, red-orange body fluids, nausea/vomiting and severe allergy. Dupixent Counseling: I discussed with the patient the risks of dupilumab including but not limited to eye infection and irritation, cold sores, injection site reactions, worsening of asthma, allergic reactions and increased risk of parasitic infection.  Live vaccines should be avoided while taking dupilumab. Dupilumab will also interact with certain medications such as warfarin and cyclosporine. The patient understands that monitoring is required and they must alert us or the primary physician if symptoms of infection or other concerning signs are noted. Albendazole Counseling:  I discussed with the patient the risks of albendazole including but not limited to cytopenia, kidney damage, nausea/vomiting and severe allergy.  The patient understands that this medication is being used in an off-label manner. Siliq Counseling:  I discussed with the patient the risks of Siliq including but not limited to new or worsening depression, suicidal thoughts and behavior, immunosuppression, malignancy, posterior leukoencephalopathy syndrome, and serious infections.  The patient understands that monitoring is required including a PPD at baseline and must alert us or the primary physician if symptoms of infection or other concerning signs are noted. There is also a special program designed to monitor depression which is required with Siliq. Ilumya Counseling: I discussed with the patient the risks of tildrakizumab including but not limited to immunosuppression, malignancy, posterior leukoencephalopathy syndrome, and serious infections.  The patient understands that monitoring is required including a PPD at baseline and must alert us or the primary physician if symptoms of infection or other concerning signs are noted. Doxycycline Pregnancy And Lactation Text: This medication is Pregnancy Category D and not consider safe during pregnancy. It is also excreted in breast milk but is considered safe for shorter treatment courses. Humira Counseling:  I discussed with the patient the risks of adalimumab including but not limited to myelosuppression, immunosuppression, autoimmune hepatitis, demyelinating diseases, lymphoma, and serious infections.  The patient understands that monitoring is required including a PPD at baseline and must alert us or the primary physician if symptoms of infection or other concerning signs are noted. Glycopyrrolate Pregnancy And Lactation Text: This medication is Pregnancy Category B and is considered safe during pregnancy. It is unknown if it is excreted breast milk. Otezla Counseling: The side effects of Otezla were discussed with the patient, including but not limited to worsening or new depression, weight loss, diarrhea, nausea, upper respiratory tract infection, and headache. Patient instructed to call the office should any adverse effect occur.  The patient verbalized understanding of the proper use and possible adverse effects of Otezla.  All the patient's questions and concerns were addressed. Cephalexin Counseling: I counseled the patient regarding use of cephalexin as an antibiotic for prophylactic and/or therapeutic purposes. Cephalexin (commonly prescribed under brand name Keflex) is a cephalosporin antibiotic which is active against numerous classes of bacteria, including most skin bacteria. Side effects may include nausea, diarrhea, gastrointestinal upset, rash, hives, yeast infections, and in rare cases, hepatitis, kidney disease, seizures, fever, confusion, neurologic symptoms, and others. Patients with severe allergies to penicillin medications are cautioned that there is about a 10% incidence of cross-reactivity with cephalosporins. When possible, patients with penicillin allergies should use alternatives to cephalosporins for antibiotic therapy. Bexarotene Pregnancy And Lactation Text: This medication is Pregnancy Category X and should not be given to women who are pregnant or may become pregnant. This medication should not be used if you are breast feeding. Azithromycin Counseling:  I discussed with the patient the risks of azithromycin including but not limited to GI upset, allergic reaction, drug rash, diarrhea, and yeast infections. Cyclosporine Counseling:  I discussed with the patient the risks of cyclosporine including but not limited to hypertension, gingival hyperplasia,myelosuppression, immunosuppression, liver damage, kidney damage, neurotoxicity, lymphoma, and serious infections. The patient understands that monitoring is required including baseline blood pressure, CBC, CMP, lipid panel and uric acid, and then 1-2 times monthly CMP and blood pressure. Metronidazole Pregnancy And Lactation Text: This medication is Pregnancy Category B and considered safe during pregnancy.  It is also excreted in breast milk. Terbinafine Counseling: Patient counseling regarding adverse effects of terbinafine including but not limited to headache, diarrhea, rash, upset stomach, liver function test abnormalities, itching, taste/smell disturbance, nausea, abdominal pain, and flatulence.  There is a rare possibility of liver failure that can occur when taking terbinafine.  The patient understands that a baseline LFT and kidney function test may be required. The patient verbalized understanding of the proper use and possible adverse effects of terbinafine.  All of the patient's questions and concerns were addressed. Cellcept Counseling:  I discussed with the patient the risks of mycophenolate mofetil including but not limited to infection/immunosuppression, GI upset, hypokalemia, hypercholesterolemia, bone marrow suppression, lymphoproliferative disorders, malignancy, GI ulceration/bleed/perforation, colitis, interstitial lung disease, kidney failure, progressive multifocal leukoencephalopathy, and birth defects.  The patient understands that monitoring is required including a baseline creatinine and regular CBC testing. In addition, patient must alert us immediately if symptoms of infection or other concerning signs are noted. Birth Control Pills Counseling: Birth Control Pill Counseling: I discussed with the patient the potential side effects of OCPs including but not limited to increased risk of stroke, heart attack, thrombophlebitis, deep venous thrombosis, hepatic adenomas, breast changes, GI upset, headaches, and depression.  The patient verbalized understanding of the proper use and possible adverse effects of OCPs. All of the patient's questions and concerns were addressed. Glycopyrrolate Counseling:  I discussed with the patient the risks of glycopyrrolate including but not limited to skin rash, drowsiness, dry mouth, difficulty urinating, and blurred vision. Xelyuliz Pregnancy And Lactation Text: This medication is Pregnancy Category D and is not considered safe during pregnancy.  The risk during breast feeding is also uncertain. Eucrisa Counseling: Patient may experience a mild burning sensation during topical application. Eucrisa is not approved in children less than 2 years of age. Drysol Counseling:  I discussed with the patient the risks of drysol/aluminum chloride including but not limited to skin rash, itching, irritation, burning. Spironolactone Counseling: Patient advised regarding risks of diarrhea, abdominal pain, hyperkalemia, birth defects (for female patients), liver toxicity and renal toxicity. The patient may need blood work to monitor liver and kidney function and potassium levels while on therapy. The patient verbalized understanding of the proper use and possible adverse effects of spironolactone.  All of the patient's questions and concerns were addressed. Cyclophosphamide Pregnancy And Lactation Text: This medication is Pregnancy Category D and it isn't considered safe during pregnancy. This medication is excreted in breast milk. Prednisone Counseling:  I discussed with the patient the risks of prolonged use of prednisone including but not limited to weight gain, insomnia, osteoporosis, mood changes, diabetes, susceptibility to infection, glaucoma and high blood pressure.  In cases where prednisone use is prolonged, patients should be monitored with blood pressure checks, serum glucose levels and an eye exam.  Additionally, the patient may need to be placed on GI prophylaxis, PCP prophylaxis, and calcium and vitamin D supplementation and/or a bisphosphonate.  The patient verbalized understanding of the proper use and the possible adverse effects of prednisone.  All of the patient's questions and concerns were addressed. Topical Sulfur Applications Pregnancy And Lactation Text: This medication is Pregnancy Category C and has an unknown safety profile during pregnancy. It is unknown if this topical medication is excreted in breast milk. High Dose Vitamin A Counseling: Side effects reviewed, pt to contact office should one occur. Methotrexate Counseling:  Patient counseled regarding adverse effects of methotrexate including but not limited to nausea, vomiting, abnormalities in liver function tests. Patients may develop mouth sores, rash, diarrhea, and abnormalities in blood counts. The patient understands that monitoring is required including LFT's and blood counts.  There is a rare possibility of scarring of the liver and lung problems that can occur when taking methotrexate. Persistent nausea, loss of appetite, pale stools, dark urine, cough, and shortness of breath should be reported immediately. Patient advised to discontinue methotrexate treatment at least three months before attempting to become pregnant.  I discussed the need for folate supplements while taking methotrexate.  These supplements can decrease side effects during methotrexate treatment. The patient verbalized understanding of the proper use and possible adverse effects of methotrexate.  All of the patient's questions and concerns were addressed. Azathioprine Counseling:  I discussed with the patient the risks of azathioprine including but not limited to myelosuppression, immunosuppression, hepatotoxicity, lymphoma, and infections.  The patient understands that monitoring is required including baseline LFTs, Creatinine, possible TPMP genotyping and weekly CBCs for the first month and then every 2 weeks thereafter.  The patient verbalized understanding of the proper use and possible adverse effects of azathioprine.  All of the patient's questions and concerns were addressed. Otezla Pregnancy And Lactation Text: This medication is Pregnancy Category C and it isn't known if it is safe during pregnancy. It is unknown if it is excreted in breast milk. Simponi Counseling:  I discussed with the patient the risks of golimumab including but not limited to myelosuppression, immunosuppression, autoimmune hepatitis, demyelinating diseases, lymphoma, and serious infections.  The patient understands that monitoring is required including a PPD at baseline and must alert us or the primary physician if symptoms of infection or other concerning signs are noted. Nsaids Pregnancy And Lactation Text: These medications are considered safe up to 30 weeks gestation. It is excreted in breast milk. Oxybutynin Pregnancy And Lactation Text: This medication is Pregnancy Category B and is considered safe during pregnancy. It is unknown if it is excreted in breast milk. Gabapentin Counseling: I discussed with the patient the risks of gabapentin including but not limited to dizziness, somnolence, fatigue and ataxia. Xeljanz Counseling: I discussed with the patient the risks of Xeljanz therapy including increased risk of infection, liver issues, headache, diarrhea, or cold symptoms. Live vaccines should be avoided. They were instructed to call if they have any problems. Protopic Counseling: Patient may experience a mild burning sensation during topical application. Protopic is not approved in children less than 2 years of age. There have been case reports of hematologic and skin malignancies in patients using topical calcineurin inhibitors although causality is questionable. Picato Counseling:  I discussed with the patient the risks of Picato including but not limited to erythema, scaling, itching, weeping, crusting, and pain. Thalidomide Counseling: I discussed with the patient the risks of thalidomide including but not limited to birth defects, anxiety, weakness, chest pain, dizziness, cough and severe allergy. Ketoconazole Counseling:   Patient counseled regarding improving absorption with orange juice.  Adverse effects include but are not limited to breast enlargement, headache, diarrhea, nausea, upset stomach, liver function test abnormalities, taste disturbance, and stomach pain.  There is a rare possibility of liver failure that can occur when taking ketoconazole. The patient understands that monitoring of LFTs may be required, especially at baseline. The patient verbalized understanding of the proper use and possible adverse effects of ketoconazole.  All of the patient's questions and concerns were addressed. Tazorac Pregnancy And Lactation Text: This medication is not safe during pregnancy. It is unknown if this medication is excreted in breast milk. Nsaids Counseling: NSAID Counseling: I discussed with the patient that NSAIDs should be taken with food. Prolonged use of NSAIDs can result in the development of stomach ulcers.  Patient advised to stop taking NSAIDs if abdominal pain occurs.  The patient verbalized understanding of the proper use and possible adverse effects of NSAIDs.  All of the patient's questions and concerns were addressed. Ivermectin Counseling:  Patient instructed to take medication on an empty stomach with a full glass of water.  Patient informed of potential adverse effects including but not limited to nausea, diarrhea, dizziness, itching, and swelling of the extremities or lymph nodes.  The patient verbalized understanding of the proper use and possible adverse effects of ivermectin.  All of the patient's questions and concerns were addressed. Cimzia Pregnancy And Lactation Text: This medication crosses the placenta but can be considered safe in certain situations. Cimzia may be excreted in breast milk. Isotretinoin Counseling: Patient should get monthly blood tests, not donate blood, not drive at night if vision affected, not share medication, and not undergo elective surgery for 6 months after tx completed. Side effects reviewed, pt to contact office should one occur. Cephalexin Pregnancy And Lactation Text: This medication is Pregnancy Category B and considered safe during pregnancy.  It is also excreted in breast milk but can be used safely for shorter doses. Methotrexate Pregnancy And Lactation Text: This medication is Pregnancy Category X and is known to cause fetal harm. This medication is excreted in breast milk. Tetracycline Counseling: Patient counseled regarding possible photosensitivity and increased risk for sunburn.  Patient instructed to avoid sunlight, if possible.  When exposed to sunlight, patients should wear protective clothing, sunglasses, and sunscreen.  The patient was instructed to call the office immediately if the following severe adverse effects occur:  hearing changes, easy bruising/bleeding, severe headache, or vision changes.  The patient verbalized understanding of the proper use and possible adverse effects of tetracycline.  All of the patient's questions and concerns were addressed. Patient understands to avoid pregnancy while on therapy due to potential birth defects. Solaraze Counseling:  I discussed with the patient the risks of Solaraze including but not limited to erythema, scaling, itching, weeping, crusting, and pain. Solaraze Pregnancy And Lactation Text: This medication is Pregnancy Category B and is considered safe. There is some data to suggest avoiding during the third trimester. It is unknown if this medication is excreted in breast milk. Xolair Pregnancy And Lactation Text: This medication is Pregnancy Category B and is considered safe during pregnancy. This medication is excreted in breast milk. Topical Retinoid counseling:  Patient advised to apply a pea-sized amount only at bedtime and wait 30 minutes after washing their face before applying.  If too drying, patient may add a non-comedogenic moisturizer. The patient verbalized understanding of the proper use and possible adverse effects of retinoids.  All of the patient's questions and concerns were addressed. Hydroquinone Counseling:  Patient advised that medication may result in skin irritation, lightening (hypopigmentation), dryness, and burning.  In the event of skin irritation, the patient was advised to reduce the amount of the drug applied or use it less frequently.  Rarely, spots that are treated with hydroquinone can become darker (pseudoochronosis).  Should this occur, patient instructed to stop medication and call the office. The patient verbalized understanding of the proper use and possible adverse effects of hydroquinone.  All of the patient's questions and concerns were addressed. Hydroxyzine Counseling: Patient advised that the medication is sedating and not to drive a car after taking this medication.  Patient informed of potential adverse effects including but not limited to dry mouth, urinary retention, and blurry vision.  The patient verbalized understanding of the proper use and possible adverse effects of hydroxyzine.  All of the patient's questions and concerns were addressed. Minoxidil Counseling: Minoxidil is a topical medication which can increase blood flow where it is applied. It is uncertain how this medication increases hair growth. Side effects are uncommon and include stinging and allergic reactions. Topical Clindamycin Counseling: Patient counseled that this medication may cause skin irritation or allergic reactions.  In the event of skin irritation, the patient was advised to reduce the amount of the drug applied or use it less frequently.   The patient verbalized understanding of the proper use and possible adverse effects of clindamycin.  All of the patient's questions and concerns were addressed. Clindamycin Pregnancy And Lactation Text: This medication can be used in pregnancy if certain situations. Clindamycin is also present in breast milk. Cosentyx Counseling:  I discussed with the patient the risks of Cosentyx including but not limited to worsening of Crohn's disease, immunosuppression, allergic reactions and infections.  The patient understands that monitoring is required including a PPD at baseline and must alert us or the primary physician if symptoms of infection or other concerning signs are noted. Doxepin Pregnancy And Lactation Text: This medication is Pregnancy Category C and it isn't known if it is safe during pregnancy. It is also excreted in breast milk and breast feeding isn't recommended. Detail Level: Detailed Fluconazole Counseling:  Patient counseled regarding adverse effects of fluconazole including but not limited to headache, diarrhea, nausea, upset stomach, liver function test abnormalities, taste disturbance, and stomach pain.  There is a rare possibility of liver failure that can occur when taking fluconazole.  The patient understands that monitoring of LFTs and kidney function test may be required, especially at baseline. The patient verbalized understanding of the proper use and possible adverse effects of fluconazole.  All of the patient's questions and concerns were addressed. Erythromycin Counseling:  I discussed with the patient the risks of erythromycin including but not limited to GI upset, allergic reaction, drug rash, diarrhea, increase in liver enzymes, and yeast infections. Benzoyl Peroxide Counseling: Patient counseled that medicine may cause skin irritation and bleach clothing.  In the event of skin irritation, the patient was advised to reduce the amount of the drug applied or use it less frequently.   The patient verbalized understanding of the proper use and possible adverse effects of benzoyl peroxide.  All of the patient's questions and concerns were addressed.

## 2024-04-24 NOTE — PHYSICAL EXAM
What Is The Reason For Today's Visit?: Full Body Skin Examination What Is The Reason For Today's Visit? (Being Monitored For X): concerning skin lesions on an annual basis [General Appearance - Alert] : alert [General Appearance - In No Acute Distress] : in no acute distress [Cranial Nerves Oculomotor (III)] : extraocular motion intact [Cranial Nerves Facial (VII)] : face symmetrical [Involuntary Movements] : no involuntary movements were seen [Motor Handedness Right-Handed] : the patient is right hand dominant [Sensation Tactile Decrease] : light touch was intact [Extraocular Movements] : extraocular movements were intact [Outer Ear] : the ears and nose were normal in appearance [Neck Appearance] : the appearance of the neck was normal [Edema] : there was no peripheral edema [Skin Color & Pigmentation] : normal skin color and pigmentation [] : no rash [FreeTextEntry1] : EXAM LIMITED BY TEB SETTINGS. [Person] : disoriented to person [Place] : disoriented to place [Time] : disoriented to time [Short Term Intact] : short term memory impaired [Remote Intact] : remote memory impaired [Registration Intact] : recent registration memory impaired [Span Intact] : the attention span was decreased [Concentration Intact] : a decrease in concentrating ability was observed [Visual Intact] : visual attention was ~T ~L decreased [Naming Objects] : difficulty naming common objects [Repeating Phrases] : difficulty repeating a phrase [Writing A Sentence] : difficulty writing a sentence [Fluency] : fluency not intact [Comprehension] : comprehension not intact [Reading] : difficulty reading [Current Events] : inadequate knowledge of current events [Past History] : inadequate knowledge of personal past history [Vocabulary] : inadequate range of vocabulary [Paresis Pronator Drift Right-Sided] : no pronator drift on the right [Paresis Pronator Drift Left-Sided] : no pronator drift on the left [Motor Strength Upper Extremities Bilaterally] : strength was normal in both upper extremities [Motor Strength Lower Extremities Bilaterally] : strength was normal in both lower extremities [Limited Balance] : balance was intact [Past-pointing] : there was no past-pointing [Tremor] : no tremor present [Dysdiadochokinesia Bilaterally] : not present [Coordination - Dysmetria Impaired Finger-to-Nose Bilateral] : not present [Coordination - Dysmetria Impaired Heel-to-Shin Bilateral] : not present [Plantar Reflex Right Only] : normal on the right [Plantar Reflex Left Only] : normal on the left [FreeTextEntry4] : Exam very limited. Cannot talk, cannot obey any commands. \par Alert, looks around, but unable to focus on screen.\par Hands clenched, grabbing bed sheets and chewing them. \par UE are in flexion, hard to extend.  [FreeTextEntry9] : Unable to stand, LUE in triple flexion.

## 2024-06-07 NOTE — DIETITIAN INITIAL EVALUATION ADULT. - PROBLEM SELECTOR PLAN 3
Palliative Care Initial Consult   Attending Physician: Eli Francisco DO  Referring Provider: Eli Francisco      Reason for Referral:  assistance with clarification of goals of care    Code Status:   Code Status and Medical Interventions:   Ordered at: 06/01/24 1941     Medical Intervention Limits:    NO intubation (DNI)     Code Status (Patient has no pulse and is not breathing):    No CPR (Do Not Attempt to Resuscitate)     Medical Interventions (Patient has pulse or is breathing):    Limited Support      Advanced Directives: Advance Directive Status: Patient has advance directive, copy in chart   Family/Support: dtr/children     Goals of Care:    Goals of Care/Treatment Preferences:    Treat       HPI:   87 yo female with hx of dementia who resides in personal care presented to hospital after an unwitnessed fall.  There is reported increasing confusion over the last few months.  Reports with low BP at facility but normal in ED.  Has had decreased appetite and has required D5 for low blood sugar. CXR 6/4 for fever did demonstrate infiltrate.  EMR demonstrates no weight loss.   Planned for return to personal care with home health tomorrow.      ROS:   Denies pain or SOA        Past Medical History:   Diagnosis Date    Diabetes mellitus     Elevated cholesterol     Hypertension     Stroke      Past Surgical History:   Procedure Laterality Date    ERCP N/A 6/27/2018    Procedure: ENDOSCOPIC RETROGRADE CHOLANGIOPANCREATOGRAPHY;  Surgeon: Liam Manning MD;  Location: [facility] ENDOSCOPY;  Service: Gastroenterology    EXPLORATORY LAPAROTOMY N/A 2/10/2020    Procedure: LAPAROTOMY EXPLORATORY BOWEL RESECTION;  Surgeon: Carlos Zurita MD;  Location: [facility] OR;  Service: General;  Laterality: N/A;    HYSTERECTOMY       Social History     Socioeconomic History    Marital status:    Tobacco Use    Smoking status: Never    Smokeless tobacco: Never   Vaping Use    Vaping status: Never Used   Substance and  Sexual Activity    Alcohol use: No    Drug use: No     Family History   Problem Relation Age of Onset    Hypertension Mother     Hypertension Father     Early death Brother     Hypertension Maternal Grandmother     Hypertension Paternal Grandmother     Learning disabilities Other        Allergies   Allergen Reactions    Amoxicillin Hives     Has tolerated Zosyn    Atorvastatin Nausea Only    Cephalexin Rash     Has tolerated Zosyn         Current Facility-Administered Medications:     acetaminophen (TYLENOL) tablet 650 mg, 650 mg, Oral, Q4H PRN, 650 mg at 06/04/24 0843 **OR** acetaminophen (TYLENOL) 160 MG/5ML oral solution 650 mg, 650 mg, Oral, Q4H PRN, 650 mg at 06/06/24 1219 **OR** acetaminophen (TYLENOL) suppository 650 mg, 650 mg, Rectal, Q4H PRN, Carrie Arenas, JOESPH    aspirin chewable tablet 81 mg, 81 mg, Oral, Daily, Eli Francisco DO, 81 mg at 06/07/24 0929    Calcium Replacement - Follow Nurse / BPA Driven Protocol, , Does not apply, PRN, Zenia Lee, DO    cefdinir (OMNICEF) capsule 300 mg, 300 mg, Oral, Q12H, Eli Francisco DO, 300 mg at 06/07/24 0929    dextrose (D50W) (25 g/50 mL) IV injection 25 g, 25 g, Intravenous, Q15 Min PRN, Carrie Arenas APRN, 25 g at 06/07/24 0546    dextrose (GLUTOSE) oral gel 15 g, 15 g, Oral, Q15 Min PRN, Carrie Arenas, APRN, 15 g at 06/07/24 0516    dextrose 5 % and sodium chloride 0.9 % infusion, 50 mL/hr, Intravenous, Continuous, Eli Francisco DO, Last Rate: 50 mL/hr at 06/07/24 0830, 50 mL/hr at 06/07/24 0830    doxycycline (MONODOX) capsule 100 mg, 100 mg, Oral, Q12H, Eli Francisco DO, 100 mg at 06/07/24 0929    furosemide (LASIX) tablet 40 mg, 40 mg, Oral, Daily, Eli Francisco DO, 40 mg at 06/04/24 0844    glucagon (GLUCAGEN) injection 1 mg, 1 mg, Subcutaneous, Q15 Min PRN, Carrie Arenas, JOESPH    heparin (porcine) 5000 UNIT/ML injection 2,500 Units, 2,500 Units, Subcutaneous, Q12H, Carrie Arenas, JOESPH, 2,500 Units at 06/07/24 0929     "ipratropium-albuterol (DUO-NEB) nebulizer solution 3 mL, 3 mL, Nebulization, 4x Daily - RT, Stefan Rocha MD, 3 mL at 06/07/24 0727    lisinopril (PRINIVIL,ZESTRIL) tablet 20 mg, 20 mg, Oral, Daily, Zenia Lee P, DO, 20 mg at 06/07/24 0929    Magnesium Standard Dose Replacement - Follow Nurse / BPA Driven Protocol, , Does not apply, PRN, Zenia Lee P, DO    metoprolol tartrate (LOPRESSOR) half tablet 12.5 mg, 12.5 mg, Oral, Q12H, Eli Francisco DO, 12.5 mg at 06/07/24 0929    nystatin (MYCOSTATIN) powder, , Topical, Q12H, Eli Francisco DO, Given at 06/07/24 0929    ondansetron ODT (ZOFRAN-ODT) disintegrating tablet 4 mg, 4 mg, Oral, Q6H PRN **OR** ondansetron (ZOFRAN) injection 4 mg, 4 mg, Intravenous, Q6H PRN, Carrie Arenas, APRN    Phosphorus Replacement - Follow Nurse / BPA Driven Protocol, , Does not apply, PRN, Zenia Lee P, DO    Potassium Replacement - Follow Nurse / BPA Driven Protocol, , Does not apply, PRN, Zenia Lee P, DO    sodium chloride 0.9 % flush 10 mL, 10 mL, Intravenous, PRN, Carrie Arenas M, APRN    sodium chloride 0.9 % flush 10 mL, 10 mL, Intravenous, Q12H, Carrie Arenas, APRN, 10 mL at 06/07/24 0929    sodium chloride 0.9 % flush 10 mL, 10 mL, Intravenous, PRN, Carrie Arenas M, APRN    sodium chloride 0.9 % infusion 40 mL, 40 mL, Intravenous, PRN, Carrie Arenas M, APRN     Palliative Performance Scale Score:      /74 (BP Location: Left arm, Patient Position: Lying)   Pulse 100   Temp 97.6 °F (36.4 °C) (Oral)   Resp 18   Ht 157.5 cm (62\")   Wt 50.1 kg (110 lb 6.4 oz)   SpO2 97%   BMI 20.19 kg/m²     Intake/Output Summary (Last 24 hours) at 6/7/2024 1155  Last data filed at 6/7/2024 0040  Gross per 24 hour   Intake 360 ml   Output 900 ml   Net -540 ml       Physical Exam:    General Appearance:    Alert, cooperative, NAD   HEENT:    NC/AT, EOMI, anicteric, MMM, face relaxed   Neck:   supple, trachea midline, no JVD   Lungs:     CTA bilat, diminished " in bases; respirations regular, even     and unlabored    Heart:    RRR, normal S1 and S2, no M/R/G   Abdomen:     Normal bowel sounds, soft, nontender, nondistended   G/U:   Deferred   MSK/Extremities:   No clubbing , cyanosis or edema, No wasting   Pulses:   Pulses palpable and equal bilaterally   Skin:   Warm, dry, no mottling   Neurologic:   A/Ox1, cooperative, moves extremities x 4, no tremor, nl     Tone, speech confused but intelligible   Psych:   Calm, appropriate         Labs:   Results from last 7 days   Lab Units 06/05/24  0457   WBC 10*3/mm3 4.59   HEMOGLOBIN g/dL 11.4*   HEMATOCRIT % 34.4   PLATELETS 10*3/mm3 173     Results from last 7 days   Lab Units 06/06/24  1116 06/05/24  1803 06/05/24  0457 06/04/24  0524   SODIUM mmol/L  --   --  135* 135*   POTASSIUM mmol/L 3.8   < > 3.5 4.1   CHLORIDE mmol/L  --   --  106 106   CO2 mmol/L  --   --  20.0* 19.0*   BUN mg/dL  --   --  6* 5*   CREATININE mg/dL  --   --  0.42* 0.46*   CALCIUM mg/dL  --   --  7.5* 7.8*   BILIRUBIN mg/dL  --   --   --  0.6   ALK PHOS U/L  --   --   --  37*   ALT (SGPT) U/L  --   --   --  19   AST (SGOT) U/L  --   --   --  42*   GLUCOSE mg/dL  --   --  79 98    < > = values in this interval not displayed.     Imaging Results (Last 72 Hours)       Procedure Component Value Units Date/Time    XR Chest 1 View [[id number]] Collected: 06/06/24 2237     Updated: 06/06/24 2244    Narrative:      XR CHEST 1 VW    Date of Exam: 6/6/2024 10:34 PM EDT    Indication: SOB    Comparison: Chest radiograph 6/4/2024.    Findings:  Borderline-enlarged cardiac silhouette. Thoracic aortic calcifications. Perihilar predominant interstitial opacities, new/increased. Small bilateral pleural effusions, new on the right and increased on the left. Increased left lower lobe consolidation.   No pneumothorax. Osseous structures are unchanged.      Impression:      Impression:  New perihilar interstitial opacities suggestive of pulmonary vascular  congestion/interstitial edema, as well as new/increased small bilateral pleural effusions. Additionally, there is increased left lower lobe consolidation, which could represent   atelectasis and/or pneumonia.      Electronically Signed: Salvatore Feldman MD    6/6/2024 10:41 PM EDT    Workstation ID: [id number]    FL Video Swallow With Speech Single Contrast [[id number]] Collected: 06/06/24 1033     Updated: 06/06/24 1655    Narrative:      FL VIDEO SWALLOW W SPEECH SINGLE-CONTRAST    Date of Exam: 6/6/2024 9:36 AM EDT    Indication: dysphagia.       Comparison: None available.    Technique:   The speech pathologist administered food and/or liquid mixed with barium to the patient with cine/video imaging.  Imaging assistance was provided to the speech pathologist and an image was saved.    Fluoroscopic Time: 1 minute 18 seconds    Number of Images: 9 associated fluoroscopic loops were saved    Findings:  A single occurrence of aspiration was seen during fluoroscopic guided modified barium swallowing series. Please see speech therapy report for full details and recommendations.      Impression:      Impression:  Fluoroscopy provided for modified barium swallow. A single occurrence aspiration was seen during swallowing evaluation. Please see speech therapy report for full details and recommendations.      Report dictated by: Rachel Sarmiento PA-c      I have personally reviewed this case and agree with the findings above:    Electronically Signed: Rubén Persaud MD    6/6/2024 4:52 PM EDT    Workstation ID: [id number]              Lab 06/01/24  1728   HEMOGLOBIN A1C 5.30       Diagnostics:   No valid procedures specified.    A:     Weakness generalized    Hypertension    History of Stroke    Hyperlipidemia    Hypokalemia    Unwitnessed fall    History of diabetes mellitus, type II    Dementia without behavioral disturbance    Hypoglycemia    Generalized weakness    Moderate malnutrition    Community acquired pneumonia of left  lower lobe of lung         Impression:   Dementia  DM 2 - A1c 5.3  Fall  LLL PNA       Symptoms:  Debility       P:    At this time believe pt would best be served by palliative f/u at facility to determine any ongoing palliative needs.  No symptoms to manage at this time and uncertain of degree of decline.  If able to remain in personal care then pt does not meet hospice criteria.  Also planned for home health.  Weight changes are questionable in the available EMR and would be best followed up at facility for better idea of trend.  Will make referral. Thank you for this consult and allowing us to participate in patient's plan of care. Palliative Care Team will continue to follow patient.       Ijeoma Villalpando MD, 6/7/2024, 11:55 EDT       As per wife, patient dx in 2001, and compliant with ART for past 2-3 years; VL on last admission <30; CD4 327 (5/2018)  -c/w Truvada and Prezista with ritonavir   -ID consult in AM

## 2025-03-17 NOTE — PHYSICAL THERAPY INITIAL EVALUATION ADULT - LEVEL OF INDEPENDENCE: SCOOT/BRIDGE, REHAB EVAL
CLINICAL PHARMACY NOTE: MEDS TO BEDS    Total # of Prescriptions Filled: 2   The following medications were delivered to the patient:  Sore throat lozenges  Benzonatate 100 mg    Additional Documentation: Delivered to patient at 1517. Copay of $10.47 paid with cash.      minimum assist (75% patients effort)

## 2025-06-26 NOTE — PROGRESS NOTE ADULT - SUBJECTIVE AND OBJECTIVE BOX
Patient is a 76y old  Male who presents with a chief complaint of fever (02 Oct 2017 14:45)      INTERVAL HPI / OVERNIGHT EVENTS: denies any complaints ,lying comfortably in bed    MEDICATIONS  (STANDING):  amLODIPine   Tablet 5 milliGRAM(s) Oral daily  atorvastatin 10 milliGRAM(s) Oral at bedtime  azithromycin   Tablet 500 milliGRAM(s) Oral daily  darunavir 800 milliGRAM(s) Oral daily  dextrose 5% + sodium chloride 0.45%. 1000 milliLiter(s) (80 mL/Hr) IV Continuous <Continuous>  donepezil 10 milliGRAM(s) Oral at bedtime  emtricitabine 200 mG/tenofovir 300 mG (TRUVADA) 1 Tablet(s) Oral daily  finasteride 5 milliGRAM(s) Oral daily  heparin  Injectable 5000 Unit(s) SubCutaneous every 8 hours  influenza   Vaccine 0.5 milliLiter(s) IntraMuscular once  meropenem IVPB      meropenem IVPB 1000 milliGRAM(s) IV Intermittent once  meropenem IVPB 1000 milliGRAM(s) IV Intermittent every 8 hours  OLANZapine 2.5 milliGRAM(s) Oral daily  ritonavir Tablet 100 milliGRAM(s) Oral daily  tamsulosin 0.4 milliGRAM(s) Oral at bedtime  vancomycin  IVPB 1000 milliGRAM(s) IV Intermittent every 12 hours    MEDICATIONS  (PRN):  acetaminophen   Tablet 650 milliGRAM(s) Oral every 6 hours PRN For Temp greater than 38 C (100.4 F)  ALBUTerol/ipratropium for Nebulization 3 milliLiter(s) Nebulizer every 6 hours PRN Bronchospasm      Vital Signs Last 24 Hrs  T(C): 38.6 (04 Oct 2017 11:08), Max: 39 (03 Oct 2017 23:33)  T(F): 101.4 (04 Oct 2017 11:08), Max: 102.2 (03 Oct 2017 23:33)  HR: 91 (04 Oct 2017 11:08) (89 - 106)  BP: 126/68 (04 Oct 2017 11:08) (105/60 - 138/76)  BP(mean): --  RR: 18 (04 Oct 2017 11:08) (17 - 18)  SpO2: 98% (04 Oct 2017 11:08) (94% - 98%)    PHYSICAL EXAM:  General :NAD  Constitutional:  well-groomed, well-developed  Respiratory: CTAB/L  Cardiovascular: S1 and S2, RRR, no M/G/R  Gastrointestinal: BS+, soft, NT/ND  Extremities: No peripheral edema  Vascular: 2+ peripheral pulses  Skin: No rashes      LABS:                        11.1   13.0  )-----------( 296      ( 04 Oct 2017 07:59 )             32.5     10-04    139  |  105  |  13  ----------------------------<  147<H>  3.8   |  24  |  1.09    Ca    8.0<L>      04 Oct 2017 07:59  Phos  2.2     10-  Mg     2.5     10-    TPro  7.7  /  Alb  2.2<L>  /  TBili  0.4  /  DBili  x   /  AST  22  /  ALT  20  /  AlkPhos  138<H>  10-04    Urinalysis Basic - ( 02 Oct 2017 17:32 )    Color: Yellow / Appearance: Clear / S.005 / pH: x  Gluc: x / Ketone: Negative  / Bili: Negative / Urobili: Negative mg/dL   Blood: x / Protein: Negative mg/dL / Nitrite: Negative   Leuk Esterase: Negative / RBC: 3-5 /HPF / WBC 3-5   Sq Epi: x / Non Sq Epi: x / Bacteria: Occasional          MICROBIOLOGY:  RECENT CULTURES:  10-02 .Blood Blood-Venous XXXX XXXX   No growth to date.          RADIOLOGY & ADDITIONAL STUDIES: No (0)